# Patient Record
Sex: FEMALE | Race: WHITE | Employment: OTHER | URBAN - METROPOLITAN AREA
[De-identification: names, ages, dates, MRNs, and addresses within clinical notes are randomized per-mention and may not be internally consistent; named-entity substitution may affect disease eponyms.]

---

## 2017-02-01 RX ORDER — MONTELUKAST SODIUM 10 MG/1
TABLET ORAL
Qty: 30 TABLET | Refills: 5 | Status: SHIPPED | OUTPATIENT
Start: 2017-02-01 | End: 2017-07-31 | Stop reason: SDUPTHER

## 2017-02-14 ENCOUNTER — OFFICE VISIT (OUTPATIENT)
Dept: INTERNAL MEDICINE CLINIC | Age: 72
End: 2017-02-14

## 2017-02-14 VITALS
SYSTOLIC BLOOD PRESSURE: 130 MMHG | HEIGHT: 63 IN | DIASTOLIC BLOOD PRESSURE: 84 MMHG | RESPIRATION RATE: 16 BRPM | WEIGHT: 172 LBS | BODY MASS INDEX: 30.48 KG/M2 | OXYGEN SATURATION: 98 % | HEART RATE: 77 BPM

## 2017-02-14 DIAGNOSIS — L23.9 ALLERGIC DERMATITIS: ICD-10-CM

## 2017-02-14 DIAGNOSIS — K21.9 GASTROESOPHAGEAL REFLUX DISEASE WITHOUT ESOPHAGITIS: ICD-10-CM

## 2017-02-14 DIAGNOSIS — J44.9 CHRONIC OBSTRUCTIVE PULMONARY DISEASE, UNSPECIFIED COPD TYPE (HCC): ICD-10-CM

## 2017-02-14 LAB — HBA1C MFR BLD: 7 %

## 2017-02-14 PROCEDURE — 99214 OFFICE O/P EST MOD 30 MIN: CPT | Performed by: INTERNAL MEDICINE

## 2017-02-14 PROCEDURE — 83036 HEMOGLOBIN GLYCOSYLATED A1C: CPT | Performed by: INTERNAL MEDICINE

## 2017-02-14 RX ORDER — MOMETASONE FUROATE 1 MG/G
OINTMENT TOPICAL
Qty: 30 G | Refills: 1 | Status: SHIPPED | OUTPATIENT
Start: 2017-02-14 | End: 2019-08-21 | Stop reason: ALTCHOICE

## 2017-02-14 ASSESSMENT — ENCOUNTER SYMPTOMS
SORE THROAT: 0
GASTROINTESTINAL NEGATIVE: 1
COUGH: 0
SHORTNESS OF BREATH: 1
CHEST TIGHTNESS: 0
TROUBLE SWALLOWING: 0
WHEEZING: 0

## 2017-04-10 RX ORDER — GABAPENTIN 300 MG/1
CAPSULE ORAL
Qty: 90 CAPSULE | Refills: 5 | Status: SHIPPED | OUTPATIENT
Start: 2017-04-10 | End: 2018-05-02 | Stop reason: SDUPTHER

## 2017-05-16 ENCOUNTER — OFFICE VISIT (OUTPATIENT)
Dept: INTERNAL MEDICINE CLINIC | Age: 72
End: 2017-05-16

## 2017-05-16 VITALS
HEIGHT: 63 IN | WEIGHT: 167.2 LBS | HEART RATE: 72 BPM | RESPIRATION RATE: 16 BRPM | BODY MASS INDEX: 29.62 KG/M2 | DIASTOLIC BLOOD PRESSURE: 74 MMHG | SYSTOLIC BLOOD PRESSURE: 122 MMHG

## 2017-05-16 DIAGNOSIS — L23.9 ALLERGIC DERMATITIS: ICD-10-CM

## 2017-05-16 DIAGNOSIS — E78.5 HYPERLIPIDEMIA, UNSPECIFIED HYPERLIPIDEMIA TYPE: ICD-10-CM

## 2017-05-16 DIAGNOSIS — I10 ESSENTIAL HYPERTENSION: ICD-10-CM

## 2017-05-16 LAB
ALBUMIN SERPL-MCNC: 4.1 G/DL (ref 3.4–5)
ALP BLD-CCNC: 35 U/L (ref 40–129)
ALT SERPL-CCNC: 17 U/L (ref 10–40)
ANION GAP SERPL CALCULATED.3IONS-SCNC: 14 MMOL/L (ref 3–16)
AST SERPL-CCNC: 18 U/L (ref 15–37)
BASOPHILS ABSOLUTE: 0 K/UL (ref 0–0.2)
BASOPHILS RELATIVE PERCENT: 0.2 %
BILIRUB SERPL-MCNC: 0.4 MG/DL (ref 0–1)
BILIRUBIN DIRECT: <0.2 MG/DL (ref 0–0.3)
BILIRUBIN, INDIRECT: ABNORMAL MG/DL (ref 0–1)
BUN BLDV-MCNC: 15 MG/DL (ref 7–20)
CALCIUM SERPL-MCNC: 9.5 MG/DL (ref 8.3–10.6)
CHLORIDE BLD-SCNC: 99 MMOL/L (ref 99–110)
CHOLESTEROL, TOTAL: 127 MG/DL (ref 0–199)
CO2: 24 MMOL/L (ref 21–32)
CREAT SERPL-MCNC: 0.9 MG/DL (ref 0.6–1.2)
EOSINOPHILS ABSOLUTE: 0.2 K/UL (ref 0–0.6)
EOSINOPHILS RELATIVE PERCENT: 2.5 %
GFR AFRICAN AMERICAN: >60
GFR NON-AFRICAN AMERICAN: >60
GLUCOSE BLD-MCNC: 130 MG/DL (ref 70–99)
HBA1C MFR BLD: 6.9 %
HCT VFR BLD CALC: 38.4 % (ref 36–48)
HDLC SERPL-MCNC: 35 MG/DL (ref 40–60)
HEMOGLOBIN: 12.4 G/DL (ref 12–16)
LDL CHOLESTEROL CALCULATED: 60 MG/DL
LYMPHOCYTES ABSOLUTE: 2 K/UL (ref 1–5.1)
LYMPHOCYTES RELATIVE PERCENT: 31.3 %
MCH RBC QN AUTO: 30.1 PG (ref 26–34)
MCHC RBC AUTO-ENTMCNC: 32.3 G/DL (ref 31–36)
MCV RBC AUTO: 93.1 FL (ref 80–100)
MONOCYTES ABSOLUTE: 0.5 K/UL (ref 0–1.3)
MONOCYTES RELATIVE PERCENT: 7.5 %
NEUTROPHILS ABSOLUTE: 3.7 K/UL (ref 1.7–7.7)
NEUTROPHILS RELATIVE PERCENT: 58.5 %
PDW BLD-RTO: 14.5 % (ref 12.4–15.4)
PLATELET # BLD: 225 K/UL (ref 135–450)
PMV BLD AUTO: 9.1 FL (ref 5–10.5)
POTASSIUM SERPL-SCNC: 4.2 MMOL/L (ref 3.5–5.1)
RBC # BLD: 4.12 M/UL (ref 4–5.2)
SODIUM BLD-SCNC: 137 MMOL/L (ref 136–145)
TOTAL PROTEIN: 6.3 G/DL (ref 6.4–8.2)
TRIGL SERPL-MCNC: 160 MG/DL (ref 0–150)
VLDLC SERPL CALC-MCNC: 32 MG/DL
WBC # BLD: 6.3 K/UL (ref 4–11)

## 2017-05-16 PROCEDURE — 99213 OFFICE O/P EST LOW 20 MIN: CPT | Performed by: INTERNAL MEDICINE

## 2017-05-16 PROCEDURE — 83036 HEMOGLOBIN GLYCOSYLATED A1C: CPT | Performed by: INTERNAL MEDICINE

## 2017-05-16 PROCEDURE — 3288F FALL RISK ASSESSMENT DOCD: CPT | Performed by: INTERNAL MEDICINE

## 2017-05-16 PROCEDURE — 36415 COLL VENOUS BLD VENIPUNCTURE: CPT | Performed by: INTERNAL MEDICINE

## 2017-05-16 PROCEDURE — G8510 SCR DEP NEG, NO PLAN REQD: HCPCS | Performed by: INTERNAL MEDICINE

## 2017-05-16 ASSESSMENT — ENCOUNTER SYMPTOMS
COUGH: 0
WHEEZING: 0
GASTROINTESTINAL NEGATIVE: 1
CHEST TIGHTNESS: 0
SHORTNESS OF BREATH: 0

## 2017-05-16 ASSESSMENT — PATIENT HEALTH QUESTIONNAIRE - PHQ9
SUM OF ALL RESPONSES TO PHQ QUESTIONS 1-9: 0
SUM OF ALL RESPONSES TO PHQ9 QUESTIONS 1 & 2: 0
2. FEELING DOWN, DEPRESSED OR HOPELESS: 0
1. LITTLE INTEREST OR PLEASURE IN DOING THINGS: 0

## 2017-05-25 RX ORDER — LOSARTAN POTASSIUM 100 MG/1
TABLET ORAL
Qty: 90 TABLET | Refills: 1 | Status: SHIPPED | OUTPATIENT
Start: 2017-05-25 | End: 2017-11-21 | Stop reason: SDUPTHER

## 2017-06-14 ENCOUNTER — TELEPHONE (OUTPATIENT)
Dept: INTERNAL MEDICINE CLINIC | Age: 72
End: 2017-06-14

## 2017-07-31 RX ORDER — MONTELUKAST SODIUM 10 MG/1
TABLET ORAL
Qty: 30 TABLET | Refills: 5 | Status: SHIPPED | OUTPATIENT
Start: 2017-07-31 | End: 2018-01-24 | Stop reason: SDUPTHER

## 2017-08-23 ENCOUNTER — OFFICE VISIT (OUTPATIENT)
Dept: INTERNAL MEDICINE CLINIC | Age: 72
End: 2017-08-23

## 2017-08-23 VITALS
BODY MASS INDEX: 29.62 KG/M2 | DIASTOLIC BLOOD PRESSURE: 80 MMHG | RESPIRATION RATE: 16 BRPM | HEART RATE: 75 BPM | SYSTOLIC BLOOD PRESSURE: 120 MMHG | HEIGHT: 63 IN | WEIGHT: 167.2 LBS | OXYGEN SATURATION: 96 %

## 2017-08-23 DIAGNOSIS — I10 ESSENTIAL HYPERTENSION: ICD-10-CM

## 2017-08-23 LAB — HBA1C MFR BLD: 7.8 %

## 2017-08-23 PROCEDURE — 99213 OFFICE O/P EST LOW 20 MIN: CPT | Performed by: INTERNAL MEDICINE

## 2017-08-23 PROCEDURE — 83036 HEMOGLOBIN GLYCOSYLATED A1C: CPT | Performed by: INTERNAL MEDICINE

## 2017-08-23 ASSESSMENT — ENCOUNTER SYMPTOMS
SORE THROAT: 0
WHEEZING: 0
TROUBLE SWALLOWING: 0
SHORTNESS OF BREATH: 0
CHEST TIGHTNESS: 0
COUGH: 0
GASTROINTESTINAL NEGATIVE: 1

## 2017-10-11 ENCOUNTER — HOSPITAL ENCOUNTER (OUTPATIENT)
Dept: WOMENS IMAGING | Age: 72
Discharge: HOME | End: 2017-10-11
Attending: INTERNAL MEDICINE | Admitting: INTERNAL MEDICINE

## 2017-10-11 DIAGNOSIS — Z12.31 VISIT FOR SCREENING MAMMOGRAM: ICD-10-CM

## 2017-11-03 ENCOUNTER — OFFICE VISIT (OUTPATIENT)
Dept: INTERNAL MEDICINE CLINIC | Age: 72
End: 2017-11-03

## 2017-11-03 VITALS
HEART RATE: 66 BPM | BODY MASS INDEX: 29.91 KG/M2 | HEIGHT: 63 IN | OXYGEN SATURATION: 92 % | RESPIRATION RATE: 16 BRPM | WEIGHT: 168.8 LBS | SYSTOLIC BLOOD PRESSURE: 134 MMHG | DIASTOLIC BLOOD PRESSURE: 74 MMHG

## 2017-11-03 DIAGNOSIS — J98.01 BRONCHOSPASM: Primary | ICD-10-CM

## 2017-11-03 DIAGNOSIS — J20.8 ACUTE BRONCHITIS DUE TO OTHER SPECIFIED ORGANISMS: ICD-10-CM

## 2017-11-03 PROCEDURE — 99213 OFFICE O/P EST LOW 20 MIN: CPT | Performed by: INTERNAL MEDICINE

## 2017-11-03 RX ORDER — ALBUTEROL SULFATE 90 UG/1
2 AEROSOL, METERED RESPIRATORY (INHALATION)
Qty: 1 INHALER | Refills: 1 | Status: SHIPPED | OUTPATIENT
Start: 2017-11-03 | End: 2018-10-15 | Stop reason: SDUPTHER

## 2017-11-03 RX ORDER — PREDNISONE 10 MG/1
TABLET ORAL
Qty: 35 TABLET | Refills: 0 | Status: SHIPPED | OUTPATIENT
Start: 2017-11-03 | End: 2018-04-04 | Stop reason: ALTCHOICE

## 2017-11-03 RX ORDER — AZITHROMYCIN 250 MG/1
TABLET, FILM COATED ORAL
Qty: 1 PACKET | Refills: 0 | Status: SHIPPED | OUTPATIENT
Start: 2017-11-03 | End: 2017-11-13

## 2017-11-03 ASSESSMENT — ENCOUNTER SYMPTOMS: TROUBLE SWALLOWING: 0

## 2017-11-03 NOTE — PROGRESS NOTES
Subjective:      Patient ID: Marizol Carl is a 67 y.o. female. HPIfor 3 days started having cough and wheezing and difficulty braething  Has no sputum and has white nasal congestion and drainage  Felt feverish but did not take temp  Right  side of chest hurts when she pushes since her lung biopsy  Review of Systems   Constitutional: Negative for activity change, appetite change and unexpected weight change. HENT: Negative for trouble swallowing. Cardiovascular: Negative for chest pain, palpitations and leg swelling. Neurological: Negative for dizziness, light-headedness and headaches. has urinary frequency of small amounts for 1 month and has no pain or burning and has it all night and day  No incontinence  Objective:   Physical Exam   Constitutional: She is oriented to person, place, and time. No distress. HENT:   Nose: Nose normal.   Mouth/Throat: Oropharynx is clear and moist. No oropharyngeal exudate. Eyes: Conjunctivae and EOM are normal. Pupils are equal, round, and reactive to light. No scleral icterus. Neck: No JVD present. No thyromegaly present. Cardiovascular: Normal rate, regular rhythm and normal heart sounds. Exam reveals no gallop. No murmur heard. Pulmonary/Chest: No respiratory distress. Wheezes: has expiratory wheezes both luungs more with forced expiration. She has rales (few at bases and decaresed bs both lungs at bases). Musculoskeletal: She exhibits no edema. Lymphadenopathy:     She has no cervical adenopathy. Neurological: She is alert and oriented to person, place, and time. Nursing note and vitals reviewed. Assessment:      Acute bronchitis with exacerbation of asthma with bronchospasm      Plan:    start on zithromax  Start on albuterol inhaler 2 puffs every 4-6 hours as needed for wheezing spells. Start on prednisone in tapering doses.     See in 10 days

## 2017-11-06 RX ORDER — FENOFIBRATE 160 MG/1
TABLET ORAL
Qty: 90 TABLET | Refills: 1 | Status: SHIPPED | OUTPATIENT
Start: 2017-11-06 | End: 2018-05-05 | Stop reason: SDUPTHER

## 2017-11-13 ENCOUNTER — OFFICE VISIT (OUTPATIENT)
Dept: INTERNAL MEDICINE CLINIC | Age: 72
End: 2017-11-13

## 2017-11-13 ENCOUNTER — HOSPITAL ENCOUNTER (OUTPATIENT)
Dept: OTHER | Age: 72
Discharge: HOME | End: 2017-11-13
Attending: INTERNAL MEDICINE | Admitting: INTERNAL MEDICINE

## 2017-11-13 VITALS
SYSTOLIC BLOOD PRESSURE: 130 MMHG | DIASTOLIC BLOOD PRESSURE: 74 MMHG | HEART RATE: 70 BPM | HEIGHT: 63 IN | BODY MASS INDEX: 29.23 KG/M2 | OXYGEN SATURATION: 95 % | RESPIRATION RATE: 16 BRPM | WEIGHT: 165 LBS

## 2017-11-13 DIAGNOSIS — J44.1 ACUTE EXACERBATION OF CHRONIC OBSTRUCTIVE PULMONARY DISEASE (COPD) (HCC): ICD-10-CM

## 2017-11-13 DIAGNOSIS — R07.81 RIB PAIN ON RIGHT SIDE: ICD-10-CM

## 2017-11-13 LAB — HBA1C MFR BLD: 6.9 %

## 2017-11-13 PROCEDURE — 93000 ELECTROCARDIOGRAM COMPLETE: CPT | Performed by: INTERNAL MEDICINE

## 2017-11-13 PROCEDURE — 83036 HEMOGLOBIN GLYCOSYLATED A1C: CPT | Performed by: INTERNAL MEDICINE

## 2017-11-13 PROCEDURE — 99214 OFFICE O/P EST MOD 30 MIN: CPT | Performed by: INTERNAL MEDICINE

## 2017-11-13 RX ORDER — LEVOFLOXACIN 500 MG/1
500 TABLET, FILM COATED ORAL DAILY
Qty: 5 TABLET | Refills: 0 | Status: SHIPPED | OUTPATIENT
Start: 2017-11-13 | End: 2017-11-18

## 2017-11-13 RX ORDER — BUDESONIDE AND FORMOTEROL FUMARATE DIHYDRATE 160; 4.5 UG/1; UG/1
2 AEROSOL RESPIRATORY (INHALATION) 2 TIMES DAILY
Qty: 1 INHALER | Refills: 3 | Status: SHIPPED | OUTPATIENT
Start: 2017-11-13 | End: 2018-04-04

## 2017-11-13 ASSESSMENT — ENCOUNTER SYMPTOMS
CHEST TIGHTNESS: 1
COUGH: 1
WHEEZING: 1
SHORTNESS OF BREATH: 1
GASTROINTESTINAL NEGATIVE: 1
SORE THROAT: 1
TROUBLE SWALLOWING: 0

## 2017-11-13 NOTE — PROGRESS NOTES
Subjective:      Patient ID: Danilo White is a 67 y.o. female. HPI  Still has problems breathing and day and night and has cough with white sputum and is less than last time she was here   Has no fever or chills and has no orthopnea or pnd and sleeps well  Has no chest pain or heaviness in chest and has slight burning under her chest and is there all the time and not worse with eating or exertion  Has no heart burns  Has no other gi or gu symptoms. Review of Systems   Constitutional: Negative for chills and fever. HENT: Positive for sore throat. Negative for trouble swallowing. Sneezing: left side and hurst slightly with swallowing. Respiratory: Positive for cough, chest tightness, shortness of breath and wheezing. Cardiovascular: Negative for chest pain, palpitations and leg swelling. Gastrointestinal: Negative. Genitourinary: Negative. Neurological: Negative for dizziness, light-headedness and headaches. Objective:   Physical Exam   Constitutional: She is oriented to person, place, and time. No distress. HENT:   Nose: Nose normal.   Mouth/Throat: No oropharyngeal exudate. Eyes: Conjunctivae and EOM are normal. Pupils are equal, round, and reactive to light. No scleral icterus. Neck: No JVD present. No thyromegaly present. Cardiovascular: Normal rate, regular rhythm, normal heart sounds and intact distal pulses. Exam reveals no gallop. No murmur heard. Pulmonary/Chest: No respiratory distress. She has wheezes (both lungs and more with forced expiration and few scattered rhinch also heard both lungs). She has no rales. She exhibits tenderness (has rigth chest wall tenderness in infra axillary and lower anterior ribs). Musculoskeletal: She exhibits no edema. Lymphadenopathy:     She has no cervical adenopathy. Neurological: She is alert and oriented to person, place, and time. Nursing note and vitals reviewed.     ekg is normal  Assessment:      Exacerbation of copd

## 2017-11-13 NOTE — PATIENT INSTRUCTIONS
Will  get chest xray and xray of right ribs    Start on steroid inhaler -symbocort  Continue albuterol inhaler as needed for symptoms control  See in  10 days or sooner if symptoms get any worse.

## 2017-11-21 RX ORDER — LOSARTAN POTASSIUM 100 MG/1
TABLET ORAL
Qty: 90 TABLET | Refills: 1 | Status: SHIPPED | OUTPATIENT
Start: 2017-11-21 | End: 2018-05-20 | Stop reason: SDUPTHER

## 2017-11-27 ENCOUNTER — TELEPHONE (OUTPATIENT)
Dept: INTERNAL MEDICINE CLINIC | Age: 72
End: 2017-11-27

## 2017-11-27 NOTE — TELEPHONE ENCOUNTER
Transitional Care Management Service-  Initial Post-discharge Communication     Date of discharge: 11-27-17  Facility: [place] [id number] services provided:  · Scheduled appointment with [facility] appt

## 2017-12-04 ENCOUNTER — OFFICE VISIT (OUTPATIENT)
Dept: INTERNAL MEDICINE CLINIC | Age: 72
End: 2017-12-04

## 2017-12-04 VITALS
SYSTOLIC BLOOD PRESSURE: 130 MMHG | WEIGHT: 164 LBS | HEART RATE: 63 BPM | OXYGEN SATURATION: 98 % | HEIGHT: 63 IN | DIASTOLIC BLOOD PRESSURE: 80 MMHG | RESPIRATION RATE: 16 BRPM | BODY MASS INDEX: 29.06 KG/M2

## 2017-12-04 DIAGNOSIS — I10 ESSENTIAL HYPERTENSION: Primary | ICD-10-CM

## 2017-12-04 DIAGNOSIS — J44.9 CHRONIC OBSTRUCTIVE PULMONARY DISEASE, UNSPECIFIED COPD TYPE (HCC): ICD-10-CM

## 2017-12-04 DIAGNOSIS — Z23 NEED FOR INFLUENZA VACCINATION: ICD-10-CM

## 2017-12-04 LAB
CREATININE URINE: 224.2 MG/DL (ref 28–259)
MICROALBUMIN UR-MCNC: <1.2 MG/DL
MICROALBUMIN/CREAT UR-RTO: NORMAL MG/G (ref 0–30)

## 2017-12-04 PROCEDURE — 99213 OFFICE O/P EST LOW 20 MIN: CPT | Performed by: INTERNAL MEDICINE

## 2017-12-04 PROCEDURE — 90662 IIV NO PRSV INCREASED AG IM: CPT | Performed by: INTERNAL MEDICINE

## 2017-12-04 PROCEDURE — G0008 ADMIN INFLUENZA VIRUS VAC: HCPCS | Performed by: INTERNAL MEDICINE

## 2017-12-04 ASSESSMENT — ENCOUNTER SYMPTOMS
CHEST TIGHTNESS: 0
GASTROINTESTINAL NEGATIVE: 1
WHEEZING: 0
SHORTNESS OF BREATH: 0
COUGH: 0

## 2017-12-04 NOTE — PROGRESS NOTES
Subjective:      Patient ID: Alex Silverman is a 67 y.o. female.htn diabetes    HPI  Feels fien and has no cough or rib pain and xcr showed infiltrate and rib xrays are negative for fx  She has no cough sputum or dyspnea or wheezing  Has no symptoms of low blood sugars and are around 122 and 110 at home  Review of Systems   Constitutional: Negative for activity change and appetite change. Respiratory: Negative for cough, chest tightness, shortness of breath and wheezing. Cardiovascular: Negative for chest pain, palpitations and leg swelling. Gastrointestinal: Negative. Genitourinary: Negative. Neurological: Negative for dizziness, light-headedness and headaches. Psychiatric/Behavioral: Negative for dysphoric mood. Objective:   Physical Exam   Constitutional: She is oriented to person, place, and time. No distress. Eyes: Conjunctivae and EOM are normal. Pupils are equal, round, and reactive to light. No scleral icterus. Neck: No JVD present. No thyromegaly present. Cardiovascular: Normal rate, regular rhythm, normal heart sounds and intact distal pulses. Exam reveals no gallop. No murmur heard. Pulmonary/Chest: Breath sounds normal. No respiratory distress. She has no wheezes. She has no rales. Musculoskeletal: She exhibits no edema. Lymphadenopathy:     She has no cervical adenopathy. Neurological: She is alert and oriented to person, place, and time. Nursing note and vitals reviewed.       Assessment:      htn controlled  Diabetes controlled  Copd stable      Plan:      Continue current medications  See in  3 months

## 2018-01-24 RX ORDER — MONTELUKAST SODIUM 10 MG/1
TABLET ORAL
Qty: 30 TABLET | Refills: 5 | Status: SHIPPED | OUTPATIENT
Start: 2018-01-24 | End: 2018-10-06 | Stop reason: SDUPTHER

## 2018-01-25 RX ORDER — SPIRONOLACTONE 25 MG/1
TABLET ORAL
Qty: 90 TABLET | Refills: 1 | Status: SHIPPED | OUTPATIENT
Start: 2018-01-25 | End: 2019-08-07 | Stop reason: SINTOL

## 2018-02-20 ENCOUNTER — OFFICE VISIT (OUTPATIENT)
Dept: INTERNAL MEDICINE CLINIC | Age: 73
End: 2018-02-20

## 2018-02-20 VITALS
RESPIRATION RATE: 16 BRPM | OXYGEN SATURATION: 97 % | SYSTOLIC BLOOD PRESSURE: 146 MMHG | DIASTOLIC BLOOD PRESSURE: 72 MMHG | HEIGHT: 63 IN | WEIGHT: 159 LBS | BODY MASS INDEX: 28.17 KG/M2 | HEART RATE: 64 BPM

## 2018-02-20 DIAGNOSIS — L23.9 ALLERGIC DERMATITIS: Primary | ICD-10-CM

## 2018-02-20 PROCEDURE — 99213 OFFICE O/P EST LOW 20 MIN: CPT | Performed by: INTERNAL MEDICINE

## 2018-02-20 RX ORDER — METHYLPREDNISOLONE 4 MG/1
TABLET ORAL
Qty: 1 KIT | Refills: 0 | Status: SHIPPED | OUTPATIENT
Start: 2018-02-20 | End: 2018-02-26

## 2018-02-20 RX ORDER — CETIRIZINE HYDROCHLORIDE 10 MG/1
10 TABLET ORAL DAILY
Qty: 15 TABLET | Refills: 0 | Status: ON HOLD
Start: 2018-02-20 | End: 2020-09-22 | Stop reason: HOSPADM

## 2018-02-20 RX ORDER — RANITIDINE 150 MG/1
150 TABLET ORAL 2 TIMES DAILY
Qty: 30 TABLET | Refills: 0 | Status: SHIPPED | OUTPATIENT
Start: 2018-02-20 | End: 2018-03-03 | Stop reason: SDUPTHER

## 2018-02-20 NOTE — PATIENT INSTRUCTIONS
Start taking zyrtec 1 tab daily and start on medrol dose pack and take as directed on packet and zantac   Follow up with dermatology as scheduled before.

## 2018-03-03 RX ORDER — RANITIDINE 150 MG/1
TABLET ORAL
Qty: 30 TABLET | Refills: 5 | Status: SHIPPED | OUTPATIENT
Start: 2018-03-03 | End: 2018-04-04 | Stop reason: ALTCHOICE

## 2018-04-04 ENCOUNTER — OFFICE VISIT (OUTPATIENT)
Dept: INTERNAL MEDICINE CLINIC | Age: 73
End: 2018-04-04

## 2018-04-04 VITALS
SYSTOLIC BLOOD PRESSURE: 130 MMHG | HEIGHT: 63 IN | RESPIRATION RATE: 16 BRPM | BODY MASS INDEX: 29.06 KG/M2 | WEIGHT: 164 LBS | DIASTOLIC BLOOD PRESSURE: 70 MMHG

## 2018-04-04 DIAGNOSIS — M54.16 LEFT LUMBAR RADICULOPATHY: ICD-10-CM

## 2018-04-04 DIAGNOSIS — M70.62 TROCHANTERIC BURSITIS OF LEFT HIP: ICD-10-CM

## 2018-04-04 DIAGNOSIS — I10 ESSENTIAL HYPERTENSION: ICD-10-CM

## 2018-04-04 LAB — HBA1C MFR BLD: 6.9 %

## 2018-04-04 PROCEDURE — 99214 OFFICE O/P EST MOD 30 MIN: CPT | Performed by: INTERNAL MEDICINE

## 2018-04-04 PROCEDURE — 83036 HEMOGLOBIN GLYCOSYLATED A1C: CPT | Performed by: INTERNAL MEDICINE

## 2018-04-04 ASSESSMENT — ENCOUNTER SYMPTOMS
GASTROINTESTINAL NEGATIVE: 1
CHEST TIGHTNESS: 0
CHOKING: 0
WHEEZING: 0
TROUBLE SWALLOWING: 0
SHORTNESS OF BREATH: 1

## 2018-04-05 ENCOUNTER — NURSE ONLY (OUTPATIENT)
Dept: INTERNAL MEDICINE CLINIC | Age: 73
End: 2018-04-05

## 2018-04-05 DIAGNOSIS — M70.62 TROCHANTERIC BURSITIS OF LEFT HIP: Primary | ICD-10-CM

## 2018-04-05 PROCEDURE — 99212 OFFICE O/P EST SF 10 MIN: CPT | Performed by: INTERNAL MEDICINE

## 2018-04-05 PROCEDURE — 20610 DRAIN/INJ JOINT/BURSA W/O US: CPT | Performed by: INTERNAL MEDICINE

## 2018-04-05 RX ORDER — METHYLPREDNISOLONE ACETATE 40 MG/ML
40 INJECTION, SUSPENSION INTRA-ARTICULAR; INTRALESIONAL; INTRAMUSCULAR; SOFT TISSUE ONCE
Status: CANCELLED | OUTPATIENT
Start: 2018-04-05 | End: 2018-04-05

## 2018-04-05 RX ORDER — METHYLPREDNISOLONE ACETATE 40 MG/ML
40 INJECTION, SUSPENSION INTRA-ARTICULAR; INTRALESIONAL; INTRAMUSCULAR; SOFT TISSUE ONCE
Status: COMPLETED | OUTPATIENT
Start: 2018-04-05 | End: 2018-04-05

## 2018-04-05 RX ADMIN — METHYLPREDNISOLONE ACETATE 40 MG: 40 INJECTION, SUSPENSION INTRA-ARTICULAR; INTRALESIONAL; INTRAMUSCULAR; SOFT TISSUE at 11:42

## 2018-04-06 ENCOUNTER — TELEPHONE (OUTPATIENT)
Dept: INTERNAL MEDICINE CLINIC | Age: 73
End: 2018-04-06

## 2018-04-06 DIAGNOSIS — M54.16 LEFT LUMBAR RADICULOPATHY: Primary | ICD-10-CM

## 2018-05-02 RX ORDER — GABAPENTIN 300 MG/1
CAPSULE ORAL
Qty: 90 CAPSULE | Refills: 5 | Status: ON HOLD | OUTPATIENT
Start: 2018-05-02 | End: 2020-09-22

## 2018-05-07 RX ORDER — FENOFIBRATE 160 MG/1
TABLET ORAL
Qty: 90 TABLET | Refills: 3 | Status: SHIPPED | OUTPATIENT
Start: 2018-05-07 | End: 2019-04-30 | Stop reason: SDUPTHER

## 2018-05-11 ENCOUNTER — HOSPITAL ENCOUNTER (OUTPATIENT)
Dept: ENDOSCOPY | Age: 73
Discharge: HOME | End: 2018-05-11
Attending: ANESTHESIOLOGY | Admitting: ANESTHESIOLOGY

## 2018-05-11 VITALS
RESPIRATION RATE: 18 BRPM | DIASTOLIC BLOOD PRESSURE: 84 MMHG | OXYGEN SATURATION: 98 % | HEART RATE: 60 BPM | SYSTOLIC BLOOD PRESSURE: 125 MMHG | TEMPERATURE: 96.7 F

## 2018-05-11 DIAGNOSIS — M54.17 RADICULOPATHY OF LUMBOSACRAL REGION: ICD-10-CM

## 2018-05-11 LAB
GLUCOSE BLD-MCNC: 186 MG/DL (ref 70–99)
PERFORMED ON: ABNORMAL

## 2018-05-11 RX ORDER — LIDOCAINE HYDROCHLORIDE 10 MG/ML
5 INJECTION, SOLUTION EPIDURAL; INFILTRATION; INTRACAUDAL; PERINEURAL ONCE
Status: COMPLETED | OUTPATIENT
Start: 2018-05-11 | End: 2018-05-11

## 2018-05-11 RX ORDER — METHYLPREDNISOLONE ACETATE 80 MG/ML
80 INJECTION, SUSPENSION INTRA-ARTICULAR; INTRALESIONAL; INTRAMUSCULAR; SOFT TISSUE ONCE
Status: COMPLETED | OUTPATIENT
Start: 2018-05-11 | End: 2018-05-11

## 2018-05-11 RX ADMIN — LIDOCAINE HYDROCHLORIDE 5 ML: 10 INJECTION, SOLUTION EPIDURAL; INFILTRATION; INTRACAUDAL; PERINEURAL at 10:26

## 2018-05-11 RX ADMIN — METHYLPREDNISOLONE ACETATE 80 MG: 80 INJECTION, SUSPENSION INTRA-ARTICULAR; INTRALESIONAL; INTRAMUSCULAR; SOFT TISSUE at 10:27

## 2018-05-11 ASSESSMENT — PAIN DESCRIPTION - LOCATION
LOCATION: LEG
LOCATION: LEG

## 2018-05-11 ASSESSMENT — PAIN DESCRIPTION - PAIN TYPE: TYPE: CHRONIC PAIN

## 2018-05-11 ASSESSMENT — PAIN DESCRIPTION - ORIENTATION
ORIENTATION: RIGHT;LEFT
ORIENTATION: RIGHT

## 2018-05-11 ASSESSMENT — PAIN SCALES - GENERAL: PAINLEVEL_OUTOF10: 5

## 2018-05-11 ASSESSMENT — PAIN DESCRIPTION - DESCRIPTORS
DESCRIPTORS: RADIATING
DESCRIPTORS: SHARP
DESCRIPTORS: RADIATING

## 2018-05-11 ASSESSMENT — PAIN - FUNCTIONAL ASSESSMENT: PAIN_FUNCTIONAL_ASSESSMENT: 0-10

## 2018-05-11 ASSESSMENT — PAIN DESCRIPTION - FREQUENCY: FREQUENCY: CONTINUOUS

## 2018-05-21 RX ORDER — LOSARTAN POTASSIUM 100 MG/1
TABLET ORAL
Qty: 90 TABLET | Refills: 1 | Status: SHIPPED | OUTPATIENT
Start: 2018-05-21 | End: 2018-11-17 | Stop reason: SDUPTHER

## 2018-05-29 ENCOUNTER — OFFICE VISIT (OUTPATIENT)
Dept: INTERNAL MEDICINE CLINIC | Age: 73
End: 2018-05-29

## 2018-05-29 ENCOUNTER — HOSPITAL ENCOUNTER (OUTPATIENT)
Dept: OTHER | Age: 73
Discharge: HOME | End: 2018-05-29
Attending: INTERNAL MEDICINE | Admitting: INTERNAL MEDICINE

## 2018-05-29 VITALS
HEIGHT: 63 IN | WEIGHT: 159 LBS | DIASTOLIC BLOOD PRESSURE: 62 MMHG | RESPIRATION RATE: 16 BRPM | OXYGEN SATURATION: 92 % | BODY MASS INDEX: 28.17 KG/M2 | HEART RATE: 72 BPM | TEMPERATURE: 99 F | SYSTOLIC BLOOD PRESSURE: 120 MMHG

## 2018-05-29 DIAGNOSIS — J44.1 ACUTE EXACERBATION OF CHRONIC OBSTRUCTIVE PULMONARY DISEASE (COPD) (HCC): Primary | ICD-10-CM

## 2018-05-29 DIAGNOSIS — J18.9 PNEUMONIA OF LEFT LOWER LOBE DUE TO INFECTIOUS ORGANISM: ICD-10-CM

## 2018-05-29 DIAGNOSIS — J44.1 ACUTE EXACERBATION OF CHRONIC OBSTRUCTIVE PULMONARY DISEASE (COPD) (HCC): ICD-10-CM

## 2018-05-29 PROCEDURE — 3288F FALL RISK ASSESSMENT DOCD: CPT | Performed by: INTERNAL MEDICINE

## 2018-05-29 PROCEDURE — 99213 OFFICE O/P EST LOW 20 MIN: CPT | Performed by: INTERNAL MEDICINE

## 2018-05-29 PROCEDURE — 36415 COLL VENOUS BLD VENIPUNCTURE: CPT | Performed by: INTERNAL MEDICINE

## 2018-05-29 PROCEDURE — G8510 SCR DEP NEG, NO PLAN REQD: HCPCS | Performed by: INTERNAL MEDICINE

## 2018-05-29 RX ORDER — LEVOFLOXACIN 750 MG/1
750 TABLET ORAL DAILY
Qty: 7 TABLET | Refills: 0 | Status: SHIPPED | OUTPATIENT
Start: 2018-05-29 | End: 2018-06-05

## 2018-05-29 ASSESSMENT — PATIENT HEALTH QUESTIONNAIRE - PHQ9
1. LITTLE INTEREST OR PLEASURE IN DOING THINGS: 0
SUM OF ALL RESPONSES TO PHQ9 QUESTIONS 1 & 2: 0
SUM OF ALL RESPONSES TO PHQ QUESTIONS 1-9: 0
2. FEELING DOWN, DEPRESSED OR HOPELESS: 0

## 2018-05-30 LAB
ANION GAP SERPL CALCULATED.3IONS-SCNC: 15 MMOL/L (ref 3–16)
BASOPHILS ABSOLUTE: 0 K/UL (ref 0–0.2)
BASOPHILS RELATIVE PERCENT: 0.3 %
BUN BLDV-MCNC: 19 MG/DL (ref 7–20)
CALCIUM SERPL-MCNC: 9.7 MG/DL (ref 8.3–10.6)
CHLORIDE BLD-SCNC: 100 MMOL/L (ref 99–110)
CO2: 23 MMOL/L (ref 21–32)
CREAT SERPL-MCNC: 0.8 MG/DL (ref 0.6–1.2)
EOSINOPHILS ABSOLUTE: 0.5 K/UL (ref 0–0.6)
EOSINOPHILS RELATIVE PERCENT: 5.7 %
GFR AFRICAN AMERICAN: >60
GFR NON-AFRICAN AMERICAN: >60
GLUCOSE BLD-MCNC: 142 MG/DL (ref 70–99)
HCT VFR BLD CALC: 40.9 % (ref 36–48)
HEMOGLOBIN: 14 G/DL (ref 12–16)
LYMPHOCYTES ABSOLUTE: 2.6 K/UL (ref 1–5.1)
LYMPHOCYTES RELATIVE PERCENT: 30.5 %
MCH RBC QN AUTO: 32.9 PG (ref 26–34)
MCHC RBC AUTO-ENTMCNC: 34.2 G/DL (ref 31–36)
MCV RBC AUTO: 96 FL (ref 80–100)
MONOCYTES ABSOLUTE: 0.6 K/UL (ref 0–1.3)
MONOCYTES RELATIVE PERCENT: 7.3 %
NEUTROPHILS ABSOLUTE: 4.8 K/UL (ref 1.7–7.7)
NEUTROPHILS RELATIVE PERCENT: 56.2 %
PDW BLD-RTO: 13.5 % (ref 12.4–15.4)
PLATELET # BLD: 178 K/UL (ref 135–450)
PMV BLD AUTO: 8.8 FL (ref 5–10.5)
POTASSIUM SERPL-SCNC: 4.5 MMOL/L (ref 3.5–5.1)
RBC # BLD: 4.26 M/UL (ref 4–5.2)
SODIUM BLD-SCNC: 138 MMOL/L (ref 136–145)
WBC # BLD: 8.5 K/UL (ref 4–11)

## 2018-06-01 RX ORDER — RANITIDINE 150 MG/1
TABLET ORAL
Qty: 60 TABLET | Refills: 3 | Status: SHIPPED | OUTPATIENT
Start: 2018-06-01 | End: 2018-06-04

## 2018-06-04 ENCOUNTER — OFFICE VISIT (OUTPATIENT)
Dept: INTERNAL MEDICINE CLINIC | Age: 73
End: 2018-06-04

## 2018-06-04 VITALS
RESPIRATION RATE: 16 BRPM | HEIGHT: 63 IN | SYSTOLIC BLOOD PRESSURE: 134 MMHG | DIASTOLIC BLOOD PRESSURE: 74 MMHG | HEART RATE: 88 BPM | BODY MASS INDEX: 28 KG/M2 | OXYGEN SATURATION: 93 % | WEIGHT: 158 LBS

## 2018-06-04 DIAGNOSIS — J44.1 ACUTE EXACERBATION OF CHRONIC OBSTRUCTIVE PULMONARY DISEASE (COPD) (HCC): Primary | ICD-10-CM

## 2018-06-04 PROCEDURE — 99213 OFFICE O/P EST LOW 20 MIN: CPT | Performed by: INTERNAL MEDICINE

## 2018-06-04 RX ORDER — PREDNISONE 10 MG/1
TABLET ORAL
Qty: 32 TABLET | Refills: 0 | Status: SHIPPED | OUTPATIENT
Start: 2018-06-04 | End: 2018-07-09

## 2018-06-11 ENCOUNTER — OFFICE VISIT (OUTPATIENT)
Dept: INTERNAL MEDICINE CLINIC | Age: 73
End: 2018-06-11

## 2018-06-11 VITALS
BODY MASS INDEX: 28.35 KG/M2 | HEIGHT: 63 IN | RESPIRATION RATE: 16 BRPM | OXYGEN SATURATION: 98 % | HEART RATE: 71 BPM | SYSTOLIC BLOOD PRESSURE: 130 MMHG | DIASTOLIC BLOOD PRESSURE: 72 MMHG | WEIGHT: 160 LBS

## 2018-06-11 DIAGNOSIS — J44.1 ACUTE EXACERBATION OF CHRONIC OBSTRUCTIVE PULMONARY DISEASE (COPD) (HCC): Primary | ICD-10-CM

## 2018-06-11 PROCEDURE — 99213 OFFICE O/P EST LOW 20 MIN: CPT | Performed by: INTERNAL MEDICINE

## 2018-07-09 ENCOUNTER — OFFICE VISIT (OUTPATIENT)
Dept: INTERNAL MEDICINE CLINIC | Age: 73
End: 2018-07-09

## 2018-07-09 VITALS
HEIGHT: 63 IN | RESPIRATION RATE: 16 BRPM | BODY MASS INDEX: 28.42 KG/M2 | WEIGHT: 160.4 LBS | HEART RATE: 70 BPM | SYSTOLIC BLOOD PRESSURE: 120 MMHG | DIASTOLIC BLOOD PRESSURE: 80 MMHG | OXYGEN SATURATION: 93 %

## 2018-07-09 DIAGNOSIS — G62.9 PERIPHERAL POLYNEUROPATHY: ICD-10-CM

## 2018-07-09 DIAGNOSIS — F32.9 REACTIVE DEPRESSION: ICD-10-CM

## 2018-07-09 DIAGNOSIS — I10 ESSENTIAL HYPERTENSION: ICD-10-CM

## 2018-07-09 DIAGNOSIS — J44.9 CHRONIC OBSTRUCTIVE PULMONARY DISEASE, UNSPECIFIED COPD TYPE (HCC): ICD-10-CM

## 2018-07-09 LAB — HBA1C MFR BLD: 7 %

## 2018-07-09 PROCEDURE — 99214 OFFICE O/P EST MOD 30 MIN: CPT | Performed by: INTERNAL MEDICINE

## 2018-07-09 PROCEDURE — 83036 HEMOGLOBIN GLYCOSYLATED A1C: CPT | Performed by: INTERNAL MEDICINE

## 2018-07-09 RX ORDER — ESCITALOPRAM OXALATE 10 MG/1
10 TABLET ORAL DAILY
Qty: 30 TABLET | Refills: 3 | Status: SHIPPED | OUTPATIENT
Start: 2018-07-09 | End: 2018-11-02 | Stop reason: SDUPTHER

## 2018-07-09 ASSESSMENT — ENCOUNTER SYMPTOMS
CHEST TIGHTNESS: 0
TROUBLE SWALLOWING: 0
COUGH: 0
WHEEZING: 0
GASTROINTESTINAL NEGATIVE: 1
SHORTNESS OF BREATH: 0

## 2018-07-09 NOTE — PROGRESS NOTES
Subjective:      Patient ID: Omkar Brandt is a 67 y.o. female. copd diabetes and htn    HPI  Feels ok and has no cp gi or gu symptoms  Some times she is getting forgetful like dates, but she takes her medications but need to be reminded . Feels depressed some times and sleeps ok most of the times and some times gets mood swings and not take much for her to get upset and she has to deal with illness in family- sister and 51 y/o daughter with stage -1V breast cancer  Has no cp gi or gu symptoms  No change in her back pain and feet  At times has lower right chest pain and last for few minutes to an hour ,never with activity and some times gets after eating and has no associated symptoms  Review of Systems   Constitutional: Negative for activity change, appetite change and unexpected weight change. HENT: Negative for trouble swallowing. Respiratory: Negative for cough, chest tightness, shortness of breath (when she runs sweeper ) and wheezing. Cardiovascular: Positive for chest pain. Negative for palpitations and leg swelling. Gastrointestinal: Negative. Genitourinary: Negative. Neurological: Negative for dizziness, light-headedness and headaches. Objective:   Physical Exam   Constitutional: She is oriented to person, place, and time. No distress. Eyes: Conjunctivae and EOM are normal. Pupils are equal, round, and reactive to light. No scleral icterus. Neck: No JVD present. No thyromegaly present. Cardiovascular: Normal rate, regular rhythm, normal heart sounds and intact distal pulses. Exam reveals no gallop. No murmur heard. Pulmonary/Chest: Breath sounds normal. No respiratory distress. Wheezes: has occasional whee heard and had few crackles heard at bases. She has no rales. Musculoskeletal: She exhibits no edema. Lymphadenopathy:     She has no cervical adenopathy. Neurological: She is alert and oriented to person, place, and time.    Nursing note and vitals

## 2018-10-08 RX ORDER — MONTELUKAST SODIUM 10 MG/1
TABLET ORAL
Qty: 30 TABLET | Refills: 5 | Status: SHIPPED | OUTPATIENT
Start: 2018-10-08 | End: 2018-10-15

## 2018-10-15 ENCOUNTER — OFFICE VISIT (OUTPATIENT)
Dept: INTERNAL MEDICINE CLINIC | Age: 73
End: 2018-10-15
Payer: MEDICARE

## 2018-10-15 VITALS
HEART RATE: 93 BPM | DIASTOLIC BLOOD PRESSURE: 90 MMHG | RESPIRATION RATE: 16 BRPM | HEIGHT: 63 IN | BODY MASS INDEX: 27.14 KG/M2 | OXYGEN SATURATION: 94 % | SYSTOLIC BLOOD PRESSURE: 130 MMHG | WEIGHT: 153.2 LBS

## 2018-10-15 DIAGNOSIS — K21.9 GASTROESOPHAGEAL REFLUX DISEASE WITHOUT ESOPHAGITIS: ICD-10-CM

## 2018-10-15 DIAGNOSIS — J84.10 POSTINFLAMMATORY PULMONARY FIBROSIS (HCC): ICD-10-CM

## 2018-10-15 DIAGNOSIS — J44.9 CHRONIC OBSTRUCTIVE PULMONARY DISEASE, UNSPECIFIED COPD TYPE (HCC): ICD-10-CM

## 2018-10-15 DIAGNOSIS — I10 ESSENTIAL HYPERTENSION: ICD-10-CM

## 2018-10-15 DIAGNOSIS — E11.649 UNCONTROLLED TYPE 2 DIABETES MELLITUS WITH HYPOGLYCEMIA, UNSPECIFIED HYPOGLYCEMIA COMA STATUS (HCC): ICD-10-CM

## 2018-10-15 DIAGNOSIS — M85.89 OSTEOPENIA OF MULTIPLE SITES: ICD-10-CM

## 2018-10-15 DIAGNOSIS — Z23 NEED FOR INFLUENZA VACCINATION: Primary | ICD-10-CM

## 2018-10-15 LAB — HBA1C MFR BLD: 7.5 %

## 2018-10-15 PROCEDURE — 90662 IIV NO PRSV INCREASED AG IM: CPT | Performed by: INTERNAL MEDICINE

## 2018-10-15 PROCEDURE — G0008 ADMIN INFLUENZA VIRUS VAC: HCPCS | Performed by: INTERNAL MEDICINE

## 2018-10-15 PROCEDURE — 83036 HEMOGLOBIN GLYCOSYLATED A1C: CPT | Performed by: INTERNAL MEDICINE

## 2018-10-15 PROCEDURE — 99214 OFFICE O/P EST MOD 30 MIN: CPT | Performed by: INTERNAL MEDICINE

## 2018-10-15 RX ORDER — GLIPIZIDE 2.5 MG/1
2.5 TABLET, EXTENDED RELEASE ORAL DAILY
Qty: 30 TABLET | Refills: 3 | Status: SHIPPED | OUTPATIENT
Start: 2018-10-15 | End: 2019-02-12 | Stop reason: SDUPTHER

## 2018-10-15 RX ORDER — ALBUTEROL SULFATE 90 UG/1
2 AEROSOL, METERED RESPIRATORY (INHALATION)
Qty: 1 INHALER | Refills: 1 | Status: ON HOLD | OUTPATIENT
Start: 2018-10-15 | End: 2020-09-22 | Stop reason: SDUPTHER

## 2018-10-15 ASSESSMENT — ENCOUNTER SYMPTOMS
COUGH: 1
CHEST TIGHTNESS: 0
TROUBLE SWALLOWING: 0
GASTROINTESTINAL NEGATIVE: 1
WHEEZING: 1
SHORTNESS OF BREATH: 0

## 2018-10-15 NOTE — PATIENT INSTRUCTIONS
Continue current medications  Will arrange for lung function tests ct scan of lungs and dexa scan and then decide on treatment sjustments    ADD glipizide garcia in am and she states she follows diet very well.   See in  3 months

## 2018-10-15 NOTE — PROGRESS NOTES
Vaccine Information Sheet, \"Influenza - Inactivated\"  given to Allyssa Dunn, or parent/legal guardian of  Allyssa Dunn and verbalized understanding. Patient responses:    Have you ever had a reaction to a flu vaccine? No  Are you able to eat eggs without adverse effects? Yes  Do you have any current illness? No  Have you ever had Guillian Clayton Syndrome? No    Flu vaccine given per order. Please see immunization tab.

## 2018-10-15 NOTE — PROGRESS NOTES
BP Readings from Last 2 Encounters:   07/09/18 120/80   06/11/18 130/72     Hemoglobin A1C (%)   Date Value   07/09/2018 7.0     Microalbumin, Random Urine (mg/dL)   Date Value   12/04/2017 <1.20     LDL Calculated (mg/dL)   Date Value   05/16/2017 60              Tobacco use:  Patient  reports that she has never smoked. She has never used smokeless tobacco.    If Smoker - Cessation materials given? NA    Is Daily aspirin on medication list?:  No    Diabetic retinal exam done? Yes   If yes, document in Health Maintenance. Monofilament placed on counter? Yes    Shoes and socks removed? No    BP taken with correct size cuff? Yes   Repeated if > 130/80 NA     Microalbumin performed if applicable?   Performed on 12/04/18

## 2018-10-29 ENCOUNTER — HOSPITAL ENCOUNTER (OUTPATIENT)
Dept: WOMENS IMAGING | Age: 73
Discharge: HOME | End: 2018-10-29
Payer: MEDICARE

## 2018-10-29 ENCOUNTER — HOSPITAL ENCOUNTER (OUTPATIENT)
Dept: CT IMAGING | Age: 73
Discharge: HOME | End: 2018-10-29
Payer: MEDICARE

## 2018-10-29 DIAGNOSIS — M85.89 OSTEOPENIA OF MULTIPLE SITES: ICD-10-CM

## 2018-10-29 DIAGNOSIS — J44.9 CHRONIC OBSTRUCTIVE PULMONARY DISEASE, UNSPECIFIED COPD TYPE (HCC): ICD-10-CM

## 2018-10-29 DIAGNOSIS — J84.10 POSTINFLAMMATORY PULMONARY FIBROSIS (HCC): ICD-10-CM

## 2018-10-29 PROCEDURE — 77080 DXA BONE DENSITY AXIAL: CPT

## 2018-10-29 PROCEDURE — 71250 CT THORAX DX C-: CPT

## 2018-10-29 RX ORDER — B-COMPLEX WITH VITAMIN C
1 TABLET ORAL 2 TIMES DAILY
Qty: 1 TABLET | Refills: 0 | COMMUNITY
Start: 2018-10-29 | End: 2019-11-08

## 2018-11-01 ENCOUNTER — TELEPHONE (OUTPATIENT)
Dept: INTERNAL MEDICINE CLINIC | Age: 73
End: 2018-11-01

## 2018-11-02 ENCOUNTER — NURSE ONLY (OUTPATIENT)
Dept: INTERNAL MEDICINE CLINIC | Age: 73
End: 2018-11-02
Payer: MEDICARE

## 2018-11-02 DIAGNOSIS — R35.0 FREQUENT URINATION: Primary | ICD-10-CM

## 2018-11-02 LAB
BACTERIA: ABNORMAL /HPF
BILIRUBIN, POC: NORMAL
BLOOD URINE, POC: NORMAL
CLARITY, POC: CLEAR
COLOR, POC: YELLOW
EPITHELIAL CELLS, UA: 4 /HPF (ref 0–5)
GLUCOSE URINE, POC: NORMAL
HYALINE CASTS: 6 /LPF (ref 0–8)
KETONES, POC: NORMAL
LEUKOCYTE EST, POC: NORMAL
NITRITE, POC: POSITIVE
PH, POC: 6
PROTEIN, POC: 30
RBC UA: 2 /HPF (ref 0–4)
SPECIFIC GRAVITY, POC: 1.02
UROBILINOGEN, POC: 0.2
WBC UA: 139 /HPF (ref 0–5)

## 2018-11-02 PROCEDURE — 81002 URINALYSIS NONAUTO W/O SCOPE: CPT | Performed by: INTERNAL MEDICINE

## 2018-11-02 RX ORDER — ESCITALOPRAM OXALATE 10 MG/1
TABLET ORAL
Qty: 30 TABLET | Refills: 5 | Status: SHIPPED | OUTPATIENT
Start: 2018-11-02 | End: 2019-04-17 | Stop reason: DRUGHIGH

## 2018-11-02 RX ORDER — CIPROFLOXACIN 250 MG/1
250 TABLET, FILM COATED ORAL EVERY 12 HOURS
Qty: 14 TABLET | Refills: 0 | Status: SHIPPED | OUTPATIENT
Start: 2018-11-02 | End: 2018-11-09

## 2018-11-04 LAB
ORGANISM: ABNORMAL
URINE CULTURE, ROUTINE: ABNORMAL
URINE CULTURE, ROUTINE: ABNORMAL

## 2018-11-09 ENCOUNTER — HOSPITAL ENCOUNTER (OUTPATIENT)
Dept: PULMONOLOGY | Age: 73
Discharge: HOME | End: 2018-11-09
Payer: MEDICARE

## 2018-11-09 PROCEDURE — 94640 AIRWAY INHALATION TREATMENT: CPT

## 2018-11-09 PROCEDURE — 6370000000 HC RX 637 (ALT 250 FOR IP): Performed by: INTERNAL MEDICINE

## 2018-11-09 PROCEDURE — 94729 DIFFUSING CAPACITY: CPT | Performed by: INTERNAL MEDICINE

## 2018-11-09 PROCEDURE — 94726 PLETHYSMOGRAPHY LUNG VOLUMES: CPT | Performed by: INTERNAL MEDICINE

## 2018-11-09 PROCEDURE — 94664 DEMO&/EVAL PT USE INHALER: CPT

## 2018-11-09 PROCEDURE — 94729 DIFFUSING CAPACITY: CPT

## 2018-11-09 PROCEDURE — 94060 EVALUATION OF WHEEZING: CPT | Performed by: INTERNAL MEDICINE

## 2018-11-09 PROCEDURE — 94060 EVALUATION OF WHEEZING: CPT

## 2018-11-09 PROCEDURE — 94726 PLETHYSMOGRAPHY LUNG VOLUMES: CPT

## 2018-11-09 RX ORDER — ALBUTEROL SULFATE 90 UG/1
4 AEROSOL, METERED RESPIRATORY (INHALATION) ONCE
Status: COMPLETED | OUTPATIENT
Start: 2018-11-09 | End: 2018-11-09

## 2018-11-09 RX ADMIN — Medication 4 PUFF: at 08:20

## 2018-11-19 RX ORDER — LOSARTAN POTASSIUM 100 MG/1
TABLET ORAL
Qty: 90 TABLET | Refills: 1 | Status: SHIPPED | OUTPATIENT
Start: 2018-11-19 | End: 2019-05-18 | Stop reason: SDUPTHER

## 2018-12-10 ENCOUNTER — OFFICE VISIT (OUTPATIENT)
Dept: PULMONOLOGY | Age: 73
End: 2018-12-10
Payer: MEDICARE

## 2018-12-10 VITALS
TEMPERATURE: 97.8 F | DIASTOLIC BLOOD PRESSURE: 82 MMHG | HEART RATE: 82 BPM | SYSTOLIC BLOOD PRESSURE: 138 MMHG | HEIGHT: 63 IN | BODY MASS INDEX: 28 KG/M2 | RESPIRATION RATE: 16 BRPM | OXYGEN SATURATION: 96 % | WEIGHT: 158 LBS

## 2018-12-10 DIAGNOSIS — J84.9 ILD (INTERSTITIAL LUNG DISEASE) (HCC): ICD-10-CM

## 2018-12-10 DIAGNOSIS — R06.02 SOB (SHORTNESS OF BREATH): ICD-10-CM

## 2018-12-10 DIAGNOSIS — J45.998 OTHER ASTHMA: ICD-10-CM

## 2018-12-10 DIAGNOSIS — R06.02 SOB (SHORTNESS OF BREATH): Primary | ICD-10-CM

## 2018-12-10 LAB
C-REACTIVE PROTEIN: 7.5 MG/L (ref 0–5.1)
RHEUMATOID FACTOR: <10 IU/ML
TOTAL CK: 145 U/L (ref 26–192)

## 2018-12-10 PROCEDURE — 99205 OFFICE O/P NEW HI 60 MIN: CPT | Performed by: INTERNAL MEDICINE

## 2018-12-10 RX ORDER — FLUTICASONE FUROATE AND VILANTEROL 200; 25 UG/1; UG/1
1 POWDER RESPIRATORY (INHALATION) DAILY
Qty: 1 EACH | Refills: 1 | COMMUNITY
Start: 2018-12-10 | End: 2019-03-04 | Stop reason: CLARIF

## 2018-12-11 LAB
ANA INTERPRETATION: NORMAL
ANTI-NUCLEAR ANTIBODY (ANA): NEGATIVE
SEDIMENTATION RATE, ERYTHROCYTE: 18 MM/HR (ref 0–30)

## 2018-12-12 LAB
ANCA IFA: NORMAL
ANTI JO-1 IGG: 0 AU/ML (ref 0–40)
ENA TO RNP ANTIBODY: NEGATIVE EU
ENA TO SMITH (SM) ANTIBODY: NEGATIVE EU
ENA TO SSB (LA) ANTIBODY: 0 AU/ML (ref 0–40)
SCLERODERMA (SCL-70) AB: 1 AU/ML (ref 0–40)
SSA 52 (RO) (ENA) AB, IGG: 13 AU/ML (ref 0–40)
SSA 60 (RO) (ENA) AB, IGG: 1 AU/ML (ref 0–40)

## 2018-12-13 ENCOUNTER — TELEPHONE (OUTPATIENT)
Dept: PULMONOLOGY | Age: 73
End: 2018-12-13

## 2018-12-13 LAB
ALLERGEN ASPERGILLUS ALTERNATA IGE: <0.1 KU/L
ALLERGEN ASPERGILLUS FUMIGATUS IGE: <0.1 KU/L
ALLERGEN BERMUDA GRASS IGE: <0.1 KU/L
ALLERGEN BIRCH IGE: <0.1 KU/L
ALLERGEN CAT DANDER IGE: <0.1 KU/L
ALLERGEN COMMON SHORT RAGWEED IGE: <0.1 KU/L
ALLERGEN COTTONWOOD: <0.1 KU/L
ALLERGEN COW MILK IGE: <0.1 KU/L
ALLERGEN DOG DANDER IGE: <0.1 KU/L
ALLERGEN ELM IGE: <0.1 KU/L
ALLERGEN FUNGI/MOLD M.RACEMOSUS IGE: <0.1 KU/L
ALLERGEN GERMAN COCKROACH IGE: <0.1 KU/L
ALLERGEN HORMODENDRUM HORDEI IGE: <0.1 KU/L
ALLERGEN MAPLE/BOX ELDER IGE: <0.1 KU/L
ALLERGEN MITE DUST FARINAE IGE: <0.1 KU/L
ALLERGEN MITE DUST PTERONYSSINUS IGE: <0.1 KU/L
ALLERGEN MOUNTAIN CEDAR: <0.1 KU/L
ALLERGEN MOUSE EPITHELIA IGE: <0.1 KU/L
ALLERGEN OAK TREE IGE: <0.1 KU/L
ALLERGEN PEANUT (F13) IGE: <0.1 KU/L
ALLERGEN PECAN TREE IGE: <0.1 KU/L
ALLERGEN PENICILLIUM NOTATUM: <0.1 KU/L
ALLERGEN ROUGH PIGWEED (W14) IGE: <0.1 KU/L
ALLERGEN RUSSIAN THISTLE IGE: <0.1 KU/L
ALLERGEN SHEEP SORREL (W18) IGE: <0.1 KU/L
ALLERGEN TIMOTHY GRASS: <0.1 KU/L
ALLERGEN TREE SYCAMORE: <0.1 KU/L
ALLERGEN WALNUT TREE IGE: <0.1 KU/L
ALLERGEN WHITE MULBERRY TREE, IGE: <0.1 KU/L
ALLERGEN, TREE, WHITE ASH IGE: <0.1 KU/L
IGE: 116 KU/L

## 2018-12-15 LAB
ALLERGEN BEEF: <0.1 KU/L
ALLERGEN PHOMA BETAE: <0.1 KU/L
ALLERGEN PORK: <0.1 KU/L
ALLERGEN SEE NOTE: NORMAL
ALLERGEN, FEATHER MIX: NEGATIVE KU/L
ASPERGILLUS FLAVUS ANTIBODIES: NORMAL
ASPERGILLUS FUMIGATUS #1: NORMAL
ASPERGILLUS FUMIGATUS #2: NORMAL
ASPERGILLUS FUMIGATUS #3: NORMAL
ASPERGILLUS FUMIGATUS #6: NORMAL
AUREOBASIDIUM PULLULANS: NORMAL
MICROPOLYSPORA FAENI: NORMAL
PIGEON SERUM ABS: NORMAL
SACCHAROMONOSPORA VIRIDIS: NORMAL
THERMOACTINOMYCES CANDIDUS AB: NORMAL
THERMOACTINOMYCES SACCHARI: NORMAL
THERMOACTINOMYCES VULGARIS #1: NORMAL

## 2018-12-21 ENCOUNTER — HOSPITAL ENCOUNTER (OUTPATIENT)
Age: 73
Setting detail: OUTPATIENT SURGERY
Discharge: HOME | End: 2018-12-21
Attending: INTERNAL MEDICINE | Admitting: INTERNAL MEDICINE
Payer: MEDICARE

## 2018-12-21 VITALS
SYSTOLIC BLOOD PRESSURE: 152 MMHG | WEIGHT: 155.87 LBS | DIASTOLIC BLOOD PRESSURE: 89 MMHG | BODY MASS INDEX: 27.62 KG/M2 | RESPIRATION RATE: 18 BRPM | HEART RATE: 69 BPM | HEIGHT: 63 IN | TEMPERATURE: 97.2 F | OXYGEN SATURATION: 96 %

## 2018-12-21 DIAGNOSIS — J84.9 INTERSTITIAL LUNG DISEASE (HCC): ICD-10-CM

## 2018-12-21 PROBLEM — R93.89 ABNORMAL CT OF THE CHEST: Status: ACTIVE | Noted: 2018-12-21

## 2018-12-21 LAB
APPEARANCE BAL (LAVAGE): ABNORMAL
CLOT EVALUATION BAL: ABNORMAL
COLOR LAVAGE: COLORLESS
LYMPHOCYTES, BAL: 40 % (ref 5–10)
MACROPHAGES, BAL: 10 % (ref 90–95)
MONOCYTES, BAL: 3 %
NUMBER OF CELLS COUNTED BAL (LAVAGE): 100
RBC, BAL: 43 /CUMM
SEGMENTED NEUTROPHILS, BAL: 47 % (ref 5–10)
VOLUME LAVAGE: 35 ML
WBC/EPI CELLS BAL: 44 /CUMM

## 2018-12-21 PROCEDURE — 87206 SMEAR FLUORESCENT/ACID STAI: CPT

## 2018-12-21 PROCEDURE — 3609010800 HC BRONCHOSCOPY ALVEOLAR LAVAGE: Performed by: INTERNAL MEDICINE

## 2018-12-21 PROCEDURE — 7100000011 HC PHASE II RECOVERY - ADDTL 15 MIN: Performed by: INTERNAL MEDICINE

## 2018-12-21 PROCEDURE — 2709999900 HC NON-CHARGEABLE SUPPLY: Performed by: INTERNAL MEDICINE

## 2018-12-21 PROCEDURE — 89051 BODY FLUID CELL COUNT: CPT

## 2018-12-21 PROCEDURE — 87116 MYCOBACTERIA CULTURE: CPT

## 2018-12-21 PROCEDURE — 2580000003 HC RX 258: Performed by: INTERNAL MEDICINE

## 2018-12-21 PROCEDURE — 87102 FUNGUS ISOLATION CULTURE: CPT

## 2018-12-21 PROCEDURE — 88112 CYTOPATH CELL ENHANCE TECH: CPT

## 2018-12-21 PROCEDURE — 7100000010 HC PHASE II RECOVERY - FIRST 15 MIN: Performed by: INTERNAL MEDICINE

## 2018-12-21 PROCEDURE — C1725 CATH, TRANSLUMIN NON-LASER: HCPCS | Performed by: INTERNAL MEDICINE

## 2018-12-21 PROCEDURE — 31624 DX BRONCHOSCOPE/LAVAGE: CPT | Performed by: INTERNAL MEDICINE

## 2018-12-21 PROCEDURE — 88184 FLOWCYTOMETRY/ TC 1 MARKER: CPT

## 2018-12-21 PROCEDURE — 2500000003 HC RX 250 WO HCPCS: Performed by: INTERNAL MEDICINE

## 2018-12-21 PROCEDURE — 87070 CULTURE OTHR SPECIMN AEROBIC: CPT

## 2018-12-21 PROCEDURE — 99152 MOD SED SAME PHYS/QHP 5/>YRS: CPT | Performed by: INTERNAL MEDICINE

## 2018-12-21 PROCEDURE — 87205 SMEAR GRAM STAIN: CPT

## 2018-12-21 PROCEDURE — 88305 TISSUE EXAM BY PATHOLOGIST: CPT

## 2018-12-21 PROCEDURE — 6360000002 HC RX W HCPCS: Performed by: INTERNAL MEDICINE

## 2018-12-21 PROCEDURE — 87015 SPECIMEN INFECT AGNT CONCNTJ: CPT

## 2018-12-21 PROCEDURE — 88185 FLOWCYTOMETRY/TC ADD-ON: CPT

## 2018-12-21 RX ORDER — LIDOCAINE HYDROCHLORIDE 40 MG/ML
INJECTION, SOLUTION RETROBULBAR; TOPICAL
Status: COMPLETED | OUTPATIENT
Start: 2018-12-21 | End: 2018-12-21

## 2018-12-21 RX ORDER — SODIUM CHLORIDE 9 MG/ML
INJECTION, SOLUTION INTRAVENOUS CONTINUOUS
Status: DISCONTINUED | OUTPATIENT
Start: 2018-12-21 | End: 2018-12-21 | Stop reason: HOSPADM

## 2018-12-21 RX ORDER — FENTANYL CITRATE 50 UG/ML
INJECTION, SOLUTION INTRAMUSCULAR; INTRAVENOUS
Status: COMPLETED | OUTPATIENT
Start: 2018-12-21 | End: 2018-12-21

## 2018-12-21 RX ORDER — MIDAZOLAM HYDROCHLORIDE 1 MG/ML
INJECTION INTRAMUSCULAR; INTRAVENOUS
Status: COMPLETED | OUTPATIENT
Start: 2018-12-21 | End: 2018-12-21

## 2018-12-21 RX ORDER — LIDOCAINE HYDROCHLORIDE 20 MG/ML
INJECTION, SOLUTION EPIDURAL; INFILTRATION; INTRACAUDAL; PERINEURAL
Status: COMPLETED | OUTPATIENT
Start: 2018-12-21 | End: 2018-12-21

## 2018-12-21 RX ADMIN — SODIUM CHLORIDE: 9 INJECTION, SOLUTION INTRAVENOUS at 07:54

## 2018-12-21 ASSESSMENT — PAIN SCALES - GENERAL
PAINLEVEL_OUTOF10: 0

## 2018-12-21 ASSESSMENT — PAIN DESCRIPTION - PAIN TYPE
TYPE: SURGICAL PAIN

## 2018-12-21 ASSESSMENT — PAIN - FUNCTIONAL ASSESSMENT: PAIN_FUNCTIONAL_ASSESSMENT: 0-10

## 2018-12-23 LAB
CULTURE, RESPIRATORY: NORMAL
GRAM STAIN RESULT: NORMAL

## 2018-12-28 DIAGNOSIS — J84.9 ILD (INTERSTITIAL LUNG DISEASE) (HCC): Primary | ICD-10-CM

## 2018-12-28 RX ORDER — PREDNISONE 10 MG/1
20 TABLET ORAL DAILY
Qty: 30 TABLET | Refills: 1 | Status: SHIPPED | OUTPATIENT
Start: 2018-12-28 | End: 2019-02-04 | Stop reason: ALTCHOICE

## 2019-01-16 ENCOUNTER — OFFICE VISIT (OUTPATIENT)
Dept: INTERNAL MEDICINE CLINIC | Age: 74
End: 2019-01-16
Payer: MEDICARE

## 2019-01-16 VITALS
WEIGHT: 157 LBS | SYSTOLIC BLOOD PRESSURE: 130 MMHG | HEART RATE: 78 BPM | OXYGEN SATURATION: 98 % | HEIGHT: 63 IN | DIASTOLIC BLOOD PRESSURE: 84 MMHG | BODY MASS INDEX: 27.82 KG/M2 | RESPIRATION RATE: 16 BRPM

## 2019-01-16 DIAGNOSIS — E11.649 UNCONTROLLED TYPE 2 DIABETES MELLITUS WITH HYPOGLYCEMIA, UNSPECIFIED HYPOGLYCEMIA COMA STATUS (HCC): Primary | ICD-10-CM

## 2019-01-16 DIAGNOSIS — J44.9 CHRONIC OBSTRUCTIVE PULMONARY DISEASE, UNSPECIFIED COPD TYPE (HCC): ICD-10-CM

## 2019-01-16 DIAGNOSIS — J84.9 ILD (INTERSTITIAL LUNG DISEASE) (HCC): ICD-10-CM

## 2019-01-16 DIAGNOSIS — F32.9 REACTIVE DEPRESSION: ICD-10-CM

## 2019-01-16 DIAGNOSIS — I10 ESSENTIAL HYPERTENSION: ICD-10-CM

## 2019-01-16 DIAGNOSIS — J84.10 POSTINFLAMMATORY PULMONARY FIBROSIS (HCC): ICD-10-CM

## 2019-01-16 PROBLEM — J44.1 ACUTE EXACERBATION OF CHRONIC OBSTRUCTIVE PULMONARY DISEASE (COPD) (HCC): Status: RESOLVED | Noted: 2017-11-13 | Resolved: 2019-01-16

## 2019-01-16 PROBLEM — J20.8 ACUTE BRONCHITIS DUE TO OTHER SPECIFIED ORGANISMS: Status: RESOLVED | Noted: 2017-11-03 | Resolved: 2019-01-16

## 2019-01-16 LAB — HBA1C MFR BLD: 6.3 %

## 2019-01-16 PROCEDURE — 99214 OFFICE O/P EST MOD 30 MIN: CPT | Performed by: INTERNAL MEDICINE

## 2019-01-16 PROCEDURE — 83036 HEMOGLOBIN GLYCOSYLATED A1C: CPT | Performed by: INTERNAL MEDICINE

## 2019-01-16 ASSESSMENT — ENCOUNTER SYMPTOMS
SHORTNESS OF BREATH: 1
COUGH: 1
WHEEZING: 0
GASTROINTESTINAL NEGATIVE: 1
TROUBLE SWALLOWING: 0
CHEST TIGHTNESS: 0

## 2019-01-21 LAB
FUNGUS (MYCOLOGY) CULTURE: NORMAL
FUNGUS STAIN: NORMAL

## 2019-02-04 ENCOUNTER — OFFICE VISIT (OUTPATIENT)
Dept: PULMONOLOGY | Age: 74
End: 2019-02-04
Payer: MEDICARE

## 2019-02-04 VITALS
HEART RATE: 89 BPM | DIASTOLIC BLOOD PRESSURE: 80 MMHG | RESPIRATION RATE: 16 BRPM | OXYGEN SATURATION: 98 % | BODY MASS INDEX: 28 KG/M2 | HEIGHT: 63 IN | WEIGHT: 158 LBS | SYSTOLIC BLOOD PRESSURE: 160 MMHG | TEMPERATURE: 98.5 F

## 2019-02-04 DIAGNOSIS — J84.9 ILD (INTERSTITIAL LUNG DISEASE) (HCC): Primary | ICD-10-CM

## 2019-02-04 PROCEDURE — 99214 OFFICE O/P EST MOD 30 MIN: CPT | Performed by: INTERNAL MEDICINE

## 2019-02-04 RX ORDER — PREDNISONE 10 MG/1
10 TABLET ORAL DAILY
Qty: 90 TABLET | Refills: 1 | Status: ON HOLD | OUTPATIENT
Start: 2019-02-04 | End: 2020-09-22 | Stop reason: SDUPTHER

## 2019-02-05 LAB
AFB CULTURE (MYCOBACTERIA): NORMAL
AFB SMEAR: NORMAL

## 2019-02-12 RX ORDER — GLIPIZIDE 2.5 MG/1
TABLET, EXTENDED RELEASE ORAL
Qty: 30 TABLET | Refills: 5 | Status: SHIPPED | OUTPATIENT
Start: 2019-02-12 | End: 2019-04-17 | Stop reason: DRUGHIGH

## 2019-03-04 ENCOUNTER — OFFICE VISIT (OUTPATIENT)
Dept: PULMONOLOGY | Age: 74
End: 2019-03-04
Payer: MEDICARE

## 2019-03-04 VITALS
OXYGEN SATURATION: 97 % | SYSTOLIC BLOOD PRESSURE: 120 MMHG | HEART RATE: 91 BPM | RESPIRATION RATE: 20 BRPM | TEMPERATURE: 98.4 F | BODY MASS INDEX: 27.82 KG/M2 | WEIGHT: 157 LBS | DIASTOLIC BLOOD PRESSURE: 72 MMHG | HEIGHT: 63 IN

## 2019-03-04 DIAGNOSIS — J84.9 ILD (INTERSTITIAL LUNG DISEASE) (HCC): Primary | ICD-10-CM

## 2019-03-04 PROCEDURE — 99214 OFFICE O/P EST MOD 30 MIN: CPT | Performed by: INTERNAL MEDICINE

## 2019-04-09 RX ORDER — MONTELUKAST SODIUM 10 MG/1
TABLET ORAL
Qty: 30 TABLET | Refills: 5 | Status: SHIPPED | OUTPATIENT
Start: 2019-04-09 | End: 2020-11-02 | Stop reason: SDUPTHER

## 2019-04-17 ENCOUNTER — OFFICE VISIT (OUTPATIENT)
Dept: INTERNAL MEDICINE CLINIC | Age: 74
End: 2019-04-17
Payer: MEDICARE

## 2019-04-17 VITALS
BODY MASS INDEX: 28.56 KG/M2 | DIASTOLIC BLOOD PRESSURE: 70 MMHG | HEART RATE: 79 BPM | HEIGHT: 63 IN | SYSTOLIC BLOOD PRESSURE: 130 MMHG | WEIGHT: 161.2 LBS | OXYGEN SATURATION: 97 %

## 2019-04-17 DIAGNOSIS — F32.9 REACTIVE DEPRESSION: ICD-10-CM

## 2019-04-17 DIAGNOSIS — E11.65 UNCONTROLLED TYPE 2 DIABETES MELLITUS WITH HYPERGLYCEMIA (HCC): Primary | ICD-10-CM

## 2019-04-17 DIAGNOSIS — I10 ESSENTIAL HYPERTENSION: ICD-10-CM

## 2019-04-17 DIAGNOSIS — J44.9 CHRONIC OBSTRUCTIVE PULMONARY DISEASE, UNSPECIFIED COPD TYPE (HCC): ICD-10-CM

## 2019-04-17 LAB — HBA1C MFR BLD: 8.5 %

## 2019-04-17 PROCEDURE — 83036 HEMOGLOBIN GLYCOSYLATED A1C: CPT | Performed by: INTERNAL MEDICINE

## 2019-04-17 PROCEDURE — 99214 OFFICE O/P EST MOD 30 MIN: CPT | Performed by: INTERNAL MEDICINE

## 2019-04-17 RX ORDER — GLIPIZIDE 5 MG/1
5 TABLET, FILM COATED, EXTENDED RELEASE ORAL DAILY
Qty: 30 TABLET | Refills: 3 | Status: SHIPPED | OUTPATIENT
Start: 2019-04-17 | End: 2019-11-08 | Stop reason: DRUGHIGH

## 2019-04-17 RX ORDER — ESCITALOPRAM OXALATE 20 MG/1
20 TABLET ORAL DAILY
Qty: 30 TABLET | Refills: 3 | Status: SHIPPED | OUTPATIENT
Start: 2019-04-17 | End: 2019-11-05 | Stop reason: SDUPTHER

## 2019-04-17 ASSESSMENT — ENCOUNTER SYMPTOMS
COUGH: 0
CHEST TIGHTNESS: 0
GASTROINTESTINAL NEGATIVE: 1
SHORTNESS OF BREATH: 0
WHEEZING: 0
TROUBLE SWALLOWING: 0

## 2019-04-17 NOTE — PATIENT INSTRUCTIONS
Increase lexapro to 20 mg daily  Continue current medications and see in  3 months  Increase  glipizide xl to 5 mg daily

## 2019-04-24 ENCOUNTER — OFFICE VISIT (OUTPATIENT)
Dept: PULMONOLOGY | Age: 74
End: 2019-04-24
Payer: MEDICARE

## 2019-04-24 VITALS
DIASTOLIC BLOOD PRESSURE: 94 MMHG | OXYGEN SATURATION: 97 % | RESPIRATION RATE: 16 BRPM | BODY MASS INDEX: 28.53 KG/M2 | TEMPERATURE: 97.5 F | SYSTOLIC BLOOD PRESSURE: 179 MMHG | HEART RATE: 71 BPM | WEIGHT: 161 LBS | HEIGHT: 63 IN

## 2019-04-24 DIAGNOSIS — J84.9 ILD (INTERSTITIAL LUNG DISEASE) (HCC): Primary | ICD-10-CM

## 2019-04-24 PROCEDURE — 99214 OFFICE O/P EST MOD 30 MIN: CPT | Performed by: INTERNAL MEDICINE

## 2019-04-24 NOTE — PROGRESS NOTES
education level: Not on file   Occupational History    Occupation: retired   Social Needs    Financial resource strain: Not on file    Food insecurity:     Worry: Not on file     Inability: Not on file   [employer] needs:     Medical: Not on file     Non-medical: Not on file   Tobacco Use    Smoking status: Never Smoker    Smokeless tobacco: Never Used   Substance and Sexual Activity    Alcohol use:  Yes    Drug use: No    Sexual activity: Not on file   Lifestyle    Physical activity:     Days per week: Not on file     Minutes per session: Not on file    Stress: Not on file   Relationships    Social connections:     Talks on phone: Not on file     Gets together: Not on file     Attends Gnosticism service: Not on file     Active member of club or organization: Not on file     Attends meetings of clubs or organizations: Not on file     Relationship status: Not on file    Intimate partner violence:     Fear of current or ex partner: Not on file     Emotionally abused: Not on file     Physically abused: Not on file     Forced sexual activity: Not on file   Other Topics Concern    Not on file   Social History Narrative    Not on file       Family History   Problem Relation Age of Onset    Heart Disease Mother     Stroke Mother     Osteoporosis Mother     Cirrhosis Father         Liver    Osteoporosis Sister     Osteoporosis Maternal Grandmother          Current Outpatient Medications:     escitalopram (LEXAPRO) 20 MG tablet, Take 1 tablet by mouth daily, Disp: 30 tablet, Rfl: 3    glipiZIDE (GLIPIZIDE XL) 5 MG extended release tablet, Take 1 tablet by mouth daily, Disp: 30 tablet, Rfl: 3    montelukast (SINGULAIR) 10 MG tablet, TAKE ONE TABLET BY MOUTH DAILY, Disp: 30 tablet, Rfl: 5    predniSONE (DELTASONE) 10 MG tablet, Take 1 tablet by mouth daily, Disp: 90 tablet, Rfl: 1    losartan (COZAAR) 100 MG tablet, TAKE 1 TABLET DAILY, Disp: 90 tablet, Rfl: 1    Calcium Carbonate-Vitamin D °F (36.4 °C)   SpO2: 97%       GENERAL:  Well nourished, alert, appears stated age, no distress  HEENT:  No scleral icterus, no conjunctival irritation  NECK:  No thyromegaly, no bruits  LYMPH:  No cervical or supraclavicular adenopathy  HEART:  Regular rate and rhythm, no murmurs  LUNGS:  Essentially clear but diminished today   ABDOMEN:  No distention, no organomegaly  EXTREMITIES:  No edema, no digital clubbing  NEURO:  No localizing deficits, CN II-XII intact    Pulmonary Function Testing 11/2018  MY IMPRESSION  Overall Mild restriction, no bronchodilator response and moderate reduction in diffusing capacity     Chest imaging from 10/2018 is reviewed. 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 Comment(s)       The combination of chronic interstitial pneumonia, bronchiolitis, and  organizing pneumonia raises hypersensitivity pneumonia (extrinsic allergic  alveolitis) as the chief etiologic consideration.  In the absence of an  identifiable antigenic exposure, similar changes can be seen in patients with  various forms of drug-induced lung disease.  In the absence of any history to  incriminate either an antigenic or drug exposure, these findings are most  consistent with nonspecific interstitial pneumonia (NSIP).   I reviewed above labs and studies pertinent to this visit and date    Bronchoscopy  12/2018  -  No malignant cells identified    -Increased CD4/CD8 ratio and decreased /CD4 ratio.  These  results may be present in pulmonary sarcoidosis, viral infection,  alveolitis, and interstitial syndromes    Differential:  Neutrophilic, lymphocytic    Cultures all negative    12/2018  VINCENT  negative  ANCA (myeloperioxidase/serine protease 3)  negative  RF (RA):   negative  Anti-Scl 70 (Scleroderma):  negative  Anti-DS DNA (SLE):  negative  Anti- SSA (Ro) (Sjogrens):  negative  Anti-SSB (La) (Sjogrens):  negative  Anti-Bhumika (polymyositis/dermatomyositis):  negative  Anti-Smith (SLE):  negative  Anti-RNP (MCTD):  negative  CK: (inflammatory myopathy) 145  Sed rate  18    Total IgE Level:  116  Significant allergies:  none    HP panel:  Negative    I reviewed the above labs and images     Assessment/Plan:  1. ILD (interstitial lung disease) ([facility] [address]  Symptoms seem to be worse with less steroids. WIll go back up to 20 mg for prednisone for the next 2 weeks and get HRCT at that time. That will be ~ 6 weeks from the last CT and will allow me to see if she has improved. - CT CHEST HIGH RESOLUTION; Future        She is tolerating prednisone well.   If she needs to stay on prednisone 20 mg long term, she will need PJP prophylaxis    I will call her once I get the repeat CT results

## 2019-04-24 NOTE — PATIENT INSTRUCTIONS
CT Chest in 2 weeks     Increase prednisone to 20 mg a day for the next 2 weeks      I will call with results regarding what to do with the prednisone

## 2019-04-30 RX ORDER — FENOFIBRATE 160 MG/1
TABLET ORAL
Qty: 90 TABLET | Refills: 3 | Status: SHIPPED | OUTPATIENT
Start: 2019-04-30 | End: 2020-04-24

## 2019-05-01 ENCOUNTER — HOSPITAL ENCOUNTER (OUTPATIENT)
Dept: CT IMAGING | Age: 74
Discharge: HOME | End: 2019-05-01
Payer: MEDICARE

## 2019-05-01 DIAGNOSIS — J84.9 ILD (INTERSTITIAL LUNG DISEASE) (HCC): ICD-10-CM

## 2019-05-01 PROCEDURE — 71250 CT THORAX DX C-: CPT

## 2019-05-08 RX ORDER — ESCITALOPRAM OXALATE 10 MG/1
TABLET ORAL
Qty: 30 TABLET | Refills: 5 | Status: ON HOLD
Start: 2019-05-08 | End: 2020-09-22 | Stop reason: HOSPADM

## 2019-05-20 RX ORDER — LOSARTAN POTASSIUM 100 MG/1
TABLET ORAL
Qty: 90 TABLET | Refills: 1 | Status: SHIPPED | OUTPATIENT
Start: 2019-05-20 | End: 2019-11-16 | Stop reason: SDUPTHER

## 2019-05-23 ENCOUNTER — OFFICE VISIT (OUTPATIENT)
Dept: PULMONOLOGY | Age: 74
End: 2019-05-23
Payer: MEDICARE

## 2019-05-23 VITALS
RESPIRATION RATE: 20 BRPM | TEMPERATURE: 97.8 F | HEIGHT: 63 IN | BODY MASS INDEX: 28.35 KG/M2 | OXYGEN SATURATION: 97 % | DIASTOLIC BLOOD PRESSURE: 93 MMHG | SYSTOLIC BLOOD PRESSURE: 159 MMHG | HEART RATE: 75 BPM | WEIGHT: 160 LBS

## 2019-05-23 DIAGNOSIS — J84.9 ILD (INTERSTITIAL LUNG DISEASE) (HCC): Primary | ICD-10-CM

## 2019-05-23 PROCEDURE — 99214 OFFICE O/P EST MOD 30 MIN: CPT | Performed by: INTERNAL MEDICINE

## 2019-05-23 RX ORDER — DAPSONE 100 MG/1
100 TABLET ORAL DAILY
Qty: 30 TABLET | Refills: 3 | Status: SHIPPED | OUTPATIENT
Start: 2019-05-23 | End: 2019-09-16 | Stop reason: SDUPTHER

## 2019-05-23 NOTE — Clinical Note
Dr. Gemma Otto was in and doing better with prednisone at 20 mg.  I will likely keep her on 20 mg longer than I did before due to improved symptoms while on 20 mg. Since she is going to remain on 20 mg I added dapsone for PJP prophylaxis.    [facility]

## 2019-05-23 NOTE — PROGRESS NOTES
file   Occupational History    Occupation: retired   Social Needs    Financial resource strain: Not on file    Food insecurity:     Worry: Not on file     Inability: Not on file   [employer] needs:     Medical: Not on file     Non-medical: Not on file   Tobacco Use    Smoking status: Never Smoker    Smokeless tobacco: Never Used   Substance and Sexual Activity    Alcohol use:  Yes    Drug use: No    Sexual activity: Not on file   Lifestyle    Physical activity:     Days per week: Not on file     Minutes per session: Not on file    Stress: Not on file   Relationships    Social connections:     Talks on phone: Not on file     Gets together: Not on file     Attends Shinto service: Not on file     Active member of club or organization: Not on file     Attends meetings of clubs or organizations: Not on file     Relationship status: Not on file    Intimate partner violence:     Fear of current or ex partner: Not on file     Emotionally abused: Not on file     Physically abused: Not on file     Forced sexual activity: Not on file   Other Topics Concern    Not on file   Social History Narrative    Not on file       Family History   Problem Relation Age of Onset    Heart Disease Mother     Stroke Mother     Osteoporosis Mother     Cirrhosis Father         Liver    Osteoporosis Sister     Osteoporosis Maternal Grandmother          Current Outpatient Medications:     losartan (COZAAR) 100 MG tablet, TAKE 1 TABLET DAILY, Disp: 90 tablet, Rfl: 1    escitalopram (LEXAPRO) 10 MG tablet, TAKE ONE TABLET BY MOUTH DAILY, Disp: 30 tablet, Rfl: 5    fenofibrate 160 MG tablet, TAKE 1 TABLET DAILY, Disp: 90 tablet, Rfl: 3    escitalopram (LEXAPRO) 20 MG tablet, Take 1 tablet by mouth daily, Disp: 30 tablet, Rfl: 3    glipiZIDE (GLIPIZIDE XL) 5 MG extended release tablet, Take 1 tablet by mouth daily, Disp: 30 tablet, Rfl: 3    montelukast (SINGULAIR) 10 MG tablet, TAKE ONE TABLET BY MOUTH DAILY, Disp: 30 tablet, Rfl: 5    predniSONE (DELTASONE) 10 MG tablet, Take 1 tablet by mouth daily, Disp: 90 tablet, Rfl: 1    Calcium Carbonate-Vitamin D (OYSTER SHELL CALCIUM/D) 500-200 MG-UNIT TABS, Take 1 tablet by mouth 2 times daily, Disp: 1 tablet, Rfl: 0    albuterol sulfate  (90 Base) MCG/ACT inhaler, Inhale 2 puffs into the lungs every 4 hours (while awake) And as needed every 4 hours at nights, Disp: 1 Inhaler, Rfl: 1    gabapentin (NEURONTIN) 300 MG capsule, TAKE ONE CAPSULE BY MOUTH THREE TIMES A DAY, Disp: 90 capsule, Rfl: 5    cetirizine (ZYRTEC ALLERGY) 10 MG tablet, Take 1 tablet by mouth daily, Disp: 15 tablet, Rfl: 0    spironolactone (ALDACTONE) 25 MG tablet, TAKE 1 TABLET DAILY, Disp: 90 tablet, Rfl: 1    metFORMIN (GLUCOPHAGE) 1000 MG tablet, TAKE ONE TABLET BY MOUTH TWICE A DAY WITH MEALS, Disp: 180 tablet, Rfl: 1    betamethasone valerate (VALISONE) 0.1 % ointment, Apply topically 2 times daily. , Disp: 30 g, Rfl: 1    mometasone (ELOCON) 0.1 % ointment, Apply topically to rash 2 times daily. , Disp: 30 g, Rfl: 1    dicyclomine (BENTYL) 10 MG capsule, TAKE 1 CAPSULE DAILY, Disp: 90 capsule, Rfl: 3    acebutolol (SECTRAL) 400 MG capsule, TAKE 1 CAPSULE TWICE A DAY, Disp: 180 capsule, Rfl: 1    esomeprazole (NEXIUM) 40 MG capsule, Take 1 capsule by mouth every morning (before breakfast). , Disp: 30 capsule, Rfl: 11      ROS:  GENERAL:  No fevers, chills, or night sweats  HEENT:  No double vision, blurry vision, or difficulty swallowing  HEART:  No chest pain, no palpitations  LUNGS:  Improved breathing with 20 mg of prednisone, less SOb, less coughing   ABDOMEN:  No abdominal pains, no changes in stools  GENITOURINARY:  No increased frequency, no blood in urine  EXTREMITIES:  No swelling, no lesions  NEURO:  No numbness or tingling, no seizures  SKIN:  No new rashes, no changes in hair or nails  LYMPH:  No masses, no swelling neck, armpits, or groin    PHYSICAL EXAM:  Vitals:    05/23/19 1241 malignant cells identified    -Increased CD4/CD8 ratio and decreased /CD4 ratio.  These  results may be present in pulmonary sarcoidosis, viral infection,  alveolitis, and interstitial syndromes    Differential:  Neutrophilic, lymphocytic    Cultures all negative    12/2018  VINCENT  negative  ANCA (myeloperioxidase/serine protease 3)  negative  RF (RA):   negative  Anti-Scl 70 (Scleroderma):  negative  Anti-DS DNA (SLE):  negative  Anti- SSA (Ro) (Sjogrens):  negative  Anti-SSB (La) (Sjogrens):  negative  Anti-Bhumika (polymyositis/dermatomyositis):  negative  Anti-Smith (SLE):  negative  Anti-RNP (MCTD):  negative  CK: (inflammatory myopathy) 145  Sed rate  18    Total IgE Level:  116  Significant allergies:  none    HP panel:  Negative    I reviewed the above labs and images     Assessment/Plan:  1. ILD (interstitial lung disease) ([facility] [address]  Either NSIP or HP. Good response to prednisone. Now that she has been on prednisone 20 mg for greater than 2 weeks she needs PJP prophylaxis. Due to sulfa allergy will use daily dapsone. Will likely need slow wean of her prednisone in the future. - dapsone 100 MG tablet; Take 1 tablet by mouth daily  Dispense: 30 tablet; Refill: 3    Follow up in 6 weeks  Prednisone of 20 mg controls her symptoms.

## 2019-07-10 ENCOUNTER — OFFICE VISIT (OUTPATIENT)
Dept: PULMONOLOGY | Age: 74
End: 2019-07-10
Payer: MEDICARE

## 2019-07-10 VITALS
BODY MASS INDEX: 25.88 KG/M2 | WEIGHT: 161 LBS | DIASTOLIC BLOOD PRESSURE: 84 MMHG | SYSTOLIC BLOOD PRESSURE: 132 MMHG | HEIGHT: 66 IN | TEMPERATURE: 98 F | RESPIRATION RATE: 16 BRPM | HEART RATE: 93 BPM | OXYGEN SATURATION: 94 %

## 2019-07-10 DIAGNOSIS — R06.02 SOB (SHORTNESS OF BREATH): ICD-10-CM

## 2019-07-10 DIAGNOSIS — J84.9 ILD (INTERSTITIAL LUNG DISEASE) (HCC): Primary | ICD-10-CM

## 2019-07-10 PROCEDURE — 99214 OFFICE O/P EST MOD 30 MIN: CPT | Performed by: INTERNAL MEDICINE

## 2019-07-10 RX ORDER — BUDESONIDE AND FORMOTEROL FUMARATE DIHYDRATE 160; 4.5 UG/1; UG/1
2 AEROSOL RESPIRATORY (INHALATION) 2 TIMES DAILY
Qty: 2 INHALER | Refills: 0 | COMMUNITY
Start: 2019-07-10 | End: 2019-11-05 | Stop reason: SDUPTHER

## 2019-07-10 NOTE — PROGRESS NOTES
gabapentin (NEURONTIN) 300 MG capsule, TAKE ONE CAPSULE BY MOUTH THREE TIMES A DAY, Disp: 90 capsule, Rfl: 5    cetirizine (ZYRTEC ALLERGY) 10 MG tablet, Take 1 tablet by mouth daily, Disp: 15 tablet, Rfl: 0    spironolactone (ALDACTONE) 25 MG tablet, TAKE 1 TABLET DAILY, Disp: 90 tablet, Rfl: 1    metFORMIN (GLUCOPHAGE) 1000 MG tablet, TAKE ONE TABLET BY MOUTH TWICE A DAY WITH MEALS, Disp: 180 tablet, Rfl: 1    dicyclomine (BENTYL) 10 MG capsule, TAKE 1 CAPSULE DAILY, Disp: 90 capsule, Rfl: 3    acebutolol (SECTRAL) 400 MG capsule, TAKE 1 CAPSULE TWICE A DAY, Disp: 180 capsule, Rfl: 1    esomeprazole (NEXIUM) 40 MG capsule, Take 1 capsule by mouth every morning (before breakfast). , Disp: 30 capsule, Rfl: 11    escitalopram (LEXAPRO) 20 MG tablet, Take 1 tablet by mouth daily, Disp: 30 tablet, Rfl: 3    albuterol sulfate  (90 Base) MCG/ACT inhaler, Inhale 2 puffs into the lungs every 4 hours (while awake) And as needed every 4 hours at nights, Disp: 1 Inhaler, Rfl: 1    betamethasone valerate (VALISONE) 0.1 % ointment, Apply topically 2 times daily. , Disp: 30 g, Rfl: 1    mometasone (ELOCON) 0.1 % ointment, Apply topically to rash 2 times daily. , Disp: 30 g, Rfl: 1      ROS:  GENERAL:  No fevers, chills, or night sweats, mild weight gain with prednisone  HEENT:  No double vision, blurry vision, or difficulty swallowing  HEART:  No chest pain, no palpitations  LUNGS:  More SOb, more coughing, more wheezing   ABDOMEN:  No abdominal pains, no changes in stools  GENITOURINARY:  No increased frequency, no blood in urine  EXTREMITIES:  No swelling, no lesions  NEURO:  No numbness or tingling, no seizures  SKIN:  No new rashes, no changes in hair or nails  LYMPH:  No masses, no swelling neck, armpits, or groin    PHYSICAL EXAM:  Vitals:    07/10/19 1307   BP: 132/84   Pulse: 93   Resp: 16   Temp: 98 °F (36.7 °C)   SpO2: 94%       GENERAL:  Well nourished, alert, appears stated age, no distress  HEENT:  No

## 2019-08-07 ENCOUNTER — OFFICE VISIT (OUTPATIENT)
Dept: INTERNAL MEDICINE CLINIC | Age: 74
End: 2019-08-07
Payer: MEDICARE

## 2019-08-07 VITALS
OXYGEN SATURATION: 89 % | HEART RATE: 90 BPM | HEIGHT: 63 IN | DIASTOLIC BLOOD PRESSURE: 60 MMHG | SYSTOLIC BLOOD PRESSURE: 110 MMHG | WEIGHT: 157 LBS | BODY MASS INDEX: 27.82 KG/M2

## 2019-08-07 DIAGNOSIS — J44.9 CHRONIC OBSTRUCTIVE PULMONARY DISEASE, UNSPECIFIED COPD TYPE (HCC): ICD-10-CM

## 2019-08-07 DIAGNOSIS — I10 ESSENTIAL HYPERTENSION: Primary | ICD-10-CM

## 2019-08-07 DIAGNOSIS — E11.649 UNCONTROLLED TYPE 2 DIABETES MELLITUS WITH HYPOGLYCEMIA, UNSPECIFIED HYPOGLYCEMIA COMA STATUS (HCC): ICD-10-CM

## 2019-08-07 DIAGNOSIS — F32.9 REACTIVE DEPRESSION: ICD-10-CM

## 2019-08-07 LAB
A/G RATIO: 1.9 (ref 1.1–2.2)
ALBUMIN SERPL-MCNC: 4 G/DL (ref 3.4–5)
ALP BLD-CCNC: 33 U/L (ref 40–129)
ALT SERPL-CCNC: 23 U/L (ref 10–40)
ANION GAP SERPL CALCULATED.3IONS-SCNC: 12 MMOL/L (ref 3–16)
AST SERPL-CCNC: 21 U/L (ref 15–37)
BASOPHILS ABSOLUTE: 0 K/UL (ref 0–0.2)
BASOPHILS RELATIVE PERCENT: 0.3 %
BILIRUB SERPL-MCNC: 0.7 MG/DL (ref 0–1)
BUN BLDV-MCNC: 15 MG/DL (ref 7–20)
CALCIUM SERPL-MCNC: 11.1 MG/DL (ref 8.3–10.6)
CHLORIDE BLD-SCNC: 99 MMOL/L (ref 99–110)
CO2: 29 MMOL/L (ref 21–32)
CREAT SERPL-MCNC: 1.1 MG/DL (ref 0.6–1.2)
EOSINOPHILS ABSOLUTE: 0 K/UL (ref 0–0.6)
EOSINOPHILS RELATIVE PERCENT: 0 %
GFR AFRICAN AMERICAN: 59
GFR NON-AFRICAN AMERICAN: 48
GLOBULIN: 2.1 G/DL
GLUCOSE BLD-MCNC: 128 MG/DL (ref 70–99)
HBA1C MFR BLD: 5 %
HCT VFR BLD CALC: 35.8 % (ref 36–48)
HEMOGLOBIN: 11.9 G/DL (ref 12–16)
LYMPHOCYTES ABSOLUTE: 1.9 K/UL (ref 1–5.1)
LYMPHOCYTES RELATIVE PERCENT: 34.5 %
MAGNESIUM: 1.6 MG/DL (ref 1.8–2.4)
MCH RBC QN AUTO: 35.9 PG (ref 26–34)
MCHC RBC AUTO-ENTMCNC: 33.2 G/DL (ref 31–36)
MCV RBC AUTO: 108.1 FL (ref 80–100)
MONOCYTES ABSOLUTE: 0.5 K/UL (ref 0–1.3)
MONOCYTES RELATIVE PERCENT: 8.3 %
NEUTROPHILS ABSOLUTE: 3.2 K/UL (ref 1.7–7.7)
NEUTROPHILS RELATIVE PERCENT: 56.9 %
PDW BLD-RTO: 13.6 % (ref 12.4–15.4)
PLATELET # BLD: 191 K/UL (ref 135–450)
PMV BLD AUTO: 8.5 FL (ref 5–10.5)
POTASSIUM SERPL-SCNC: 4.3 MMOL/L (ref 3.5–5.1)
RBC # BLD: 3.31 M/UL (ref 4–5.2)
SODIUM BLD-SCNC: 140 MMOL/L (ref 136–145)
TOTAL PROTEIN: 6.1 G/DL (ref 6.4–8.2)
TSH REFLEX: 1.45 UIU/ML (ref 0.27–4.2)
WBC # BLD: 5.6 K/UL (ref 4–11)

## 2019-08-07 PROCEDURE — 99214 OFFICE O/P EST MOD 30 MIN: CPT | Performed by: INTERNAL MEDICINE

## 2019-08-07 PROCEDURE — 36415 COLL VENOUS BLD VENIPUNCTURE: CPT | Performed by: INTERNAL MEDICINE

## 2019-08-07 PROCEDURE — 3288F FALL RISK ASSESSMENT DOCD: CPT | Performed by: INTERNAL MEDICINE

## 2019-08-07 PROCEDURE — 83036 HEMOGLOBIN GLYCOSYLATED A1C: CPT | Performed by: INTERNAL MEDICINE

## 2019-08-07 ASSESSMENT — ENCOUNTER SYMPTOMS
SHORTNESS OF BREATH: 1
WHEEZING: 0
CHEST TIGHTNESS: 0
COUGH: 0
GASTROINTESTINAL NEGATIVE: 1

## 2019-08-07 NOTE — PROGRESS NOTES
Diabetes  well controlled and a1c is 5% today  Copd stable    No tremor seen on exam and cause is not clear and ha s no signs of parkinson's on exam and has n h/o essential tremor and she did not see her tremor to be after HHN or using inhaler  No change in her depression even with increased dose of lexapro  Memory issues -prednisone related? Plan: Will monitor her tremors for now  Stop spironolactone for now  Decrease lexapro 10 mg daily- and she states she is taking 10 mg only and not increased to 20 mg  Continue current medications  See in  3 months.         Toyin Simpson MD

## 2019-08-07 NOTE — PATIENT INSTRUCTIONS
Will monitor her tremors for now  Stop spironolactone for now  Decrease lexapro 10 mg daily- and she states she is taking 10 mg only and not increased to 20 mg  Continue current medications  See in  3 months

## 2019-08-08 DIAGNOSIS — E87.5 HYPERKALEMIA: Primary | ICD-10-CM

## 2019-08-08 DIAGNOSIS — D53.9 MACROCYTIC ANEMIA: ICD-10-CM

## 2019-08-08 DIAGNOSIS — E87.5 HYPERKALEMIA: ICD-10-CM

## 2019-08-08 LAB
PARATHYROID HORMONE INTACT: 14.7 PG/ML (ref 14–72)
VITAMIN B-12: 490 PG/ML (ref 211–911)

## 2019-08-08 RX ORDER — MAGNESIUM OXIDE 400 MG/1
400 TABLET ORAL 2 TIMES DAILY
Qty: 60 TABLET | Refills: 3 | Status: SHIPPED | OUTPATIENT
Start: 2019-08-08 | End: 2020-01-23

## 2019-08-12 LAB
ALBUMIN SERPL-MCNC: 3.5 G/DL (ref 3.1–4.9)
ALPHA-1-GLOBULIN: 0.2 G/DL (ref 0.2–0.4)
ALPHA-2-GLOBULIN: 0.7 G/DL (ref 0.4–1.1)
BETA GLOBULIN: 1.1 G/DL (ref 0.9–1.6)
GAMMA GLOBULIN: 0.6 G/DL (ref 0.6–1.8)
SPE/IFE INTERPRETATION: NORMAL
TOTAL PROTEIN: 6.1 G/DL (ref 6.4–8.2)

## 2019-08-21 ENCOUNTER — OFFICE VISIT (OUTPATIENT)
Dept: PULMONOLOGY | Age: 74
End: 2019-08-21
Payer: MEDICARE

## 2019-08-21 VITALS
BODY MASS INDEX: 27.82 KG/M2 | OXYGEN SATURATION: 94 % | HEIGHT: 63 IN | WEIGHT: 157 LBS | SYSTOLIC BLOOD PRESSURE: 117 MMHG | DIASTOLIC BLOOD PRESSURE: 72 MMHG | TEMPERATURE: 98 F | HEART RATE: 92 BPM | RESPIRATION RATE: 20 BRPM

## 2019-08-21 DIAGNOSIS — J84.9 ILD (INTERSTITIAL LUNG DISEASE) (HCC): Primary | ICD-10-CM

## 2019-08-21 DIAGNOSIS — R06.02 SOB (SHORTNESS OF BREATH): ICD-10-CM

## 2019-08-21 PROCEDURE — 99214 OFFICE O/P EST MOD 30 MIN: CPT | Performed by: INTERNAL MEDICINE

## 2019-08-21 RX ORDER — BUDESONIDE AND FORMOTEROL FUMARATE DIHYDRATE 160; 4.5 UG/1; UG/1
2 AEROSOL RESPIRATORY (INHALATION) 2 TIMES DAILY
Qty: 1 INHALER | Refills: 0 | Status: ON HOLD | OUTPATIENT
Start: 2019-08-21 | End: 2020-09-22 | Stop reason: SDUPTHER

## 2019-08-21 RX ORDER — BUDESONIDE AND FORMOTEROL FUMARATE DIHYDRATE 160; 4.5 UG/1; UG/1
2 AEROSOL RESPIRATORY (INHALATION) 2 TIMES DAILY
Qty: 1 INHALER | Refills: 1 | COMMUNITY
Start: 2019-08-21 | End: 2019-11-05 | Stop reason: SDUPTHER

## 2019-08-21 NOTE — PATIENT INSTRUCTIONS
Continue with prednisone 20 mg     Continue with dapsone    Continue with symbicort. One puff twice a day.   Samples of symbicort x 2    Follow up in 2 months

## 2019-08-21 NOTE — PROGRESS NOTES
Chief complaint  This is a 75y.o. year old female  who comes to see me with a chief complaint of   Chief Complaint   Patient presents with    Follow-up     ILD       HPI  Here with a cc of ILD    She has been on 20 mg of prednisone and dapsone for PJP prophylaxis. Last visit she was still SOB. I decided to start symbicort mainly for the ICS properties and knowing I always have samples. She is doing much better. Less SOB with the stairs and overall improvement. Only issus is shakiness from the inhaler. Tolerating everything very well. No other changes     Past Medical History:   Diagnosis Date    Asthma     Depression 6/28/2013    Diverticulosis of colon (without mention of hemorrhage)     Enthesopathy of hip region     left - mild    Esophageal reflux     Irritable bowel syndrome     Nocturia     Osteoporosis, unspecified     Other and unspecified hyperlipidemia     Postinflammatory pulmonary fibrosis (HCC)     Sialoadenitis     left    Synovial cyst of popliteal space     left knee    Thoracic or lumbosacral neuritis or radiculitis, unspecified     left - L5 - S1    Type II or unspecified type diabetes mellitus without mention of complication, not stated as uncontrolled     Unspecified essential hypertension        Past Surgical History:   Procedure Laterality Date    BRONCHOSCOPY N/A 12/21/2018    BRONCHOSCOPY WITH BAL performed by Kemi Banks DO at [address] [place] [address]  2009    DR. Jean-no polyps    COLONOSCOPY  02/2016    normal-DR. Ramsay    HYSTERECTOMY      partial    LIPOMA RESECTION      abdominal wall    OVARY REMOVAL      left    POLYPECTOMY         Social History     Socioeconomic History    Marital status:      Spouse name: Not on file    Number of children: Not on file    Years of education: Not on file    Highest education level: Not on file   Occupational History    Occupation: retired   Social Needs    Financial resource strain: Not on file    Food insecurity:     Worry: Not on file     Inability: Not on file    Transportation needs:     Medical: Not on file     Non-medical: Not on file   Tobacco Use    Smoking status: Never Smoker    Smokeless tobacco: Never Used   Substance and Sexual Activity    Alcohol use:  Yes    Drug use: No    Sexual activity: Not on file   Lifestyle    Physical activity:     Days per week: Not on file     Minutes per session: Not on file    Stress: Not on file   Relationships    Social connections:     Talks on phone: Not on file     Gets together: Not on file     Attends [employer] witness service: Not on file     Active member of club or organization: Not on file     Attends meetings of clubs or organizations: Not on file     Relationship status: Not on file    Intimate partner violence:     Fear of current or ex partner: Not on file     Emotionally abused: Not on file     Physically abused: Not on file     Forced sexual activity: Not on file   Other Topics Concern    Not on file   Social History Narrative    Not on file       Family History   Problem Relation Age of Onset    Heart Disease Mother     Stroke Mother     Osteoporosis Mother     Cirrhosis Father         Liver    Osteoporosis Sister     Osteoporosis Maternal Grandmother          Current Outpatient Medications:     magnesium oxide (MAG-OX) 400 MG tablet, Take 1 tablet by mouth 2 times daily, Disp: 60 tablet, Rfl: 3    budesonide-formoterol (SYMBICORT) 160-4.5 MCG/ACT AERO, Inhale 2 puffs into the lungs 2 times daily, Disp: 2 Inhaler, Rfl: 0    losartan (COZAAR) 100 MG tablet, TAKE 1 TABLET DAILY, Disp: 90 tablet, Rfl: 1    escitalopram (LEXAPRO) 10 MG tablet, TAKE ONE TABLET BY MOUTH DAILY, Disp: 30 tablet, Rfl: 5    fenofibrate 160 MG tablet, TAKE 1 TABLET DAILY, Disp: 90 tablet, Rfl: 3    escitalopram (LEXAPRO) 20 MG tablet, Take 1 tablet by mouth daily, Disp: 30 tablet, Rfl: 3    glipiZIDE (GLIPIZIDE XL) 5 adenopathy  HEART:  Regular rate and rhythm, no murmurs  LUNGS:  Clear with slight crackles   ABDOMEN:  No distention, no organomegaly  EXTREMITIES:  No edema, no digital clubbing  NEURO:  No localizing deficits, CN II-XII intact    Pulmonary Function Testing 11/2018  MY IMPRESSION  Overall Mild restriction, no bronchodilator response and moderate reduction in diffusing capacity     Chest imaging from 5/2019 is reviewed. My interpretation chronic interstitial changes with bronchiectasis. 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 Comment(s)       The combination of chronic interstitial pneumonia, bronchiolitis, and  organizing pneumonia raises hypersensitivity pneumonia (extrinsic allergic  alveolitis) as the chief etiologic consideration.  In the absence of an  identifiable antigenic exposure, similar changes can be seen in patients with  various forms of drug-induced lung disease.  In the absence of any history to  incriminate either an antigenic or drug exposure, these findings are most  consistent with nonspecific interstitial pneumonia (NSIP).   I reviewed above labs and studies pertinent to this visit and date    Bronchoscopy  12/2018  -  No malignant cells identified    -Increased CD4/CD8 ratio and decreased /CD4 ratio.  These  results may be present in pulmonary sarcoidosis, viral infection,  alveolitis, and interstitial syndromes    Differential:  Neutrophilic, lymphocytic    Cultures all

## 2019-09-16 DIAGNOSIS — J84.9 ILD (INTERSTITIAL LUNG DISEASE) (HCC): ICD-10-CM

## 2019-09-16 RX ORDER — DAPSONE 100 MG/1
TABLET ORAL
Qty: 30 TABLET | Refills: 2 | Status: ON HOLD
Start: 2019-09-16 | End: 2020-09-22 | Stop reason: HOSPADM

## 2019-11-05 ENCOUNTER — OFFICE VISIT (OUTPATIENT)
Dept: PULMONOLOGY | Age: 74
End: 2019-11-05
Payer: MEDICARE

## 2019-11-05 VITALS
HEIGHT: 63 IN | RESPIRATION RATE: 16 BRPM | BODY MASS INDEX: 27.64 KG/M2 | SYSTOLIC BLOOD PRESSURE: 138 MMHG | TEMPERATURE: 98.2 F | OXYGEN SATURATION: 96 % | HEART RATE: 86 BPM | DIASTOLIC BLOOD PRESSURE: 82 MMHG | WEIGHT: 156 LBS

## 2019-11-05 DIAGNOSIS — Z79.52 CURRENT CHRONIC USE OF SYSTEMIC STEROIDS: ICD-10-CM

## 2019-11-05 DIAGNOSIS — J84.9 ILD (INTERSTITIAL LUNG DISEASE) (HCC): Primary | ICD-10-CM

## 2019-11-05 PROCEDURE — 99214 OFFICE O/P EST MOD 30 MIN: CPT | Performed by: INTERNAL MEDICINE

## 2019-11-05 RX ORDER — BUDESONIDE AND FORMOTEROL FUMARATE DIHYDRATE 160; 4.5 UG/1; UG/1
2 AEROSOL RESPIRATORY (INHALATION) 2 TIMES DAILY
Qty: 1 INHALER | Refills: 1 | COMMUNITY
Start: 2019-11-05 | End: 2019-11-08

## 2019-11-08 ENCOUNTER — OFFICE VISIT (OUTPATIENT)
Dept: INTERNAL MEDICINE CLINIC | Age: 74
End: 2019-11-08
Payer: MEDICARE

## 2019-11-08 VITALS
OXYGEN SATURATION: 96 % | HEART RATE: 80 BPM | SYSTOLIC BLOOD PRESSURE: 130 MMHG | BODY MASS INDEX: 27.64 KG/M2 | DIASTOLIC BLOOD PRESSURE: 70 MMHG | WEIGHT: 156 LBS | HEIGHT: 63 IN

## 2019-11-08 DIAGNOSIS — J44.9 CHRONIC OBSTRUCTIVE PULMONARY DISEASE, UNSPECIFIED COPD TYPE (HCC): ICD-10-CM

## 2019-11-08 DIAGNOSIS — L73.9 FOLLICULITIS: ICD-10-CM

## 2019-11-08 DIAGNOSIS — E11.649 UNCONTROLLED TYPE 2 DIABETES MELLITUS WITH HYPOGLYCEMIA, UNSPECIFIED HYPOGLYCEMIA COMA STATUS (HCC): ICD-10-CM

## 2019-11-08 DIAGNOSIS — I10 ESSENTIAL HYPERTENSION: Primary | ICD-10-CM

## 2019-11-08 LAB
ANION GAP SERPL CALCULATED.3IONS-SCNC: 14 MMOL/L (ref 3–16)
BUN BLDV-MCNC: 15 MG/DL (ref 7–20)
CALCIUM SERPL-MCNC: 9.7 MG/DL (ref 8.3–10.6)
CHLORIDE BLD-SCNC: 98 MMOL/L (ref 99–110)
CO2: 25 MMOL/L (ref 21–32)
CREAT SERPL-MCNC: 0.8 MG/DL (ref 0.6–1.2)
GFR AFRICAN AMERICAN: >60
GFR NON-AFRICAN AMERICAN: >60
GLUCOSE BLD-MCNC: 153 MG/DL (ref 70–99)
MAGNESIUM: 1.7 MG/DL (ref 1.8–2.4)
POTASSIUM SERPL-SCNC: 4.1 MMOL/L (ref 3.5–5.1)
SODIUM BLD-SCNC: 137 MMOL/L (ref 136–145)

## 2019-11-08 PROCEDURE — 90653 IIV ADJUVANT VACCINE IM: CPT | Performed by: INTERNAL MEDICINE

## 2019-11-08 PROCEDURE — G0008 ADMIN INFLUENZA VIRUS VAC: HCPCS | Performed by: INTERNAL MEDICINE

## 2019-11-08 PROCEDURE — 36415 COLL VENOUS BLD VENIPUNCTURE: CPT | Performed by: INTERNAL MEDICINE

## 2019-11-08 PROCEDURE — G0009 ADMIN PNEUMOCOCCAL VACCINE: HCPCS | Performed by: INTERNAL MEDICINE

## 2019-11-08 PROCEDURE — 99214 OFFICE O/P EST MOD 30 MIN: CPT | Performed by: INTERNAL MEDICINE

## 2019-11-08 PROCEDURE — 90732 PPSV23 VACC 2 YRS+ SUBQ/IM: CPT | Performed by: INTERNAL MEDICINE

## 2019-11-08 RX ORDER — CEPHALEXIN 500 MG/1
500 CAPSULE ORAL 2 TIMES DAILY
Qty: 28 CAPSULE | Refills: 0 | Status: SHIPPED | OUTPATIENT
Start: 2019-11-08 | End: 2020-01-23

## 2019-11-08 ASSESSMENT — ENCOUNTER SYMPTOMS
TROUBLE SWALLOWING: 0
WHEEZING: 0
GASTROINTESTINAL NEGATIVE: 1
SHORTNESS OF BREATH: 1
SORE THROAT: 0
COUGH: 0
CHEST TIGHTNESS: 0

## 2019-11-14 ENCOUNTER — HOSPITAL ENCOUNTER (OUTPATIENT)
Dept: CT IMAGING | Age: 74
Discharge: HOME | End: 2019-11-14
Payer: MEDICARE

## 2019-11-14 DIAGNOSIS — J84.9 ILD (INTERSTITIAL LUNG DISEASE) (HCC): ICD-10-CM

## 2019-11-14 PROCEDURE — 71250 CT THORAX DX C-: CPT

## 2019-11-18 RX ORDER — LOSARTAN POTASSIUM 100 MG/1
TABLET ORAL
Qty: 90 TABLET | Refills: 1 | Status: SHIPPED | OUTPATIENT
Start: 2019-11-18 | End: 2020-05-14

## 2020-01-23 ENCOUNTER — APPOINTMENT (OUTPATIENT)
Dept: CT IMAGING | Age: 75
End: 2020-01-23
Payer: MEDICARE

## 2020-01-23 ENCOUNTER — HOSPITAL ENCOUNTER (EMERGENCY)
Age: 75
Discharge: HOME | End: 2020-01-23
Attending: EMERGENCY MEDICINE
Payer: MEDICARE

## 2020-01-23 ENCOUNTER — OFFICE VISIT (OUTPATIENT)
Dept: INTERNAL MEDICINE CLINIC | Age: 75
End: 2020-01-23
Payer: MEDICARE

## 2020-01-23 VITALS
WEIGHT: 148.15 LBS | TEMPERATURE: 97.8 F | SYSTOLIC BLOOD PRESSURE: 164 MMHG | OXYGEN SATURATION: 94 % | HEIGHT: 63 IN | DIASTOLIC BLOOD PRESSURE: 82 MMHG | BODY MASS INDEX: 26.25 KG/M2 | HEART RATE: 81 BPM | RESPIRATION RATE: 18 BRPM

## 2020-01-23 VITALS
BODY MASS INDEX: 25.16 KG/M2 | HEIGHT: 63 IN | OXYGEN SATURATION: 96 % | HEART RATE: 84 BPM | DIASTOLIC BLOOD PRESSURE: 96 MMHG | RESPIRATION RATE: 16 BRPM | WEIGHT: 142 LBS | SYSTOLIC BLOOD PRESSURE: 179 MMHG

## 2020-01-23 LAB
ANION GAP SERPL CALCULATED.3IONS-SCNC: 12 MMOL/L (ref 3–16)
BASOPHILS ABSOLUTE: 0 K/UL (ref 0–0.2)
BASOPHILS RELATIVE PERCENT: 0.3 %
BUN BLDV-MCNC: 13 MG/DL (ref 7–20)
CALCIUM SERPL-MCNC: 9.4 MG/DL (ref 8.3–10.6)
CHLORIDE BLD-SCNC: 102 MMOL/L (ref 99–110)
CO2: 25 MMOL/L (ref 21–32)
CREAT SERPL-MCNC: 0.6 MG/DL (ref 0.6–1.2)
EOSINOPHILS ABSOLUTE: 0 K/UL (ref 0–0.6)
EOSINOPHILS RELATIVE PERCENT: 0.2 %
GFR AFRICAN AMERICAN: >60
GFR NON-AFRICAN AMERICAN: >60
GLUCOSE BLD-MCNC: 140 MG/DL (ref 70–99)
HCT VFR BLD CALC: 38.9 % (ref 36–48)
HEMOGLOBIN: 13 G/DL (ref 12–16)
LYMPHOCYTES ABSOLUTE: 1.7 K/UL (ref 1–5.1)
LYMPHOCYTES RELATIVE PERCENT: 36.4 %
MAGNESIUM: 1.6 MG/DL (ref 1.8–2.4)
MCH RBC QN AUTO: 31.4 PG (ref 26–34)
MCHC RBC AUTO-ENTMCNC: 33.6 G/DL (ref 31–36)
MCV RBC AUTO: 93.5 FL (ref 80–100)
MONOCYTES ABSOLUTE: 0.4 K/UL (ref 0–1.3)
MONOCYTES RELATIVE PERCENT: 8.1 %
NEUTROPHILS ABSOLUTE: 2.6 K/UL (ref 1.7–7.7)
NEUTROPHILS RELATIVE PERCENT: 55 %
PDW BLD-RTO: 14.2 % (ref 12.4–15.4)
PLATELET # BLD: 179 K/UL (ref 135–450)
PMV BLD AUTO: 8.3 FL (ref 5–10.5)
POTASSIUM REFLEX MAGNESIUM: 3.5 MMOL/L (ref 3.5–5.1)
RBC # BLD: 4.15 M/UL (ref 4–5.2)
SODIUM BLD-SCNC: 139 MMOL/L (ref 136–145)
WBC # BLD: 4.8 K/UL (ref 4–11)

## 2020-01-23 PROCEDURE — 99214 OFFICE O/P EST MOD 30 MIN: CPT | Performed by: NURSE PRACTITIONER

## 2020-01-23 PROCEDURE — 85025 COMPLETE CBC W/AUTO DIFF WBC: CPT

## 2020-01-23 PROCEDURE — 80048 BASIC METABOLIC PNL TOTAL CA: CPT

## 2020-01-23 PROCEDURE — 72128 CT CHEST SPINE W/O DYE: CPT

## 2020-01-23 PROCEDURE — 72131 CT LUMBAR SPINE W/O DYE: CPT

## 2020-01-23 PROCEDURE — 83735 ASSAY OF MAGNESIUM: CPT

## 2020-01-23 PROCEDURE — 99285 EMERGENCY DEPT VISIT HI MDM: CPT

## 2020-01-23 RX ORDER — HYDROCODONE BITARTRATE AND ACETAMINOPHEN 5; 325 MG/1; MG/1
1 TABLET ORAL EVERY 4 HOURS PRN
Qty: 18 TABLET | Refills: 0 | Status: SHIPPED | OUTPATIENT
Start: 2020-01-23 | End: 2020-01-26

## 2020-01-23 SDOH — ECONOMIC STABILITY: FOOD INSECURITY: WITHIN THE PAST 12 MONTHS, THE FOOD YOU BOUGHT JUST DIDN'T LAST AND YOU DIDN'T HAVE MONEY TO GET MORE.: NEVER TRUE

## 2020-01-23 SDOH — ECONOMIC STABILITY: TRANSPORTATION INSECURITY
IN THE PAST 12 MONTHS, HAS LACK OF TRANSPORTATION KEPT YOU FROM MEETINGS, WORK, OR FROM GETTING THINGS NEEDED FOR DAILY LIVING?: NO

## 2020-01-23 SDOH — ECONOMIC STABILITY: INCOME INSECURITY: HOW HARD IS IT FOR YOU TO PAY FOR THE VERY BASICS LIKE FOOD, HOUSING, MEDICAL CARE, AND HEATING?: NOT HARD AT ALL

## 2020-01-23 SDOH — ECONOMIC STABILITY: FOOD INSECURITY: WITHIN THE PAST 12 MONTHS, YOU WORRIED THAT YOUR FOOD WOULD RUN OUT BEFORE YOU GOT MONEY TO BUY MORE.: NEVER TRUE

## 2020-01-23 SDOH — ECONOMIC STABILITY: TRANSPORTATION INSECURITY
IN THE PAST 12 MONTHS, HAS THE LACK OF TRANSPORTATION KEPT YOU FROM MEDICAL APPOINTMENTS OR FROM GETTING MEDICATIONS?: NO

## 2020-01-23 ASSESSMENT — ENCOUNTER SYMPTOMS
ABDOMINAL DISTENTION: 1
BACK PAIN: 1
CHOKING: 0
BLOOD IN STOOL: 0
VOMITING: 0
CHEST TIGHTNESS: 0
CONSTIPATION: 0
STRIDOR: 0
EYES NEGATIVE: 1
WHEEZING: 0
ANAL BLEEDING: 0
ABDOMINAL PAIN: 1
CHOKING: 0
ABDOMINAL PAIN: 1
NAUSEA: 0
BACK PAIN: 1
DIARRHEA: 0
ALLERGIC/IMMUNOLOGIC NEGATIVE: 1
RECTAL PAIN: 0
WHEEZING: 0
COUGH: 0
ABDOMINAL DISTENTION: 1
APNEA: 0
SHORTNESS OF BREATH: 1
SHORTNESS OF BREATH: 1
CHEST TIGHTNESS: 0
COUGH: 0

## 2020-01-23 ASSESSMENT — PAIN DESCRIPTION - PROGRESSION: CLINICAL_PROGRESSION: NOT CHANGED

## 2020-01-23 ASSESSMENT — PAIN DESCRIPTION - ORIENTATION: ORIENTATION: MID

## 2020-01-23 ASSESSMENT — PAIN DESCRIPTION - PAIN TYPE: TYPE: ACUTE PAIN

## 2020-01-23 ASSESSMENT — PAIN DESCRIPTION - LOCATION: LOCATION: BACK

## 2020-01-23 ASSESSMENT — PAIN DESCRIPTION - FREQUENCY: FREQUENCY: CONTINUOUS

## 2020-01-23 ASSESSMENT — PAIN - FUNCTIONAL ASSESSMENT
PAIN_FUNCTIONAL_ASSESSMENT: ACTIVITIES ARE NOT PREVENTED
PAIN_FUNCTIONAL_ASSESSMENT: 0-10

## 2020-01-23 ASSESSMENT — PAIN DESCRIPTION - ONSET: ONSET: ON-GOING

## 2020-01-23 ASSESSMENT — PAIN SCALES - GENERAL
PAINLEVEL_OUTOF10: 9
PAINLEVEL_OUTOF10: 8

## 2020-01-23 ASSESSMENT — PAIN DESCRIPTION - DESCRIPTORS: DESCRIPTORS: PRESSURE

## 2020-01-23 NOTE — ED TRIAGE NOTES
Pt arrived to ED via private vehilce with complaints of back pain s/p fall 1.5 weeks ago. On initial assessment, pt states they tripped in the bathroom and fell, denies LOC. Pt states they hurt their back and it has gotten worse. VS noted and stable. Patient A&Ox4. Respirations easy and unlabored. Skin warm and dry and appropriate for ethnicity. No acute distress noted at this time.

## 2020-01-23 NOTE — PROGRESS NOTES
Pt is here for: back pain, abdominal pain, Passing out at home, dizziness/SOB    Chief Complaint   Patient presents with    Fall    Back Pain     mid back     Abdominal Pain     bloated , swelling and pain        HPI  This is a 75 yr old CF, with a known past medical hx that includes HTN, DM type II, anxiety/depression, and neuropathy, that presents today after falling in her bathroom ~5 days ago. Patient denies tripping, denies wet surface, states \"I just fell backwards and passed out\". Since, patient states that she has had 9/10 mid scapular back pain, abdominal pain/bloating, dizziness and SOB. Upon assessment, patient is guarded to abdomen. Given LOC/abodminal pain, and 9/10 back pain, patient instructed to go to ER For further evaluation and treatment of pain. BP (!) 179/96 (Site: Left Upper Arm, Position: Sitting, Cuff Size: Medium Adult)   Pulse 84   Resp 16   Ht 5' 3\" (1.6 m)   Wt 142 lb (64.4 kg)   SpO2 96%   Breastfeeding No   BMI 25.15 kg/m²     Review of Systems   Constitutional: Positive for fatigue. Negative for activity change, appetite change, fever and unexpected weight change. Respiratory: Positive for shortness of breath. Negative for cough, choking, chest tightness, wheezing and stridor. Cardiovascular: Positive for palpitations. Negative for chest pain. Gastrointestinal: Positive for abdominal distention and abdominal pain. Musculoskeletal: Positive for back pain. Neurological: Positive for dizziness, syncope, weakness and light-headedness. Negative for tremors and speech difficulty. Psychiatric/Behavioral: The patient is nervous/anxious. Physical Exam  Constitutional:       General: She is in acute distress (2/2 to pain ). Appearance: Normal appearance. Cardiovascular:      Rate and Rhythm: Normal rate. Pulses: Normal pulses. Pulmonary:      Effort: No respiratory distress. Breath sounds: Normal breath sounds.    Abdominal:      General: There is distension. Palpations: There is no mass. Tenderness: There is tenderness. There is no right CVA tenderness, left CVA tenderness, guarding or rebound. Hernia: No hernia is present. Musculoskeletal:         General: Tenderness (to midscapular region ) present. Neurological:      Mental Status: She is alert. Psychiatric:         Mood and Affect: Mood is anxious. Assessment/Plan   Melissa Gaona was seen today for fall, back pain and abdominal pain. Diagnoses and all orders for this visit:    Impression   Acute midline thoracic back pain  Abdominal pain, unspecified abdominal location  Syncope, unspecified syncope type  SOB (shortness of breath)    Plan  Given presenting with acute exacerbating back/abdominal pain, with known syncopal event, patient instructed to go to ER ASAP For further evaluation and pain control. Patient  states \"I wanted her to go anyways, she's been hurting so badly\". Patient and  in room agree with plan of care.

## 2020-02-14 ENCOUNTER — OFFICE VISIT (OUTPATIENT)
Dept: INTERNAL MEDICINE CLINIC | Age: 75
End: 2020-02-14
Payer: MEDICARE

## 2020-02-14 VITALS
HEART RATE: 80 BPM | BODY MASS INDEX: 25.52 KG/M2 | OXYGEN SATURATION: 94 % | SYSTOLIC BLOOD PRESSURE: 130 MMHG | HEIGHT: 63 IN | DIASTOLIC BLOOD PRESSURE: 80 MMHG | WEIGHT: 144 LBS

## 2020-02-14 PROBLEM — R63.4 WEIGHT LOSS: Status: ACTIVE | Noted: 2020-02-14

## 2020-02-14 PROBLEM — L73.9 FOLLICULITIS: Status: RESOLVED | Noted: 2019-11-08 | Resolved: 2020-02-14

## 2020-02-14 LAB
A/G RATIO: 1.9 (ref 1.1–2.2)
ALBUMIN SERPL-MCNC: 4.2 G/DL (ref 3.4–5)
ALP BLD-CCNC: 77 U/L (ref 40–129)
ALT SERPL-CCNC: 22 U/L (ref 10–40)
ANION GAP SERPL CALCULATED.3IONS-SCNC: 14 MMOL/L (ref 3–16)
AST SERPL-CCNC: 19 U/L (ref 15–37)
BILIRUB SERPL-MCNC: 1.1 MG/DL (ref 0–1)
BUN BLDV-MCNC: 14 MG/DL (ref 7–20)
C-REACTIVE PROTEIN: 2.5 MG/L (ref 0–5.1)
CALCIUM SERPL-MCNC: 9.7 MG/DL (ref 8.3–10.6)
CHLORIDE BLD-SCNC: 100 MMOL/L (ref 99–110)
CO2: 27 MMOL/L (ref 21–32)
CREAT SERPL-MCNC: 0.7 MG/DL (ref 0.6–1.2)
GFR AFRICAN AMERICAN: >60
GFR NON-AFRICAN AMERICAN: >60
GLOBULIN: 2.2 G/DL
GLUCOSE BLD-MCNC: 131 MG/DL (ref 70–99)
HBA1C MFR BLD: 6.1 %
LIPASE: 65 U/L (ref 13–60)
POTASSIUM SERPL-SCNC: 4.2 MMOL/L (ref 3.5–5.1)
SODIUM BLD-SCNC: 141 MMOL/L (ref 136–145)
TOTAL PROTEIN: 6.4 G/DL (ref 6.4–8.2)

## 2020-02-14 PROCEDURE — 99213 OFFICE O/P EST LOW 20 MIN: CPT | Performed by: INTERNAL MEDICINE

## 2020-02-14 PROCEDURE — 83036 HEMOGLOBIN GLYCOSYLATED A1C: CPT | Performed by: INTERNAL MEDICINE

## 2020-02-14 PROCEDURE — 36415 COLL VENOUS BLD VENIPUNCTURE: CPT | Performed by: INTERNAL MEDICINE

## 2020-02-14 ASSESSMENT — ENCOUNTER SYMPTOMS
TROUBLE SWALLOWING: 0
SHORTNESS OF BREATH: 0
WHEEZING: 0
COUGH: 0
CHEST TIGHTNESS: 0
GASTROINTESTINAL NEGATIVE: 1

## 2020-02-14 ASSESSMENT — PATIENT HEALTH QUESTIONNAIRE - PHQ9
1. LITTLE INTEREST OR PLEASURE IN DOING THINGS: 0
SUM OF ALL RESPONSES TO PHQ9 QUESTIONS 1 & 2: 0
SUM OF ALL RESPONSES TO PHQ QUESTIONS 1-9: 0
SUM OF ALL RESPONSES TO PHQ QUESTIONS 1-9: 0
2. FEELING DOWN, DEPRESSED OR HOPELESS: 0

## 2020-02-14 NOTE — PATIENT INSTRUCTIONS
Continue current medications  See in  3 months  Will check labs today and then may need a ct of abdomen and pelvis.

## 2020-02-14 NOTE — PROGRESS NOTES
Subjective:      Patient ID: Silver Gaitan is a 75 y.o. female. HPI  Minus Cameron in bath room 1 month back and has back pain and was found to have comp fx T-11 -pain is better . Lost weight and not have much appetite  Eats cereals for bf and some times soup for lunch and small supper. Lost 8 pounds since 11/19  Has no gi or gu symptoms and has no gi symptoms  Not feel depressed and sleeps ok . Review of Systems   Constitutional: Positive for appetite change and unexpected weight change. HENT: Negative for trouble swallowing. Respiratory: Negative for cough, chest tightness, shortness of breath and wheezing. Cardiovascular: Negative for chest pain, palpitations and leg swelling. Gastrointestinal: Negative. Genitourinary: Negative. Neurological: Positive for light-headedness (some times and walks with cane). Negative for dizziness and headaches. Objective:   Physical Exam  Vitals signs and nursing note reviewed. Constitutional:       General: She is not in acute distress. HENT:      Nose: Nose normal.      Mouth/Throat:      Mouth: Mucous membranes are moist.      Pharynx: Oropharynx is clear. Eyes:      General: No scleral icterus. Extraocular Movements: Extraocular movements intact. Conjunctiva/sclera: Conjunctivae normal.      Pupils: Pupils are equal, round, and reactive to light. Neck:      Vascular: No carotid bruit. Cardiovascular:      Rate and Rhythm: Normal rate and regular rhythm. Pulses: Normal pulses. Heart sounds: Normal heart sounds. No murmur. No gallop. Pulmonary:      Effort: No respiratory distress. Breath sounds: Normal breath sounds. No wheezing, rhonchi or rales. Abdominal:      General: Bowel sounds are normal. There is no distension. Palpations: Abdomen is soft. There is no mass. Tenderness: There is no abdominal tenderness. Musculoskeletal:      Right lower leg: No edema. Left lower leg: No edema.    Lymphadenopathy: Cervical: No cervical adenopathy. Neurological:      Mental Status: She is alert and oriented to person, place, and time. no mass felt left Le and calf    Assessment:      htn controlled  Diabetes controlled-a1c is 6.1%    Not sure what is causing her poor appetite and weight loss. cbc and bmp normal in 1/20  Plan:      Continue current medications  See in  3 months  Will check labs today and then may need a ct of abdomen and pelvis.         Miracle Izaguirre MD

## 2020-02-17 ENCOUNTER — TELEPHONE (OUTPATIENT)
Dept: INTERNAL MEDICINE CLINIC | Age: 75
End: 2020-02-17

## 2020-02-17 NOTE — TELEPHONE ENCOUNTER
DR MELÉNDEZ -  THE SED RATE WAS NOT DRAWN ON 2.14.20     TRIED ADDING IT ON AT THE LAB BUT MARCELLA CALLED AND STATED IT COULD NOT BE ADDED ON    WOULD YOU LIKE PATIENT TO RETURN FOR A REDRAW OF THE SED RATE ?

## 2020-02-21 ENCOUNTER — HOSPITAL ENCOUNTER (OUTPATIENT)
Dept: CT IMAGING | Age: 75
Discharge: HOME | End: 2020-02-21
Payer: MEDICARE

## 2020-02-21 LAB
GFR AFRICAN AMERICAN: >60
GFR NON-AFRICAN AMERICAN: >60
PERFORMED ON: NORMAL
POC CREATININE: 0.9 MG/DL (ref 0.6–1.2)
POC SAMPLE TYPE: NORMAL

## 2020-02-21 PROCEDURE — 74177 CT ABD & PELVIS W/CONTRAST: CPT

## 2020-02-21 PROCEDURE — 82565 ASSAY OF CREATININE: CPT

## 2020-02-21 PROCEDURE — 6360000004 HC RX CONTRAST MEDICATION: Performed by: INTERNAL MEDICINE

## 2020-02-21 RX ADMIN — IOPAMIDOL 75 ML: 755 INJECTION, SOLUTION INTRAVENOUS at 16:19

## 2020-02-21 RX ADMIN — IOHEXOL 50 ML: 240 INJECTION, SOLUTION INTRATHECAL; INTRAVASCULAR; INTRAVENOUS; ORAL at 16:19

## 2020-02-24 ENCOUNTER — TELEPHONE (OUTPATIENT)
Dept: INTERNAL MEDICINE CLINIC | Age: 75
End: 2020-02-24

## 2020-02-26 ENCOUNTER — TELEPHONE (OUTPATIENT)
Dept: INTERNAL MEDICINE CLINIC | Age: 75
End: 2020-02-26

## 2020-02-26 NOTE — TELEPHONE ENCOUNTER
Spoke to [address] [place] [address], the  and she did not have any openings today. She is wide open tomorrow. Dr Liam Gonzalez please call to schedule a time for a physician to call you personally (a medical assistant cannot) and perform a Peer - to Peer to get this approved.     [phone] option #4    Since this is a Medicare case the last day to do this is this Friday 2.28.2020

## 2020-04-24 RX ORDER — FENOFIBRATE 160 MG/1
TABLET ORAL
Qty: 90 TABLET | Refills: 0 | Status: SHIPPED | OUTPATIENT
Start: 2020-04-24 | End: 2020-07-23

## 2020-04-24 NOTE — TELEPHONE ENCOUNTER
Last ov 2/14/2020  Future Appointments   Date Time Provider Lila Arias   5/15/2020  1:15 PM Daniel Cooper MD [facility] [place]

## 2020-05-14 RX ORDER — LOSARTAN POTASSIUM 100 MG/1
TABLET ORAL
Qty: 90 TABLET | Refills: 1 | Status: SHIPPED | OUTPATIENT
Start: 2020-05-14 | End: 2020-10-05 | Stop reason: DRUGHIGH

## 2020-05-15 ENCOUNTER — VIRTUAL VISIT (OUTPATIENT)
Dept: INTERNAL MEDICINE CLINIC | Age: 75
End: 2020-05-15
Payer: MEDICARE

## 2020-05-15 PROBLEM — Z92.241 PERSONAL HISTORY OF STEROID THERAPY: Status: ACTIVE | Noted: 2020-05-15

## 2020-05-15 PROBLEM — S32.010A CLOSED COMPRESSION FRACTURE OF THORACOLUMBAR VERTEBRA (HCC): Status: ACTIVE | Noted: 2020-05-15

## 2020-05-15 PROBLEM — S22.080A CLOSED COMPRESSION FRACTURE OF THORACOLUMBAR VERTEBRA (HCC): Status: ACTIVE | Noted: 2020-05-15

## 2020-05-15 PROBLEM — Z78.0 POST-MENOPAUSAL: Status: ACTIVE | Noted: 2020-05-15

## 2020-05-15 PROBLEM — E11.9 TYPE 2 DIABETES MELLITUS WITHOUT COMPLICATION, WITHOUT LONG-TERM CURRENT USE OF INSULIN (HCC): Status: ACTIVE | Noted: 2020-05-15

## 2020-05-15 PROCEDURE — 99442 PR PHYS/QHP TELEPHONE EVALUATION 11-20 MIN: CPT | Performed by: INTERNAL MEDICINE

## 2020-05-15 ASSESSMENT — ENCOUNTER SYMPTOMS
COUGH: 0
TROUBLE SWALLOWING: 0
SHORTNESS OF BREATH: 1
CHEST TIGHTNESS: 0
BACK PAIN: 0
GASTROINTESTINAL NEGATIVE: 1
WHEEZING: 0

## 2020-06-24 ENCOUNTER — HOSPITAL ENCOUNTER (OUTPATIENT)
Dept: WOMENS IMAGING | Age: 75
Discharge: HOME | End: 2020-06-24
Payer: MEDICARE

## 2020-06-24 PROCEDURE — 77080 DXA BONE DENSITY AXIAL: CPT

## 2020-06-24 RX ORDER — ALENDRONATE SODIUM 70 MG/1
70 TABLET ORAL
Qty: 4 TABLET | Refills: 5 | Status: ON HOLD | OUTPATIENT
Start: 2020-06-24 | End: 2020-09-22 | Stop reason: SDUPTHER

## 2020-07-07 ENCOUNTER — TELEPHONE (OUTPATIENT)
Dept: INTERNAL MEDICINE CLINIC | Age: 75
End: 2020-07-07

## 2020-07-23 RX ORDER — FENOFIBRATE 160 MG/1
TABLET ORAL
Qty: 90 TABLET | Refills: 1 | Status: SHIPPED | OUTPATIENT
Start: 2020-07-23 | End: 2020-10-05 | Stop reason: DRUGHIGH

## 2020-07-23 NOTE — TELEPHONE ENCOUNTER
Last office visit: 05/15/2020    Future Appointments   Date Time Provider Lila Arias   8/19/2020  1:15 PM Rome Mena MD [facility]

## 2020-08-19 ENCOUNTER — OFFICE VISIT (OUTPATIENT)
Dept: INTERNAL MEDICINE CLINIC | Age: 75
End: 2020-08-19
Payer: MEDICARE

## 2020-08-19 VITALS
SYSTOLIC BLOOD PRESSURE: 150 MMHG | DIASTOLIC BLOOD PRESSURE: 100 MMHG | HEIGHT: 63 IN | TEMPERATURE: 97.9 F | WEIGHT: 135 LBS | BODY MASS INDEX: 23.92 KG/M2 | OXYGEN SATURATION: 99 % | HEART RATE: 94 BPM | RESPIRATION RATE: 18 BRPM

## 2020-08-19 PROBLEM — R35.0 URINARY FREQUENCY: Status: ACTIVE | Noted: 2020-08-19

## 2020-08-19 LAB
A/G RATIO: 1.8 (ref 1.1–2.2)
ALBUMIN SERPL-MCNC: 4.3 G/DL (ref 3.4–5)
ALP BLD-CCNC: 54 U/L (ref 40–129)
ALT SERPL-CCNC: 17 U/L (ref 10–40)
ANION GAP SERPL CALCULATED.3IONS-SCNC: 12 MMOL/L (ref 3–16)
AST SERPL-CCNC: 17 U/L (ref 15–37)
BACTERIA: ABNORMAL /HPF
BASOPHILS ABSOLUTE: 0 K/UL (ref 0–0.2)
BASOPHILS RELATIVE PERCENT: 0.3 %
BILIRUB SERPL-MCNC: 0.7 MG/DL (ref 0–1)
BILIRUBIN URINE: ABNORMAL
BLOOD, URINE: NEGATIVE
BUN BLDV-MCNC: 12 MG/DL (ref 7–20)
C-REACTIVE PROTEIN: 0.9 MG/L (ref 0–5.1)
CALCIUM SERPL-MCNC: 9.5 MG/DL (ref 8.3–10.6)
CHLORIDE BLD-SCNC: 102 MMOL/L (ref 99–110)
CLARITY: ABNORMAL
CO2: 27 MMOL/L (ref 21–32)
COLOR: ABNORMAL
CREAT SERPL-MCNC: 0.7 MG/DL (ref 0.6–1.2)
CRYSTALS, UA: ABNORMAL /HPF
EOSINOPHILS ABSOLUTE: 0.1 K/UL (ref 0–0.6)
EOSINOPHILS RELATIVE PERCENT: 1.6 %
EPITHELIAL CELLS, UA: 4 /HPF (ref 0–5)
GFR AFRICAN AMERICAN: >60
GFR NON-AFRICAN AMERICAN: >60
GLOBULIN: 2.4 G/DL
GLUCOSE BLD-MCNC: 128 MG/DL (ref 70–99)
GLUCOSE URINE: NEGATIVE MG/DL
HBA1C MFR BLD: 5.7 %
HCT VFR BLD CALC: 47.3 % (ref 36–48)
HEMOGLOBIN: 15.8 G/DL (ref 12–16)
HYALINE CASTS: 5 /LPF (ref 0–8)
KETONES, URINE: ABNORMAL MG/DL
LEUKOCYTE ESTERASE, URINE: ABNORMAL
LYMPHOCYTES ABSOLUTE: 1.8 K/UL (ref 1–5.1)
LYMPHOCYTES RELATIVE PERCENT: 29.6 %
MCH RBC QN AUTO: 31.8 PG (ref 26–34)
MCHC RBC AUTO-ENTMCNC: 33.4 G/DL (ref 31–36)
MCV RBC AUTO: 95.3 FL (ref 80–100)
MICROSCOPIC EXAMINATION: YES
MONOCYTES ABSOLUTE: 0.5 K/UL (ref 0–1.3)
MONOCYTES RELATIVE PERCENT: 7.5 %
NEUTROPHILS ABSOLUTE: 3.8 K/UL (ref 1.7–7.7)
NEUTROPHILS RELATIVE PERCENT: 61 %
NITRITE, URINE: NEGATIVE
PDW BLD-RTO: 13.3 % (ref 12.4–15.4)
PH UA: 5.5 (ref 5–8)
PLATELET # BLD: 186 K/UL (ref 135–450)
PMV BLD AUTO: 9.5 FL (ref 5–10.5)
POTASSIUM SERPL-SCNC: 4 MMOL/L (ref 3.5–5.1)
PROTEIN UA: 30 MG/DL
RBC # BLD: 4.96 M/UL (ref 4–5.2)
RBC UA: 6 /HPF (ref 0–4)
SEDIMENTATION RATE, ERYTHROCYTE: 15 MM/HR (ref 0–30)
SODIUM BLD-SCNC: 141 MMOL/L (ref 136–145)
SPECIFIC GRAVITY UA: 1.03 (ref 1–1.03)
TOTAL PROTEIN: 6.7 G/DL (ref 6.4–8.2)
TSH SERPL DL<=0.05 MIU/L-ACNC: 1.27 UIU/ML (ref 0.27–4.2)
URINE TYPE: ABNORMAL
UROBILINOGEN, URINE: 0.2 E.U./DL
WBC # BLD: 6.2 K/UL (ref 4–11)
WBC UA: 316 /HPF (ref 0–5)

## 2020-08-19 PROCEDURE — 99214 OFFICE O/P EST MOD 30 MIN: CPT | Performed by: INTERNAL MEDICINE

## 2020-08-19 PROCEDURE — 36415 COLL VENOUS BLD VENIPUNCTURE: CPT | Performed by: INTERNAL MEDICINE

## 2020-08-19 PROCEDURE — 83036 HEMOGLOBIN GLYCOSYLATED A1C: CPT | Performed by: INTERNAL MEDICINE

## 2020-08-19 RX ORDER — AMLODIPINE BESYLATE 5 MG/1
5 TABLET ORAL DAILY
Qty: 30 TABLET | Refills: 5 | Status: SHIPPED
Start: 2020-08-19 | End: 2020-10-02 | Stop reason: DRUGHIGH

## 2020-08-19 ASSESSMENT — ENCOUNTER SYMPTOMS
DIARRHEA: 0
NAUSEA: 0
CONSTIPATION: 0
TROUBLE SWALLOWING: 0
ABDOMINAL PAIN: 0
ABDOMINAL DISTENTION: 1
VOMITING: 0

## 2020-08-19 NOTE — PATIENT INSTRUCTIONS
Advised her she need to follow up with DR. Mahsa Aguilar for her lung problems  Continue current medications  Will check labs again   May need GI consult. and Urology consult.   See in 4 weeks  Add amlodipine 5 mg daily

## 2020-08-19 NOTE — PROGRESS NOTES
Lymphadenopathy:      Cervical: No cervical adenopathy. Neurological:      Mental Status: She is alert and oriented to person, place, and time. Assessment:      htn not well controlled-may be due to steroids  Diabetes  well controlled  Not sure of her cause of weight loss -labs and ct were negative in 2.20 ,normal colonoscopy in 2016      Plan:      Advised her she need to follow up with DR. Martha Lundy for her lung problems  Continue current medications  Will check labs again   May need GI consult. and Urology consult.   See in 4 weeks  Add amlodipine 5 mg daily        Kranthi Carlos MD

## 2020-08-20 RX ORDER — CIPROFLOXACIN 250 MG/1
250 TABLET, FILM COATED ORAL 2 TIMES DAILY
Qty: 14 TABLET | Refills: 0 | Status: SHIPPED | OUTPATIENT
Start: 2020-08-20 | End: 2020-08-27

## 2020-08-26 ENCOUNTER — HOSPITAL ENCOUNTER (OUTPATIENT)
Dept: CT IMAGING | Age: 75
Discharge: HOME | End: 2020-08-26
Payer: MEDICARE

## 2020-08-26 PROCEDURE — 74176 CT ABD & PELVIS W/O CONTRAST: CPT

## 2020-08-26 PROCEDURE — 6360000004 HC RX CONTRAST MEDICATION: Performed by: INTERNAL MEDICINE

## 2020-08-26 RX ADMIN — IOHEXOL 50 ML: 240 INJECTION, SOLUTION INTRATHECAL; INTRAVASCULAR; INTRAVENOUS; ORAL at 14:00

## 2020-09-16 ENCOUNTER — HOSPITAL ENCOUNTER (INPATIENT)
Age: 75
LOS: 6 days | Discharge: HOME | DRG: 189 | End: 2020-09-22
Attending: STUDENT IN AN ORGANIZED HEALTH CARE EDUCATION/TRAINING PROGRAM | Admitting: INTERNAL MEDICINE
Payer: MEDICARE

## 2020-09-16 ENCOUNTER — OFFICE VISIT (OUTPATIENT)
Dept: INTERNAL MEDICINE CLINIC | Age: 75
End: 2020-09-16
Payer: MEDICARE

## 2020-09-16 ENCOUNTER — APPOINTMENT (OUTPATIENT)
Dept: CT IMAGING | Age: 75
DRG: 189 | End: 2020-09-16
Payer: MEDICARE

## 2020-09-16 ENCOUNTER — APPOINTMENT (OUTPATIENT)
Dept: GENERAL RADIOLOGY | Age: 75
DRG: 189 | End: 2020-09-16
Payer: MEDICARE

## 2020-09-16 VITALS
SYSTOLIC BLOOD PRESSURE: 120 MMHG | TEMPERATURE: 98 F | DIASTOLIC BLOOD PRESSURE: 78 MMHG | BODY MASS INDEX: 23.1 KG/M2 | RESPIRATION RATE: 22 BRPM | WEIGHT: 130.4 LBS

## 2020-09-16 DIAGNOSIS — J18.9 PNEUMONIA: ICD-10-CM

## 2020-09-16 DIAGNOSIS — J44.1 COPD WITH ACUTE EXACERBATION (HCC): ICD-10-CM

## 2020-09-16 DIAGNOSIS — J96.01 ACUTE HYPOXEMIC RESPIRATORY FAILURE (HCC): Primary | ICD-10-CM

## 2020-09-16 DIAGNOSIS — J84.9 ILD (INTERSTITIAL LUNG DISEASE) (HCC): ICD-10-CM

## 2020-09-16 DIAGNOSIS — R06.02 SOB (SHORTNESS OF BREATH): ICD-10-CM

## 2020-09-16 LAB
ALBUMIN SERPL-MCNC: 4.4 G/DL (ref 3.4–5)
ALP BLD-CCNC: 38 U/L (ref 40–129)
ALT SERPL-CCNC: 39 U/L (ref 10–40)
ANION GAP SERPL CALCULATED.3IONS-SCNC: 14 MMOL/L (ref 3–16)
AST SERPL-CCNC: 41 U/L (ref 15–37)
BASOPHILS ABSOLUTE: 0 K/UL (ref 0–0.2)
BASOPHILS RELATIVE PERCENT: 0.7 %
BILIRUB SERPL-MCNC: 3.1 MG/DL (ref 0–1)
BILIRUBIN DIRECT: 0.7 MG/DL (ref 0–0.3)
BILIRUBIN, INDIRECT: 2.4 MG/DL (ref 0–1)
BUN BLDV-MCNC: 18 MG/DL (ref 7–20)
CALCIUM SERPL-MCNC: 10.2 MG/DL (ref 8.3–10.6)
CHLORIDE BLD-SCNC: 101 MMOL/L (ref 99–110)
CO2: 21 MMOL/L (ref 21–32)
CREAT SERPL-MCNC: 0.9 MG/DL (ref 0.6–1.2)
EOSINOPHILS ABSOLUTE: 0 K/UL (ref 0–0.6)
EOSINOPHILS RELATIVE PERCENT: 0.1 %
GFR AFRICAN AMERICAN: >60
GFR NON-AFRICAN AMERICAN: >60
GLUCOSE BLD-MCNC: 109 MG/DL (ref 70–99)
GLUCOSE BLD-MCNC: 111 MG/DL (ref 70–99)
HCT VFR BLD CALC: 36.2 % (ref 36–48)
HEMOGLOBIN: 12.3 G/DL (ref 12–16)
LYMPHOCYTES ABSOLUTE: 2.1 K/UL (ref 1–5.1)
LYMPHOCYTES RELATIVE PERCENT: 30.6 %
MCH RBC QN AUTO: 34.4 PG (ref 26–34)
MCHC RBC AUTO-ENTMCNC: 33.9 G/DL (ref 31–36)
MCV RBC AUTO: 101.5 FL (ref 80–100)
MONOCYTES ABSOLUTE: 0.6 K/UL (ref 0–1.3)
MONOCYTES RELATIVE PERCENT: 8.2 %
NEUTROPHILS ABSOLUTE: 4.1 K/UL (ref 1.7–7.7)
NEUTROPHILS RELATIVE PERCENT: 60.4 %
PDW BLD-RTO: 18.3 % (ref 12.4–15.4)
PERFORMED ON: ABNORMAL
PLATELET # BLD: 225 K/UL (ref 135–450)
PMV BLD AUTO: 8.7 FL (ref 5–10.5)
POTASSIUM REFLEX MAGNESIUM: 4.4 MMOL/L (ref 3.5–5.1)
PRO-BNP: 41 PG/ML (ref 0–449)
RBC # BLD: 3.57 M/UL (ref 4–5.2)
SODIUM BLD-SCNC: 136 MMOL/L (ref 136–145)
TOTAL PROTEIN: 6.9 G/DL (ref 6.4–8.2)
TROPONIN: <0.01 NG/ML
WBC # BLD: 6.8 K/UL (ref 4–11)

## 2020-09-16 PROCEDURE — 2580000003 HC RX 258: Performed by: INTERNAL MEDICINE

## 2020-09-16 PROCEDURE — 1200000000 HC SEMI PRIVATE

## 2020-09-16 PROCEDURE — 74177 CT ABD & PELVIS W/CONTRAST: CPT

## 2020-09-16 PROCEDURE — 71046 X-RAY EXAM CHEST 2 VIEWS: CPT

## 2020-09-16 PROCEDURE — 99214 OFFICE O/P EST MOD 30 MIN: CPT | Performed by: INTERNAL MEDICINE

## 2020-09-16 PROCEDURE — 80048 BASIC METABOLIC PNL TOTAL CA: CPT

## 2020-09-16 PROCEDURE — 84484 ASSAY OF TROPONIN QUANT: CPT

## 2020-09-16 PROCEDURE — 71260 CT THORAX DX C+: CPT

## 2020-09-16 PROCEDURE — 6370000000 HC RX 637 (ALT 250 FOR IP): Performed by: INTERNAL MEDICINE

## 2020-09-16 PROCEDURE — 6360000004 HC RX CONTRAST MEDICATION: Performed by: STUDENT IN AN ORGANIZED HEALTH CARE EDUCATION/TRAINING PROGRAM

## 2020-09-16 PROCEDURE — 85025 COMPLETE CBC W/AUTO DIFF WBC: CPT

## 2020-09-16 PROCEDURE — 80076 HEPATIC FUNCTION PANEL: CPT

## 2020-09-16 PROCEDURE — 83880 ASSAY OF NATRIURETIC PEPTIDE: CPT

## 2020-09-16 PROCEDURE — 83036 HEMOGLOBIN GLYCOSYLATED A1C: CPT

## 2020-09-16 PROCEDURE — 99285 EMERGENCY DEPT VISIT HI MDM: CPT

## 2020-09-16 PROCEDURE — 36415 COLL VENOUS BLD VENIPUNCTURE: CPT

## 2020-09-16 PROCEDURE — 93005 ELECTROCARDIOGRAM TRACING: CPT | Performed by: STUDENT IN AN ORGANIZED HEALTH CARE EDUCATION/TRAINING PROGRAM

## 2020-09-16 RX ORDER — DAPSONE 100 MG/1
100 TABLET ORAL DAILY
Status: DISCONTINUED | OUTPATIENT
Start: 2020-09-17 | End: 2020-09-18

## 2020-09-16 RX ORDER — AMLODIPINE BESYLATE 5 MG/1
5 TABLET ORAL DAILY
Status: DISCONTINUED | OUTPATIENT
Start: 2020-09-17 | End: 2020-09-22 | Stop reason: HOSPADM

## 2020-09-16 RX ORDER — DEXTROSE MONOHYDRATE 25 G/50ML
12.5 INJECTION, SOLUTION INTRAVENOUS PRN
Status: DISCONTINUED | OUTPATIENT
Start: 2020-09-16 | End: 2020-09-22 | Stop reason: HOSPADM

## 2020-09-16 RX ORDER — ACETAMINOPHEN 325 MG/1
650 TABLET ORAL EVERY 6 HOURS PRN
Status: DISCONTINUED | OUTPATIENT
Start: 2020-09-16 | End: 2020-09-22 | Stop reason: HOSPADM

## 2020-09-16 RX ORDER — ACETAMINOPHEN 650 MG/1
650 SUPPOSITORY RECTAL EVERY 6 HOURS PRN
Status: DISCONTINUED | OUTPATIENT
Start: 2020-09-16 | End: 2020-09-22 | Stop reason: HOSPADM

## 2020-09-16 RX ORDER — ESCITALOPRAM OXALATE 10 MG/1
10 TABLET ORAL DAILY
Status: DISCONTINUED | OUTPATIENT
Start: 2020-09-17 | End: 2020-09-22 | Stop reason: HOSPADM

## 2020-09-16 RX ORDER — FENOFIBRATE 160 MG/1
160 TABLET ORAL DAILY
Status: DISCONTINUED | OUTPATIENT
Start: 2020-09-17 | End: 2020-09-22 | Stop reason: HOSPADM

## 2020-09-16 RX ORDER — NICOTINE POLACRILEX 4 MG
15 LOZENGE BUCCAL PRN
Status: DISCONTINUED | OUTPATIENT
Start: 2020-09-16 | End: 2020-09-22 | Stop reason: HOSPADM

## 2020-09-16 RX ORDER — SODIUM CHLORIDE 0.9 % (FLUSH) 0.9 %
10 SYRINGE (ML) INJECTION PRN
Status: DISCONTINUED | OUTPATIENT
Start: 2020-09-16 | End: 2020-09-22 | Stop reason: HOSPADM

## 2020-09-16 RX ORDER — IPRATROPIUM BROMIDE AND ALBUTEROL SULFATE 2.5; .5 MG/3ML; MG/3ML
1 SOLUTION RESPIRATORY (INHALATION) EVERY 4 HOURS PRN
Status: DISCONTINUED | OUTPATIENT
Start: 2020-09-16 | End: 2020-09-22 | Stop reason: HOSPADM

## 2020-09-16 RX ORDER — PROMETHAZINE HYDROCHLORIDE 25 MG/1
12.5 TABLET ORAL EVERY 6 HOURS PRN
Status: DISCONTINUED | OUTPATIENT
Start: 2020-09-16 | End: 2020-09-22 | Stop reason: HOSPADM

## 2020-09-16 RX ORDER — DICYCLOMINE HYDROCHLORIDE 10 MG/1
10 CAPSULE ORAL DAILY
Status: DISCONTINUED | OUTPATIENT
Start: 2020-09-17 | End: 2020-09-22 | Stop reason: HOSPADM

## 2020-09-16 RX ORDER — LANOLIN ALCOHOL/MO/W.PET/CERES
400 CREAM (GRAM) TOPICAL 2 TIMES DAILY
Status: DISCONTINUED | OUTPATIENT
Start: 2020-09-16 | End: 2020-09-22 | Stop reason: HOSPADM

## 2020-09-16 RX ORDER — ONDANSETRON 2 MG/ML
4 INJECTION INTRAMUSCULAR; INTRAVENOUS EVERY 6 HOURS PRN
Status: DISCONTINUED | OUTPATIENT
Start: 2020-09-16 | End: 2020-09-22 | Stop reason: HOSPADM

## 2020-09-16 RX ORDER — MONTELUKAST SODIUM 10 MG/1
10 TABLET ORAL NIGHTLY
Status: DISCONTINUED | OUTPATIENT
Start: 2020-09-16 | End: 2020-09-22 | Stop reason: HOSPADM

## 2020-09-16 RX ORDER — CETIRIZINE HYDROCHLORIDE 10 MG/1
10 TABLET ORAL DAILY
Status: DISCONTINUED | OUTPATIENT
Start: 2020-09-17 | End: 2020-09-22 | Stop reason: HOSPADM

## 2020-09-16 RX ORDER — ALENDRONATE SODIUM 35 MG/1
70 TABLET ORAL
Status: DISCONTINUED | OUTPATIENT
Start: 2020-09-18 | End: 2020-09-18

## 2020-09-16 RX ORDER — PREDNISONE 10 MG/1
10 TABLET ORAL DAILY
Status: DISCONTINUED | OUTPATIENT
Start: 2020-09-17 | End: 2020-09-17

## 2020-09-16 RX ORDER — BUDESONIDE AND FORMOTEROL FUMARATE DIHYDRATE 160; 4.5 UG/1; UG/1
2 AEROSOL RESPIRATORY (INHALATION) 2 TIMES DAILY
Status: DISCONTINUED | OUTPATIENT
Start: 2020-09-16 | End: 2020-09-22 | Stop reason: HOSPADM

## 2020-09-16 RX ORDER — LOSARTAN POTASSIUM 100 MG/1
100 TABLET ORAL DAILY
Status: DISCONTINUED | OUTPATIENT
Start: 2020-09-17 | End: 2020-09-22 | Stop reason: HOSPADM

## 2020-09-16 RX ORDER — POLYETHYLENE GLYCOL 3350 17 G/17G
17 POWDER, FOR SOLUTION ORAL DAILY PRN
Status: DISCONTINUED | OUTPATIENT
Start: 2020-09-16 | End: 2020-09-22 | Stop reason: HOSPADM

## 2020-09-16 RX ORDER — DEXTROSE MONOHYDRATE 50 MG/ML
100 INJECTION, SOLUTION INTRAVENOUS PRN
Status: DISCONTINUED | OUTPATIENT
Start: 2020-09-16 | End: 2020-09-22 | Stop reason: HOSPADM

## 2020-09-16 RX ORDER — GABAPENTIN 300 MG/1
300 CAPSULE ORAL DAILY
Status: DISCONTINUED | OUTPATIENT
Start: 2020-09-17 | End: 2020-09-22 | Stop reason: HOSPADM

## 2020-09-16 RX ORDER — SODIUM CHLORIDE 0.9 % (FLUSH) 0.9 %
10 SYRINGE (ML) INJECTION EVERY 12 HOURS SCHEDULED
Status: DISCONTINUED | OUTPATIENT
Start: 2020-09-16 | End: 2020-09-22 | Stop reason: HOSPADM

## 2020-09-16 RX ADMIN — METOPROLOL TARTRATE 25 MG: 25 TABLET, FILM COATED ORAL at 23:19

## 2020-09-16 RX ADMIN — OYSTER SHELL CALCIUM WITH VITAMIN D 1 TABLET: 500; 200 TABLET, FILM COATED ORAL at 23:19

## 2020-09-16 RX ADMIN — MONTELUKAST 10 MG: 10 TABLET, FILM COATED ORAL at 23:18

## 2020-09-16 RX ADMIN — Medication 400 MG: at 23:18

## 2020-09-16 RX ADMIN — IOPAMIDOL 75 ML: 755 INJECTION, SOLUTION INTRAVENOUS at 16:21

## 2020-09-16 RX ADMIN — SODIUM CHLORIDE, PRESERVATIVE FREE 10 ML: 5 INJECTION INTRAVENOUS at 23:18

## 2020-09-16 ASSESSMENT — ENCOUNTER SYMPTOMS
COUGH: 0
SHORTNESS OF BREATH: 1
CHEST TIGHTNESS: 0
WHEEZING: 0
NAUSEA: 0
BACK PAIN: 1
TROUBLE SWALLOWING: 0
ABDOMINAL PAIN: 1
DIARRHEA: 0
BLOOD IN STOOL: 1
CONSTIPATION: 0

## 2020-09-16 ASSESSMENT — PAIN DESCRIPTION - DESCRIPTORS
DESCRIPTORS: ACHING
DESCRIPTORS: ACHING

## 2020-09-16 ASSESSMENT — PAIN DESCRIPTION - ONSET: ONSET: GRADUAL

## 2020-09-16 ASSESSMENT — PAIN DESCRIPTION - PAIN TYPE: TYPE: ACUTE PAIN

## 2020-09-16 ASSESSMENT — PAIN DESCRIPTION - PROGRESSION: CLINICAL_PROGRESSION: GRADUALLY WORSENING

## 2020-09-16 ASSESSMENT — PAIN SCALES - GENERAL
PAINLEVEL_OUTOF10: 3
PAINLEVEL_OUTOF10: 4

## 2020-09-16 ASSESSMENT — PAIN DESCRIPTION - ORIENTATION
ORIENTATION: LOWER
ORIENTATION: LOWER

## 2020-09-16 NOTE — ED PROVIDER NOTES
[address] [facility]      Pt Name: Tierra Lane  MRN: [id number]  [place] 1945  Date of evaluation: 9/16/2020  Provider: Jailyn Hunt MD    CHIEF COMPLAINT       Chief Complaint   Patient presents with    Shortness of Breath     starting for one week.  Nausea    Abdominal Pain     lower abd pain, bright red blood one week ago, that has subsided          HISTORY OF PRESENT ILLNESS   (Location/Symptom, Timing/Onset,Context/Setting, Quality, Duration, Modifying Factors, Severity)  Note limiting factors. Tierra Lane is a 67 y.o. female who presents to the emergency department with shortness of breath x 1 week. Onset gradual, progressively worsening, associated with exertion, characterized as difficulty catching her breath, stating she becomes winded when talking. Associated with R sided anteroinferior chest pain worse with deep inspiration. Further associated with crampy RUQ/epigastric pain, nonradiating, associated with nausea. Had 1 episode hematochezia 1 week ago that has since resolved. Denies fevers, chills, diarrhea, focal weakness, syncope, though feels lightheaded when standing. Symptoms not otherwise alleviated or exacerbated by other factors. NursingNotes were reviewed. REVIEW OF SYSTEMS    (2-9 systems for level 4, 10 or more for level 5)       Constitutional: No fever or chills. Eye: No visual disturbances. No eye pain. Ear/Nose/Mouth/Throat: No nasal congestion. No sore throat. Respiratory: No cough, + shortness of breath, No sputum production. Cardiovascular: + chest pain. No palpitations. Gastrointestinal: + abdominal pain. + nausea, no vomiting, no melena or hematochezia. Genitourinary: No dysuria. No hematuria. Hematology/Lymphatics: No bleeding or bruising tendency. Immunologic: No malaise. No swollen glands. Musculoskeletal: No back pain. No joint pain. Integumentary: No rash.  No abrasions. Neurologic: No headache. No focal numbness or weakness. PAST MEDICAL HISTORY     Past Medical History:   Diagnosis Date    Asthma     Depression 6/28/2013    Diverticulosis of colon (without mention of hemorrhage)     Enthesopathy of hip region     left - mild    Esophageal reflux     Irritable bowel syndrome     Nocturia     Osteoporosis, unspecified     Other and unspecified hyperlipidemia     Postinflammatory pulmonary fibrosis (HCC)     Sialoadenitis     left    Synovial cyst of popliteal space     left knee    Thoracic or lumbosacral neuritis or radiculitis, unspecified     left - L5 - S1    Type II or unspecified type diabetes mellitus without mention of complication, not stated as uncontrolled     Unspecified essential hypertension          SURGICALHISTORY       Past Surgical History:   Procedure Laterality Date    BRONCHOSCOPY N/A 12/21/2018    BRONCHOSCOPY WITH BAL performed by Kimmy Barbour DO at [address] [place] [address]  2009    DR. Jean-no polyps    COLONOSCOPY  02/2016    normal-DR. Ramsay    HYSTERECTOMY      partial    LIPOMA RESECTION      abdominal wall    OVARY REMOVAL      left    POLYPECTOMY           CURRENT MEDICATIONS       Previous Medications    ACEBUTOLOL (SECTRAL) 400 MG CAPSULE    TAKE 1 CAPSULE TWICE A DAY    ALBUTEROL SULFATE  (90 BASE) MCG/ACT INHALER    Inhale 2 puffs into the lungs every 4 hours (while awake) And as needed every 4 hours at nights    ALENDRONATE (FOSAMAX) 70 MG TABLET    Take 1 tablet by mouth every 7 days Take on empty stomach in am with full glass of water and remain upright for 30 minutes    AMLODIPINE (NORVASC) 5 MG TABLET    Take 1 tablet by mouth daily    BUDESONIDE-FORMOTEROL (SYMBICORT) 160-4.5 MCG/ACT AERO    Inhale 2 puffs into the lungs 2 times daily    CALCIUM-VITAMIN D (OSCAL 500/200 D-3) 500-200 MG-UNIT PER TABLET    Take 1 tablet by mouth 2 times daily    CETIRIZINE (ZYRTEC ALLERGY) 10 MG TABLET    Take 1 tablet by mouth daily    DAPSONE 100 MG TABLET    TAKE ONE TABLET BY MOUTH DAILY    DICYCLOMINE (BENTYL) 10 MG CAPSULE    TAKE 1 CAPSULE DAILY    ESCITALOPRAM (LEXAPRO) 10 MG TABLET    TAKE ONE TABLET BY MOUTH DAILY    FENOFIBRATE (TRIGLIDE) 160 MG TABLET    TAKE 1 TABLET DAILY    GABAPENTIN (NEURONTIN) 300 MG CAPSULE    TAKE ONE CAPSULE BY MOUTH THREE TIMES A DAY    LOSARTAN (COZAAR) 100 MG TABLET    TAKE 1 TABLET DAILY    MAGNESIUM OXIDE (MAG-OX) 400 (241.3 MG) MG TABS TABLET    TAKE ONE TABLET BY MOUTH TWICE A DAY    METFORMIN (GLUCOPHAGE) 1000 MG TABLET    TAKE ONE TABLET BY MOUTH TWICE A DAY WITH MEALS    MONTELUKAST (SINGULAIR) 10 MG TABLET    TAKE ONE TABLET BY MOUTH DAILY    PREDNISONE (DELTASONE) 10 MG TABLET    Take 1 tablet by mouth daily       ALLERGIES     Latex; Neda-seltzer [aspirin effervescent]; and Sulfa antibiotics    FAMILY HISTORY       Family History   Problem Relation Age of Onset    Heart Disease Mother     Stroke Mother     Osteoporosis Mother     Cirrhosis Father         Liver    Osteoporosis Sister     Osteoporosis Maternal Grandmother           SOCIAL HISTORY       Social History     Socioeconomic History    Marital status:      Spouse name: Milagros Marie Number of children: 3    Years of education: 15    Highest education level: High school graduate   Occupational History    Occupation: retired   Social Needs    Financial resource strain: Not hard at all   [place] insecurity     Worry: Never true     Inability: Never true    Transportation needs     Medical: No     Non-medical: No   Tobacco Use    Smoking status: Never Smoker    Smokeless tobacco: Never Used   Substance and Sexual Activity    Alcohol use:  Yes    Drug use: No    Sexual activity: Not on file   Lifestyle    Physical activity     Days per week: Not on file     Minutes per session: Not on file    Stress: Not on file   Relationships    Social connections     Talks on phone: Not on file     Gets together: Not on file     Attends Anabaptist service: Not on file     Active member of club or organization: Not on file     Attends meetings of clubs or organizations: Not on file     Relationship status: Not on file    Intimate partner violence     Fear of current or ex partner: Not on file     Emotionally abused: Not on file     Physically abused: Not on file     Forced sexual activity: Not on file   Other Topics Concern    Not on file   Social History Narrative    Not on file       SCREENINGS             PHYSICAL EXAM    (up to 7 for level 4, 8 or more for level 5)     ED Triage Vitals [09/16/20 1354]   BP Temp Temp Source Pulse Resp SpO2 Height Weight   135/83 97.4 °F (36.3 °C) Oral 88 20 80% on RA during my evaluation -- 135 lb 11.2 oz (61.6 kg)       General: Alert and oriented appropriately for age, Tachypneic, speaking in short sentences. No acute distress. Eye: Normal conjunctiva. Pupils equal and reactive. HENT: Oral mucosa is moist.  Respiratory: Tachypneic, equal air entry bilaterally, needs to stop and catch her breath b/t phrases. No stridor. Placed on NC. Cardiovascular: Normal rate, Regular rhythm. Gastrointestinal: Soft, Non-tender, Non-distended. Musculoskeletal: No swelling. Integumentary: Warm, Dry. Neurologic: Alert and appropriate for age. No focal deficits. Psychiatric: Cooperative. DIAGNOSTIC RESULTS       RADIOLOGY:   Non-plain filmimages such as CT, Ultrasound and MRI are read by the radiologist. Plain radiographic images are visualized and preliminarily interpreted by the emergency physician with the below findings:      Interpretation per the Radiologist below, if available at the time ofthis note:    CT CHEST PULMONARY EMBOLISM W CONTRAST   Final Result   1. No pulmonary embolism. 2. Centrilobular emphysema and chronic fibrotic changes in the chest.   Scattered ground-glass opacities in the right upper lobe appear to be   chronic.   No focal consolidation to suggest pneumonia. 3. No acute finding in the abdomen or pelvis. CT ABDOMEN PELVIS W IV CONTRAST Additional Contrast? None   Final Result   1. No pulmonary embolism. 2. Centrilobular emphysema and chronic fibrotic changes in the chest.   Scattered ground-glass opacities in the right upper lobe appear to be   chronic. No focal consolidation to suggest pneumonia. 3. No acute finding in the abdomen or pelvis. XR CHEST (2 VW)   Final Result   No acute process. ED BEDSIDE ULTRASOUND:   Performed by ED Physician - none    LABS:  Labs Reviewed   CBC WITH AUTO DIFFERENTIAL - Abnormal; Notable for the following components:       Result Value    RBC 3.57 (*)     .5 (*)     MCH 34.4 (*)     RDW 18.3 (*)     All other components within normal limits    Narrative:     Performed at:  [address] [place] [address]   Phone [phone]   BASIC METABOLIC PANEL W/ REFLEX TO MG FOR LOW K - Abnormal; Notable for the following components:    Glucose 111 (*)     All other components within normal limits    Narrative:     Performed at:  [address] [place] [address]   Phone [phone]   BRAIN NATRIURETIC PEPTIDE    Narrative:     Performed at:  [address] [place] [address]   Phone [phone]   TROPONIN    Narrative:     Performed at:  [facility]  [address] [place] [address] [address]   Phone [phone]       All other labs were within normal range or not returned as of this dictation.     EMERGENCY DEPARTMENT COURSE and DIFFERENTIAL DIAGNOSIS/MDM:   Vitals:    Vitals:    09/16/20 2050 09/16/20 2237 09/16/20 2321 09/17/20 0038   BP: 134/78 (!) 154/89  125/71   Pulse: 88 97  72   Resp: 18 18  16   Temp:  97.8 °F (36.6 °C)  98.1 °F (36.7 °C)   TempSrc: Oral  Oral   SpO2: 92% 92%  93%   Weight:   128 lb 8.5 oz (58.3 kg)    Height:   5' 4\" (1.626 m)        Medical decision makin yo F pt p/w worsening SOB x 1 week, h/o ILD, exertional dyspnea, speaking in short sentences, will desat to 80% when talking. CT -ve for PE, demonstrates interstitial changes. On 3L NC to improvement. EKG not STEMI. Troponin negative. Given ground glass opacities c/w priors, likely worsening dyspnea from chronic ILD requiring further evaluation, likely inpt pulm consultation, pt improved with O2, still satting 92% on 3L, admitted to Dr. Zo Williamson for further [facility] and workup. FINAL IMPRESSION      1.  Acute hypoxemic respiratory failure ([address]          DISPOSITION/PLAN   DISPOSITION  Admitted      (Please note that portions of this note were completed with a voice recognition program.Efforts were made to edit the dictations but occasionally words are mis-transcribed.)    Melissa Ortiz MD (electronically signed)  Attending Emergency Physician          Melissa Ortiz MD  [phone]

## 2020-09-16 NOTE — PROGRESS NOTES
Medication Reconciliation     List of medications patient is currently taking is complete. Source of information:   1. Conversation with patient at bedside  2. EPIC records        Notes regarding home medications:  1. Patient did not receive any of her home medications today    2. Has not refilled her dapsone in \"months\"    3. States that she takes every medication \"once a day in the morning\".  There are multiple meds that are ordered multiple times daily      Venu Parkinson, Pharmacy Intern  9/16/2020 7:51 PM

## 2020-09-16 NOTE — PROGRESS NOTES
Subjective:      Patient ID: William Howard is a 67 y.o. female. HPI  For 2 weeks she is not breathing well and happens at rest also an increases with any exertion   Has no swelling of legs and not gained any weight but lost weight-4 more pounds in 1 month  She feels she eats well and has no fever or chills and hs no cough or sputum   Uses inhaler and seem to help her  Her lower chest feels sore when she starts breathing hard  She stated she has bright blood in stool and vagina or rectum and not bled any more after that  Once in a awhile has pain in epigastric area and recent cy did not show any abnormalities  Review of Systems   Constitutional: Positive for appetite change, chills, fatigue and unexpected weight change. Negative for fever. HENT: Negative for trouble swallowing. Respiratory: Positive for shortness of breath. Negative for cough, chest tightness and wheezing. Cardiovascular: Negative for chest pain, palpitations and leg swelling. Gastrointestinal: Positive for abdominal pain and blood in stool. Negative for constipation, diarrhea and nausea. Genitourinary: Negative. Musculoskeletal: Positive for back pain. Neurological: Negative for dizziness, light-headedness and headaches. Objective:   Physical Exam  Vitals signs and nursing note reviewed. Constitutional:       General: She is not in acute distress. Eyes:      General: No scleral icterus. Extraocular Movements: Extraocular movements intact. Conjunctiva/sclera: Conjunctivae normal.      Pupils: Pupils are equal, round, and reactive to light. Neck:      Thyroid: No thyromegaly. Vascular: No carotid bruit. Cardiovascular:      Rate and Rhythm: Normal rate and regular rhythm. Pulses: Normal pulses. Heart sounds: Normal heart sounds. No murmur. No gallop. Pulmonary:      Effort: No respiratory distress. Breath sounds: No wheezing, rhonchi or rales.    Abdominal:      General: Bowel sounds are normal. There is no distension (rlq). Palpations: Abdomen is soft. There is no hepatomegaly, splenomegaly or mass. Tenderness: There is abdominal tenderness (moderate). There is no guarding or rebound. Hernia: No hernia is present. Musculoskeletal:      Right lower leg: No edema. Left lower leg: No edema. Lymphadenopathy:      Cervical: No cervical adenopathy. Neurological:      General: No focal deficit present. Mental Status: She is alert and oriented to person, place, and time.          Assessment:      Dyspnea of undetermined etiology    Weight loss   htn ;abdominal pain a nd rectal bleeding  Copd   Acute respiratory failure with hypoxia  ILD  Diabetes mellitis-2  Plan:      Advised to take her to ER for evaluation         Caitlyn Yoon MD

## 2020-09-17 PROBLEM — D64.9 ANEMIA, UNSPECIFIED: Status: ACTIVE | Noted: 2020-09-17

## 2020-09-17 PROBLEM — K62.5 RECTAL BLEEDING: Status: ACTIVE | Noted: 2020-09-17

## 2020-09-17 LAB
ALBUMIN SERPL-MCNC: 3.8 G/DL (ref 3.4–5)
ALP BLD-CCNC: 33 U/L (ref 40–129)
ALT SERPL-CCNC: 35 U/L (ref 10–40)
ANION GAP SERPL CALCULATED.3IONS-SCNC: 10 MMOL/L (ref 3–16)
AST SERPL-CCNC: 32 U/L (ref 15–37)
BASOPHILS ABSOLUTE: 0 K/UL (ref 0–0.2)
BASOPHILS RELATIVE PERCENT: 0.5 %
BILIRUB SERPL-MCNC: 2.7 MG/DL (ref 0–1)
BILIRUBIN DIRECT: 0.7 MG/DL (ref 0–0.3)
BILIRUBIN, INDIRECT: 2 MG/DL (ref 0–1)
BUN BLDV-MCNC: 19 MG/DL (ref 7–20)
CALCIUM SERPL-MCNC: 9.4 MG/DL (ref 8.3–10.6)
CHLORIDE BLD-SCNC: 99 MMOL/L (ref 99–110)
CO2: 24 MMOL/L (ref 21–32)
CREAT SERPL-MCNC: 0.8 MG/DL (ref 0.6–1.2)
DAT POLYSPECIFIC: NORMAL
EKG ATRIAL RATE: 83 BPM
EKG DIAGNOSIS: NORMAL
EKG P AXIS: 78 DEGREES
EKG P-R INTERVAL: 134 MS
EKG Q-T INTERVAL: 358 MS
EKG QRS DURATION: 84 MS
EKG QTC CALCULATION (BAZETT): 420 MS
EKG R AXIS: -29 DEGREES
EKG T AXIS: 16 DEGREES
EKG VENTRICULAR RATE: 83 BPM
EOSINOPHILS ABSOLUTE: 0 K/UL (ref 0–0.6)
EOSINOPHILS RELATIVE PERCENT: 0.2 %
ESTIMATED AVERAGE GLUCOSE: 56.6 MG/DL
FOLATE: 9.72 NG/ML (ref 4.78–24.2)
GFR AFRICAN AMERICAN: >60
GFR NON-AFRICAN AMERICAN: >60
GLUCOSE BLD-MCNC: 100 MG/DL (ref 70–99)
GLUCOSE BLD-MCNC: 157 MG/DL (ref 70–99)
GLUCOSE BLD-MCNC: 179 MG/DL (ref 70–99)
GLUCOSE BLD-MCNC: 90 MG/DL (ref 70–99)
GLUCOSE BLD-MCNC: 94 MG/DL (ref 70–99)
HAPTOGLOBIN: <10 MG/DL (ref 30–200)
HBA1C MFR BLD: 3.6 %
HCT VFR BLD CALC: 33.2 % (ref 36–48)
HEMOGLOBIN: 11.1 G/DL (ref 12–16)
IMMATURE RETIC FRACT: 0.6 (ref 0.21–0.37)
LACTATE DEHYDROGENASE: 242 U/L (ref 100–190)
LV EF: 53 %
LVEF MODALITY: NORMAL
LYMPHOCYTES ABSOLUTE: 1.7 K/UL (ref 1–5.1)
LYMPHOCYTES RELATIVE PERCENT: 31.6 %
MAGNESIUM: 2 MG/DL (ref 1.8–2.4)
MCH RBC QN AUTO: 34.5 PG (ref 26–34)
MCHC RBC AUTO-ENTMCNC: 33.4 G/DL (ref 31–36)
MCV RBC AUTO: 103.3 FL (ref 80–100)
MONOCYTES ABSOLUTE: 0.4 K/UL (ref 0–1.3)
MONOCYTES RELATIVE PERCENT: 7.5 %
NEUTROPHILS ABSOLUTE: 3.2 K/UL (ref 1.7–7.7)
NEUTROPHILS RELATIVE PERCENT: 60.2 %
PDW BLD-RTO: 19.3 % (ref 12.4–15.4)
PERFORMED ON: ABNORMAL
PERFORMED ON: NORMAL
PLATELET # BLD: 208 K/UL (ref 135–450)
PMV BLD AUTO: 8.7 FL (ref 5–10.5)
POTASSIUM REFLEX MAGNESIUM: 4.1 MMOL/L (ref 3.5–5.1)
PROCALCITONIN: 0.08 NG/ML (ref 0–0.15)
RBC # BLD: 3.21 M/UL (ref 4–5.2)
RETICULOCYTE ABSOLUTE COUNT: 0.17 M/UL (ref 0.02–0.1)
RETICULOCYTE COUNT PCT: 5.35 % (ref 0.5–2.18)
SODIUM BLD-SCNC: 133 MMOL/L (ref 136–145)
TOTAL PROTEIN: 6.2 G/DL (ref 6.4–8.2)
VITAMIN B-12: 327 PG/ML (ref 211–911)
WBC # BLD: 5.3 K/UL (ref 4–11)

## 2020-09-17 PROCEDURE — 2580000003 HC RX 258: Performed by: INTERNAL MEDICINE

## 2020-09-17 PROCEDURE — 83010 ASSAY OF HAPTOGLOBIN QUANT: CPT

## 2020-09-17 PROCEDURE — 86880 COOMBS TEST DIRECT: CPT

## 2020-09-17 PROCEDURE — 94761 N-INVAS EAR/PLS OXIMETRY MLT: CPT

## 2020-09-17 PROCEDURE — 6370000000 HC RX 637 (ALT 250 FOR IP): Performed by: INTERNAL MEDICINE

## 2020-09-17 PROCEDURE — 2700000000 HC OXYGEN THERAPY PER DAY

## 2020-09-17 PROCEDURE — 83615 LACTATE (LD) (LDH) ENZYME: CPT

## 2020-09-17 PROCEDURE — 1200000000 HC SEMI PRIVATE

## 2020-09-17 PROCEDURE — 93010 ELECTROCARDIOGRAM REPORT: CPT | Performed by: INTERNAL MEDICINE

## 2020-09-17 PROCEDURE — 80076 HEPATIC FUNCTION PANEL: CPT

## 2020-09-17 PROCEDURE — 87449 NOS EACH ORGANISM AG IA: CPT

## 2020-09-17 PROCEDURE — 83735 ASSAY OF MAGNESIUM: CPT

## 2020-09-17 PROCEDURE — 82607 VITAMIN B-12: CPT

## 2020-09-17 PROCEDURE — 99223 1ST HOSP IP/OBS HIGH 75: CPT | Performed by: INTERNAL MEDICINE

## 2020-09-17 PROCEDURE — 84145 PROCALCITONIN (PCT): CPT

## 2020-09-17 PROCEDURE — 6360000002 HC RX W HCPCS: Performed by: INTERNAL MEDICINE

## 2020-09-17 PROCEDURE — 80048 BASIC METABOLIC PNL TOTAL CA: CPT

## 2020-09-17 PROCEDURE — 87305 ASPERGILLUS AG IA: CPT

## 2020-09-17 PROCEDURE — 83051 HEMOGLOBIN PLASMA: CPT

## 2020-09-17 PROCEDURE — 83070 ASSAY OF HEMOSIDERIN QUAL: CPT

## 2020-09-17 PROCEDURE — 94640 AIRWAY INHALATION TREATMENT: CPT

## 2020-09-17 PROCEDURE — 85045 AUTOMATED RETICULOCYTE COUNT: CPT

## 2020-09-17 PROCEDURE — 93306 TTE W/DOPPLER COMPLETE: CPT

## 2020-09-17 PROCEDURE — 82746 ASSAY OF FOLIC ACID SERUM: CPT

## 2020-09-17 PROCEDURE — 85025 COMPLETE CBC W/AUTO DIFF WBC: CPT

## 2020-09-17 RX ORDER — LUBIPROSTONE 8 UG/1
8 CAPSULE, GELATIN COATED ORAL 2 TIMES DAILY WITH MEALS
Status: DISCONTINUED | OUTPATIENT
Start: 2020-09-17 | End: 2020-09-17 | Stop reason: RX

## 2020-09-17 RX ORDER — PREDNISONE 20 MG/1
20 TABLET ORAL DAILY
Status: DISCONTINUED | OUTPATIENT
Start: 2020-09-17 | End: 2020-09-17 | Stop reason: SDUPTHER

## 2020-09-17 RX ORDER — PREDNISONE 20 MG/1
20 TABLET ORAL DAILY
Status: DISCONTINUED | OUTPATIENT
Start: 2020-09-18 | End: 2020-09-18

## 2020-09-17 RX ORDER — PREDNISONE 10 MG/1
10 TABLET ORAL ONCE
Status: COMPLETED | OUTPATIENT
Start: 2020-09-17 | End: 2020-09-17

## 2020-09-17 RX ORDER — HALOPERIDOL 5 MG/ML
2 INJECTION INTRAMUSCULAR EVERY 6 HOURS PRN
Status: DISCONTINUED | OUTPATIENT
Start: 2020-09-17 | End: 2020-09-22 | Stop reason: HOSPADM

## 2020-09-17 RX ADMIN — HALOPERIDOL LACTATE 2 MG: 5 INJECTION, SOLUTION INTRAMUSCULAR at 21:45

## 2020-09-17 RX ADMIN — METFORMIN HYDROCHLORIDE 1000 MG: 500 TABLET ORAL at 08:28

## 2020-09-17 RX ADMIN — Medication 400 MG: at 09:32

## 2020-09-17 RX ADMIN — SODIUM CHLORIDE, PRESERVATIVE FREE 10 ML: 5 INJECTION INTRAVENOUS at 09:42

## 2020-09-17 RX ADMIN — LOSARTAN POTASSIUM 100 MG: 100 TABLET, FILM COATED ORAL at 09:26

## 2020-09-17 RX ADMIN — SODIUM CHLORIDE, PRESERVATIVE FREE 10 ML: 5 INJECTION INTRAVENOUS at 23:04

## 2020-09-17 RX ADMIN — DAPSONE 100 MG: 100 TABLET ORAL at 09:30

## 2020-09-17 RX ADMIN — FENOFIBRATE 160 MG: 160 TABLET ORAL at 09:38

## 2020-09-17 RX ADMIN — ENOXAPARIN SODIUM 30 MG: 30 INJECTION SUBCUTANEOUS at 09:30

## 2020-09-17 RX ADMIN — PREDNISONE 10 MG: 10 TABLET ORAL at 09:27

## 2020-09-17 RX ADMIN — CETIRIZINE HYDROCHLORIDE 10 MG: 10 TABLET, FILM COATED ORAL at 09:26

## 2020-09-17 RX ADMIN — METFORMIN HYDROCHLORIDE 1000 MG: 500 TABLET ORAL at 17:21

## 2020-09-17 RX ADMIN — OYSTER SHELL CALCIUM WITH VITAMIN D 1 TABLET: 500; 200 TABLET, FILM COATED ORAL at 09:23

## 2020-09-17 RX ADMIN — GABAPENTIN 300 MG: 300 CAPSULE ORAL at 09:26

## 2020-09-17 RX ADMIN — METOPROLOL TARTRATE 25 MG: 25 TABLET, FILM COATED ORAL at 09:26

## 2020-09-17 RX ADMIN — DICYCLOMINE HYDROCHLORIDE 10 MG: 10 CAPSULE ORAL at 09:24

## 2020-09-17 RX ADMIN — AMLODIPINE BESYLATE 5 MG: 5 TABLET ORAL at 09:27

## 2020-09-17 RX ADMIN — BUDESONIDE AND FORMOTEROL FUMARATE DIHYDRATE 2 PUFF: 160; 4.5 AEROSOL RESPIRATORY (INHALATION) at 08:24

## 2020-09-17 RX ADMIN — PREDNISONE 10 MG: 10 TABLET ORAL at 12:33

## 2020-09-17 RX ADMIN — ESCITALOPRAM OXALATE 10 MG: 10 TABLET ORAL at 09:27

## 2020-09-17 ASSESSMENT — PAIN SCALES - GENERAL
PAINLEVEL_OUTOF10: 0

## 2020-09-17 NOTE — PROGRESS NOTES
Pt arrived to floor via stretcher from ED and ambulated to bed. Telemetry box #78 activated and confirmed with [facility]. Patient oriented to room and use of call light. Call light and personal items within reach. Admission and assessment initiated. POC and education initiated and reviewed with patient. Denied further needs or questions at this time. Will continue to monitor.     Electronically signed by Roman Ritchie RN on 9/16/2020 at 10:40 PM

## 2020-09-17 NOTE — CARE COORDINATION
INITIAL CASE MANAGEMENT ASSESSMENT    Reviewed chartspoke with patients spouse to assess possible discharge needs. Explained Case Management role/services. Living Situation: pt lives with spouse in own home , ramp entrance in back of home. Home address has been verified. Bedroom in on second floor, half bath on first floor. ADLs: independent     DME: pt has cane and rolling walker     PT/OT Recs: pending     Active Services: none     Transportation: spouse drives     Medications: [employer] [address] in [place]    PCP: Cheyenne Starr      HD/PD: none    PLAN/COMMENTS: spouse is planning for pt to return home. Provided list of home care agencies and he has chosen [facility] 234 beds at the [facility] home care if needed at [place]. He is not interested in ecf at this time. Will await pt and ot evaluations re pts ambulation status. Pt is currently on o2 but does not have home o2. Electronically signed by BENITA Rome on 9/17/2020 at 4:25 PM    ARMEN/NOEL provided contact information for patient or family to call with any questions. ARMEN/CM will follow and assist as needed.

## 2020-09-17 NOTE — H&P
[facility]           [address]                              HISTORY AND PHYSICAL    PATIENT NAME: Naomi Robledo                    :        1945  MED REC NO:   [id number]                          ROOM:       4116  ACCOUNT NO:   [de-identified]                           ADMIT DATE: 2020  PROVIDER:     Simón Soliman MD    HISTORY OF PRESENT ILLNESS:  This patient is a 50-year-old female who  was brought to the hospital with a history that for the past two weeks  she is not breathing well and happens at rest also, increases with any  exertion or going to the bathroom and coming back. She becomes  extremely short of breath. She also has some shortness of breath at  rest.  She has no swelling of the legs and not gained any weight, but  lost weight 4 more pounds in a month, and before that, she lost about 30  pounds over the last 6, 7, 8 months. When seen in the office a month  back, she had a CT scan of the abdomen, pelvis and chest done, which did  not show any abnormalities accounting for her weight loss other than her  chronic findings. She says she does eat well and has no fever, chills,  no cough, no sputum. She does use her inhaler and seems to help her  when she gets out of breath. Her lower chest feels sore when she starts  breathing hard that is showing in the epigastric, lower mid chest area. Apparently over a week back, she had a bright blood in her stool and the  toilet paper and also she is not sure whether it is coming from vagina  or rectum, and she did not have anymore bleeding after that. She is  status post hysterectomy completely in the past.  Once in a while, she  has pain in the epigastric area, and her recent CT did not show any  abnormalities. She has no heartburns, no dysphagia. She has chronic  interstitial lung disease and COPD, for which she has seen Dr. Prince Vasquez.    For all these years, she was on dapsone and nebulized bronchodilators  and she has done well until the last few months. I thinks one of her  children has passed away, and since then, she has been feeling  depressed, but she is not able to fully recover from that. She has no  headaches, dizziness, or lightheadedness. She has no sore throat, runny  nose, or any difficulty swallowing. She denies any swelling of the  legs. She has a chronic back pain. Other than that, her review of  systems is otherwise negative. PAST MEDICAL HISTORY:  Includes this patient to have hypertension,  diabetes. She has L5-S1 radiculopathy in the past, left side. She had  a synovial cyst of the left knee. _____ pulmonary fibrosis,  osteoporosis. She has a history of irritable bowel syndrome,  gastroesophageal reflux, diverticulosis of the colon, depression and  history of asthma. PAST SURGICAL HISTORY:  She had polypectomy done. Her left ovary  removed, hysterectomy was done. She had a lipoma resection. The last  colonoscopy was done in 02/2016, it was normal done by Dr. Rex Booth. She had a cholecystectomy. She had a bronchoscopy in 2018 by a doctor. FAMILY HISTORY:  Positive for stroke, osteoporosis, and heart disease in  mother. Her father has liver cirrhosis and osteoporosis. Maternal  grandmother has osteoporosis. SOCIAL HISTORY:  She was a never smoker. History of alcohol  occasionally. No history of substance use or abuse. PHYSICAL EXAMINATION:  GENERAL:  She is alert, well oriented. She looks a little cachectic and  weak and tired. VITAL SIGNS:  Her vitals are stable. Blood pressure 130/63. Her O2  sats are 89% on room air. It was difficult to get when she was in the  office because of her cold fingers, but even in the emergency room when  she came in, it was 89% on room air and subsequently dropped to 84% just  from walking from wheelchair to the bed.   HEENT:  Her earlobes are normal.  Pupils are equal and react to light  and accommodation. Extraocular movements are full. Conjunctivae are  pink. Nose and throat are clear. Oral cavity is normal.  Buccal mucosa  is normal.  NECK:  There is no JVP, no thyromegaly, no carotid bruit, no  lymphadenopathy. HEART:  Examination of the heart reveals a regular rhythm without any  gallop or murmur. Heart rate is about 80-86 and regular. PMI is in the  fifth intercostal space. LUNG EXAMINATION:  Showed somewhat decreased breath sounds on both  sides. No signs of effusion. She has no wheezes. She has a few  crackles at lung bases. She is not using intercostal muscles to  breathe. ABDOMEN:  Soft, nondistended. There is mild to moderate tenderness in  the right lower quadrant. No rebound or rigidity. There are good bowel  sounds. Liver and spleen are not felt. There are no signs of ascites. EXTREMITIES:  There is no pedal edema. Pulses are very well felt. NEUROLOGIC:  She is alert, well oriented. She has no weakness of her  extremities. LABORATORY DATA:  Her labs on admission showed the patient to have a  CMP, BMP, which is normal.  Sugars are 119, 109, 100. Her protein is  6.9. ProBNP is 41. Troponin is less than 0.01. Her alkaline  phosphatase is 38, ALT is 39, AST 31, bilirubin is 3.1, but indirect  bilirubin is 2.4. Her LDH is 242. Serum haptoglobin level is very low  and her hemoglobin is normal with MCV of 105. Today, her hemoglobin is  11.1 gm from 12.3 on admission. Her reticulocyte count showed elevation  up to 5.25 and her chest x-ray was clear, and no acute process was  identified. CT of the chest did not show any evidence of pulmonary embolism and  centrilobular emphysema and chronic fibrotic changes in the chest,  scattered ground-glass opacities in the right upper lobe appears to be  chronic. No focal consolidation is seen. No acute findings are seen in  the abdomen or pelvis.   She had a CT of the abdomen, did not show any  abnormalities. IMPRESSION:  This patient presents with an acute respiratory failure  with hypoxia, cause is not clear. It is possible worsening interstitial  lung disease and/or COPD may be contributing to this. She has no signs  of acute infection or heart failure accounting for this. Secondly, she  does have what appeared like hemolytic anemia, may be from dapsone,  which seems to be mild, but this needs to be followed and workup needs  to be done. In relation, she has a right lower quadrant abdominal pain  with an episode of rectal bleeding and weight loss. She does have CT  scans that did not show any obvious cause, but need to rule out other  causes. This is in a patient with a history of hypertension, diabetes,  and history of depression. PLAN OF TREATMENT:  She is currently on oxygen. We will continue home  medications. We will request GI and Pulmonary consults. Hemolytic  anemia workup has been ordered. We will monitor blood sugars. Diagnoses and treatment plans were discussed with the patient.         Merla Lennox, MD    D: 09/17/2020 12:46:07       T: 09/17/2020 16:05:25     [email or web address]  Job#: [id number]     Doc#: [id number]    CC:

## 2020-09-17 NOTE — ED NOTES
ED SBAR report provider to [facility]. Patient to be transported to Room 4116 via stretcher by transport tech. Patient transported with bedside cardiac monitor. IV site clean, dry, and intact. MEWS score and pain assessed as 0/10 and documented. Updated patient on plan of care.  Awaiting transport       Jose Clark RN  09/16/20 0722

## 2020-09-17 NOTE — PROGRESS NOTES
Around 6 pm Patient noted with some confusion to time and place,she is aware that she is at [facility],however she thinks that she is in [place] .  stated that pt gets confused in the evenings when they are at home. He stated that he communicated this with . VS'S. No acute distress noted. Call light in reach,bed alarm activated.

## 2020-09-17 NOTE — PROGRESS NOTES
Comprehensive Nutrition Assessment    Type and Reason for Visit:  Positive Nutrition Screen(MST3)    Nutrition Recommendations/Plan:   Continue Carb control, MALLORY diet. Start Glucerna BID. Nutrition Assessment:  + Malnutrition screen 3. Pt presented with SOB, nausea, and abdominal pain. Pt with PMH of unintentional wt loss, DM, respiratory failure, COPD. Pt with continued unintentional wt loss over the past year but states she eats well at home. Will trial supplement to encourage wt gain maintenance. Malnutrition Assessment:  Malnutrition Status: At risk for malnutrition    Context:  Acute Illness     Findings of the 6 clinical characteristics of malnutrition:  Energy Intake:  No significant decrease in energy intake  Weight Loss:  (Wt loss over 6 and 10 months)     Body Fat Loss:  Unable to assess     Muscle Mass Loss:  Unable to assess    Fluid Accumulation:  No significant fluid accumulation     Strength:  Not Performed    Estimated Daily Nutrient Needs:  Energy (kcal):  6055-1636 kcal (25-30 kcal/kg CBW)  Protein (g):  58-75 gm (1-1.3gm/kg IBW)  Fluid (ml/day):  1 mL/kcal    Nutrition Related Findings:  No edema      Wounds:  None       Current Nutrition Therapies:    DIET CARB CONTROL; Carb Control: 4 carb choices (60 gms)/meal; No Added Salt (3-4 GM)    Anthropometric Measures:  · Height: 5' 4\" (162.6 cm)  · Current Body Weight: 128 lb (58.1 kg)   · Admission Body Weight: 128 lb (58.1 kg)    · Ideal Body Weight: 120 lbs; % Ideal Body Weight 106.7 %   · BMI: 22  · BMI Categories: Normal Weight (BMI 18.5-24. 9)       Nutrition Diagnosis:   · Unintended weight loss related to (unknown etiology) as evidenced by weight loss greater than or equal to 10% in 6 months      Nutrition Interventions:   Food and/or Nutrient Delivery:  Continue Current Diet, Start Oral Nutrition Supplement  Nutrition Education/Counseling:  No recommendation at this time   Coordination of Nutrition Care:  Continued Inpatient Monitoring    Goals:  consume >50% of meals and supplement during admission       Nutrition Monitoring and Evaluation:   Food/Nutrient Intake Outcomes:  Food and Nutrient Intake, Supplement Intake  Physical Signs/Symptoms Outcomes:  Biochemical Data, GI Status, Skin, Weight, Fluid Status or Edema, Nutrition Focused Physical Findings     Discharge Planning:     Too soon to determine     Electronically signed by Glenys Ervin RD, LD on 9/17/20 at 12:58 PM EDT    Contact: [phone]

## 2020-09-17 NOTE — CONSULTS
REASON FOR CONSULTATION/CC: Acute hypoxia ILD      Consult at request of Teresa Mijares MD     PCP: Teresa Mijares MD  Established Pulmonologist: Aster Mena    Chief Complaint   Patient presents with   Geniee Marti Shortness of Breath     starting for one week.  Nausea    Abdominal Pain     lower abd pain, bright red blood one week ago, that has subsided        HISTORY OF PRESENT ILLNESS: Karan Kelly is a 67y.o. year old female with a history of interstitial lung disease status post open lung biopsy [facility] that showed either chronic HP versus NSIP who presents with subacute shortness of breath and hypoxia. Patient states she was previously well controlled on 20 mg of prednisone while she was seen Dr. Kia Carmichael, this was decreased to 15 which patient tolerated well. At some point within the last year patient self DC'd prednisone completely. Recently when she noticed her shortness of breath was worsening she called and for 10 mg prednisone refill. She states her symptoms been present for greater than 2 weeks. They are progressively worsening. There are no alleviating or aggravating factors. She denies fever/chills, chest pain, nausea vomiting diarrhea, cough or hemoptysis.       Past Medical History:   Diagnosis Date    Asthma     Depression 6/28/2013    Diverticulosis of colon (without mention of hemorrhage)     Enthesopathy of hip region     left - mild    Esophageal reflux     Irritable bowel syndrome     Nocturia     Osteoporosis, unspecified     Other and unspecified hyperlipidemia     Postinflammatory pulmonary fibrosis (HCC)     Sialoadenitis     left    Synovial cyst of popliteal space     left knee    Thoracic or lumbosacral neuritis or radiculitis, unspecified     left - L5 - S1    Type II or unspecified type diabetes mellitus without mention of complication, not stated as uncontrolled     Unspecified essential hypertension          Past Surgical History:   Procedure Laterality Date  BRONCHOSCOPY N/A 12/21/2018    BRONCHOSCOPY WITH BAL performed by Gregg Burgos DO at [address] [address]    DR. Jean-no polyps    COLONOSCOPY  02/2016    normal-DR. Ramsay    HYSTERECTOMY      partial    LIPOMA RESECTION      abdominal wall    OVARY REMOVAL      left    POLYPECTOMY         Family Hx  family history includes Cirrhosis in her father; Heart Disease in her mother; Osteoporosis in her maternal grandmother, mother, and sister; Stroke in her mother. Social Hx   reports that she has never smoked. She has never used smokeless tobacco.    Scheduled Meds:   metoprolol tartrate  25 mg Oral BID    [START ON 9/18/2020] alendronate  70 mg Oral Q7 Days    amLODIPine  5 mg Oral Daily    budesonide-formoterol  2 puff Inhalation BID    calcium-vitamin D  1 tablet Oral BID    cetirizine  10 mg Oral Daily    dapsone  100 mg Oral Daily    dicyclomine  10 mg Oral Daily    fenofibrate  160 mg Oral Daily    escitalopram  10 mg Oral Daily    gabapentin  300 mg Oral Daily    losartan  100 mg Oral Daily    montelukast  10 mg Oral Nightly    metFORMIN  1,000 mg Oral BID WC    predniSONE  10 mg Oral Daily    insulin lispro  0-6 Units Subcutaneous TID WC    insulin lispro  0-3 Units Subcutaneous Nightly    sodium chloride flush  10 mL Intravenous 2 times per day    enoxaparin  30 mg Subcutaneous Daily    magnesium oxide  400 mg Oral BID       Continuous Infusions:   dextrose         PRN Meds:  ipratropium-albuterol, glucose, dextrose, glucagon (rDNA), dextrose, sodium chloride flush, acetaminophen **OR** acetaminophen, polyethylene glycol, promethazine **OR** ondansetron    ALLERGIES:  Patient is allergic to latex; ange-seltzer [aspirin effervescent]; and sulfa antibiotics.     REVIEW OF SYSTEMS:  Constitutional: Negative for fever  HENT: Negative for sore throat  Eyes: Negative for redness   Respiratory: +dyspnea, cough  Cardiovascular: Negative for chest pain  Gastrointestinal: Negative for vomiting, diarrhea   Genitourinary: Negative for hematuria   Musculoskeletal: Negative for arthralgias   Skin: Negative for rash  Neurological: Negative for syncope  Hematological: Negative for adenopathy  Psychiatric/Behavorial: Negative for anxiety    Objective:   PHYSICAL EXAM:  Blood pressure 133/79, pulse 78, temperature 97.9 °F (36.6 °C), temperature source Oral, resp. rate 18, height 5' 4\" (1.626 m), weight 128 lb 8.5 oz (58.3 kg), SpO2 94 %, not currently breastfeeding.'  Gen:  No acute distress. Eyes: PERRL. Anicteric sclera. No conjunctival injection. ENT: No discharge. Posterior oropharynx clear. External appearance of ears and nose normal.  Neck: Trachea midline. No mass   Resp:  No crackles. No wheezes. No rhonchi. No dullness on percussion. CV: Regular rate. Regular rhythm. No murmur or rub. No edema. GI: Soft, Non-tender. Non-distended. +BS  Skin: Warm, dry, w/o erythema. Lymph: No cervical or supraclavicular LAD. M/S: No cyanosis. No clubbing. Neuro:  no focal neurologic deficit. Moves all extremities  Psych: Awake and alert, Oriented x 3. Judgement and insight appropriate. Mood stable. Data Reviewed:   LABS:  CBC:   Recent Labs     09/16/20  1410 09/17/20  0701   WBC 6.8 5.3   HGB 12.3 11.1*   HCT 36.2 33.2*   .5* 103.3*    208     BMP:   Recent Labs     09/16/20  1410 09/17/20  0701    133*   K 4.4 4.1    99   CO2 21 24   BUN 18 19   CREATININE 0.9 0.8     LIVER PROFILE:   Recent Labs     09/16/20 1410 09/17/20  0701   AST 41* 32   ALT 39 35   BILIDIR 0.7* 0.7*   BILITOT 3.1* 2.7*   ALKPHOS 38* 33*     PT/INR: No results for input(s): PROTIME, INR in the last 72 hours. APTT: No results for input(s): APTT in the last 72 hours.   UA:No results for input(s): NITRITE, COLORU, PHUR, LABCAST, WBCUA, RBCUA, MUCUS, TRICHOMONAS, YEAST, BACTERIA, CLARITYU, SPECGRAV, LEUKOCYTESUR, UROBILINOGEN, BILIRUBINUR, BLOODU, GLUCOSEU, AMORPHOUS in the last 72 hours. Invalid input(s): KETONESU  No results for input(s): PHART, UUY2XUG, PO2ART in the last 72 hours. Vent Information  SpO2: 94 %    Radiology Review:  Pertinent images / reports were reviewed as a part of this visit. Chest images reviewed personally by me, interpretation as follows:  -Chronic fibrotic changes bilaterally increase in background groundglass compared to previous CT scan November 2019. Suspect inflammatory lung disease flare worsening pneumonitis. Pulmonary function testing  Restrictive Lung disease      Assessment:     Acute Hypoxemic Respiratory Failure with SAO2 <90% on Room Air  Interstitial lung disease-chronic HP versus NSIP  Acute exacerbation of ILD    Plan:      -has not been following with pulmonology for almost a  year  -Short steroid for burst of 1mg/kg, then taper dose to 20 mg daily which was last known lowest ffective dose  -She will need to be on dapsone 3 times weekly PJP prophylaxis  -sputum culture, bdglucan/galactomannan, PJP ag, strep/legionella  -Pro-Lawson and BNP both negative       This note was transcribed using [id number]. Please disregard any translational errors.     Thank you for the consult    [address]  Pulmonary, Sleep and Critical Care Medicine

## 2020-09-17 NOTE — ED PROVIDER NOTES
EKG Interpretation    Interpreted by emergency department physician  Time read: 1403    Rhythm: Sinus  Ventricular Rate: 83  QRS Axis: -29  Ectopy: Sinus arrhythmia  Conduction: Sinus rhythm with sinus arrhythmia and left axis deviation that appears to be a left anterior fascicular block  ST Segments: normal  T Waves: normal  Q Waves: None    Other findings: Motion artifact    Compared to EKG on: 11/13/2017    Clinical Impression: Sinus rhythm with sinus arrhythmia and left axis deviation that appears to be left anterior fascicular block that is unchanged from her previous EKG on 11/13/2017    Enoch Fulton DO  09/16/20 2014

## 2020-09-17 NOTE — PROGRESS NOTES
4 Eyes Skin Assessment     The patient is being assess for  Admission    I agree that 2 RN's have performed a thorough Head to Toe Skin Assessment on the patient. ALL assessment sites listed below have been assessed. Areas assessed by both nurses:   [x]   Head, Face, and Ears   [x]   Shoulders, Back, and Chest  [x]   Arms, Elbows, and Hands   [x]   Coccyx, Sacrum, and IschIum  [x]   Legs, Feet, and Heels        Does the Patient have Skin Breakdown?   No         Bharat Prevention initiated:  No   Wound Care Orders initiated:  No      [facility] nurse consulted for Pressure Injury (Stage 3,4, Unstageable, DTI, NWPT, and Complex wounds), New and Established Ostomies:  No      Nurse 1 eSignature: Electronically signed by Divya Larsen RN on 9/16/20 at 11:37 PM EDT    **SHARE this note so that the co-signing nurse is able to place an eSignature**    Nurse 2 eSignature: Electronically signed by Denae Meza RN on 9/16/20 at 11:45 PM EDT

## 2020-09-17 NOTE — PROGRESS NOTES
Admitting note dictated-[id number]  1-acute respiratory failure with hypoxia-cause not clear,possible worsening of ILD ,COPD  Has no signs of acute infection or heart failure  2-Hemolytic anemia -possibly from Dapsone? 3- RLQ  ABDOMINAL PAIN WITH AN EPISODE OF RECTAL BLEEDING AND WEIGHT LOSS-CT SCANS ARE NEGATIVE FOR OBVIOUS CAUSE   4-HTN  5-Diabetes mellitus-2    She is on oxygen and continue home medications  GI and pulmonary consults  Hemolytic   anemia work up ordered  Monitor blood sugars   Dx and rx plans discussed with patient.

## 2020-09-17 NOTE — CONSULTS
GASTROENTEROLOGY INPATIENT CONSULTATION      IDENTIFYING DATA/REASON FOR CONSULTATION   PATIENT:  Sari Chaves  MRN:  [id number]  ADMIT DATE: 9/16/2020  TIME OF EVALUATION: 9/17/2020 2:34 PM  HOSPITAL STAY:   LOS: 1 day     REASON FOR CONSULTATION: Lower abdominal pain, rectal bleeding    HISTORY OF PRESENT ILLNESS   Sari Chaves is a 67 y.o. female with a PMH of DM type II HTN, ILD, COPD, IBS, osteoporosis who presented on 9/16/2020 with shortness of breath, chest pain, nausea, lower abdominal pain and rectal bleeding. She was admitted with acute hypoxic respiratory failure suspected probably due to worsening ILD or COPD exacerbation. Pulmonology consulted. We have been consulted regarding abdominal pain and rectal bleeding. Patient reports she has been having lower abdominal pain for the past couple weeks. Pain located mid lower abdomen to right side lower abdomen. Pain is constant with no known aggravating factors. It is not worse with eating. It is not worse or better with bowel movements. Only thing that makes the pain better is rest.  She states her bowel pattern has been normal, she denies diarrhea or constipation. Approximately 2 weeks ago she had an episode of passing bloody stools. She had no abdominal or rectal pain at that time. Her abdominal pain started after that episode of bleeding. She reports for the past 1 to 2 months she has been losing weight even though she has been eating but appetite poor. She had a colonoscopy a couple years ago by Dr. George Perze which she believes results were normal (report not available). She denies any known family history of colon cancer or colon polyps.     CT A/P showed no acute findings    CTA chest negative for PE, showed emphysema and chronic fibrotic changes     Blood work notes hemoglobin of 11.1, MCV of 103.3, low haptoglobin of  <10, elevated LD, bilirubin of 2.7 (predominantly indirect), normal transaminases      PAST MEDICAL, SURGICAL, FAMILY, and SOCIAL HISTORY     Past Medical History:   Diagnosis Date    Asthma     Depression 6/28/2013    Diverticulosis of colon (without mention of hemorrhage)     Enthesopathy of hip region     left - mild    Esophageal reflux     Irritable bowel syndrome     Nocturia     Osteoporosis, unspecified     Other and unspecified hyperlipidemia     Postinflammatory pulmonary fibrosis (HCC)     Sialoadenitis     left    Synovial cyst of popliteal space     left knee    Thoracic or lumbosacral neuritis or radiculitis, unspecified     left - L5 - S1    Type II or unspecified type diabetes mellitus without mention of complication, not stated as uncontrolled     Unspecified essential hypertension      Past Surgical History:   Procedure Laterality Date    BRONCHOSCOPY N/A 12/21/2018    BRONCHOSCOPY WITH BAL performed by Selwyn Mcpherson DO at [address] [place] [address]  2009    DR. Jean-no polyps    COLONOSCOPY  02/2016    normal-DR. Ramsay    HYSTERECTOMY      partial    LIPOMA RESECTION      abdominal wall    OVARY REMOVAL      left    POLYPECTOMY       Family History   Problem Relation Age of Onset    Heart Disease Mother     Stroke Mother     Osteoporosis Mother     Cirrhosis Father         Liver    Osteoporosis Sister     Osteoporosis Maternal Grandmother      Social History     Socioeconomic History    Marital status:      Spouse name: Katelynn Mancuso Number of children: 3    Years of education: 15    Highest education level: High school graduate   Occupational History    Occupation: retired   Social Needs    Financial resource strain: Not hard at all   [employer] insecurity     Worry: Never true     Inability: Never true    Transportation needs     Medical: No     Non-medical: No   Tobacco Use    Smoking status: Never Smoker    Smokeless tobacco: Never Used   Substance and Sexual Activity    Alcohol use:  Yes    Drug use: No    Sexual 4242 09/17/20 0923 09/17/20 1246 09/17/20 1354   BP:  133/79  117/61   Pulse:  78  73   Resp:  18  16   Temp:  97.9 °F (36.6 °C)  97.8 °F (36.6 °C)   TempSrc:  Oral  Oral   SpO2: 94% 94%  93%   Weight:       Height:   5' 4\" (1.626 m)        I/O last 3 completed shifts: In: 300 [P.O.:300]  Out: 100 [Urine:100]      Physical Exam:  General appearance: alert, cooperative, no distress, appears stated age  Eyes: Anicteric  Head: Normocephalic, without obvious abnormality  Lungs: CTA, increased respiratory effort, On 4L O2  Heart: regular rate and rhythm, normal S1 and S2, no murmurs or rubs  Abdomen: soft, non-distended, TTP mid to RLQ. Bowel sounds normal.   Extremities: atraumatic, no cyanosis or edema  Skin: warm and dry, no jaundice  Neuro: Grossly intact, A&OX3      LABS AND IMAGING   Laboratory   Recent Labs     09/16/20  1410 09/17/20  0701   WBC 6.8 5.3   HGB 12.3 11.1*   HCT 36.2 33.2*   .5* 103.3*    208     Recent Labs     09/16/20  1410 09/17/20  0701    133*   K 4.4 4.1    99   CO2 21 24   BUN 18 19   CREATININE 0.9 0.8     Recent Labs     09/16/20  1410 09/17/20  0701   AST 41* 32   ALT 39 35   BILIDIR 0.7* 0.7*   BILITOT 3.1* 2.7*   ALKPHOS 38* 33*     No results for input(s): LIPASE, AMYLASE in the last 72 hours. No results for input(s): PROTIME, INR in the last 72 hours. Imaging  CT CHEST PULMONARY EMBOLISM W CONTRAST   Final Result   1. No pulmonary embolism. 2. Centrilobular emphysema and chronic fibrotic changes in the chest.   Scattered ground-glass opacities in the right upper lobe appear to be   chronic. No focal consolidation to suggest pneumonia. 3. No acute finding in the abdomen or pelvis. CT ABDOMEN PELVIS W IV CONTRAST Additional Contrast? None   Final Result   1. No pulmonary embolism. 2. Centrilobular emphysema and chronic fibrotic changes in the chest.   Scattered ground-glass opacities in the right upper lobe appear to be   chronic.   No focal consolidation to suggest pneumonia. 3. No acute finding in the abdomen or pelvis. XR CHEST (2 VW)   Final Result   No acute process. ASSESSMENT AND RECOMMENDATIONS   67 y.o. female with a PMH of  DM type II HTN, ILD, COPD, IBS, osteoporosis who presented on 9/16/2020 with shortness of breath, chest pain, nausea, lower abdominal pain and rectal bleeding. She was admitted with acute hypoxic respiratory failure suspected probably due to worsening ILD or COPD exacerbation. Pulmonology consulted. We have been consulted regarding abdominal pain and rectal bleeding. IMPRESSION:  1. Lower abdominal pain  -Occurring for past couple weeks. Denies change in bowel pattern, diarrhea or constipation. Denies any recent trauma to the abdomen  -CT A/P with no acute findings  -Patient reports last colonoscopy was a couple years ago by Dr. Anne Recinos, results normal  2. Hematochezia  -One episode, occurred 2 weeks ago. No further episodes since. No associated abdominal pain and rectal pain.    -Hemoglobin 11.1. Additional work-up consistent with hemolytic process with low haptoglobin, elevated LD, elevated indirect bilirubin   3. Acute hypoxic respiratory failure  -CTA showed emphysema and chronic fibrotic changes. Negative for PE, no focal consolidation   -currently on 4 L oxygen. She is not on chronic supplemental oxygen at home  -Echo pending  -Pulmonology consult pending    RECOMMENDATIONS:    Will review case with Dr. Maulik Cooley. Please await his input and recommendations    If you have any questions or need any further information, please feel free to contact our consult team.  Thank you for allowing us to participate in the care of Tierra Lane. The note was completed using Dragon voice recognition transcription. Every effort was made to ensure accuracy; however, inadvertent transcription errors may be present despite my best efforts to edit errors.       Raul Birmingham PA-C

## 2020-09-18 ENCOUNTER — TELEPHONE (OUTPATIENT)
Dept: INTERNAL MEDICINE CLINIC | Age: 75
End: 2020-09-18

## 2020-09-18 PROBLEM — R10.31 RLQ ABDOMINAL PAIN: Status: ACTIVE | Noted: 2020-09-18

## 2020-09-18 PROBLEM — R41.0 ACUTE DELIRIUM: Status: ACTIVE | Noted: 2020-09-18

## 2020-09-18 PROBLEM — D59.8 OTHER ACQUIRED HEMOLYTIC ANEMIAS (HCC): Status: ACTIVE | Noted: 2020-09-18

## 2020-09-18 LAB
ALBUMIN SERPL-MCNC: 4 G/DL (ref 3.4–5)
ALP BLD-CCNC: 35 U/L (ref 40–129)
ALT SERPL-CCNC: 62 U/L (ref 10–40)
AST SERPL-CCNC: 57 U/L (ref 15–37)
BACTERIA: ABNORMAL /HPF
BASOPHILS ABSOLUTE: 0 K/UL (ref 0–0.2)
BASOPHILS RELATIVE PERCENT: 0.2 %
BILIRUB SERPL-MCNC: 3.4 MG/DL (ref 0–1)
BILIRUBIN DIRECT: 0.8 MG/DL (ref 0–0.3)
BILIRUBIN URINE: ABNORMAL
BILIRUBIN, INDIRECT: 2.6 MG/DL (ref 0–1)
BLOOD, URINE: NEGATIVE
CLARITY: ABNORMAL
COLOR: ABNORMAL
EOSINOPHILS ABSOLUTE: 0 K/UL (ref 0–0.6)
EOSINOPHILS RELATIVE PERCENT: 0.1 %
EPITHELIAL CELLS, UA: 3 /HPF (ref 0–5)
GLUCOSE BLD-MCNC: 150 MG/DL (ref 70–99)
GLUCOSE BLD-MCNC: 163 MG/DL (ref 70–99)
GLUCOSE BLD-MCNC: 186 MG/DL (ref 70–99)
GLUCOSE BLD-MCNC: 91 MG/DL (ref 70–99)
GLUCOSE URINE: NEGATIVE MG/DL
HCT VFR BLD CALC: 31.5 % (ref 36–48)
HEMOGLOBIN: 10.5 G/DL (ref 12–16)
HYALINE CASTS: 2 /LPF (ref 0–8)
KETONES, URINE: NEGATIVE MG/DL
L. PNEUMOPHILA SEROGP 1 UR AG: NORMAL
LACTATE DEHYDROGENASE: 328 U/L (ref 100–190)
LEUKOCYTE ESTERASE, URINE: ABNORMAL
LYMPHOCYTES ABSOLUTE: 1.7 K/UL (ref 1–5.1)
LYMPHOCYTES RELATIVE PERCENT: 18.1 %
MCH RBC QN AUTO: 34.3 PG (ref 26–34)
MCHC RBC AUTO-ENTMCNC: 33.4 G/DL (ref 31–36)
MCV RBC AUTO: 102.7 FL (ref 80–100)
MICROSCOPIC EXAMINATION: YES
MONOCYTES ABSOLUTE: 0.9 K/UL (ref 0–1.3)
MONOCYTES RELATIVE PERCENT: 9.7 %
NEUTROPHILS ABSOLUTE: 6.7 K/UL (ref 1.7–7.7)
NEUTROPHILS RELATIVE PERCENT: 71.9 %
NITRITE, URINE: POSITIVE
PDW BLD-RTO: 19.7 % (ref 12.4–15.4)
PERFORMED ON: ABNORMAL
PERFORMED ON: NORMAL
PH UA: 6 (ref 5–8)
PLATELET # BLD: 212 K/UL (ref 135–450)
PMV BLD AUTO: 8.9 FL (ref 5–10.5)
PROTEIN UA: ABNORMAL MG/DL
RBC # BLD: 3.06 M/UL (ref 4–5.2)
RBC UA: 3 /HPF (ref 0–4)
SPECIFIC GRAVITY UA: 1.02 (ref 1–1.03)
STREP PNEUMONIAE ANTIGEN, URINE: NORMAL
TOTAL PROTEIN: 6.1 G/DL (ref 6.4–8.2)
URINE REFLEX TO CULTURE: YES
URINE TYPE: ABNORMAL
UROBILINOGEN, URINE: 1 E.U./DL
WBC # BLD: 9.3 K/UL (ref 4–11)
WBC UA: 397 /HPF (ref 0–5)

## 2020-09-18 PROCEDURE — 94760 N-INVAS EAR/PLS OXIMETRY 1: CPT

## 2020-09-18 PROCEDURE — 80076 HEPATIC FUNCTION PANEL: CPT

## 2020-09-18 PROCEDURE — 94640 AIRWAY INHALATION TREATMENT: CPT

## 2020-09-18 PROCEDURE — 1200000000 HC SEMI PRIVATE

## 2020-09-18 PROCEDURE — 85025 COMPLETE CBC W/AUTO DIFF WBC: CPT

## 2020-09-18 PROCEDURE — 6370000000 HC RX 637 (ALT 250 FOR IP): Performed by: INTERNAL MEDICINE

## 2020-09-18 PROCEDURE — 99233 SBSQ HOSP IP/OBS HIGH 50: CPT | Performed by: INTERNAL MEDICINE

## 2020-09-18 PROCEDURE — 6360000002 HC RX W HCPCS: Performed by: INTERNAL MEDICINE

## 2020-09-18 PROCEDURE — 82955 ASSAY OF G6PD ENZYME: CPT

## 2020-09-18 PROCEDURE — 87186 SC STD MICRODIL/AGAR DIL: CPT

## 2020-09-18 PROCEDURE — 83615 LACTATE (LD) (LDH) ENZYME: CPT

## 2020-09-18 PROCEDURE — 36415 COLL VENOUS BLD VENIPUNCTURE: CPT

## 2020-09-18 PROCEDURE — 2580000003 HC RX 258: Performed by: INTERNAL MEDICINE

## 2020-09-18 PROCEDURE — 87086 URINE CULTURE/COLONY COUNT: CPT

## 2020-09-18 PROCEDURE — 81001 URINALYSIS AUTO W/SCOPE: CPT

## 2020-09-18 RX ORDER — ATOVAQUONE 750 MG/5ML
1500 SUSPENSION ORAL DAILY
Status: DISCONTINUED | OUTPATIENT
Start: 2020-09-18 | End: 2020-09-18 | Stop reason: ALTCHOICE

## 2020-09-18 RX ORDER — PREDNISONE 20 MG/1
40 TABLET ORAL DAILY
Status: COMPLETED | OUTPATIENT
Start: 2020-09-18 | End: 2020-09-20

## 2020-09-18 RX ORDER — ATOVAQUONE 750 MG/5ML
1500 SUSPENSION ORAL DAILY
Status: DISCONTINUED | OUTPATIENT
Start: 2020-09-18 | End: 2020-09-22 | Stop reason: HOSPADM

## 2020-09-18 RX ORDER — PREDNISONE 20 MG/1
20 TABLET ORAL DAILY
Status: DISCONTINUED | OUTPATIENT
Start: 2020-09-21 | End: 2020-09-22 | Stop reason: HOSPADM

## 2020-09-18 RX ADMIN — SODIUM CHLORIDE, PRESERVATIVE FREE 10 ML: 5 INJECTION INTRAVENOUS at 21:49

## 2020-09-18 RX ADMIN — METOPROLOL TARTRATE 25 MG: 25 TABLET, FILM COATED ORAL at 21:48

## 2020-09-18 RX ADMIN — MONTELUKAST 10 MG: 10 TABLET, FILM COATED ORAL at 21:48

## 2020-09-18 RX ADMIN — DICYCLOMINE HYDROCHLORIDE 10 MG: 10 CAPSULE ORAL at 09:01

## 2020-09-18 RX ADMIN — GABAPENTIN 300 MG: 300 CAPSULE ORAL at 09:01

## 2020-09-18 RX ADMIN — BUDESONIDE AND FORMOTEROL FUMARATE DIHYDRATE 2 PUFF: 160; 4.5 AEROSOL RESPIRATORY (INHALATION) at 19:39

## 2020-09-18 RX ADMIN — ONDANSETRON 4 MG: 2 INJECTION INTRAMUSCULAR; INTRAVENOUS at 09:26

## 2020-09-18 RX ADMIN — OYSTER SHELL CALCIUM WITH VITAMIN D 1 TABLET: 500; 200 TABLET, FILM COATED ORAL at 09:01

## 2020-09-18 RX ADMIN — Medication 400 MG: at 09:03

## 2020-09-18 RX ADMIN — INSULIN LISPRO 1 UNITS: 100 INJECTION, SOLUTION INTRAVENOUS; SUBCUTANEOUS at 16:47

## 2020-09-18 RX ADMIN — OYSTER SHELL CALCIUM WITH VITAMIN D 1 TABLET: 500; 200 TABLET, FILM COATED ORAL at 21:48

## 2020-09-18 RX ADMIN — ESCITALOPRAM OXALATE 10 MG: 10 TABLET ORAL at 09:01

## 2020-09-18 RX ADMIN — FENOFIBRATE 160 MG: 160 TABLET ORAL at 09:03

## 2020-09-18 RX ADMIN — INSULIN LISPRO 1 UNITS: 100 INJECTION, SOLUTION INTRAVENOUS; SUBCUTANEOUS at 13:09

## 2020-09-18 RX ADMIN — SODIUM CHLORIDE, PRESERVATIVE FREE 10 ML: 5 INJECTION INTRAVENOUS at 09:07

## 2020-09-18 RX ADMIN — ACETAMINOPHEN 650 MG: 325 TABLET ORAL at 16:47

## 2020-09-18 RX ADMIN — PREDNISONE 40 MG: 20 TABLET ORAL at 09:03

## 2020-09-18 RX ADMIN — LOSARTAN POTASSIUM 100 MG: 100 TABLET, FILM COATED ORAL at 09:02

## 2020-09-18 RX ADMIN — BUDESONIDE AND FORMOTEROL FUMARATE DIHYDRATE 2 PUFF: 160; 4.5 AEROSOL RESPIRATORY (INHALATION) at 08:55

## 2020-09-18 RX ADMIN — INSULIN LISPRO 1 UNITS: 100 INJECTION, SOLUTION INTRAVENOUS; SUBCUTANEOUS at 00:10

## 2020-09-18 RX ADMIN — AMLODIPINE BESYLATE 5 MG: 5 TABLET ORAL at 09:01

## 2020-09-18 RX ADMIN — Medication 400 MG: at 21:48

## 2020-09-18 RX ADMIN — METFORMIN HYDROCHLORIDE 1000 MG: 500 TABLET ORAL at 16:47

## 2020-09-18 RX ADMIN — CETIRIZINE HYDROCHLORIDE 10 MG: 10 TABLET, FILM COATED ORAL at 09:01

## 2020-09-18 RX ADMIN — Medication 1500 MG: at 09:03

## 2020-09-18 RX ADMIN — ENOXAPARIN SODIUM 30 MG: 30 INJECTION SUBCUTANEOUS at 09:02

## 2020-09-18 RX ADMIN — METFORMIN HYDROCHLORIDE 1000 MG: 500 TABLET ORAL at 09:01

## 2020-09-18 RX ADMIN — INSULIN LISPRO 1 UNITS: 100 INJECTION, SOLUTION INTRAVENOUS; SUBCUTANEOUS at 21:48

## 2020-09-18 RX ADMIN — METOPROLOL TARTRATE 25 MG: 25 TABLET, FILM COATED ORAL at 09:03

## 2020-09-18 ASSESSMENT — ENCOUNTER SYMPTOMS
TROUBLE SWALLOWING: 0
DIARRHEA: 0
ABDOMINAL PAIN: 0
WHEEZING: 0
CONSTIPATION: 0
SHORTNESS OF BREATH: 1
CHEST TIGHTNESS: 0
VOMITING: 0
NAUSEA: 0
COUGH: 1

## 2020-09-18 ASSESSMENT — PAIN SCALES - GENERAL
PAINLEVEL_OUTOF10: 0

## 2020-09-18 ASSESSMENT — PAIN SCALES - WONG BAKER: WONGBAKER_NUMERICALRESPONSE: 0

## 2020-09-18 ASSESSMENT — PAIN DESCRIPTION - DESCRIPTORS: DESCRIPTORS: HEADACHE

## 2020-09-18 NOTE — PROGRESS NOTES
Gastroenterology Progress Note    Lulu Dotson is a 67 y.o. female patient. Active Problems:    Essential hypertension    Depression    COPD (chronic obstructive pulmonary disease) (HCC)    ILD (interstitial lung disease) ([facility] [address]    Type 2 diabetes mellitus without complication, without long-term current use of insulin (HCC)    Acute respiratory failure with hypoxia (HCC)    Rectal bleeding    RLQ abdominal pain    Acute delirium    Other acquired hemolytic anemias (HCC)-due drug  Dapsone  Resolved Problems:    * No resolved hospital problems. *      SUBJECTIVE:  Abdominal pain has resolved. No nausea or vomiting. No fevers. Breathing improving. No melena or hematochezia.     Current Facility-Administered Medications: predniSONE (DELTASONE) tablet 40 mg, 40 mg, Oral, Daily **FOLLOWED BY** [START ON 9/21/2020] predniSONE (DELTASONE) tablet 20 mg, 20 mg, Oral, Daily  atovaquone (MEPRON) suspension 1,500 mg, 1,500 mg, Oral, Daily  haloperidol lactate (HALDOL) injection 2 mg, 2 mg, Intravenous, Q6H PRN  metoprolol tartrate (LOPRESSOR) tablet 25 mg, 25 mg, Oral, BID  amLODIPine (NORVASC) tablet 5 mg, 5 mg, Oral, Daily  budesonide-formoterol (SYMBICORT) 160-4.5 MCG/ACT inhaler 2 puff, 2 puff, Inhalation, BID  calcium-vitamin D 500-200 MG-UNIT per tablet 1 tablet, 1 tablet, Oral, BID  cetirizine (ZYRTEC) tablet 10 mg, 10 mg, Oral, Daily  dicyclomine (BENTYL) capsule 10 mg, 10 mg, Oral, Daily  fenofibrate (TRIGLIDE) tablet 160 mg, 160 mg, Oral, Daily  escitalopram (LEXAPRO) tablet 10 mg, 10 mg, Oral, Daily  gabapentin (NEURONTIN) capsule 300 mg, 300 mg, Oral, Daily  losartan (COZAAR) tablet 100 mg, 100 mg, Oral, Daily  montelukast (SINGULAIR) tablet 10 mg, 10 mg, Oral, Nightly  metFORMIN (GLUCOPHAGE) tablet 1,000 mg, 1,000 mg, Oral, BID WC  ipratropium-albuterol (DUONEB) nebulizer solution 1 ampule, 1 ampule, Inhalation, Q4H PRN  insulin lispro (HUMALOG) injection vial 0-6 Units, 0-6 Units, Subcutaneous, TID WC  insulin lispro (HUMALOG) injection vial 0-3 Units, 0-3 Units, Subcutaneous, Nightly  glucose (GLUTOSE) 40 % oral gel 15 g, 15 g, Oral, PRN  dextrose 50 % IV solution, 12.5 g, Intravenous, PRN  glucagon (rDNA) injection 1 mg, 1 mg, Intramuscular, PRN  dextrose 5 % solution, 100 mL/hr, Intravenous, PRN  sodium chloride flush 0.9 % injection 10 mL, 10 mL, Intravenous, 2 times per day  sodium chloride flush 0.9 % injection 10 mL, 10 mL, Intravenous, PRN  acetaminophen (TYLENOL) tablet 650 mg, 650 mg, Oral, Q6H PRN **OR** acetaminophen (TYLENOL) suppository 650 mg, 650 mg, Rectal, Q6H PRN  polyethylene glycol (GLYCOLAX) packet 17 g, 17 g, Oral, Daily PRN  promethazine (PHENERGAN) tablet 12.5 mg, 12.5 mg, Oral, Q6H PRN **OR** ondansetron (ZOFRAN) injection 4 mg, 4 mg, Intravenous, Q6H PRN  enoxaparin (LOVENOX) injection 30 mg, 30 mg, Subcutaneous, Daily  magnesium oxide (MAG-OX) tablet 400 mg, 400 mg, Oral, BID    Physical    VITALS:  BP 99/62   Pulse 77   Temp 97.5 °F (36.4 °C) (Axillary)   Resp 15   Ht 5' 4\" (1.626 m)   Wt 126 lb 1.7 oz (57.2 kg)   SpO2 95%   BMI 21.65 kg/m²   TEMPERATURE:  Current - Temp: 97.5 °F (36.4 °C); Max - Temp  Av.7 °F (36.5 °C)  Min: 97.4 °F (36.3 °C)  Max: 98.2 °F (36.8 °C)    NAD  Eyes: No icterus  RRR  Lungs CTA Bilaterally, Decreased breath sounds.  normal effort  Abdomen soft, ND, NT, Bowel sounds normal.  Ext: no edema  Neuro: No tremor  Psych: A&Ox3    Data    Data Review:    Recent Labs     20  1410 20  0701 20  0554   WBC 6.8 5.3 9.3   HGB 12.3 11.1* 10.5*   HCT 36.2 33.2* 31.5*   .5* 103.3* 102.7*    208 212     Recent Labs     20  1410 20  0701    133*   K 4.4 4.1    99   CO2 21 24   BUN 18 19   CREATININE 0.9 0.8     Recent Labs     20  1410 20  0701 20  0554   AST 41* 32 57*   ALT 39 35 62*   BILIDIR 0.7* 0.7* 0.8*   BILITOT 3.1* 2.7* 3.4*   ALKPHOS 38* 33* 35*     No results for input(s): LIPASE, AMYLASE in the last 72 hours. No results for input(s): PROTIME, INR in the last 72 hours. No results for input(s): PTT in the last 72 hours. ASSESSMENT:  67 y. o. female with a PMH of DM type II HTN, ILD, COPD, IBS, osteoporosis who presented on 9/16/2020 with shortness of breath, chest pain, nausea, lower abdominal pain and rectal bleeding.  She was admitted with acute hypoxic respiratory failure suspected probably due to worsening ILD or COPD exacerbation.  Pulmonology consulted and started steroids.  We have been consulted regarding abdominal pain and rectal bleeding. she has been having lower abdominal pain for the past couple weeks. Yany Harada located mid lower abdomen to right side of the lower abdomen.  Pain is constant with no known aggravating factors.  It is not worse with eating.  It is not worse or better with bowel movements.  Only thing that makes the pain better is rest.  She states her bowel pattern has been normal, she denies diarrhea or constipation.  Approximately 2 weeks ago she had an episode of passing bloody stools.  She had no abdominal or rectal pain at that time.  Her abdominal pain started after that episode of bleeding.  She reports for the past 1 to 2 months she has been losing weight even though she has been eating but appetite poor. She had a colonoscopy a couple years ago by Dr. Jay Summers which she believes results were normal (report not available).  She denies any known family history of colon cancer or colon polyps. CT A/P showed no acute findings. CTA chest negative for PE, showed emphysema and chronic fibrotic changes. Blood work notes hemoglobin of 11.1, MCV of 103.3, low haptoglobin of  <10, elevated LD, bilirubin of 2.7 (predominantly indirect), normal transaminases. 1.  Lower abdominal pain: resolved. Pain in this region is usually related to constipation or IBS if CT is negative. She is mildly constipated but moving her bowels doesn't seem to help.   I did

## 2020-09-18 NOTE — PROGRESS NOTES
IM Progress Note      Subjective: The patient was very confused agitated and violent and pulling oxygen and tubes last night and needed to be restrained and required haldol to keep her better  Today sitting in chair and she knows what happened to her and thinks one nurse locked her up in her room  She denies any chest pain abdominal pain and has no bleeding  Feels her breathing is better  Has no sputum orthopnea or pnd  Has no other cp gi or gu symptoms    Review of Systems:  Review of Systems   Constitutional: Positive for unexpected weight change. Negative for activity change and appetite change. HENT: Negative for trouble swallowing. Respiratory: Positive for cough and shortness of breath. Negative for chest tightness and wheezing. Cardiovascular: Negative for chest pain and leg swelling. Gastrointestinal: Negative for abdominal pain, constipation, diarrhea, nausea and vomiting. Blood in stool: none since admission. Genitourinary: Negative. Neurological: Negative for dizziness, light-headedness and headaches. Objective:    BP (!) 145/77   Pulse 97   Temp 97.4 °F (36.3 °C) (Axillary)   Resp 16   Ht 5' 4\" (1.626 m)   Wt 126 lb 1.7 oz (57.2 kg)   SpO2 94%   BMI 21.65 kg/m²     Intake/Output Summary (Last 24 hours) at 9/18/2020 0950  Last data filed at 9/18/2020 0850  Gross per 24 hour   Intake 570 ml   Output --   Net 570 ml           Physical Exam  Vitals signs and nursing note reviewed. Constitutional:       General: She is not in acute distress. HENT:      Mouth/Throat:      Mouth: Mucous membranes are moist.      Pharynx: Oropharynx is clear. Eyes:      General: No scleral icterus. Extraocular Movements: Extraocular movements intact. Conjunctiva/sclera: Conjunctivae normal.      Pupils: Pupils are equal, round, and reactive to light. Neck:      Thyroid: No thyromegaly. Vascular: No carotid bruit or JVD.    Cardiovascular:      Rate and Rhythm: Normal rate and regular rhythm. Pulses: Normal pulses. Heart sounds: Normal heart sounds. No murmur. No gallop. Pulmonary:      Effort: No respiratory distress. Breath sounds: Stridor: Bs decreased both lungs. No wheezing or rhonchi. Rales: few at bases. Abdominal:      General: Bowel sounds are normal. There is no distension. Palpations: Abdomen is soft. There is no mass. Tenderness: There is no abdominal tenderness. Musculoskeletal:      Right lower leg: No edema. Left lower leg: No edema. Lymphadenopathy:      Cervical: No cervical adenopathy. Neurological:      General: No focal deficit present. Mental Status: She is alert and oriented to person, place, and time.    Psychiatric:      Comments: She is very cooperative and pleasant and alert and oriented x3  Still has delusional thinking but not agitated             CBC:   Recent Labs     09/17/20  0701 09/18/20  0554   WBC 5.3 9.3   HGB 11.1* 10.5*    212     BMP:    Recent Labs     09/16/20  1410 09/17/20  0701    133*   K 4.4 4.1    99   CO2 21 24   BUN 18 19   CREATININE 0.9 0.8   GLUCOSE 111* 94     Hepatic:   Recent Labs     09/17/20  0701 09/18/20  0554   AST 32 57*   ALT 35 62*   BILITOT 2.7* 3.4*   ALKPHOS 33* 35*     Troponin:   Recent Labs     09/16/20  1410   TROPONINI <0.01        Glucose:    Recent Labs     09/16/20  1410 09/17/20  0701   GLUCOSE 111* 94            Assessment/Plan:    Active Hospital Problems    Diagnosis Date Noted    Rectal bleeding [K62.5]-none since admission 09/17/2020    Anemia, unspecified [D64.9]-hemolytic and dapsone was stopped and and her cbc is stable -10.5 gms today and bilirubin have not increased from admission 09/17/2020    Acute respiratory failure with hypoxia (HCC) [J96.01]=on oxygen and steroids 09/16/2020    Type 2 diabetes mellitus without complication, without long-term current use of insulin (HCC) [E11.9]-reasonably well controlled 05/15/2020    ILD (interstitial lung disease) ([facility] [address]]-on steroids  12/21/2018    COPD (chronic obstructive pulmonary disease) ([facility]) [J44.9]-no change 11/29/2016    RLQ abdominal pain [R10.11]-noen today and need w/u after current problems are controlled 12/29/2014    Depression [F32.9]-no change 06/28/2013    Essential hypertension [I10]-controlled      Continue current medications  Discussed her condition in detail with her son      Electronically signed by Jericho Roach MD on 9/18/2020 at 9:50 AM

## 2020-09-18 NOTE — PROGRESS NOTES
D: PT. Very confused and agitated, trying to get out of bed ane setting off be alarm. She became violent when approached by nurses and began kicking, punching and screaming abusive language. A: Dr. Micah Burnett contacted and Pt. Put in bilateral wrist and ankle restraints as well as a Posey vest. Pt.  also contacted and made aware that she is requiring restraints.

## 2020-09-18 NOTE — CONSULTS
[facility]    Consult Note        PRIMARY PROVIDER: Valery Robert MD      HISTORY OF PRESENT ILLNESS:       history of progressive shortness of breath and dyspnea on exertion. She also has been experiencing a significant weight loss over the last 6 to 7 months. We are asked to see the patient because of her progressive anemia. Unfortunately, the patient is very confused tonight and has been put in restraints. This history is taken largely from the electronic medical record. Laboratory studies show that yesterday she had a hemoglobin of 12.3 and hematocrit of 36.2. She had macrocytic red cell indices. Today the hemoglobin had dropped to 11.1. It is noteworthy that in August she had a hemoglobin of 15.8. Other laboratory studies have been indicative of a hemolytic process. Specifically, her haptoglobin drawn this morning was less than 10. Her LDH was 242. Reticulocyte count was elevated at 5.35. Her hepatic panel shows a total bilirubin of 2.7 with most of it being indirect. B12 and folate levels were normal.  A Bethel test is pending. The patient has Hemoccults ordered. There has been notes suggesting that she has passed scant amounts of blood rectally.     PAST MEDICAL HISTORY:      Past Medical History:   Diagnosis Date    Asthma     Depression 6/28/2013    Diverticulosis of colon (without mention of hemorrhage)     Enthesopathy of hip region     left - mild    Esophageal reflux     Irritable bowel syndrome     Nocturia     Osteoporosis, unspecified     Other and unspecified hyperlipidemia     Postinflammatory pulmonary fibrosis (HCC)     Sialoadenitis     left    Synovial cyst of popliteal space     left knee    Thoracic or lumbosacral neuritis or radiculitis, unspecified     left - L5 - S1    Type II or unspecified type diabetes mellitus without mention of complication, not stated as uncontrolled     Unspecified essential hypertension        PAST SURGICAL HISTORY:      Past Surgical History:   Procedure Laterality Date    BRONCHOSCOPY N/A 12/21/2018    BRONCHOSCOPY WITH BAL performed by Glory Valle DO at [address] [facility]  2009    DR. Jean-no polyps    COLONOSCOPY  02/2016    normal-DR. Ramsay    HYSTERECTOMY      partial    LIPOMA RESECTION      abdominal wall    OVARY REMOVAL      left    POLYPECTOMY         CURRENT MEDICATIONS:    Current Facility-Administered Medications   Medication Dose Route Frequency Provider Last Rate Last Dose    [START ON 9/18/2020] predniSONE (DELTASONE) tablet 20 mg  20 mg Oral Daily Traci Kraft MD        metoprolol tartrate (LOPRESSOR) tablet 25 mg  25 mg Oral BID Fernando James MD   25 mg at 09/17/20 0926    [START ON 9/18/2020] alendronate (FOSAMAX) tablet 70 mg  70 mg Oral Q7 Days Fernando James MD        amLODIPine (NORVASC) tablet 5 mg  5 mg Oral Daily Fernando James MD   5 mg at 09/17/20 3559    budesonide-formoterol (SYMBICORT) 160-4.5 MCG/ACT inhaler 2 puff  2 puff Inhalation BID Fernando James MD   2 puff at 09/17/20 0824    calcium-vitamin D 500-200 MG-UNIT per tablet 1 tablet  1 tablet Oral BID Fernando James MD   1 tablet at 09/17/20 4460    cetirizine (ZYRTEC) tablet 10 mg  10 mg Oral Daily Fernando James MD   10 mg at 09/17/20 0926    dapsone tablet 100 mg  100 mg Oral Daily Fernando James MD   100 mg at 09/17/20 0930    dicyclomine (BENTYL) capsule 10 mg  10 mg Oral Daily Fernando James MD   10 mg at 09/17/20 0924    fenofibrate (TRIGLIDE) tablet 160 mg  160 mg Oral Daily Fernando James MD   160 mg at 09/17/20 0938    escitalopram (LEXAPRO) tablet 10 mg  10 mg Oral Daily Fernando James MD   10 mg at 09/17/20 2370    gabapentin (NEURONTIN) capsule 300 mg  300 mg Oral Daily Fernando James MD   300 mg at 09/17/20 0926    losartan (COZAAR) tablet 100 mg  100 mg Oral Daily Fernando James MD   100 mg at 09/17/20 8490    montelukast (SINGULAIR) tablet 10 mg  10 mg Oral Nightly Tio Barbour MD   10 mg at 09/16/20 2318    metFORMIN (GLUCOPHAGE) tablet 1,000 mg  1,000 mg Oral BID  Tio Barbour MD   1,000 mg at 09/17/20 1721    ipratropium-albuterol (DUONEB) nebulizer solution 1 ampule  1 ampule Inhalation Q4H PRN Toi Barbour MD        insulin lispro (HUMALOG) injection vial 0-6 Units  0-6 Units Subcutaneous TID DANAE Barbour MD        insulin lispro (HUMALOG) injection vial 0-3 Units  0-3 Units Subcutaneous Nightly Tio Barbour MD        glucose (GLUTOSE) 40 % oral gel 15 g  15 g Oral PRN Tio Barbour MD        dextrose 50 % IV solution  12.5 g Intravenous PRN Tio Barbour MD        glucagon (rDNA) injection 1 mg  1 mg Intramuscular PRN Tio Barbour MD        dextrose 5 % solution  100 mL/hr Intravenous PRN Tio Barbour MD        sodium chloride flush 0.9 % injection 10 mL  10 mL Intravenous 2 times per day Tio Barbour MD   10 mL at 09/17/20 0942    sodium chloride flush 0.9 % injection 10 mL  10 mL Intravenous PRN Tio Barbour MD        acetaminophen (TYLENOL) tablet 650 mg  650 mg Oral Q6H PRN Tio Barbour MD        Or    acetaminophen (TYLENOL) suppository 650 mg  650 mg Rectal Q6H PRN Tio Barbour MD        polyethylene glycol [facility]) packet 17 g  17 g Oral Daily PRN Tio Barbour MD        promethazine (PHENERGAN) tablet 12.5 mg  12.5 mg Oral Q6H PRN Tio Barbour MD        Or    ondansetron [facility]) injection 4 mg  4 mg Intravenous Q6H PRN Tio Barbour MD        enoxaparin (LOVENOX) injection 30 mg  30 mg Subcutaneous Daily Tio Barbour MD   30 mg at 09/17/20 0930    magnesium oxide (MAG-OX) tablet 400 mg  400 mg Oral BID Tio Barbour MD   400 mg at 09/17/20 0932       ALLERGIES:    Allergies   Allergen Reactions    Latex     [place] [Aspirin Effervescent] Hives     Tongue swelling,but advil does not bother her.  Sulfa Antibiotics        SOCIAL HISTORY:    Social History     Socioeconomic History    Marital status:      Spouse name: Adis Viera Number of children: 3    Years of education: 15    Highest education level: High school graduate   Occupational History    Occupation: retired   Social Needs    Financial resource strain: Not hard at all   Varun[place]Faye insecurity     Worry: Never true     Inability: Never true   [employer] needs     Medical: No     Non-medical: No   Tobacco Use    Smoking status: Never Smoker    Smokeless tobacco: Never Used   Substance and Sexual Activity    Alcohol use: Yes    Drug use: No    Sexual activity: None   Lifestyle    Physical activity     Days per week: None     Minutes per session: None    Stress: None   Relationships    Social connections     Talks on phone: None     Gets together: None     Attends Sikh service: None     Active member of club or organization: None     Attends meetings of clubs or organizations: None     Relationship status: None    Intimate partner violence     Fear of current or ex partner: None     Emotionally abused: None     Physically abused: None     Forced sexual activity: None   Other Topics Concern    None   Social History Narrative    None   :      FAMILY HISTORY:    Family History   Problem Relation Age of Onset    Heart Disease Mother     Stroke Mother     Osteoporosis Mother     Cirrhosis Father         Liver    Osteoporosis Sister     Osteoporosis Maternal Grandmother        REVIEW OF SYSTEMS:      Unable to obtain    PHYSICAL EXAM:      Vitals:  /64   Pulse 78   Temp 97.9 °F (36.6 °C)   Resp 18   Ht 5' 4\" (1.626 m)   Wt 128 lb 8.5 oz (58.3 kg)   SpO2 94%   BMI 22.06 kg/m²     CONSTITUTIONAL: Patient currently is in two-point restraints. She is combative and agitated. She is confused. She is pale.  She is very thin  EYES: Sclera clear, conjunctiva normal  ENT: Oral pharynx unremarkable with moist mucus membranes  LUNGS: Diminished bilaterally to auscultation. No increased labor with breathing. CARDIOVASCULAR: Regular rate and rhythm, normal S1 and S2, no S3 or S4, and no murmur noted. ABDOMEN: Soft, non-distended, non-tender  MUSCULOSKELETAL: There is no redness, warmth, or swelling of the joints. NEUROLOGIC: She is moving all extremities and her exam seems non-focal  SKIN: No bruising or bleeding.   PSYCH: Oriented only to person    LAB DATA:    Labs:  General Labs:  CBC with Differential:    Lab Results   Component Value Date    WBC 5.3 09/17/2020    RBC 3.21 09/17/2020    HGB 11.1 09/17/2020    HCT 33.2 09/17/2020     09/17/2020    .3 09/17/2020    MCH 34.5 09/17/2020    MCHC 33.4 09/17/2020    RDW 19.3 09/17/2020    SEGSPCT 54.8 08/03/2011    LYMPHOPCT 31.6 09/17/2020    MONOPCT 7.5 09/17/2020    EOSPCT 2.4 08/03/2011    BASOPCT 0.5 09/17/2020    MONOSABS 0.4 09/17/2020    LYMPHSABS 1.7 09/17/2020    EOSABS 0.0 09/17/2020    BASOSABS 0.0 09/17/2020     CMP:    Lab Results   Component Value Date     09/17/2020    K 4.1 09/17/2020    CL 99 09/17/2020    CO2 24 09/17/2020    BUN 19 09/17/2020    CREATININE 0.8 09/17/2020    GFRAA >60 09/17/2020    GFRAA >60 05/31/2013    AGRATIO 1.8 08/19/2020    LABGLOM >60 09/17/2020    LABGLOM 49.7 08/03/2011    GLUCOSE 94 09/17/2020    GLUCOSE 191 08/03/2011    PROT 6.2 09/17/2020    PROT 6.7 08/03/2011    CALCIUM 9.4 09/17/2020    BILITOT 2.7 09/17/2020    ALKPHOS 33 09/17/2020    AST 32 09/17/2020    ALT 35 09/17/2020     Magnesium:    Lab Results   Component Value Date    MG 2.00 09/17/2020     PT/INR:  No results found for: PTINR  INR: No results found for: INR  PTT:  No results found for: APTT[APTT  U/A:    Lab Results   Component Value Date    NITRITE positive 11/02/2018    COLORU DK YELLOW 08/19/2020    PHUR 5.5 08/19/2020    WBCUA 316 08/19/2020    RBCUA 6 08/19/2020    BACTERIA 4+ 08/19/2020    CLARITYU CLOUDY 08/19/2020    SPECGRAV 1.027 08/19/2020    LEUKOCYTESUR MODERATE 08/19/2020    UROBILINOGEN 0.2 08/19/2020    BILIRUBINUR SMALL 08/19/2020    BILIRUBINUR neg 11/02/2018    BLOODU Negative 08/19/2020    GLUCOSEU Negative 08/19/2020       IMPRESSION\PLAN:    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 This is supported by her slight increase in LDH, increase in bilirubin, haptoglobin less than 10. She is becoming increasingly anemic but not to the point of necessarily needing immediate intervention. She is on 20 mg a day of corticosteroids. She has also been taking dapsone for pneumocystis prophylaxis. Dapsone has been associated with hemolytic anemia and I am fearful that this is probably the root cause. More often than not this hemolysis is mild and does not require intervention. However, her hemoglobin is dropping and I think that we should consider stopping the dapsone in favor of an alternative medication. Hemolysis can often be worse in those patients who harbor a G6PD deficiency so we will do that testing as well. I do not think we need to raise her steroid doses yet. I have talked to nursing and asked them to hold the dapsone for now she did get a dose this morning. The patient is very confused right now. According to the nursing her  has been reporting that she has been getting confusion regularly at night. She is to the point where they have had to restrain her. This is limited my ability to get a more cogent history. The patient was admitted with shortness of breath and exacerbation of her COPD. This is being managed by pulmonary. The patient has multiple other comorbidities. Included in the comorbidities has been significant weight loss. On 8/26/2020 she had a CT scan of the chest and abdomen which showed no acute process.   She had evidence of an interstitial lung disease and some wall thickening involving the right hemicolon which they thought was secondary to a lack of distention. At some point further GI evaluation might be considered. The CT of the abdomen pelvis was repeated yesterday and there was no foot findings. Likewise a CT scan of the chest was unremarkable. It is unlikely that this is a malignant process. Thank you very much for allowing me to participate in the care of this patient. I will plan to keep you fully appraised of your patient's progress. Haris Salas. Faby Figueroa M.D., MPH  Chair, Department of  Clinical [address]  Office [phone]  Cell [phone]  [email or web address]       \"Participating in a clinical trial is the first step to fighting cancer; not the last.\"    9/17/2020 8:27 PM

## 2020-09-18 NOTE — PROGRESS NOTES
Pulmonary Progress Note    Date of Admission: 9/16/2020   LOS: 2 days     Chief Complaint   Patient presents with    Shortness of Breath     starting for one week.  Nausea    Abdominal Pain     lower abd pain, bright red blood one week ago, that has subsided        Assessment:     Acute Hypoxemic Respiratory Failure with SAO2 <90% on Room Air  Acute Exacerbation of ILD - NSIP or HP  Hemolytic Anemia  Encephalopathy    Plan:      -ILD exacerbation most likely cuase, infectious w/u negative thus far.  -40mg pred burst followed by 20mg daily  -pt reportedly had stopped steroids on her own several motnhs ago due to feeling well. -ZACHARY to sulfa, dapsone possible culprit of hemolysis, will switch to atovaquone for PJP ppx. Pt not on steroids at home so unclear if she was taking dapsone. Had been advised to stop some time back, unclear if dapsone is to blame for hemolysis. -procal and BNP negative, strep/legionella neg  -symbicort  -nl Echo    I spent 37 minutes with the patient, >50% was face-to-face in discussion of the diagnosis and the coordination of care in regards to the treatment plan. All questions answered. 24 Hour Events/Subjective  Normal echo  Remains on 3L O2  Improved confusion    ROS:   No nausea  No Vomiting  No chest pain      Intake/Output Summary (Last 24 hours) at 9/18/2020 0730  Last data filed at 9/17/2020 1339  Gross per 24 hour   Intake 450 ml   Output --   Net 450 ml         PHYSICAL EXAM:   Blood pressure 127/75, pulse 103, temperature 97.7 °F (36.5 °C), temperature source Oral, resp. rate 18, height 5' 4\" (1.626 m), weight 126 lb 1.7 oz (57.2 kg), SpO2 90 %, not currently breastfeeding.'  Gen:  No acute distress. Eyes: PERRL. Anicteric sclera. No conjunctival injection. ENT: No discharge. Posterior oropharynx clear. External appearance of ears and nose normal.  Neck: Trachea midline. No mass   Resp:  No crackles. No wheezes. No rhonchi. No dullness on percussion.   CV: Regular rate. Regular rhythm. No murmur or rub. No edema. GI: Soft, Non-tender. Non-distended. +BS  Skin: Warm, dry, w/o erythema. Lymph: No cervical or supraclavicular LAD. M/S: No cyanosis. No clubbing. Neuro:   no focal neurologic deficit. Moves all extremities  Psych: Awake and alert, Oriented x 3. Judgement and insight appropriate. Mood stable.       Medications:    Scheduled Meds:   predniSONE  40 mg Oral Daily    Followed by   Kamron Phillips ON 9/21/2020] predniSONE  20 mg Oral Daily    metoprolol tartrate  25 mg Oral BID    alendronate  70 mg Oral Q7 Days    amLODIPine  5 mg Oral Daily    budesonide-formoterol  2 puff Inhalation BID    calcium-vitamin D  1 tablet Oral BID    cetirizine  10 mg Oral Daily    dapsone  100 mg Oral Daily    dicyclomine  10 mg Oral Daily    fenofibrate  160 mg Oral Daily    escitalopram  10 mg Oral Daily    gabapentin  300 mg Oral Daily    losartan  100 mg Oral Daily    montelukast  10 mg Oral Nightly    metFORMIN  1,000 mg Oral BID WC    insulin lispro  0-6 Units Subcutaneous TID WC    insulin lispro  0-3 Units Subcutaneous Nightly    sodium chloride flush  10 mL Intravenous 2 times per day    enoxaparin  30 mg Subcutaneous Daily    magnesium oxide  400 mg Oral BID       Continuous Infusions:   dextrose         PRN Meds:  haloperidol lactate, ipratropium-albuterol, glucose, dextrose, glucagon (rDNA), dextrose, sodium chloride flush, acetaminophen **OR** acetaminophen, polyethylene glycol, promethazine **OR** ondansetron    Labs reviewed:  CBC:   Recent Labs     09/16/20 1410 09/17/20  0701   WBC 6.8 5.3   HGB 12.3 11.1*   HCT 36.2 33.2*   .5* 103.3*    208     BMP:   Recent Labs     09/16/20 1410 09/17/20  0701    133*   K 4.4 4.1    99   CO2 21 24   BUN 18 19   CREATININE 0.9 0.8     LIVER PROFILE:   Recent Labs     09/16/20 1410 09/17/20  0701 09/18/20  0554   AST 41* 32 57*   ALT 39 35 62*   BILIDIR 0.7* 0.7* 0.8*   BILITOT 3.1* 2. 7* 3.4*   ALKPHOS 38* 33* 35*     PT/INR: No results for input(s): PROTIME, INR in the last 72 hours. APTT: No results for input(s): APTT in the last 72 hours. UA:No results for input(s): NITRITE, COLORU, PHUR, LABCAST, WBCUA, RBCUA, MUCUS, TRICHOMONAS, YEAST, BACTERIA, CLARITYU, SPECGRAV, LEUKOCYTESUR, UROBILINOGEN, BILIRUBINUR, BLOODU, GLUCOSEU, AMORPHOUS in the last 72 hours. Invalid input(s): Sharon Guillen  No results for input(s): PH, PCO2, PO2 in the last 72 hours. Films:  Radiology Review:  Pertinent images / reports were reviewed as a part of this visit. ECHO Complete 2D W Doppler W Color  Transthoracic Echocardiography Report (TTE)     Demographics      Patient Name       Sabrina Marin      Date of Study      09/17/2020         Gender              Female      Patient Number     [id number]         Date of Birth       1945      Visit Number       [id number]          Age                 67 year(s)      Accession Number   [id number]         Room Number         6959      Corporate ID       [id number]            Valerio Davidson,                                                             [facility]      Ordering Physician Ivette Saunders, [address]                      MD                 Physician           Eduardo Andrade MD     Procedure    Type of Study      TTE procedure:ECHOCARDIOGRAM COMPLETE 2D W DOPPLER W COLOR. Procedure Date  Date: 09/17/2020 Start: 03:12 PM    Study Location: [facility] Echo Lab  Technical Quality: Limited visualization due to lung interface. Indications:Dyspnea/SOB. Additional Indications:HX:HTN/COPD/DM/ATRIAL FIB. Patient Status: Routine    Height: 63 inches Weight: 135 pounds BSA: 1.64 m2 BMI: 23.91 kg/m2    BP: 117/61 mmHg     Conclusions      Summary   Left ventricular cavity size is normal.   Normal left ventricular wall thickness. Ejection fraction is visually estimated to be 50-55%. Mitral valve leaflets appear mildly thickened. Trivial mitral regurgitation. Trivial tricuspid regurgitation. Trivial pulmonic regurgitation present. Signature      ------------------------------------------------------------------   Electronically signed by Adrian Whittaker MD (Interpreting   physician) on 09/17/2020 at 05:10 PM   ------------------------------------------------------------------      Findings      Left Ventricle   Left ventricular cavity size is normal.   Normal left ventricular wall thickness. Ejection fraction is visually estimated to be 50-55%. Patient stated she was having right sided pain and difficulty breathing and   refused further testing as I begin apical views. Mitral Valve   Mitral valve leaflets appear mildly thickened. Trivial mitral regurgitation. Left Atrium   Left atrium is of normal size. Aortic Valve   Aortic valve appears sclerotic but opens adequately. No significant aortic valve regurgitation. Aorta   The aortic root is normal in size. Right Ventricle   The right ventricle is not well visualized. Tricuspid Valve   Tricuspid valve is structurally normal.   Trivial tricuspid regurgitation. Right Atrium   Right atrium is not well visualized. Pulmonic Valve   The pulmonic valve is not well visualized. Trivial pulmonic regurgitation present. No evidence of pulmonic valve stenosis. Pericardial Effusion   No evidence of pericardial or pleural effusion. Miscellaneous   IVC not visualized.      M-Mode/2D Measurements (cm)      LV Diastolic Dimension: 0.35 cm LV Systolic Dimension: 3.4 cm   LV Septum Diastolic: 6.30 cm   LV PW Diastolic: 7.57 cm        AO Root Dimension: 3 cm                                      LA Area: 14.6 cm2                                   LA volume/Index: 31 ml /19 ml/m2     Doppler Measurements      AV Peak Velocity: 122 cm/s   AV Peak Gradient: 5.95 mmHg   AV Mean Gradient: 3 mmHg      Aortic Valve      Peak Velocity: 122 cm/s  Mean Velocity: 85.3 cm/s   Peak Gradient: 5.95 mmHg Mean Gradient: 3 mmHg   AV VTI: 24.5 cm     Aorta      Aortic Root: 3 cm               This note was transcribed using [id number] [employer]. Please disregard any translational errors.     Thank you for this consult,    Tani Henry [address] [facility]

## 2020-09-18 NOTE — PLAN OF CARE
Problem: Falls - Risk of:  Goal: Will remain free from falls  Description: Will remain free from falls  9/18/2020 0923 by Janet Dee  Outcome: Ongoing  9/18/2020 0115 by Noble Solis RN  Outcome: Ongoing  Note: D: PT. Remains confused and in bilateral wrist/ankle restraints as well as a posey vest, bed alarm on   A: attempted to reorient as needed  R: without falls this shift  Goal: Absence of physical injury  Description: Absence of physical injury  Outcome: Ongoing     Problem: Pain:  Goal: Pain level will decrease  Description: Pain level will decrease  Outcome: Ongoing  Goal: Control of acute pain  Description: Control of acute pain  Outcome: Ongoing  Goal: Control of chronic pain  Description: Control of chronic pain  Outcome: Ongoing     Problem:  Activity:  Goal: Fatigue will decrease  Description: Fatigue will decrease  Outcome: Ongoing     Problem: Cardiac:  Goal: Hemodynamic stability will improve  Description: Hemodynamic stability will improve  Outcome: Ongoing     Problem: Coping:  Goal: Level of anxiety will decrease  Description: Level of anxiety will decrease  Outcome: Ongoing  Goal: Ability to cope will improve  Description: Ability to cope will improve  Outcome: Ongoing  Goal: Ability to establish a method of communication will improve  Description: Ability to establish a method of communication will improve  Outcome: Ongoing     Problem: Nutritional:  Goal: Consumption of the prescribed amount of daily calories will improve  Description: Consumption of the prescribed amount of daily calories will improve  Outcome: Ongoing     Problem: Respiratory:  Goal: Ability to maintain adequate ventilation will improve  Description: Ability to maintain adequate ventilation will improve  Outcome: Ongoing  Goal: Complications related to the disease process, condition or treatment will be avoided or minimized  Description: Complications related to the disease process, condition or treatment will be avoided or minimized  Outcome: Ongoing     Problem: Skin Integrity:  Goal: Risk for impaired skin integrity will decrease  Description: Risk for impaired skin integrity will decrease  Outcome: Ongoing  Goal: Will show no infection signs and symptoms  Description: Will show no infection signs and symptoms  Outcome: Ongoing  Goal: Absence of new skin breakdown  Description: Absence of new skin breakdown  Outcome: Ongoing     Problem: Nutrition  Goal: Optimal nutrition therapy  Outcome: Ongoing     Problem: Restraint Use - Nonviolent/Non-Self-Destructive Behavior:  Goal: Absence of restraint indications  Description: Absence of restraint indications  Outcome: Ongoing  Goal: Absence of restraint-related injury  Description: Absence of restraint-related injury  Outcome: Ongoing

## 2020-09-18 NOTE — PLAN OF CARE
Problem: Falls - Risk of:  Goal: Will remain free from falls  Description: Will remain free from falls  9/18/2020 0115 by Candance Dials, RN  Outcome: Ongoing  Note: D: PT. Remains confused and in bilateral wrist/ankle restraints as well as a posey vest, bed alarm on   A: attempted to reorient as needed  R: without falls this shift

## 2020-09-18 NOTE — PROGRESS NOTES
I was made aware that this patient was put in 4 point soft restraints due to being combative. I will make Latosha Man aware via e-mail.

## 2020-09-18 NOTE — PROGRESS NOTES
[facility]  [facility]  Progress Note      SUBJECTIVE:   PT is calm -she is in restraints and thought she was at home but hse is not agitated  She has a slight drop in hgb but not severe  ldh was mildly up   b12 folate were ok   OBJECTIVE      Medications    Current Facility-Administered Medications: predniSONE (DELTASONE) tablet 40 mg, 40 mg, Oral, Daily **FOLLOWED BY** [START ON 9/21/2020] predniSONE (DELTASONE) tablet 20 mg, 20 mg, Oral, Daily  atovaquone (MEPRON) suspension 1,500 mg, 1,500 mg, Oral, Daily  haloperidol lactate (HALDOL) injection 2 mg, 2 mg, Intravenous, Q6H PRN  metoprolol tartrate (LOPRESSOR) tablet 25 mg, 25 mg, Oral, BID  alendronate (FOSAMAX) tablet 70 mg, 70 mg, Oral, Q7 Days  amLODIPine (NORVASC) tablet 5 mg, 5 mg, Oral, Daily  budesonide-formoterol (SYMBICORT) 160-4.5 MCG/ACT inhaler 2 puff, 2 puff, Inhalation, BID  calcium-vitamin D 500-200 MG-UNIT per tablet 1 tablet, 1 tablet, Oral, BID  cetirizine (ZYRTEC) tablet 10 mg, 10 mg, Oral, Daily  dicyclomine (BENTYL) capsule 10 mg, 10 mg, Oral, Daily  fenofibrate (TRIGLIDE) tablet 160 mg, 160 mg, Oral, Daily  escitalopram (LEXAPRO) tablet 10 mg, 10 mg, Oral, Daily  gabapentin (NEURONTIN) capsule 300 mg, 300 mg, Oral, Daily  losartan (COZAAR) tablet 100 mg, 100 mg, Oral, Daily  montelukast (SINGULAIR) tablet 10 mg, 10 mg, Oral, Nightly  metFORMIN (GLUCOPHAGE) tablet 1,000 mg, 1,000 mg, Oral, BID WC  ipratropium-albuterol (DUONEB) nebulizer solution 1 ampule, 1 ampule, Inhalation, Q4H PRN  insulin lispro (HUMALOG) injection vial 0-6 Units, 0-6 Units, Subcutaneous, TID WC  insulin lispro (HUMALOG) injection vial 0-3 Units, 0-3 Units, Subcutaneous, Nightly  glucose (GLUTOSE) 40 % oral gel 15 g, 15 g, Oral, PRN  dextrose 50 % IV solution, 12.5 g, Intravenous, PRN  glucagon (rDNA) injection 1 mg, 1 mg, Intramuscular, PRN  dextrose 5 % solution, 100 mL/hr, Intravenous, PRN  sodium chloride flush 0.9 % injection 10 mL, 10 mL, Intravenous, 2 times per day  sodium chloride flush 0.9 % injection 10 mL, 10 mL, Intravenous, PRN  acetaminophen (TYLENOL) tablet 650 mg, 650 mg, Oral, Q6H PRN **OR** acetaminophen (TYLENOL) suppository 650 mg, 650 mg, Rectal, Q6H PRN  polyethylene glycol (GLYCOLAX) packet 17 g, 17 g, Oral, Daily PRN  promethazine (PHENERGAN) tablet 12.5 mg, 12.5 mg, Oral, Q6H PRN **OR** ondansetron (ZOFRAN) injection 4 mg, 4 mg, Intravenous, Q6H PRN  enoxaparin (LOVENOX) injection 30 mg, 30 mg, Subcutaneous, Daily  magnesium oxide (MAG-OX) tablet 400 mg, 400 mg, Oral, BID  Physical    VITALS:  /75   Pulse 103   Temp 97.7 °F (36.5 °C) (Oral)   Resp 18   Ht 5' 4\" (1.626 m)   Wt 126 lb 1.7 oz (57.2 kg)   SpO2 90%   BMI 21.65 kg/m²   TEMPERATURE:  Current - Temp: 97.7 °F (36.5 °C); Max - Temp  Av.8 °F (36.6 °C)  Min: 97.5 °F (36.4 °C)  Max: 98 °F (36.7 °C)  PULSE OXIMETRY RANGE: SpO2  Av.9 %  Min: 90 %  Max: 94 %  24HR INTAKE/OUTPUT:      Intake/Output Summary (Last 24 hours) at 2020 0751  Last data filed at 2020 1339  Gross per 24 hour   Intake 450 ml   Output --   Net 450 ml       CONSTITUTIONAL:  awake, alert, cooperative, no apparent distress, HEENT oral pharynx , no scleral icterus  HEMATOLOGIC/LYMPHATICS:  no cervical lymphadenopathy, no supraclavicular lymphadenopathy,   LUNGS:  No increased work of breathing, good air exchange, clear to auscultation bilaterally, no crackles or wheezing  CARDIOVASCULAR:  , regular rate and rhythm, normal S1 and S2, no S3 or S4, and no murmur noted  ABDOMEN:  No scars, normal bowel sounds, soft, non-distended, non-tender, no masses palpated, no hepatosplenomegally  MUSCULOSKELETAL:  There is no redness, warmth, or swelling of the joints.   EXTREMETIES: No clubbing cynosis or edema  NEUROLOGIC: deferred motor but cn intact     Data      Recent Labs     20  1410 20  0701 20  0554   WBC 6.8 5.3 9.3   HGB 12.3 11.1* 10.5*   HCT 36.2 33.2* 31.5*    208 212 .5* 103.3* 102.7*        Recent Labs     09/16/20  1410 09/17/20  0701    133*   K 4.4 4.1    99   CO2 21 24   BUN 18 19   CREATININE 0.9 0.8     Recent Labs     09/16/20  1410 09/17/20  0701 09/18/20  0554   AST 41* 32 57*   ALT 39 35 62*   BILIDIR 0.7* 0.7* 0.8*   BILITOT 3.1* 2.7* 3.4*   ALKPHOS 38* 33* 35*       Magnesium:    Lab Results   Component Value Date    MG 2.00 09/17/2020    MG 1.60 01/23/2020    MG 1.70 11/08/2019       Imaging Xr Chest (2 Vw)    Result Date: 9/16/2020  EXAMINATION: TWO XRAY VIEWS OF THE CHEST 9/16/2020 2:57 pm COMPARISON: 05/29/2018 08/26/2020 HISTORY: ORDERING SYSTEM PROVIDED HISTORY: Shortness of breath TECHNOLOGIST PROVIDED HISTORY: Reason for exam:->Shortness of breath Reason for Exam: sob, nausea abdominal pain FINDINGS: The lungs are without acute focal process. There is no effusion or pneumothorax. The cardiomediastinal silhouette is stable. The osseous structures are stable. Unchanged anterior wedging T11. No acute process. Ct Abdomen Pelvis W Iv Contrast Additional Contrast? None    Result Date: 9/16/2020  EXAMINATION: CTA OF THE CHEST; CT OF THE ABDOMEN AND PELVIS WITH CONTRAST 9/16/2020 3:56 pm TECHNIQUE: CTA of the chest was performed after the administration of intravenous contrast.  Multiplanar reformatted images are provided for review. MIP images are provided for review. Dose modulation, iterative reconstruction, and/or weight based adjustment of the mA/kV was utilized to reduce the radiation dose to as low as reasonably achievable.; CT of the abdomen and pelvis was performed with the administration of intravenous contrast. Multiplanar reformatted images are provided for review. Dose modulation, iterative reconstruction, and/or weight based adjustment of the mA/kV was utilized to reduce the radiation dose to as low as reasonably achievable.  COMPARISON: 08/26/2020 CT HISTORY: ORDERING SYSTEM PROVIDED HISTORY: hypoxic to the 80s, normally. No lymph node enlargement. Bones/Soft Tissues: No significant skeletal abnormalities. 1. No pulmonary embolism. 2. Centrilobular emphysema and chronic fibrotic changes in the chest. Scattered ground-glass opacities in the right upper lobe appear to be chronic. No focal consolidation to suggest pneumonia. 3. No acute finding in the abdomen or pelvis. Ct Chest Pulmonary Embolism W Contrast    Result Date: 9/16/2020  EXAMINATION: CTA OF THE CHEST; CT OF THE ABDOMEN AND PELVIS WITH CONTRAST 9/16/2020 3:56 pm TECHNIQUE: CTA of the chest was performed after the administration of intravenous contrast.  Multiplanar reformatted images are provided for review. MIP images are provided for review. Dose modulation, iterative reconstruction, and/or weight based adjustment of the mA/kV was utilized to reduce the radiation dose to as low as reasonably achievable.; CT of the abdomen and pelvis was performed with the administration of intravenous contrast. Multiplanar reformatted images are provided for review. Dose modulation, iterative reconstruction, and/or weight based adjustment of the mA/kV was utilized to reduce the radiation dose to as low as reasonably achievable.  COMPARISON: 08/26/2020 CT HISTORY: ORDERING SYSTEM PROVIDED HISTORY: hypoxic to the 80s, worsening shortness of breath, tachypneic, h/o ILD, lungs clear, assessing for perfusion etiology, r/o embolus TECHNOLOGIST PROVIDED HISTORY: Reason for exam:->hypoxic to the 80s, worsening shortness of breath, tachypneic, h/o ILD, lungs clear, assessing for perfusion etiology, r/o embolus Reason for Exam: hypoxic to the 80s, worsening shortness of breath, tachypneic, h/o ILD, lungs clear, assessing for perfusion etiology, r/o embolus Acuity: Acute Type of Exam: Initial; ORDERING SYSTEM PROVIDED HISTORY: periumbilical tenderness, abdominal pain TECHNOLOGIST PROVIDED HISTORY: Reason for exam:->periumbilical tenderness, abdominal pain Additional Contrast?->None Reason for Exam: periumbilical tenderness, abdominal pain nausea, sob, chol, Acuity: Acute Type of Exam: Initial FINDINGS: Chest: Pulmonary arteries: Study is of good technical quality for evaluation of pulmonary embolism. There are no filling defects to suggest pulmonary embolism. Main pulmonary artery is normal in caliber. No evidence of right ventricular strain. Mediastinum: The heart size is normal. Aorta is normal in caliber. Superior mediastinum is normal. No mediastinal or hilar lymph node enlargement. Lungs/pleura: The airways are patent. No pleural fluid. Moderate changes of centrilobular emphysema. Scattered ground-glass opacities are present in the right upper lobe. There is also a band of fibrotic change at the right base. Soft Tissues/Bones: T11 compression fracture stable and remote. No acute skeletal findings. Abdomen/Pelvis: Organs: The gallbladder is absent. The liver, biliary ducts, pancreas and spleen are normal.  Kidneys and adrenal glands are normal. GI/Bowel: The stomach, duodenum and small bowel are normal. A normal appendix is visualized. Diverticular changes in the sigmoid colon with no acute inflammation. Pelvis: The bladder is unremarkable. Postsurgical change of hysterectomy. Peritoneum/Retroperitoneum: The aorta tapers normally. No lymph node enlargement. Bones/Soft Tissues: No significant skeletal abnormalities. 1. No pulmonary embolism. 2. Centrilobular emphysema and chronic fibrotic changes in the chest. Scattered ground-glass opacities in the right upper lobe appear to be chronic. No focal consolidation to suggest pneumonia. 3. No acute finding in the abdomen or pelvis.      Ct Chest Abdomen Pelvis Wo Contrast    Result Date: 8/29/2020  EXAMINATION: CT OF THE CHEST, ABDOMEN, AND PELVIS WITHOUT CONTRAST 8/26/2020 1:07 pm TECHNIQUE: CT of the chest, abdomen and pelvis was performed without the administration of intravenous contrast. Multiplanar reformatted images are provided for review. Dose modulation, iterative reconstruction, and/or weight based adjustment of the mA/kV was utilized to reduce the radiation dose to as low as reasonably achievable. COMPARISON: 02/21/2020, 11/14/2019, 10/29/2018, 08/15/2007 HISTORY: ORDERING SYSTEM PROVIDED HISTORY: Weight loss, abnormal TECHNOLOGIST PROVIDED HISTORY: Additional Contrast?->Radiologist Recommendation Reason for Exam: abnormal weight loss Acuity: Acute Type of Exam: Subsequent/Follow-up Relevant Medical/Surgical History: hx domenic, hyst FINDINGS: Chest: Mediastinum: The thyroid is unremarkable. There is no pathologic lymphadenopathy within the chest or mediastinum. There is atherosclerotic disease of the thoracic aorta, without evidence of aneurysm. No pericardial abnormality is identified. Lungs/pleura: The central airways are patent. No localized focus of acute airspace consolidation is identified. There is a postbiopsy scar within the right middle lobe. There is an additional postbiopsy scar within the right lower lobe as well. There are multiple persistent reticular and ground-glass nodular opacities throughout both lungs which are similar in comparison with the study of 11/14/2019, and most likely represent an element of chronic interstitial lung disease, to include either chronic hypersensitivity pneumonitis, cryptogenic organizing pneumonia, or respiratory bronchiolitis. There is no evidence of a pneumothorax or pleural effusion. No new suspicious pulmonary nodule or mass is evident. Soft Tissues/Bones: There is degenerative change throughout the thoracic spine. There is a chronic wedge compression fracture of T11. No osteolytic or osteoblastic lesion is seen. Abdomen/Pelvis: Organs: Evaluation of the solid organs is limited by lack of IV contrast. The patient is status post cholecystectomy. The liver and spleen are unremarkable in noncontrast appearance.   The pancreas is normal.  The adrenal glands are unremarkable bilaterally. Noncontrast images of the kidneys and ureters demonstrate no evidence of a renal or ureteral calculus or hydronephrosis. There is chronic bilateral perinephric stranding, likely secondary to chronic medical renal disease. The bilateral kidneys are otherwise grossly unremarkable in noncontrast appearance. GI/Bowel: Evaluation of the hollow GI tract demonstrates a small sliding-type hiatal hernia. There is mild nonspecific wall thickening involving the cecum, ascending colon, and proximal transverse colon, which while could represent lack of distention, a focal infectious or inflammatory colitis is also on the differential in the right clinical setting. The remainder of the colon is unremarkable. The small bowel is unremarkable, without dilatation or obstruction. Pelvis: The urinary bladder is normal.  The patient is status post hysterectomy. There is no free pelvic fluid or pathologic pelvic lymphadenopathy. Peritoneum/Retroperitoneum: No intraperitoneal free air or free fluid is identified. No pathologic lymphadenopathy is seen. The abdominal aorta is unremarkable in noncontrast appearance. No significant abdominal wall hernia is identified. There is a small 1.6 x 1.4 cm fat-containing umbilical hernia without incarceration or bowel involvement. Bones/Soft Tissues: There is degenerative change throughout the lumbar spine. No osteolytic or osteoblastic lesion is seen. 1. No definite acute process within the chest, abdomen, or pelvis. 2. Multifocal reticular and ground-glass nodular opacities throughout both lungs are similar in comparison with the prior study of 11/14/2019, and likely reflect a chronic interstitial lung process, to include chronic hypersensitivity pneumonitis, cryptogenic organizing pneumonia, or respiratory bronchiolitis.   However, consider short-term chest CT follow-up in 3-6 months to ensure stability of the multifocal ground-glass nodules, as a slow-growing malignancy is not entirely excluded. 3. Wall thickening involving the right hemicolon may be secondary to lack of distention. However, a focal infectious or inflammatory colitis is also a diagnostic consideration. Clinical correlation is advised. Problem List  Patient Active Problem List   Diagnosis    Diverticulosis of large intestine    Postinflammatory pulmonary fibrosis ([facility] [address]    Essential hypertension    Irritable bowel syndrome    Osteoporosis    Nocturia    Thoracic or lumbosacral neuritis or radiculitis, unspecified    Hyperlipidemia    Synovial cyst of popliteal space    Enthesopathy of hip region    Peripheral neuropathy,both lower extremities    Left lumbar radiculopathy-L4    Bursitis of left hip,trochanteric.  Low back pain    Lumbar disc herniation with radiculopathy    Degenerative disc disease, lumbar    Tendonitis-3rd flexor tendon left hand.     Depression    RUQ abdominal pain    Allergic dermatitis    Trigger finger, left ring finger    COPD (chronic obstructive pulmonary disease) ([facility])    Rib pain on right side    Trochanteric bursitis of left hip    Osteopenia of multiple sites    Abnormal CT of the chest    ILD (interstitial lung disease) ([facility])    Weight loss    Type 2 diabetes mellitus without complication, without long-term current use of insulin ([facility])    compression fracture -T11    Personal history of steroid therapy    Post-menopausal    Urinary frequency    Acute respiratory failure with hypoxia (HCC)    Rectal bleeding    Anemia, unspecified       ASSESSMENT AND PLAN    Anemia  Mild hemolysis=  ldh not too high-would moniter daily   Dapsone stopped  Direct tg negative  For now would moniter as the counts are not severely dropping  This should be self limited if dapsone did this   g6pd pendin -can be hard to interpret in acute setting      Zahira Corley MD

## 2020-09-18 NOTE — TELEPHONE ENCOUNTER
bp is 93/60 and she is feeling light headed and dizzy   Wanting to know if any recommendations.       Wanting to know if okay to add UA and urine culture to sample given yesterday    Please advise

## 2020-09-19 PROBLEM — N30.00 ACUTE CYSTITIS WITHOUT HEMATURIA: Status: ACTIVE | Noted: 2020-09-19

## 2020-09-19 LAB
ALBUMIN SERPL-MCNC: 3.8 G/DL (ref 3.4–5)
ALP BLD-CCNC: 34 U/L (ref 40–129)
ALT SERPL-CCNC: 55 U/L (ref 10–40)
AST SERPL-CCNC: 43 U/L (ref 15–37)
BASOPHILS ABSOLUTE: 0 K/UL (ref 0–0.2)
BASOPHILS RELATIVE PERCENT: 0.1 %
BILIRUB SERPL-MCNC: 2.5 MG/DL (ref 0–1)
BILIRUBIN DIRECT: 0.7 MG/DL (ref 0–0.3)
BILIRUBIN, INDIRECT: 1.8 MG/DL (ref 0–1)
EOSINOPHILS ABSOLUTE: 0 K/UL (ref 0–0.6)
EOSINOPHILS RELATIVE PERCENT: 0.1 %
GLUCOSE BLD-MCNC: 123 MG/DL (ref 70–99)
GLUCOSE BLD-MCNC: 136 MG/DL (ref 70–99)
GLUCOSE BLD-MCNC: 141 MG/DL (ref 70–99)
GLUCOSE BLD-MCNC: 99 MG/DL (ref 70–99)
HCT VFR BLD CALC: 30.1 % (ref 36–48)
HEMOGLOBIN PLASMA: 3.1 MG/DL (ref 0–9.7)
HEMOGLOBIN: 10.2 G/DL (ref 12–16)
HEMOSIDERIN QUALITATIVE URINE: NEGATIVE
LACTATE DEHYDROGENASE: 262 U/L (ref 100–190)
LYMPHOCYTES ABSOLUTE: 2.5 K/UL (ref 1–5.1)
LYMPHOCYTES RELATIVE PERCENT: 26.1 %
MCH RBC QN AUTO: 35.3 PG (ref 26–34)
MCHC RBC AUTO-ENTMCNC: 33.8 G/DL (ref 31–36)
MCV RBC AUTO: 104.4 FL (ref 80–100)
MONOCYTES ABSOLUTE: 0.8 K/UL (ref 0–1.3)
MONOCYTES RELATIVE PERCENT: 7.8 %
NEUTROPHILS ABSOLUTE: 6.3 K/UL (ref 1.7–7.7)
NEUTROPHILS RELATIVE PERCENT: 65.9 %
OCCULT BLOOD DIAGNOSTIC: ABNORMAL
PDW BLD-RTO: 20 % (ref 12.4–15.4)
PERFORMED ON: ABNORMAL
PERFORMED ON: NORMAL
PLATELET # BLD: 205 K/UL (ref 135–450)
PMV BLD AUTO: 8.7 FL (ref 5–10.5)
RBC # BLD: 2.88 M/UL (ref 4–5.2)
REASON FOR REJECTION: NORMAL
REJECTED TEST: NORMAL
TOTAL PROTEIN: 6.1 G/DL (ref 6.4–8.2)
WBC # BLD: 9.6 K/UL (ref 4–11)

## 2020-09-19 PROCEDURE — 2580000003 HC RX 258: Performed by: INTERNAL MEDICINE

## 2020-09-19 PROCEDURE — 6370000000 HC RX 637 (ALT 250 FOR IP): Performed by: INTERNAL MEDICINE

## 2020-09-19 PROCEDURE — 1200000000 HC SEMI PRIVATE

## 2020-09-19 PROCEDURE — 80076 HEPATIC FUNCTION PANEL: CPT

## 2020-09-19 PROCEDURE — 99233 SBSQ HOSP IP/OBS HIGH 50: CPT | Performed by: INTERNAL MEDICINE

## 2020-09-19 PROCEDURE — 99232 SBSQ HOSP IP/OBS MODERATE 35: CPT | Performed by: INTERNAL MEDICINE

## 2020-09-19 PROCEDURE — G0328 FECAL BLOOD SCRN IMMUNOASSAY: HCPCS

## 2020-09-19 PROCEDURE — 85025 COMPLETE CBC W/AUTO DIFF WBC: CPT

## 2020-09-19 PROCEDURE — 36415 COLL VENOUS BLD VENIPUNCTURE: CPT

## 2020-09-19 PROCEDURE — 94640 AIRWAY INHALATION TREATMENT: CPT

## 2020-09-19 PROCEDURE — 83615 LACTATE (LD) (LDH) ENZYME: CPT

## 2020-09-19 PROCEDURE — 6360000002 HC RX W HCPCS: Performed by: INTERNAL MEDICINE

## 2020-09-19 RX ORDER — CIPROFLOXACIN 250 MG/1
250 TABLET, FILM COATED ORAL EVERY 12 HOURS SCHEDULED
Status: DISCONTINUED | OUTPATIENT
Start: 2020-09-19 | End: 2020-09-22 | Stop reason: HOSPADM

## 2020-09-19 RX ADMIN — MONTELUKAST 10 MG: 10 TABLET, FILM COATED ORAL at 20:53

## 2020-09-19 RX ADMIN — METFORMIN HYDROCHLORIDE 1000 MG: 500 TABLET ORAL at 09:24

## 2020-09-19 RX ADMIN — CIPROFLOXACIN HYDROCHLORIDE 250 MG: 250 TABLET, FILM COATED ORAL at 20:52

## 2020-09-19 RX ADMIN — INSULIN LISPRO 2 UNITS: 100 INJECTION, SOLUTION INTRAVENOUS; SUBCUTANEOUS at 17:47

## 2020-09-19 RX ADMIN — CETIRIZINE HYDROCHLORIDE 10 MG: 10 TABLET, FILM COATED ORAL at 09:24

## 2020-09-19 RX ADMIN — FENOFIBRATE 160 MG: 160 TABLET ORAL at 09:22

## 2020-09-19 RX ADMIN — BUDESONIDE AND FORMOTEROL FUMARATE DIHYDRATE 2 PUFF: 160; 4.5 AEROSOL RESPIRATORY (INHALATION) at 20:40

## 2020-09-19 RX ADMIN — CIPROFLOXACIN HYDROCHLORIDE 250 MG: 250 TABLET, FILM COATED ORAL at 09:24

## 2020-09-19 RX ADMIN — Medication 400 MG: at 09:24

## 2020-09-19 RX ADMIN — OYSTER SHELL CALCIUM WITH VITAMIN D 1 TABLET: 500; 200 TABLET, FILM COATED ORAL at 09:24

## 2020-09-19 RX ADMIN — Medication 1500 MG: at 09:27

## 2020-09-19 RX ADMIN — GABAPENTIN 300 MG: 300 CAPSULE ORAL at 09:24

## 2020-09-19 RX ADMIN — ENOXAPARIN SODIUM 30 MG: 30 INJECTION SUBCUTANEOUS at 09:22

## 2020-09-19 RX ADMIN — ESCITALOPRAM OXALATE 10 MG: 10 TABLET ORAL at 09:24

## 2020-09-19 RX ADMIN — SODIUM CHLORIDE, PRESERVATIVE FREE 10 ML: 5 INJECTION INTRAVENOUS at 09:24

## 2020-09-19 RX ADMIN — METOPROLOL TARTRATE 25 MG: 25 TABLET, FILM COATED ORAL at 20:53

## 2020-09-19 RX ADMIN — PREDNISONE 40 MG: 20 TABLET ORAL at 09:22

## 2020-09-19 RX ADMIN — IPRATROPIUM BROMIDE AND ALBUTEROL SULFATE 1 AMPULE: .5; 3 SOLUTION RESPIRATORY (INHALATION) at 09:20

## 2020-09-19 RX ADMIN — Medication 400 MG: at 20:53

## 2020-09-19 RX ADMIN — METFORMIN HYDROCHLORIDE 1000 MG: 500 TABLET ORAL at 17:47

## 2020-09-19 RX ADMIN — AMLODIPINE BESYLATE 5 MG: 5 TABLET ORAL at 09:25

## 2020-09-19 RX ADMIN — LOSARTAN POTASSIUM 100 MG: 100 TABLET, FILM COATED ORAL at 09:24

## 2020-09-19 RX ADMIN — DICYCLOMINE HYDROCHLORIDE 10 MG: 10 CAPSULE ORAL at 09:24

## 2020-09-19 RX ADMIN — OYSTER SHELL CALCIUM WITH VITAMIN D 1 TABLET: 500; 200 TABLET, FILM COATED ORAL at 20:52

## 2020-09-19 RX ADMIN — METOPROLOL TARTRATE 25 MG: 25 TABLET, FILM COATED ORAL at 09:23

## 2020-09-19 RX ADMIN — BUDESONIDE AND FORMOTEROL FUMARATE DIHYDRATE 2 PUFF: 160; 4.5 AEROSOL RESPIRATORY (INHALATION) at 09:16

## 2020-09-19 RX ADMIN — SODIUM CHLORIDE, PRESERVATIVE FREE 10 ML: 5 INJECTION INTRAVENOUS at 20:56

## 2020-09-19 ASSESSMENT — ENCOUNTER SYMPTOMS
RESPIRATORY NEGATIVE: 1
ALLERGIC/IMMUNOLOGIC NEGATIVE: 1
GASTROINTESTINAL NEGATIVE: 1
EYES NEGATIVE: 1

## 2020-09-19 ASSESSMENT — PAIN SCALES - GENERAL
PAINLEVEL_OUTOF10: 0

## 2020-09-19 ASSESSMENT — PAIN SCALES - WONG BAKER: WONGBAKER_NUMERICALRESPONSE: 0

## 2020-09-19 NOTE — PLAN OF CARE
Problem: Falls - Risk of:  Goal: Will remain free from falls  Description: Will remain free from falls  Outcome: Ongoing  Patient free from falls this shift. Fall precautions in place at all times. Bed in lowest position with two side rails up and wheels locked. Call light within reach. Telecam in place and operating. RN rounding every even hour/pca rounding every odd hour. Goal: Absence of physical injury  Description: Absence of physical injury  Outcome: Ongoing     Problem: Pain:  Goal: Pain level will decrease  Description: Pain level will decrease  Outcome: Ongoing  Goal: Control of acute pain  Description: Control of acute pain  Outcome: Ongoing  Goal: Control of chronic pain  Description: Control of chronic pain  Outcome: Ongoing     Problem:  Activity:  Goal: Fatigue will decrease  Description: Fatigue will decrease  Outcome: Ongoing     Problem: Cardiac:  Goal: Hemodynamic stability will improve  Description: Hemodynamic stability will improve  Outcome: Ongoing     Problem: Coping:  Goal: Level of anxiety will decrease  Description: Level of anxiety will decrease  Outcome: Ongoing  Goal: Ability to cope will improve  Description: Ability to cope will improve  Outcome: Ongoing  Goal: Ability to establish a method of communication will improve  Description: Ability to establish a method of communication will improve  Outcome: Ongoing     Problem: Nutritional:  Goal: Consumption of the prescribed amount of daily calories will improve  Description: Consumption of the prescribed amount of daily calories will improve  Outcome: Ongoing     Problem: Respiratory:  Goal: Ability to maintain adequate ventilation will improve  Description: Ability to maintain adequate ventilation will improve  Outcome: Ongoing  Goal: Complications related to the disease process, condition or treatment will be avoided or minimized  Description: Complications related to the disease process, condition or treatment will be avoided or

## 2020-09-19 NOTE — PROGRESS NOTES
Pt alert and oriented to person, place, and sitaution. Pt disoriented to the time. Pt denies any pain, nausea, and vomiting. Pt still little SOB with exertion, pt currently on 3L NC-maintains SpO2 of 94%. Pt lungs sounds are diminished, respirations are unlabored at rest. Scheduled a.m meds given to the pt as ordered. Pt denies other needs at present time. Bed alarm on. Call light and item need in reach. Electronically signed by Jordi Garner RN on 9/19/2020 at 4:03 PM

## 2020-09-19 NOTE — PROGRESS NOTES
Pt ringings SpO2 of 67 to the phone, went to check on the pt-pt sitting in bed. Pt alert and oriented x 3. Pt denies any trouble breathing while resting in bed. Pt respirations are unlabored. Pt states she walked to BR few mins ago. Increase pt O2 to 10L NC, pt SpO2 came up to 96 % within 5 mins. O2 turned back down to 3 L NC. Pt able to maintain SpO2 of 94%. RT notified at bedside.  Electronically signed by Dick Eckert RN on 9/19/2020 at 4:07 PM

## 2020-09-19 NOTE — PROGRESS NOTES
Pulmonary Progress Note    CC:  Follow up ILD, hypoxia    Subjective: On 3 liters of oxygen  Slight improvement in breathing   is present and does most of the talking         Intake/Output Summary (Last 24 hours) at 9/19/2020 1145  Last data filed at 9/19/2020 0932  Gross per 24 hour   Intake 830 ml   Output 700 ml   Net 130 ml         PHYSICAL EXAM:  Blood pressure 121/69, pulse 69, temperature 98.2 °F (36.8 °C), temperature source Oral, resp. rate 18, height 5' 4\" (1.626 m), weight 127 lb 3.3 oz (57.7 kg), SpO2 96 %, not currently breastfeeding.'  Gen: No distress. On toilet  Eyes: PERRL. No sclera icterus. No conjunctival injection. ENT: No discharge. Pharynx clear. External appearance of ears and nose normal.  Neck: Trachea midline. No obvious mass. Resp: No dyspnea   CV: Regular rate. Regular rhythm. No murmur or rub. GI: Non-tender. Non-distended. No hernia. Skin: Warm, dry, normal texture and turgor. No nodule on exposed extremities. Lymph: No cervical LAD. No supraclavicular LAD. M/S: No cyanosis. No clubbing. No joint deformity. Neuro: Moves all four extremities. CN 2-12 tested, no defect noted.   Ext:   no edema    Medications:    Scheduled Meds:   ciprofloxacin  250 mg Oral 2 times per day    predniSONE  40 mg Oral Daily    Followed by   Jose Armando Villalba ON 9/21/2020] predniSONE  20 mg Oral Daily    atovaquone  1,500 mg Oral Daily    metoprolol tartrate  25 mg Oral BID    amLODIPine  5 mg Oral Daily    budesonide-formoterol  2 puff Inhalation BID    calcium-vitamin D  1 tablet Oral BID    cetirizine  10 mg Oral Daily    dicyclomine  10 mg Oral Daily    fenofibrate  160 mg Oral Daily    escitalopram  10 mg Oral Daily    gabapentin  300 mg Oral Daily    losartan  100 mg Oral Daily    montelukast  10 mg Oral Nightly    metFORMIN  1,000 mg Oral BID WC    insulin lispro  0-6 Units Subcutaneous TID WC    insulin lispro  0-3 Units Subcutaneous Nightly    sodium chloride flush 10 mL Intravenous 2 times per day    enoxaparin  30 mg Subcutaneous Daily    magnesium oxide  400 mg Oral BID       Continuous Infusions:   dextrose         PRN Meds:  haloperidol lactate, ipratropium-albuterol, glucose, dextrose, glucagon (rDNA), dextrose, sodium chloride flush, acetaminophen **OR** acetaminophen, polyethylene glycol, promethazine **OR** ondansetron    Labs:  CBC:   Recent Labs     09/17/20  0701 09/18/20  0554 09/19/20  0904   WBC 5.3 9.3 9.6   HGB 11.1* 10.5* 10.2*   HCT 33.2* 31.5* 30.1*   .3* 102.7* 104.4*    212 205     BMP:   Recent Labs     09/16/20  1410 09/17/20  0701    133*   K 4.4 4.1    99   CO2 21 24   BUN 18 19   CREATININE 0.9 0.8     LIVER PROFILE:   Recent Labs     09/17/20  0701 09/18/20  0554 09/19/20  0738   AST 32 57* 43*   ALT 35 62* 55*   BILIDIR 0.7* 0.8* 0.7*   BILITOT 2.7* 3.4* 2.5*   ALKPHOS 33* 35* 34*     PT/INR: No results for input(s): PROTIME, INR in the last 72 hours. APTT: No results for input(s): APTT in the last 72 hours. UA:  Recent Labs     09/18/20  1645   COLORU DK YELLOW   PHUR 6.0   WBCUA 397*   RBCUA 3   BACTERIA 4+*   CLARITYU CLOUDY*   SPECGRAV 1.020   LEUKOCYTESUR LARGE*   UROBILINOGEN 1.0   BILIRUBINUR SMALL*   BLOODU Negative   GLUCOSEU Negative     No results for input(s): PH, PCO2, PO2 in the last 72 hours. Films:  Chest imaging reports were reviewed and imaging was reviewed by me and showed no new imagew      Cultures:  Negative    I reviewed the labs and images listed above    Assessment:   · Acute Hypoxic Respiratory Failure with saturations less than 90% on room air  · ILD exacerbation  · Anemia      Plan:  Titrate oxygen for saturations greater than or equal to 90%  · Likely needs home oxygen evaluation at DC  · Prednisone plan in place. Likely to need 20 mg at DC  · Atovaquone for PJP prohylaxis while on prednisone 20 mg or greater.   Likely to need life long prednisone  · DVT prophylaxis with lovenox    She follows with me as outpatient. She abruptly stopped her prednisone. ? Dementia setting in            Irais Rodriguez, DO [facility]

## 2020-09-19 NOTE — PROGRESS NOTES
trachea midline and thyroid not enlarged, symmetric, no tenderness/mass/nodules  Lungs: diminished breath sounds bibasilar  Heart: regular rate and rhythm, S1, S2 normal, no murmur, click, rub or gallop  Abdomen: soft, non-tender; bowel sounds normal; no masses,  no organomegaly  Extremities: extremities normal, atraumatic, no cyanosis or edema  Neurologic: Mental status: not agitated --no focal deficits--needs help getting up in bed     Admission on 09/16/2020   Component Date Value Ref Range Status    Ventricular Rate 09/16/2020 83  BPM Final    Atrial Rate 09/16/2020 83  BPM Final    P-R Interval 09/16/2020 134  ms Final    QRS Duration 09/16/2020 84  ms Final    Q-T Interval 09/16/2020 358  ms Final    QTc Calculation (Bazett) 09/16/2020 420  ms Final    P Axis 09/16/2020 78  degrees Final    R Axis 09/16/2020 -29  degrees Final    T Axis 09/16/2020 16  degrees Final    Diagnosis 09/16/2020 Sinus rhythm with marked sinus arrhythmiaBaseline artifactOtherwise normal ECGWhen compared with ECG of 12-JUN-2007 17:16,T wave amplitude has increased in Anterior leadsConfirmed by MADY Sena MD ([id number]) on 9/17/2020 8:55:58 AM   Final    WBC 09/16/2020 6.8  4.0 - 11.0 K/uL Final    RBC 09/16/2020 3.57* 4.00 - 5.20 M/uL Final    Hemoglobin 09/16/2020 12.3  12.0 - 16.0 g/dL Final    Hematocrit 09/16/2020 36.2  36.0 - 48.0 % Final    MCV 09/16/2020 101.5* 80.0 - 100.0 fL Final    MCH 09/16/2020 34.4* 26.0 - 34.0 pg Final    MCHC 09/16/2020 33.9  31.0 - 36.0 g/dL Final    RDW 09/16/2020 18.3* 12.4 - 15.4 % Final    Platelets 88/44/4091 225  135 - 450 K/uL Final    MPV 09/16/2020 8.7  5.0 - 10.5 fL Final    Neutrophils % 09/16/2020 60.4  % Final    Lymphocytes % 09/16/2020 30.6  % Final    Monocytes % 09/16/2020 8.2  % Final    Eosinophils % 09/16/2020 0.1  % Final    Basophils % 09/16/2020 0.7  % Final    Neutrophils Absolute 09/16/2020 4.1  1.7 - 7.7 K/uL Final    Lymphocytes Absolute 09/16/2020 2.1  1.0 - 5.1 K/uL Final    Monocytes Absolute 09/16/2020 0.6  0.0 - 1.3 K/uL Final    Eosinophils Absolute 09/16/2020 0.0  0.0 - 0.6 K/uL Final    Basophils Absolute 09/16/2020 0.0  0.0 - 0.2 K/uL Final    Sodium 09/16/2020 136  136 - 145 mmol/L Final    Potassium reflex Magnesium 09/16/2020 4.4  3.5 - 5.1 mmol/L Final    Chloride 09/16/2020 101  99 - 110 mmol/L Final    CO2 09/16/2020 21  21 - 32 mmol/L Final    Anion Gap 09/16/2020 14  3 - 16 Final    Glucose 09/16/2020 111* 70 - 99 mg/dL Final    BUN 09/16/2020 18  7 - 20 mg/dL Final    CREATININE 09/16/2020 0.9  0.6 - 1.2 mg/dL Final    GFR Non- 09/16/2020 >60  >60 Final    Comment: >60 mL/min/1.73m2 EGFR, calc. for ages 25 and older using the  MDRD formula (not corrected for weight), is valid for stable  renal function.  GFR  09/16/2020 >60  >60 Final    Comment: Chronic Kidney Disease: less than 60 ml/min/1.73 sq.m. Kidney Failure: less than 15 ml/min/1.73 sq.m. Results valid for patients 18 years and older.  Calcium 09/16/2020 10.2  8.3 - 10.6 mg/dL Final    Pro-BNP 09/16/2020 41  0 - 449 pg/mL Final    Comment: Methodology by NT-proBNP    An age-independent cutoff point of 300 pg/ml has a 98%  negative predictive value excluding acute heart failure. Values exceeding the age-related cutoff values (450 pg/mL if  age<50, 900 if 50-75 and 1800 if >75) has 90% sensitivity and  84% specificity for diagnosing acute HF. In patients with  renal compromise (eGFR<60) values greater than 1200pg/ml have  a diagnostic sensitivity and specificity of 89% and 72% for  acute HF.       Troponin 09/16/2020 <0.01  <0.01 ng/mL Final    Methodology by Troponin T    Total Protein 09/16/2020 6.9  6.4 - 8.2 g/dL Final    Alb 09/16/2020 4.4  3.4 - 5.0 g/dL Final    Alkaline Phosphatase 09/16/2020 38* 40 - 129 U/L Final    ALT 09/16/2020 39  10 - 40 U/L Final    AST 09/16/2020 41* 15 - 37 U/L Final    Total Bilirubin 09/16/2020 3.1* 0.0 - 1.0 mg/dL Final    Bilirubin, Direct 09/16/2020 0.7* 0.0 - 0.3 mg/dL Final    Bilirubin, Indirect 09/16/2020 2.4* 0.0 - 1.0 mg/dL Final    Hemoglobin A1C 09/16/2020 3.6  See comment % Final    Comment: Comment:  Diagnosis of Diabetes: > or = 6.5%  Increased risk of diabetes (Prediabetes): 5.7-6.4%  Glycemic Control: Nonpregnant Adults: <7.0%                    Pregnant: <6.0%        eAG 09/16/2020 56.6  mg/dL Final    WBC 09/17/2020 5.3  4.0 - 11.0 K/uL Final    RBC 09/17/2020 3.21* 4.00 - 5.20 M/uL Final    Hemoglobin 09/17/2020 11.1* 12.0 - 16.0 g/dL Final    Hematocrit 09/17/2020 33.2* 36.0 - 48.0 % Final    MCV 09/17/2020 103.3* 80.0 - 100.0 fL Final    Result consistent with patient history    [facility] 09/17/2020 34.5* 26.0 - 34.0 pg Final    MCHC 09/17/2020 33.4  31.0 - 36.0 g/dL Final    RDW 09/17/2020 19.3* 12.4 - 15.4 % Final    Platelets 57/83/8581 208  135 - 450 K/uL Final    MPV 09/17/2020 8.7  5.0 - 10.5 fL Final    Neutrophils % 09/17/2020 60.2  % Final    Lymphocytes % 09/17/2020 31.6  % Final    Monocytes % 09/17/2020 7.5  % Final    Eosinophils % 09/17/2020 0.2  % Final    Basophils % 09/17/2020 0.5  % Final    Neutrophils Absolute 09/17/2020 3.2  1.7 - 7.7 K/uL Final    Lymphocytes Absolute 09/17/2020 1.7  1.0 - 5.1 K/uL Final    Monocytes Absolute 09/17/2020 0.4  0.0 - 1.3 K/uL Final    Eosinophils Absolute 09/17/2020 0.0  0.0 - 0.6 K/uL Final    Basophils Absolute 09/17/2020 0.0  0.0 - 0.2 K/uL Final    Total Protein 09/17/2020 6.2* 6.4 - 8.2 g/dL Final    Alb 09/17/2020 3.8  3.4 - 5.0 g/dL Final    Alkaline Phosphatase 09/17/2020 33* 40 - 129 U/L Final    ALT 09/17/2020 35  10 - 40 U/L Final    AST 09/17/2020 32  15 - 37 U/L Final    Total Bilirubin 09/17/2020 2.7* 0.0 - 1.0 mg/dL Final    Bilirubin, Direct 09/17/2020 0.7* 0.0 - 0.3 mg/dL Final    Bilirubin, Indirect 09/17/2020 2.0* 0.0 - 1.0 mg/dL Final    Sodium 09/17/2020 133* 136 - 145 mmol/L Final    Potassium reflex Magnesium 09/17/2020 4.1  3.5 - 5.1 mmol/L Final    Chloride 09/17/2020 99  99 - 110 mmol/L Final    CO2 09/17/2020 24  21 - 32 mmol/L Final    Anion Gap 09/17/2020 10  3 - 16 Final    Glucose 09/17/2020 94  70 - 99 mg/dL Final    BUN 09/17/2020 19  7 - 20 mg/dL Final    CREATININE 09/17/2020 0.8  0.6 - 1.2 mg/dL Final    GFR Non- 09/17/2020 >60  >60 Final    Comment: >60 mL/min/1.73m2 EGFR, calc. for ages 25 and older using the  MDRD formula (not corrected for weight), is valid for stable  renal function.  GFR  09/17/2020 >60  >60 Final    Comment: Chronic Kidney Disease: less than 60 ml/min/1.73 sq.m. Kidney Failure: less than 15 ml/min/1.73 sq.m. Results valid for patients 18 years and older.  Calcium 09/17/2020 9.4  8.3 - 10.6 mg/dL Final    Magnesium 09/17/2020 2.00  1.80 - 2.40 mg/dL Final    Retic Ct Pct 09/17/2020 5.35* 0.50 - 2.18 % Final    Retic Ct Abs 09/17/2020 0.172* 0.024 - 0.100 M/uL Final    Immature Retic Fract 09/17/2020 0.60* 0.21 - 0.37 Final    LD 09/17/2020 242* 100 - 190 U/L Final    Haptoglobin 09/17/2020 <10.0* 30.0 - 200.0 mg/dL Final    Hemosiderin Qual, Ur 09/17/2020 Negative  Negative Final    Comment: Performed By: [address]  [place], [address] [facility]  : Patty Cevallos. Charli Vazquez MD      POC Glucose 09/16/2020 109* 70 - 99 mg/dl Final    Performed on 09/16/2020 ACCU-CHEK   Final    POC Glucose 09/17/2020 90  70 - 99 mg/dl Final    Performed on 09/17/2020 ACCU-CHEK   Final    STREP PNEUMONIAE ANTIGEN, URINE 09/17/2020    Final                    Value:Presumptive Negative  Presumptive negative suggests no current or recent  pneumococcal infection.  Infection due to Strep pneumoniae  cannot be ruled out since the antigen present in the sample  may be below the detection limit of the test.  Normal Range:Presumptive Negative      Procalcitonin 09/17/2020 0.08  0.00 - 0.15 ng/mL Final    Comment: Suspected Sepsis:  Low likelihood of sepsis  <.50 ng/mL    Increased likelihood of sepsis 0.50-2.00 ng/mL  Antibiotics encouraged    High risk of sepsis/shock   >2.00 ng/mL  Antibiotics strongly encouraged    Suspected Lower Respiratory Tract Infections:  Low likelihood of bacterial infection  <0.24 ng/mL    Increased likelihood of bacterial infection >0.24 ng/mL  Antibiotics encouraged    With successful antibiotic therapy, PCT levels should decrease  rapidly. (Half-life of 24 to 36 hours.)    Procalcitonin values from samples collected within the first  6 hours of systemic infection may still be low. Retesting may be indicated. Values from day 1 and day 4 can be entered into the Change in  Procalcitonin Calculator to determine the patient's  Mortality Risk Prognosis  (www.[facility]-pct-calculator. [employer])    In healthy neonates, plasma Procalcitonin (PCT) concentrations  increase gradually after birth, reaching peak values at about  24 hours of age then decrease to normal values below 0.5                            ng/mL  by 48-72 hours of age.  L. pneumophila Serogp 1 Ur Ag 09/17/2020    Final                    Value:Presumptive Negative  No Legionella pneumophila serogroup 1 antigens detected. A negative result does not exclude infection with  Legionella pneumophila serogroup 1 nor does it rule out  other microbial-caused respiratory infections or  disease caused by other serogroups of  Legionella pneumophila. Normal Range: Presumptive Negative      POC Glucose 09/17/2020 100* 70 - 99 mg/dl Final    Performed on 09/17/2020 ACCU-CHEK   Final    Vitamin B-12 09/17/2020 327  211 - 911 pg/mL Final    Folate 09/17/2020 9.72  4.78 - 24.20 ng/mL Final    Comment: Effective 11-15-16 10:00am EST  Please note reference ranges have  changed for Folate.       POC Glucose 09/17/2020 157* 70 - 99 mg/dl Final    Performed on 09/17/2020 ACCU-CHEK   Final    WBC 09/18/2020 9.3  4.0 - 11.0 K/uL Final    RBC 09/18/2020 3.06* 4.00 - 5.20 M/uL Final    Hemoglobin 09/18/2020 10.5* 12.0 - 16.0 g/dL Final    Hematocrit 09/18/2020 31.5* 36.0 - 48.0 % Final    MCV 09/18/2020 102.7* 80.0 - 100.0 fL Final    MCH 09/18/2020 34.3* 26.0 - 34.0 pg Final    MCHC 09/18/2020 33.4  31.0 - 36.0 g/dL Final    RDW 09/18/2020 19.7* 12.4 - 15.4 % Final    Platelets 27/66/8376 212  135 - 450 K/uL Final    MPV 09/18/2020 8.9  5.0 - 10.5 fL Final    Neutrophils % 09/18/2020 71.9  % Final    Lymphocytes % 09/18/2020 18.1  % Final    Monocytes % 09/18/2020 9.7  % Final    Eosinophils % 09/18/2020 0.1  % Final    Basophils % 09/18/2020 0.2  % Final    Neutrophils Absolute 09/18/2020 6.7  1.7 - 7.7 K/uL Final    Lymphocytes Absolute 09/18/2020 1.7  1.0 - 5.1 K/uL Final    Monocytes Absolute 09/18/2020 0.9  0.0 - 1.3 K/uL Final    Eosinophils Absolute 09/18/2020 0.0  0.0 - 0.6 K/uL Final    Basophils Absolute 09/18/2020 0.0  0.0 - 0.2 K/uL Final    Total Protein 09/18/2020 6.1* 6.4 - 8.2 g/dL Final    Alb 09/18/2020 4.0  3.4 - 5.0 g/dL Final    Alkaline Phosphatase 09/18/2020 35* 40 - 129 U/L Final    ALT 09/18/2020 62* 10 - 40 U/L Final    AST 09/18/2020 57* 15 - 37 U/L Final    Total Bilirubin 09/18/2020 3.4* 0.0 - 1.0 mg/dL Final    Bilirubin, Direct 09/18/2020 0.8* 0.0 - 0.3 mg/dL Final    Bilirubin, Indirect 09/18/2020 2.6* 0.0 - 1.0 mg/dL Final    Polyspecific [place] 09/17/2020 NEG   Final    Color, UA 09/18/2020 DK YELLOW  Straw/Yellow Final    Clarity, UA 09/18/2020 CLOUDY* Clear Final    Glucose, Ur 09/18/2020 Negative  Negative mg/dL Final    Bilirubin Urine 09/18/2020 SMALL* Negative Final    Ketones, Urine 09/18/2020 Negative  Negative mg/dL Final    Specific [place], UA 09/18/2020 1.020  1.005 - 1.030 Final    Blood, Urine 09/18/2020 Negative  Negative Final    pH, UA 09/18/2020 6.0  5.0 - 8.0 Final    Protein, UA 09/18/2020 TRACE* Negative mg/dL Final    Urobilinogen, Urine 09/18/2020 1.0  <2.0 E.U./dL Final    Nitrite, Urine 09/18/2020 POSITIVE* Negative Final    Leukocyte Esterase, Urine 09/18/2020 LARGE* Negative Final    Microscopic Examination 09/18/2020 YES   Final    Urine Type 09/18/2020 NotGiven   Final    Urine Reflex to Culture 09/18/2020 Yes   Final    POC Glucose 09/17/2020 179* 70 - 99 mg/dl Final    Performed on 09/17/2020 ACCU-CHEK   Final    POC Glucose 09/18/2020 91  70 - 99 mg/dl Final    Performed on 09/18/2020 ACCU-CHEK   Final    LD 09/18/2020 328* 100 - 190 U/L Final    POC Glucose 09/18/2020 163* 70 - 99 mg/dl Final    Performed on 09/18/2020 ACCU-CHEK   Final    POC Glucose 09/18/2020 150* 70 - 99 mg/dl Final    Performed on 09/18/2020 ACCU-CHEK   Final    Bacteria, UA 09/18/2020 4+* None Seen /HPF Final    Hyaline Casts, UA 09/18/2020 2  0 - 8 /LPF Final    WBC, UA 09/18/2020 397* 0 - 5 /HPF Final    RBC, UA 09/18/2020 3  0 - 4 /HPF Final    Epithelial Cells, UA 09/18/2020 3  0 - 5 /HPF Final    Comment: Urinalysis microscopic performed using the  automated methodology (AUWI analyzer).  POC Glucose 09/18/2020 186* 70 - 99 mg/dl Final    Performed on 09/18/2020 ACCU-CHEK   Final    POC Glucose 09/19/2020 99  70 - 99 mg/dl Final    Performed on 09/19/2020 ACCU-CHEK   Final         Assessment & Plan:    Active Problems:    Essential hypertension--Continue current therapy      Depression    COPD (chronic obstructive pulmonary disease) ([facility])--stable--Continue current therapy      ILD (interstitial lung disease) ([facility])    Type 2 diabetes mellitus without complication, without long-term current use of insulin ([facility])--Continue current therapy      Acute respiratory failure with hypoxia ([facility])--improving     Rectal bleeding--no recc--needs o/p colo as r GI     RLQ abdominal pain    Acute delirium    Other acquired hemolytic anemias ([facility])-due drug  Dapsone    Acute cystitis without hematuria--await C&S--now on po cipro Resolved Problems:    * No resolved hospital problems.  *  Cont cipro---SK ot TO SEE--ADL'S ===resp status stable--pred taper   Please note that over 35 minutes was spent in evaluating the patient, review of records and results, discussion with staff/family, etc.    Nesha Mathis MD

## 2020-09-19 NOTE — PLAN OF CARE
Problem: Falls - Risk of:  Goal: Will remain free from falls  Description: Will remain free from falls  Outcome: Ongoing  Note: Pt free from falls this shift. Non skid socks on, bed and chair alarm used, hourly rounded on pt for needs, Call light always within reach. Pt able and agreeable to contact for safety appropriately. Problem: Pain:  Goal: Pain level will decrease  Description: Pain level will decrease  Outcome: Ongoing  Note: Pt denies any pain this shift. Pt able to express presence and absence of pain using numerical pain scale. Pt pain is managed by PRN analgesics as ordered by MD. Pain reassess after each interventions. Will continue to monitor as needed. Problem: Skin Integrity:  Goal: Risk for impaired skin integrity will decrease  Description: Risk for impaired skin integrity will decrease  Outcome: Ongoing  Note: Skin assessment performed each shift per protocol. Pt encouraged to reposition often. Will continue to monitor and assess for skin breakdown.         Problem: Respiratory:  Goal: Ability to maintain adequate ventilation will improve  Description: Ability to maintain adequate ventilation will improve  Outcome: Ongoing

## 2020-09-19 NOTE — PROGRESS NOTES
[facility]  Progress Note    Subjective:     G6PD pend  AM labs pend   on Sept 18    Sitting up & eating breakfast. MS clear. Review of Systems:     Review of Systems   Constitutional: Negative. HENT: Negative. Eyes: Negative. Respiratory: Negative. Cardiovascular: Negative. Gastrointestinal: Negative. Endocrine: Negative. Genitourinary: Negative. Musculoskeletal: Negative. Skin: Negative. Allergic/Immunologic: Negative. Neurological: Negative. Hematological: Negative.         Objective:     Medications    Current Facility-Administered Medications: ciprofloxacin (CIPRO) tablet 250 mg, 250 mg, Oral, 2 times per day  predniSONE (DELTASONE) tablet 40 mg, 40 mg, Oral, Daily **FOLLOWED BY** [START ON 9/21/2020] predniSONE (DELTASONE) tablet 20 mg, 20 mg, Oral, Daily  atovaquone (MEPRON) suspension 1,500 mg, 1,500 mg, Oral, Daily  haloperidol lactate (HALDOL) injection 2 mg, 2 mg, Intravenous, Q6H PRN  metoprolol tartrate (LOPRESSOR) tablet 25 mg, 25 mg, Oral, BID  amLODIPine (NORVASC) tablet 5 mg, 5 mg, Oral, Daily  budesonide-formoterol (SYMBICORT) 160-4.5 MCG/ACT inhaler 2 puff, 2 puff, Inhalation, BID  calcium-vitamin D 500-200 MG-UNIT per tablet 1 tablet, 1 tablet, Oral, BID  cetirizine (ZYRTEC) tablet 10 mg, 10 mg, Oral, Daily  dicyclomine (BENTYL) capsule 10 mg, 10 mg, Oral, Daily  fenofibrate (TRIGLIDE) tablet 160 mg, 160 mg, Oral, Daily  escitalopram (LEXAPRO) tablet 10 mg, 10 mg, Oral, Daily  gabapentin (NEURONTIN) capsule 300 mg, 300 mg, Oral, Daily  losartan (COZAAR) tablet 100 mg, 100 mg, Oral, Daily  montelukast (SINGULAIR) tablet 10 mg, 10 mg, Oral, Nightly  metFORMIN (GLUCOPHAGE) tablet 1,000 mg, 1,000 mg, Oral, BID WC  ipratropium-albuterol (DUONEB) nebulizer solution 1 ampule, 1 ampule, Inhalation, Q4H PRN  insulin lispro (HUMALOG) injection vial 0-6 Units, 0-6 Units, Subcutaneous, TID WC  insulin lispro (HUMALOG) injection vial 0-3 Units, 0-3 Units, Subcutaneous, Nightly  glucose (GLUTOSE) 40 % oral gel 15 g, 15 g, Oral, PRN  dextrose 50 % IV solution, 12.5 g, Intravenous, PRN  glucagon (rDNA) injection 1 mg, 1 mg, Intramuscular, PRN  dextrose 5 % solution, 100 mL/hr, Intravenous, PRN  sodium chloride flush 0.9 % injection 10 mL, 10 mL, Intravenous, 2 times per day  sodium chloride flush 0.9 % injection 10 mL, 10 mL, Intravenous, PRN  acetaminophen (TYLENOL) tablet 650 mg, 650 mg, Oral, Q6H PRN **OR** acetaminophen (TYLENOL) suppository 650 mg, 650 mg, Rectal, Q6H PRN  polyethylene glycol (GLYCOLAX) packet 17 g, 17 g, Oral, Daily PRN  promethazine (PHENERGAN) tablet 12.5 mg, 12.5 mg, Oral, Q6H PRN **OR** ondansetron (ZOFRAN) injection 4 mg, 4 mg, Intravenous, Q6H PRN  enoxaparin (LOVENOX) injection 30 mg, 30 mg, Subcutaneous, Daily  magnesium oxide (MAG-OX) tablet 400 mg, 400 mg, Oral, BID    Allergies  Allergies   Allergen Reactions    Latex     Neda-[place] [Aspirin Effervescent] Hives     Tongue swelling,but advil does not bother her.  Sulfa Antibiotics        Physical Exam  VITALS:  /63   Pulse 76   Temp 97.7 °F (36.5 °C) (Oral)   Resp 18   Ht 5' 4\" (1.626 m)   Wt 127 lb 3.3 oz (57.7 kg)   SpO2 90%   BMI 21.83 kg/m²   TEMPERATURE:  Current - Temp: 97.7 °F (36.5 °C); Max - Temp  Av.8 °F (36.6 °C)  Min: 97.5 °F (36.4 °C)  Max: 98.2 °F (36.8 °C)  PULSE OXIMETRY RANGE: SpO2  Av.4 %  Min: 90 %  Max: 95 %  24HR INTAKE/OUTPUT:      Intake/Output Summary (Last 24 hours) at 2020 6338  Last data filed at 2020 0351  Gross per 24 hour   Intake 700 ml   Output 600 ml   Net 100 ml       Physical Exam  Eyes:      General: Scleral icterus present. Cardiovascular:      Rate and Rhythm: Normal rate and regular rhythm. Pulmonary:      Effort: Pulmonary effort is normal.      Breath sounds: Normal breath sounds. Comments: Moderate kyphosis  Abdominal:      Palpations: Abdomen is soft. Tenderness:  There is no abdominal tenderness. Musculoskeletal:         General: No swelling. Lymphadenopathy:      Cervical: No cervical adenopathy. Skin:     General: Skin is warm and dry. Findings: No rash. Neurological:      General: No focal deficit present. Mental Status: She is alert and oriented to person, place, and time. Labs  Recent Labs     09/16/20  1410 09/17/20  0701 09/18/20  0554   WBC 6.8 5.3 9.3   HGB 12.3 11.1* 10.5*   HCT 36.2 33.2* 31.5*    208 212   .5* 103.3* 102.7*       Recent Labs     09/16/20  1410 09/17/20  0701    133*   K 4.4 4.1    99   CO2 21 24   BUN 18 19   CREATININE 0.9 0.8       Recent Labs     09/16/20  1410 09/17/20  0701 09/18/20  0554   AST 41* 32 57*   ALT 39 35 62*   BILIDIR 0.7* 0.7* 0.8*   BILITOT 3.1* 2.7* 3.4*   ALKPHOS 38* 33* 35*       Recent Labs     09/17/20  0701   MG 2.00       Radiology  Xr Chest (2 Vw)    Result Date: 9/16/2020  EXAMINATION: TWO XRAY VIEWS OF THE CHEST 9/16/2020 2:57 pm COMPARISON: 05/29/2018 08/26/2020 HISTORY: ORDERING SYSTEM PROVIDED HISTORY: Shortness of breath TECHNOLOGIST PROVIDED HISTORY: Reason for exam:->Shortness of breath Reason for Exam: sob, nausea abdominal pain FINDINGS: The lungs are without acute focal process. There is no effusion or pneumothorax. The cardiomediastinal silhouette is stable. The osseous structures are stable. Unchanged anterior wedging T11. No acute process. Ct Abdomen Pelvis W Iv Contrast Additional Contrast? None    Result Date: 9/16/2020  EXAMINATION: CTA OF THE CHEST; CT OF THE ABDOMEN AND PELVIS WITH CONTRAST 9/16/2020 3:56 pm TECHNIQUE: CTA of the chest was performed after the administration of intravenous contrast.  Multiplanar reformatted images are provided for review. MIP images are provided for review.  Dose modulation, iterative reconstruction, and/or weight based adjustment of the mA/kV was utilized to reduce the radiation dose to as low as reasonably achievable.; CT of the abdomen and pelvis was performed with the administration of intravenous contrast. Multiplanar reformatted images are provided for review. Dose modulation, iterative reconstruction, and/or weight based adjustment of the mA/kV was utilized to reduce the radiation dose to as low as reasonably achievable. COMPARISON: 08/26/2020 CT HISTORY: ORDERING SYSTEM PROVIDED HISTORY: hypoxic to the 80s, worsening shortness of breath, tachypneic, h/o ILD, lungs clear, assessing for perfusion etiology, r/o embolus TECHNOLOGIST PROVIDED HISTORY: Reason for exam:->hypoxic to the 80s, worsening shortness of breath, tachypneic, h/o ILD, lungs clear, assessing for perfusion etiology, r/o embolus Reason for Exam: hypoxic to the 80s, worsening shortness of breath, tachypneic, h/o ILD, lungs clear, assessing for perfusion etiology, r/o embolus Acuity: Acute Type of Exam: Initial; ORDERING SYSTEM PROVIDED HISTORY: periumbilical tenderness, abdominal pain TECHNOLOGIST PROVIDED HISTORY: Reason for exam:->periumbilical tenderness, abdominal pain Additional Contrast?->None Reason for Exam: periumbilical tenderness, abdominal pain nausea, sob, chol, Acuity: Acute Type of Exam: Initial FINDINGS: Chest: Pulmonary arteries: Study is of good technical quality for evaluation of pulmonary embolism. There are no filling defects to suggest pulmonary embolism. Main pulmonary artery is normal in caliber. No evidence of right ventricular strain. Mediastinum: The heart size is normal. Aorta is normal in caliber. Superior mediastinum is normal. No mediastinal or hilar lymph node enlargement. Lungs/pleura: The airways are patent. No pleural fluid. Moderate changes of centrilobular emphysema. Scattered ground-glass opacities are present in the right upper lobe. There is also a band of fibrotic change at the right base. Soft Tissues/Bones: T11 compression fracture stable and remote. No acute skeletal findings.  Abdomen/Pelvis: Organs: The gallbladder is absent. The liver, biliary ducts, pancreas and spleen are normal.  Kidneys and adrenal glands are normal. GI/Bowel: The stomach, duodenum and small bowel are normal. A normal appendix is visualized. Diverticular changes in the sigmoid colon with no acute inflammation. Pelvis: The bladder is unremarkable. Postsurgical change of hysterectomy. Peritoneum/Retroperitoneum: The aorta tapers normally. No lymph node enlargement. Bones/Soft Tissues: No significant skeletal abnormalities. 1. No pulmonary embolism. 2. Centrilobular emphysema and chronic fibrotic changes in the chest. Scattered ground-glass opacities in the right upper lobe appear to be chronic. No focal consolidation to suggest pneumonia. 3. No acute finding in the abdomen or pelvis. Ct Chest Pulmonary Embolism W Contrast    Result Date: 9/16/2020  EXAMINATION: CTA OF THE CHEST; CT OF THE ABDOMEN AND PELVIS WITH CONTRAST 9/16/2020 3:56 pm TECHNIQUE: CTA of the chest was performed after the administration of intravenous contrast.  Multiplanar reformatted images are provided for review. MIP images are provided for review. Dose modulation, iterative reconstruction, and/or weight based adjustment of the mA/kV was utilized to reduce the radiation dose to as low as reasonably achievable.; CT of the abdomen and pelvis was performed with the administration of intravenous contrast. Multiplanar reformatted images are provided for review. Dose modulation, iterative reconstruction, and/or weight based adjustment of the mA/kV was utilized to reduce the radiation dose to as low as reasonably achievable.  COMPARISON: 08/26/2020 CT HISTORY: ORDERING SYSTEM PROVIDED HISTORY: hypoxic to the 80s, worsening shortness of breath, tachypneic, h/o ILD, lungs clear, assessing for perfusion etiology, r/o embolus TECHNOLOGIST PROVIDED HISTORY: Reason for exam:->hypoxic to the 80s, worsening shortness of breath, tachypneic, h/o ILD, lungs clear, assessing to be chronic. No focal consolidation to suggest pneumonia. 3. No acute finding in the abdomen or pelvis. Ct Chest Abdomen Pelvis Wo Contrast    Result Date: 8/29/2020  EXAMINATION: CT OF THE CHEST, ABDOMEN, AND PELVIS WITHOUT CONTRAST 8/26/2020 1:07 pm TECHNIQUE: CT of the chest, abdomen and pelvis was performed without the administration of intravenous contrast. Multiplanar reformatted images are provided for review. Dose modulation, iterative reconstruction, and/or weight based adjustment of the mA/kV was utilized to reduce the radiation dose to as low as reasonably achievable. COMPARISON: 02/21/2020, 11/14/2019, 10/29/2018, 08/15/2007 HISTORY: ORDERING SYSTEM PROVIDED HISTORY: Weight loss, abnormal TECHNOLOGIST PROVIDED HISTORY: Additional Contrast?->Radiologist Recommendation Reason for Exam: abnormal weight loss Acuity: Acute Type of Exam: Subsequent/Follow-up Relevant Medical/Surgical History: hx domenic, hyst FINDINGS: Chest: Mediastinum: The thyroid is unremarkable. There is no pathologic lymphadenopathy within the chest or mediastinum. There is atherosclerotic disease of the thoracic aorta, without evidence of aneurysm. No pericardial abnormality is identified. Lungs/pleura: The central airways are patent. No localized focus of acute airspace consolidation is identified. There is a postbiopsy scar within the right middle lobe. There is an additional postbiopsy scar within the right lower lobe as well. There are multiple persistent reticular and ground-glass nodular opacities throughout both lungs which are similar in comparison with the study of 11/14/2019, and most likely represent an element of chronic interstitial lung disease, to include either chronic hypersensitivity pneumonitis, cryptogenic organizing pneumonia, or respiratory bronchiolitis. There is no evidence of a pneumothorax or pleural effusion. No new suspicious pulmonary nodule or mass is evident. Soft Tissues/Bones:  There is opacities throughout both lungs are similar in comparison with the prior study of 11/14/2019, and likely reflect a chronic interstitial lung process, to include chronic hypersensitivity pneumonitis, cryptogenic organizing pneumonia, or respiratory bronchiolitis. However, consider short-term chest CT follow-up in 3-6 months to ensure stability of the multifocal ground-glass nodules, as a slow-growing malignancy is not entirely excluded. 3. Wall thickening involving the right hemicolon may be secondary to lack of distention. However, a focal infectious or inflammatory colitis is also a diagnostic consideration. Clinical correlation is advised. Pathology  N/A    Problem List  Patient Active Problem List   Diagnosis    Diverticulosis of large intestine    Postinflammatory pulmonary fibrosis ([facility] [address]    Essential hypertension    Irritable bowel syndrome    Osteoporosis    Nocturia    Thoracic or lumbosacral neuritis or radiculitis, unspecified    Hyperlipidemia    Synovial cyst of popliteal space    Enthesopathy of hip region    Peripheral neuropathy,both lower extremities    Left lumbar radiculopathy-L4    Bursitis of left hip,trochanteric.  Low back pain    Lumbar disc herniation with radiculopathy    Degenerative disc disease, lumbar    Tendonitis-3rd flexor tendon left hand.     Depression    Allergic dermatitis    Trigger finger, left ring finger    COPD (chronic obstructive pulmonary disease) (HCC)    Rib pain on right side    Trochanteric bursitis of left hip    Osteopenia of multiple sites    Abnormal CT of the chest    ILD (interstitial lung disease) (HCC)    Weight loss    Type 2 diabetes mellitus without complication, without long-term current use of insulin (HCC)    compression fracture -T11    Personal history of steroid therapy    Post-menopausal    Urinary frequency    Acute respiratory failure with hypoxia (HCC)    Rectal bleeding    RLQ abdominal pain    Acute delirium    Other acquired hemolytic anemias (HCC)-due drug  Dapsone       Assessment and Plan:     Clinically improved. Anemia, macrocytic - 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 This is supported by her slight increase in LDH, increase in bilirubin, haptoglobin less than 10. She is receiving prednisone 40 mg daily.   Dapsone stopped & switched to atovaquone for PCP prophylaxis  Direct tg negative  For now would moniter as the counts are not precipitously declining  This should be self limited if Dapsone did this   G6PD result is pending - can be hard to interpret in acute setting    Ketty Garcia MD  9/19/2020

## 2020-09-20 LAB
(1,3)-BETA-D-GLUCAN (FUNGITELL) INTERPRETATION: NORMAL
(1,3)-BETA-D-GLUCAN (FUNGITELL): NORMAL PG/ML
A/G RATIO: 1.8 (ref 1.1–2.2)
ALBUMIN SERPL-MCNC: 3.7 G/DL (ref 3.4–5)
ALP BLD-CCNC: 33 U/L (ref 40–129)
ALT SERPL-CCNC: 55 U/L (ref 10–40)
ANION GAP SERPL CALCULATED.3IONS-SCNC: 10 MMOL/L (ref 3–16)
ASPERGILLUS GALACTO AG: NEGATIVE
ASPERGILLUS GALACTO INDEX: 0.07
AST SERPL-CCNC: 35 U/L (ref 15–37)
BASOPHILS ABSOLUTE: 0 K/UL (ref 0–0.2)
BASOPHILS RELATIVE PERCENT: 0.2 %
BILIRUB SERPL-MCNC: 2.5 MG/DL (ref 0–1)
BILIRUBIN DIRECT: 0.7 MG/DL (ref 0–0.3)
BILIRUBIN, INDIRECT: 1.8 MG/DL (ref 0–1)
BUN BLDV-MCNC: 29 MG/DL (ref 7–20)
CALCIUM SERPL-MCNC: 10 MG/DL (ref 8.3–10.6)
CHLORIDE BLD-SCNC: 95 MMOL/L (ref 99–110)
CO2: 27 MMOL/L (ref 21–32)
CREAT SERPL-MCNC: 0.9 MG/DL (ref 0.6–1.2)
EOSINOPHILS ABSOLUTE: 0 K/UL (ref 0–0.6)
EOSINOPHILS RELATIVE PERCENT: 0 %
G-6-PD, QUANT: 15.1 U/G HB (ref 9.9–16.6)
GFR AFRICAN AMERICAN: >60
GFR NON-AFRICAN AMERICAN: >60
GLOBULIN: 2.1 G/DL
GLUCOSE BLD-MCNC: 114 MG/DL (ref 70–99)
GLUCOSE BLD-MCNC: 148 MG/DL (ref 70–99)
GLUCOSE BLD-MCNC: 148 MG/DL (ref 70–99)
GLUCOSE BLD-MCNC: 78 MG/DL (ref 70–99)
GLUCOSE BLD-MCNC: 84 MG/DL (ref 70–99)
HCT VFR BLD CALC: 30.1 % (ref 36–48)
HEMOGLOBIN: 10 G/DL (ref 12–16)
LACTATE DEHYDROGENASE: 250 U/L (ref 100–190)
LYMPHOCYTES ABSOLUTE: 2.2 K/UL (ref 1–5.1)
LYMPHOCYTES RELATIVE PERCENT: 18.8 %
MCH RBC QN AUTO: 34.7 PG (ref 26–34)
MCHC RBC AUTO-ENTMCNC: 33.4 G/DL (ref 31–36)
MCV RBC AUTO: 104 FL (ref 80–100)
MONOCYTES ABSOLUTE: 1 K/UL (ref 0–1.3)
MONOCYTES RELATIVE PERCENT: 9.1 %
NEUTROPHILS ABSOLUTE: 8.3 K/UL (ref 1.7–7.7)
NEUTROPHILS RELATIVE PERCENT: 71.9 %
ORGANISM: ABNORMAL
PDW BLD-RTO: 19.8 % (ref 12.4–15.4)
PERFORMED ON: ABNORMAL
PERFORMED ON: NORMAL
PLATELET # BLD: 207 K/UL (ref 135–450)
PMV BLD AUTO: 8.4 FL (ref 5–10.5)
POTASSIUM SERPL-SCNC: 4.9 MMOL/L (ref 3.5–5.1)
RBC # BLD: 2.89 M/UL (ref 4–5.2)
SODIUM BLD-SCNC: 132 MMOL/L (ref 136–145)
TOTAL PROTEIN: 5.8 G/DL (ref 6.4–8.2)
URINE CULTURE, ROUTINE: ABNORMAL
WBC # BLD: 11.6 K/UL (ref 4–11)

## 2020-09-20 PROCEDURE — 85025 COMPLETE CBC W/AUTO DIFF WBC: CPT

## 2020-09-20 PROCEDURE — 36415 COLL VENOUS BLD VENIPUNCTURE: CPT

## 2020-09-20 PROCEDURE — 6360000002 HC RX W HCPCS: Performed by: INTERNAL MEDICINE

## 2020-09-20 PROCEDURE — 99233 SBSQ HOSP IP/OBS HIGH 50: CPT | Performed by: INTERNAL MEDICINE

## 2020-09-20 PROCEDURE — 94761 N-INVAS EAR/PLS OXIMETRY MLT: CPT

## 2020-09-20 PROCEDURE — 82248 BILIRUBIN DIRECT: CPT

## 2020-09-20 PROCEDURE — 83615 LACTATE (LD) (LDH) ENZYME: CPT

## 2020-09-20 PROCEDURE — 80053 COMPREHEN METABOLIC PANEL: CPT

## 2020-09-20 PROCEDURE — 1200000000 HC SEMI PRIVATE

## 2020-09-20 PROCEDURE — 2580000003 HC RX 258: Performed by: INTERNAL MEDICINE

## 2020-09-20 PROCEDURE — 99232 SBSQ HOSP IP/OBS MODERATE 35: CPT | Performed by: INTERNAL MEDICINE

## 2020-09-20 PROCEDURE — 6370000000 HC RX 637 (ALT 250 FOR IP): Performed by: INTERNAL MEDICINE

## 2020-09-20 PROCEDURE — 94640 AIRWAY INHALATION TREATMENT: CPT

## 2020-09-20 PROCEDURE — 2700000000 HC OXYGEN THERAPY PER DAY

## 2020-09-20 RX ADMIN — Medication 400 MG: at 08:30

## 2020-09-20 RX ADMIN — CIPROFLOXACIN HYDROCHLORIDE 250 MG: 250 TABLET, FILM COATED ORAL at 21:13

## 2020-09-20 RX ADMIN — Medication 1500 MG: at 08:30

## 2020-09-20 RX ADMIN — LOSARTAN POTASSIUM 100 MG: 100 TABLET, FILM COATED ORAL at 08:31

## 2020-09-20 RX ADMIN — BUDESONIDE AND FORMOTEROL FUMARATE DIHYDRATE 2 PUFF: 160; 4.5 AEROSOL RESPIRATORY (INHALATION) at 19:32

## 2020-09-20 RX ADMIN — CIPROFLOXACIN HYDROCHLORIDE 250 MG: 250 TABLET, FILM COATED ORAL at 08:31

## 2020-09-20 RX ADMIN — OYSTER SHELL CALCIUM WITH VITAMIN D 1 TABLET: 500; 200 TABLET, FILM COATED ORAL at 21:13

## 2020-09-20 RX ADMIN — BUDESONIDE AND FORMOTEROL FUMARATE DIHYDRATE 2 PUFF: 160; 4.5 AEROSOL RESPIRATORY (INHALATION) at 08:21

## 2020-09-20 RX ADMIN — METOPROLOL TARTRATE 25 MG: 25 TABLET, FILM COATED ORAL at 21:13

## 2020-09-20 RX ADMIN — PREDNISONE 40 MG: 20 TABLET ORAL at 08:31

## 2020-09-20 RX ADMIN — DICYCLOMINE HYDROCHLORIDE 10 MG: 10 CAPSULE ORAL at 08:31

## 2020-09-20 RX ADMIN — METOPROLOL TARTRATE 25 MG: 25 TABLET, FILM COATED ORAL at 08:31

## 2020-09-20 RX ADMIN — CETIRIZINE HYDROCHLORIDE 10 MG: 10 TABLET, FILM COATED ORAL at 08:31

## 2020-09-20 RX ADMIN — FENOFIBRATE 160 MG: 160 TABLET ORAL at 08:30

## 2020-09-20 RX ADMIN — GABAPENTIN 300 MG: 300 CAPSULE ORAL at 08:30

## 2020-09-20 RX ADMIN — AMLODIPINE BESYLATE 5 MG: 5 TABLET ORAL at 08:31

## 2020-09-20 RX ADMIN — SODIUM CHLORIDE, PRESERVATIVE FREE 10 ML: 5 INJECTION INTRAVENOUS at 21:13

## 2020-09-20 RX ADMIN — MONTELUKAST 10 MG: 10 TABLET, FILM COATED ORAL at 21:13

## 2020-09-20 RX ADMIN — ENOXAPARIN SODIUM 30 MG: 30 INJECTION SUBCUTANEOUS at 08:30

## 2020-09-20 RX ADMIN — INSULIN LISPRO 1 UNITS: 100 INJECTION, SOLUTION INTRAVENOUS; SUBCUTANEOUS at 21:30

## 2020-09-20 RX ADMIN — OYSTER SHELL CALCIUM WITH VITAMIN D 1 TABLET: 500; 200 TABLET, FILM COATED ORAL at 08:31

## 2020-09-20 RX ADMIN — METFORMIN HYDROCHLORIDE 1000 MG: 500 TABLET ORAL at 16:56

## 2020-09-20 RX ADMIN — Medication 400 MG: at 21:13

## 2020-09-20 RX ADMIN — INSULIN LISPRO 1 UNITS: 100 INJECTION, SOLUTION INTRAVENOUS; SUBCUTANEOUS at 16:56

## 2020-09-20 RX ADMIN — SODIUM CHLORIDE, PRESERVATIVE FREE 10 ML: 5 INJECTION INTRAVENOUS at 08:31

## 2020-09-20 RX ADMIN — ESCITALOPRAM OXALATE 10 MG: 10 TABLET ORAL at 08:31

## 2020-09-20 RX ADMIN — METFORMIN HYDROCHLORIDE 1000 MG: 500 TABLET ORAL at 08:31

## 2020-09-20 ASSESSMENT — PAIN SCALES - GENERAL
PAINLEVEL_OUTOF10: 0

## 2020-09-20 NOTE — PROGRESS NOTES
Pulmonary Progress Note    CC:  Follow up ILD, hypoxia    Subjective: On 3 liters of oxygen  Doing ok  Less SOB      Intake/Output Summary (Last 24 hours) at 9/20/2020 1121  Last data filed at 9/20/2020 0839  Gross per 24 hour   Intake 970 ml   Output --   Net 970 ml         PHYSICAL EXAM:  Blood pressure (!) 142/75, pulse 83, temperature 98.2 °F (36.8 °C), temperature source Oral, resp. rate 18, height 5' 4\" (1.626 m), weight 133 lb 6.1 oz (60.5 kg), SpO2 95 %, not currently breastfeeding.'  Gen: No distress. On toilet  Eyes: PERRL. No sclera icterus. No conjunctival injection. ENT: No discharge. Pharynx clear. External appearance of ears and nose normal.  Neck: Trachea midline. No obvious mass. Resp: Few wheezes   CV: Regular rate. Regular rhythm. No murmur or rub. GI: Non-tender. Non-distended. No hernia. Skin: Warm, dry, normal texture and turgor. No nodule on exposed extremities. Lymph: No cervical LAD. No supraclavicular LAD. M/S: No cyanosis. No clubbing. No joint deformity. Neuro: Moves all four extremities. CN 2-12 tested, no defect noted.   Ext:   no edema    Medications:    Scheduled Meds:   ciprofloxacin  250 mg Oral 2 times per day    [START ON 9/21/2020] predniSONE  20 mg Oral Daily    atovaquone  1,500 mg Oral Daily    metoprolol tartrate  25 mg Oral BID    amLODIPine  5 mg Oral Daily    budesonide-formoterol  2 puff Inhalation BID    calcium-vitamin D  1 tablet Oral BID    cetirizine  10 mg Oral Daily    dicyclomine  10 mg Oral Daily    fenofibrate  160 mg Oral Daily    escitalopram  10 mg Oral Daily    gabapentin  300 mg Oral Daily    losartan  100 mg Oral Daily    montelukast  10 mg Oral Nightly    metFORMIN  1,000 mg Oral BID WC    insulin lispro  0-6 Units Subcutaneous TID WC    insulin lispro  0-3 Units Subcutaneous Nightly    sodium chloride flush  10 mL Intravenous 2 times per day    enoxaparin  30 mg Subcutaneous Daily    magnesium oxide  400 mg Oral BID Continuous Infusions:   dextrose         PRN Meds:  haloperidol lactate, ipratropium-albuterol, glucose, dextrose, glucagon (rDNA), dextrose, sodium chloride flush, acetaminophen **OR** acetaminophen, polyethylene glycol, promethazine **OR** ondansetron    Labs:  CBC:   Recent Labs     09/18/20  0554 09/19/20  0904 09/20/20  0618   WBC 9.3 9.6 11.6*   HGB 10.5* 10.2* 10.0*   HCT 31.5* 30.1* 30.1*   .7* 104.4* 104.0*    205 207     BMP:   Recent Labs     09/20/20  0618   *   K 4.9   CL 95*   CO2 27   BUN 29*   CREATININE 0.9     LIVER PROFILE:   Recent Labs     09/18/20  0554 09/19/20  0738 09/20/20  0618   AST 57* 43* 35   ALT 62* 55* 55*   BILIDIR 0.8* 0.7* 0.7*   BILITOT 3.4* 2.5* 2.5*   ALKPHOS 35* 34* 33*     PT/INR: No results for input(s): PROTIME, INR in the last 72 hours. APTT: No results for input(s): APTT in the last 72 hours. UA:  Recent Labs     09/18/20  1645   COLORU DK YELLOW   PHUR 6.0   WBCUA 397*   RBCUA 3   BACTERIA 4+*   CLARITYU CLOUDY*   SPECGRAV 1.020   LEUKOCYTESUR LARGE*   UROBILINOGEN 1.0   BILIRUBINUR SMALL*   BLOODU Negative   GLUCOSEU Negative     No results for input(s): PH, PCO2, PO2 in the last 72 hours. Films:  Chest imaging reports were reviewed and imaging was reviewed by me and showed no new imagew      Cultures:  Negative    I reviewed the labs and images listed above    Assessment:   · Acute Hypoxic Respiratory Failure with saturations less than 90% on room air  · ILD exacerbation  · Anemia      Plan:  Titrate oxygen for saturations greater than or equal to 90%  · Likely needs home oxygen evaluation at DC  · Prednisone plan in place. Likely to need 20 mg at DC  · Atovaquone for PJP prohylaxis while on prednisone 20 mg or greater.   Likely to need life long prednisone  · DVT prophylaxis with lovenox              Beryle Fluke, DO  [facility]

## 2020-09-20 NOTE — PROGRESS NOTES
[facility] Progress Note  9/20/2020 9:20 AM  Subjective:   Admit Date: 9/16/2020      Chief Complaint: Agitated and paranoid overnite--was able to be reassured     Interval History: Calmer this AM  Stool was heme pos-GI feels sec to hemorrhoids   Urine cx >10K E Coli--sens pending--on po cipro     Diet: DIET CARB CONTROL; Carb Control: 4 carb choices (60 gms)/meal; No Added Salt (3-4 GM)  Dietary Nutrition Supplements: Diabetic Oral Supplement  Medications:   Scheduled Meds:   ciprofloxacin  250 mg Oral 2 times per day    [START ON 9/21/2020] predniSONE  20 mg Oral Daily    atovaquone  1,500 mg Oral Daily    metoprolol tartrate  25 mg Oral BID    amLODIPine  5 mg Oral Daily    budesonide-formoterol  2 puff Inhalation BID    calcium-vitamin D  1 tablet Oral BID    cetirizine  10 mg Oral Daily    dicyclomine  10 mg Oral Daily    fenofibrate  160 mg Oral Daily    escitalopram  10 mg Oral Daily    gabapentin  300 mg Oral Daily    losartan  100 mg Oral Daily    montelukast  10 mg Oral Nightly    metFORMIN  1,000 mg Oral BID WC    insulin lispro  0-6 Units Subcutaneous TID WC    insulin lispro  0-3 Units Subcutaneous Nightly    sodium chloride flush  10 mL Intravenous 2 times per day    enoxaparin  30 mg Subcutaneous Daily    magnesium oxide  400 mg Oral BID     Continuous Infusions:   dextrose         Review of Systems -   General ROS: afebrile  Respiratory ROS: no cough, shortness of breath or wheezing  Cardiovascular ROS: no chest pain  Musculoskeletal ROS:positive for - :joint pain  Neurological ROS: positive for - confusion    Objective:   Vitals: BP (!) 142/75   Pulse 83   Temp 98.2 °F (36.8 °C) (Oral)   Resp 18   Ht 5' 4\" (1.626 m)   Wt 133 lb 6.1 oz (60.5 kg)   SpO2 95%   BMI 22.89 kg/m²   General appearance: alert and cooperative with exam  HEENT: Head: Normocephalic, no lesions, without obvious abnormality.   Neck: no adenopathy, no carotid bruit, no JVD, supple, symmetrical, trachea midline and thyroid not enlarged, symmetric, no tenderness/mass/nodules  Lungs: diminished breath sounds bibasilar  Heart: regular rate and rhythm, S1, S2 normal, no murmur, click, rub or gallop  Abdomen: soft, non-tender; bowel sounds normal; no masses,  no organomegaly  Extremities: extremities normal, atraumatic, no cyanosis or edema  Neurologic: Mental status: no focal deficits --     No results displayed because visit has over 200 results. Assessment & Plan: Active Problems:    Essential hypertension--Continue current therapy      Depression    COPD (chronic obstructive pulmonary disease) ([facility])--cont rx     ILD (interstitial lung disease) ([facility])--still requiring O2     Type 2 diabetes mellitus without complication, without long-term current use of insulin ([facility])    Acute hypoxemic respiratory failure ([facility])    Rectal bleeding--most likely hemorrhoids     RLQ abdominal pain    Acute delirium--fluctuating--cont to rx UTI     Other acquired hemolytic anemias ([facility])-due drug  Dapsone    Acute cystitis without hematuria--oral cipro     Anemia  Resolved Problems:    * No resolved hospital problems.  *  Await sensitivity for urine cx --incr activity   Please note that over 35 minutes was spent in evaluating the patient, review of records and results, discussion with staff/family, etc.    Faith Trent MD

## 2020-09-20 NOTE — PROGRESS NOTES
Pt alert to person only this morning. Pt thinks that she is at her house and looking for her . Pt reoriented to her situation. Pt reports ongoing SOB with exertion. Pt lungs sound are diminished, maintains SPO2 95% on 3 l nc. Pt scheduled a.m meds are given. Pt resting in bed quietly. Denies other needs at this time. Bed alarm on. Call light and item need in reach.  Electronically signed by Susana Grullon RN on 9/20/2020 at 10:00 AM

## 2020-09-20 NOTE — PROGRESS NOTES
Patient was alert and oriented to herself, place and situation at the beginning of shift. As the evening progressed she became more agitated and confused. She refused her midnight tele vitals. She was adamant that we leave her alone and not come in her room. While doing rounds she stated she is upset and doesn't understand why people are in her home She is very worried about her . She asked if we had him drugged and tied up out in the jha to keep him away from her. Continued to calmly reassure her and reorient her. At this time she calmed down enough to rest in her bed quietly.

## 2020-09-20 NOTE — PLAN OF CARE
Problem: Falls - Risk of:  Goal: Will remain free from falls  Description: Will remain free from falls  9/20/2020 0001 by Barbara Crump RN  Outcome: Ongoing  9/19/2020 1658 by Jim Albert RN  Outcome: Ongoing  Note: Pt free from falls this shift. Non skid socks on, bed and chair alarm used, hourly rounded on pt for needs, Call light always within reach. Pt able and agreeable to contact for safety appropriately. Goal: Absence of physical injury  Description: Absence of physical injury  9/20/2020 0001 by Barbara Crump RN  Outcome: Ongoing  9/19/2020 1658 by Jim Albert RN  Outcome: Ongoing     Problem: Pain:  Goal: Pain level will decrease  Description: Pain level will decrease  9/20/2020 0001 by Barbara Crump RN  Outcome: Ongoing  9/19/2020 1658 by Jim Albert RN  Outcome: Ongoing  Note: Pt denies any pain this shift. Pt able to express presence and absence of pain using numerical pain scale. Pt pain is managed by PRN analgesics as ordered by MD. Pain reassess after each interventions. Will continue to monitor as needed. Goal: Control of acute pain  Description: Control of acute pain  Outcome: Ongoing  Goal: Control of chronic pain  Description: Control of chronic pain  Outcome: Ongoing     Problem:  Activity:  Goal: Fatigue will decrease  Description: Fatigue will decrease  9/20/2020 0001 by Barbara Crump RN  Outcome: Ongoing  9/19/2020 1658 by Jim Albert RN  Outcome: Ongoing     Problem: Cardiac:  Goal: Hemodynamic stability will improve  Description: Hemodynamic stability will improve  Outcome: Ongoing     Problem: Coping:  Goal: Level of anxiety will decrease  Description: Level of anxiety will decrease  Outcome: Ongoing  Goal: Ability to cope will improve  Description: Ability to cope will improve  Outcome: Ongoing  Goal: Ability to establish a method of communication will improve  Description: Ability to establish a method of communication will improve  Outcome: Ongoing Problem: Nutritional:  Goal: Consumption of the prescribed amount of daily calories will improve  Description: Consumption of the prescribed amount of daily calories will improve  Outcome: Ongoing     Problem: Respiratory:  Goal: Ability to maintain adequate ventilation will improve  Description: Ability to maintain adequate ventilation will improve  9/20/2020 0001 by Morro Teixeira RN  Outcome: Ongoing  9/19/2020 1658 by Adriano Bahena RN  Outcome: Ongoing  Goal: Complications related to the disease process, condition or treatment will be avoided or minimized  Description: Complications related to the disease process, condition or treatment will be avoided or minimized  Outcome: Ongoing     Problem: Skin Integrity:  Goal: Risk for impaired skin integrity will decrease  Description: Risk for impaired skin integrity will decrease  9/20/2020 0001 by Morro Teixeira RN  Outcome: Ongoing  9/19/2020 1658 by Adriano Bahena RN  Outcome: Ongoing  Note: Skin assessment performed each shift per protocol. Pt encouraged to reposition often. Will continue to monitor and assess for skin breakdown.      Goal: Will show no infection signs and symptoms  Description: Will show no infection signs and symptoms  Outcome: Ongoing  Goal: Absence of new skin breakdown  Description: Absence of new skin breakdown  Outcome: Ongoing     Problem: Nutrition  Goal: Optimal nutrition therapy  Outcome: Ongoing     Problem: Restraint Use - Nonviolent/Non-Self-Destructive Behavior:  Goal: Absence of restraint indications  Description: Absence of restraint indications  Outcome: Ongoing  Goal: Absence of restraint-related injury  Description: Absence of restraint-related injury  Outcome: Ongoing

## 2020-09-20 NOTE — PLAN OF CARE
Problem: Falls - Risk of:  Goal: Will remain free from falls  Description: Will remain free from falls  9/20/2020 1001 by Dick Eckert RN  Outcome: Ongoing  Note: Pt free from falls this shift. Non skid socks provided,  bed and chair alarm used, frequently checked on pt for needs, Call light always within reach. Pt able and agreeable to contact for safety appropriately. Problem: Pain:  Goal: Pain level will decrease  Description: Pain level will decrease  9/20/2020 1001 by Dick Eckert RN  Outcome: Ongoing  Note: Pt able to express presence and absence of pain using numerical pain scale. Pt pain is managed by PRN analgesics as ordered by MD. Pain reassess after each interventions. Will continue to monitor as needed. Problem: Coping:  Goal: Level of anxiety will decrease  Description: Level of anxiety will decrease  9/20/2020 1001 by Dick Eckert RN  Outcome: Ongoing     Problem: Respiratory:  Goal: Ability to maintain adequate ventilation will improve  Description: Ability to maintain adequate ventilation will improve  9/20/2020 1001 by Dick Eckert RN  Outcome: Ongoing     Problem: Skin Integrity:  Goal: Risk for impaired skin integrity will decrease  Description: Risk for impaired skin integrity will decrease  9/20/2020 1001 by Dick Eckert RN  Outcome: Ongoing  Note: Skin assessment performed each shift per protocol. Pt encouraged to reposition often. Will continue to monitor and assess for skin breakdown.

## 2020-09-21 ENCOUNTER — APPOINTMENT (OUTPATIENT)
Dept: CT IMAGING | Age: 75
DRG: 189 | End: 2020-09-21
Payer: MEDICARE

## 2020-09-21 ENCOUNTER — TELEPHONE (OUTPATIENT)
Dept: INTERNAL MEDICINE CLINIC | Age: 75
End: 2020-09-21

## 2020-09-21 LAB
APTT: 31.1 SEC (ref 24.2–36.2)
GLUCOSE BLD-MCNC: 108 MG/DL (ref 70–99)
GLUCOSE BLD-MCNC: 150 MG/DL (ref 70–99)
GLUCOSE BLD-MCNC: 71 MG/DL (ref 70–99)
GLUCOSE BLD-MCNC: 75 MG/DL (ref 70–99)
HAV IGM SER IA-ACNC: NORMAL
HCT VFR BLD CALC: 32.2 % (ref 36–48)
HEPATITIS B CORE IGM ANTIBODY: NORMAL
HEPATITIS B SURFACE ANTIGEN INTERPRETATION: NORMAL
HEPATITIS C ANTIBODY INTERPRETATION: NORMAL
IMMATURE RETIC FRACT: 0.54 (ref 0.21–0.37)
INR BLD: 1.01 (ref 0.86–1.14)
LACTATE DEHYDROGENASE: 266 U/L (ref 100–190)
PERFORMED ON: ABNORMAL
PERFORMED ON: ABNORMAL
PERFORMED ON: NORMAL
PERFORMED ON: NORMAL
PROTHROMBIN TIME: 11.7 SEC (ref 10–13.2)
RETICULOCYTE ABSOLUTE COUNT: 0.18 M/UL (ref 0.02–0.1)
RETICULOCYTE COUNT PCT: 6.09 % (ref 0.5–2.18)

## 2020-09-21 PROCEDURE — 36415 COLL VENOUS BLD VENIPUNCTURE: CPT

## 2020-09-21 PROCEDURE — 6370000000 HC RX 637 (ALT 250 FOR IP): Performed by: INTERNAL MEDICINE

## 2020-09-21 PROCEDURE — 88313 SPECIAL STAINS GROUP 2: CPT

## 2020-09-21 PROCEDURE — 2709999900 CT BIOPSY BONE MARROW

## 2020-09-21 PROCEDURE — 99233 SBSQ HOSP IP/OBS HIGH 50: CPT | Performed by: INTERNAL MEDICINE

## 2020-09-21 PROCEDURE — 88185 FLOWCYTOMETRY/TC ADD-ON: CPT

## 2020-09-21 PROCEDURE — 2580000003 HC RX 258: Performed by: INTERNAL MEDICINE

## 2020-09-21 PROCEDURE — 82595 ASSAY OF CRYOGLOBULIN: CPT

## 2020-09-21 PROCEDURE — 80074 ACUTE HEPATITIS PANEL: CPT

## 2020-09-21 PROCEDURE — 99232 SBSQ HOSP IP/OBS MODERATE 35: CPT | Performed by: INTERNAL MEDICINE

## 2020-09-21 PROCEDURE — 84155 ASSAY OF PROTEIN SERUM: CPT

## 2020-09-21 PROCEDURE — 86157 COLD AGGLUTININ TITER: CPT

## 2020-09-21 PROCEDURE — 6360000002 HC RX W HCPCS: Performed by: RADIOLOGY

## 2020-09-21 PROCEDURE — 1200000000 HC SEMI PRIVATE

## 2020-09-21 PROCEDURE — 85045 AUTOMATED RETICULOCYTE COUNT: CPT

## 2020-09-21 PROCEDURE — 85730 THROMBOPLASTIN TIME PARTIAL: CPT

## 2020-09-21 PROCEDURE — 84165 PROTEIN E-PHORESIS SERUM: CPT

## 2020-09-21 PROCEDURE — 2700000000 HC OXYGEN THERAPY PER DAY

## 2020-09-21 PROCEDURE — 07DR3ZX EXTRACTION OF ILIAC BONE MARROW, PERCUTANEOUS APPROACH, DIAGNOSTIC: ICD-10-PCS | Performed by: INTERNAL MEDICINE

## 2020-09-21 PROCEDURE — 83615 LACTATE (LD) (LDH) ENZYME: CPT

## 2020-09-21 PROCEDURE — 94761 N-INVAS EAR/PLS OXIMETRY MLT: CPT

## 2020-09-21 PROCEDURE — 85610 PROTHROMBIN TIME: CPT

## 2020-09-21 PROCEDURE — 88184 FLOWCYTOMETRY/ TC 1 MARKER: CPT

## 2020-09-21 PROCEDURE — 77012 CT SCAN FOR NEEDLE BIOPSY: CPT

## 2020-09-21 PROCEDURE — 88311 DECALCIFY TISSUE: CPT

## 2020-09-21 PROCEDURE — 94640 AIRWAY INHALATION TREATMENT: CPT

## 2020-09-21 PROCEDURE — 88305 TISSUE EXAM BY PATHOLOGIST: CPT

## 2020-09-21 RX ORDER — FENTANYL CITRATE 50 UG/ML
INJECTION, SOLUTION INTRAMUSCULAR; INTRAVENOUS DAILY PRN
Status: COMPLETED | OUTPATIENT
Start: 2020-09-21 | End: 2020-09-21

## 2020-09-21 RX ORDER — MIDAZOLAM HYDROCHLORIDE 1 MG/ML
INJECTION INTRAMUSCULAR; INTRAVENOUS DAILY PRN
Status: COMPLETED | OUTPATIENT
Start: 2020-09-21 | End: 2020-09-21

## 2020-09-21 RX ORDER — IPRATROPIUM BROMIDE AND ALBUTEROL SULFATE 2.5; .5 MG/3ML; MG/3ML
1 SOLUTION RESPIRATORY (INHALATION)
Status: DISCONTINUED | OUTPATIENT
Start: 2020-09-21 | End: 2020-09-22 | Stop reason: HOSPADM

## 2020-09-21 RX ADMIN — Medication 400 MG: at 22:00

## 2020-09-21 RX ADMIN — CIPROFLOXACIN HYDROCHLORIDE 250 MG: 250 TABLET, FILM COATED ORAL at 22:00

## 2020-09-21 RX ADMIN — OYSTER SHELL CALCIUM WITH VITAMIN D 1 TABLET: 500; 200 TABLET, FILM COATED ORAL at 10:21

## 2020-09-21 RX ADMIN — SODIUM CHLORIDE, PRESERVATIVE FREE 10 ML: 5 INJECTION INTRAVENOUS at 22:00

## 2020-09-21 RX ADMIN — IPRATROPIUM BROMIDE AND ALBUTEROL SULFATE 1 AMPULE: .5; 3 SOLUTION RESPIRATORY (INHALATION) at 16:55

## 2020-09-21 RX ADMIN — AMLODIPINE BESYLATE 5 MG: 5 TABLET ORAL at 10:22

## 2020-09-21 RX ADMIN — FENTANYL CITRATE 25 MCG: 50 INJECTION INTRAMUSCULAR; INTRAVENOUS at 13:23

## 2020-09-21 RX ADMIN — IPRATROPIUM BROMIDE AND ALBUTEROL SULFATE 1 AMPULE: .5; 3 SOLUTION RESPIRATORY (INHALATION) at 21:20

## 2020-09-21 RX ADMIN — BUDESONIDE AND FORMOTEROL FUMARATE DIHYDRATE 2 PUFF: 160; 4.5 AEROSOL RESPIRATORY (INHALATION) at 21:20

## 2020-09-21 RX ADMIN — BUDESONIDE AND FORMOTEROL FUMARATE DIHYDRATE 2 PUFF: 160; 4.5 AEROSOL RESPIRATORY (INHALATION) at 08:53

## 2020-09-21 RX ADMIN — DICYCLOMINE HYDROCHLORIDE 10 MG: 10 CAPSULE ORAL at 10:22

## 2020-09-21 RX ADMIN — Medication 400 MG: at 10:20

## 2020-09-21 RX ADMIN — GABAPENTIN 300 MG: 300 CAPSULE ORAL at 10:22

## 2020-09-21 RX ADMIN — PREDNISONE 20 MG: 20 TABLET ORAL at 10:21

## 2020-09-21 RX ADMIN — CETIRIZINE HYDROCHLORIDE 10 MG: 10 TABLET, FILM COATED ORAL at 10:22

## 2020-09-21 RX ADMIN — METOPROLOL TARTRATE 25 MG: 25 TABLET, FILM COATED ORAL at 10:20

## 2020-09-21 RX ADMIN — FENOFIBRATE 160 MG: 160 TABLET ORAL at 10:24

## 2020-09-21 RX ADMIN — SODIUM CHLORIDE, PRESERVATIVE FREE 10 ML: 5 INJECTION INTRAVENOUS at 10:24

## 2020-09-21 RX ADMIN — Medication 1500 MG: at 10:22

## 2020-09-21 RX ADMIN — MIDAZOLAM 0.5 MG: 1 INJECTION INTRAMUSCULAR; INTRAVENOUS at 13:23

## 2020-09-21 RX ADMIN — OYSTER SHELL CALCIUM WITH VITAMIN D 1 TABLET: 500; 200 TABLET, FILM COATED ORAL at 22:00

## 2020-09-21 RX ADMIN — ESCITALOPRAM OXALATE 10 MG: 10 TABLET ORAL at 10:22

## 2020-09-21 RX ADMIN — CIPROFLOXACIN HYDROCHLORIDE 250 MG: 250 TABLET, FILM COATED ORAL at 10:20

## 2020-09-21 RX ADMIN — MONTELUKAST 10 MG: 10 TABLET, FILM COATED ORAL at 22:00

## 2020-09-21 RX ADMIN — LOSARTAN POTASSIUM 100 MG: 100 TABLET, FILM COATED ORAL at 10:22

## 2020-09-21 RX ADMIN — INSULIN LISPRO 1 UNITS: 100 INJECTION, SOLUTION INTRAVENOUS; SUBCUTANEOUS at 22:00

## 2020-09-21 RX ADMIN — IPRATROPIUM BROMIDE AND ALBUTEROL SULFATE 1 AMPULE: .5; 3 SOLUTION RESPIRATORY (INHALATION) at 12:05

## 2020-09-21 ASSESSMENT — ENCOUNTER SYMPTOMS
GASTROINTESTINAL NEGATIVE: 1
WHEEZING: 0
TROUBLE SWALLOWING: 0
COUGH: 0
CHEST TIGHTNESS: 0
SHORTNESS OF BREATH: 1

## 2020-09-21 ASSESSMENT — PAIN SCALES - GENERAL
PAINLEVEL_OUTOF10: 0
PAINLEVEL_OUTOF10: 0

## 2020-09-21 ASSESSMENT — PAIN SCALES - WONG BAKER
WONGBAKER_NUMERICALRESPONSE: 0
WONGBAKER_NUMERICALRESPONSE: 0

## 2020-09-21 NOTE — PROGRESS NOTES
IM Progress Note      Subjective: The patient is doing ok this am and no confused and slept well last night  She feels her breathing is ok and is on oxygen and sats some toes goes down to mid 80s even with oxygen and gets better in to 80s at rest  She ha sno abdominal pain and did not have nay more blood in stool but stool was guaiac positive  She denies any other cp gi pr gu symptoms  Review of Systems:  Review of Systems   Constitutional: Positive for fatigue. Negative for activity change, appetite change, fever and unexpected weight change. HENT: Negative for trouble swallowing. Respiratory: Positive for shortness of breath. Negative for cough, chest tightness and wheezing. Stridor: with exertion. Cardiovascular: Negative for chest pain, palpitations and leg swelling. Gastrointestinal: Negative. Genitourinary: Negative. Neurological: Negative for dizziness, light-headedness and headaches. Objective:    /62   Pulse 81   Temp 97.7 °F (36.5 °C) (Oral)   Resp 19   Ht 5' 4\" (1.626 m)   Wt 132 lb 4.4 oz (60 kg)   SpO2 96%   BMI 22.71 kg/m²     Intake/Output Summary (Last 24 hours) at 9/21/2020 0925  Last data filed at 9/21/2020 0600  Gross per 24 hour   Intake 1020 ml   Output --   Net 1020 ml         Physical Exam  Vitals signs and nursing note reviewed. Constitutional:       General: She is not in acute distress. Eyes:      General: No scleral icterus. Extraocular Movements: Extraocular movements intact. Conjunctiva/sclera: Conjunctivae normal.      Pupils: Pupils are equal, round, and reactive to light. Neck:      Thyroid: No thyromegaly. Vascular: No carotid bruit or JVD. Cardiovascular:      Rate and Rhythm: Normal rate and regular rhythm. Pulses: Normal pulses. Heart sounds: Normal heart sounds. No gallop. Pulmonary:      Effort: No respiratory distress. Breath sounds: Normal breath sounds. No wheezing or rhonchi.  Rales: few at bases.   Abdominal:      General: Bowel sounds are normal. There is no distension. Palpations: Abdomen is soft. There is no hepatomegaly, splenomegaly or mass. Tenderness: There is no abdominal tenderness. Musculoskeletal:      Right lower leg: No edema. Left lower leg: No edema. Lymphadenopathy:      Cervical: No cervical adenopathy. Neurological:      Mental Status: She is alert and oriented to person, place, and time. Psychiatric:         Mood and Affect: Mood normal.         Behavior: Behavior normal.      Comments: Not delusional any more             CBC:   Recent Labs     09/19/20  0904 09/20/20  0618   WBC 9.6 11.6*   HGB 10.2* 10.0*    207     BMP:    Recent Labs     09/20/20  0618   *   K 4.9   CL 95*   CO2 27   BUN 29*   CREATININE 0.9   GLUCOSE 84     Hepatic:   Recent Labs     09/19/20  0738 09/20/20  0618   AST 43* 35   ALT 55* 55*   BILITOT 2.5* 2.5*   ALKPHOS 34* 33*     INR:   Recent Labs     09/21/20  0754   INR 1.01        Glucose:    Recent Labs     09/20/20  0618   GLUCOSE 84              Assessment/Plan:    Active Hospital Problems    Diagnosis Date Noted    Acute cystitis without hematuria [N30.00]-on cipro 09/19/2020    Anemia [D64.9]-hemolytic and due to blood ;loass -cause to be determined     RLQ abdominal pain [R10.31]-no recurrence 09/18/2020    Acute delirium [R41.0]-improving 09/18/2020    Other acquired hemolytic anemias (HCC)-due drug  Dapsone [D59.8]-for bone marrow aspiration biopsy today 09/18/2020    Rectal bleeding [K62.5]-no recurrence and needs colonoscopy  09/17/2020    Acute hypoxemic respiratory failure (HCC) [J96.01]-on oxygen and needs home oxygen  09/16/2020    Type 2 diabetes mellitus without complication, without long-term current use of insulin (HCC) [E11.9]-controlled 05/15/2020    ILD (interstitial lung disease) ([facility] [address] [G28. 9]-on steroids  12/21/2018    COPD (chronic obstructive pulmonary disease) ([facility]) [J44.9]-stable 11/29/2016  06/28/2013    Essential hypertension [I10]-controlled          Discussed with her and her nurse -for home oxygen and colonoscopy arrangements  Possible discharge in am    Electronically signed by Daryle Po, MD on 9/21/2020 at 9:25 AM

## 2020-09-21 NOTE — PROGRESS NOTES
Pt off unit for bone marrow biopsy at 1305,  transported by stretcher, consent obtained and in chart. This RN will continue to monitor upon return.

## 2020-09-21 NOTE — PROGRESS NOTES
Patient anxious to leave pacing the jha with walker ordered for home and asks when will paperwork be ready. RN explains that paperwork includes home health information that needs completed by MD. Pt states \" I don't do home health care with them. .. my wife does it and takes care of me\".  RN provides notifies social work [place], and orders wheelchair

## 2020-09-21 NOTE — PRE SEDATION
Sedation Pre-Procedure Note    Patient Name: Ebony Gutierrez   YOB: 1945  Room/Bed: F3V-8995/6385-88  Medical Record Number: [id number]  Date: 9/21/2020   Time: 1:16 PM       Indication:  Bone marrow bx    Consent: I have discussed with the patient and/or the patient representative the indication, alternatives, and the possible risks and/or complications of the planned procedure and the anesthesia methods. The patient and/or patient representative appear to understand and agree to proceed. Vital Signs:   Vitals:    09/21/20 1313   BP: 127/69   Pulse: 78   Resp: 16   Temp:    SpO2: 95%       Past Medical History:   has a past medical history of Asthma, Depression, Diverticulosis of colon (without mention of hemorrhage), Enthesopathy of hip region, Esophageal reflux, Irritable bowel syndrome, Nocturia, Osteoporosis, unspecified, Other and unspecified hyperlipidemia, Postinflammatory pulmonary fibrosis ([address] Sialoadenitis, Synovial cyst of popliteal space, Thoracic or lumbosacral neuritis or radiculitis, unspecified, Type II or unspecified type diabetes mellitus without mention of complication, not stated as uncontrolled, and Unspecified essential hypertension. Past Surgical History:   has a past surgical history that includes Cholecystectomy; polypectomy; Hysterectomy; Ovary removal; lipoma resection; Colonoscopy (2009); Colonoscopy (02/2016); and bronchoscopy (N/A, 12/21/2018).     Medications:   Scheduled Meds:    ipratropium-albuterol  1 ampule Inhalation Q4H WA    ciprofloxacin  250 mg Oral 2 times per day    predniSONE  20 mg Oral Daily    atovaquone  1,500 mg Oral Daily    metoprolol tartrate  25 mg Oral BID    amLODIPine  5 mg Oral Daily    budesonide-formoterol  2 puff Inhalation BID    calcium-vitamin D  1 tablet Oral BID    cetirizine  10 mg Oral Daily    dicyclomine  10 mg Oral Daily    fenofibrate  160 mg Oral Daily    escitalopram  10 mg Oral Daily    gabapentin  300 mg Oral Daily    losartan  100 mg Oral Daily    montelukast  10 mg Oral Nightly    metFORMIN  1,000 mg Oral BID WC    insulin lispro  0-6 Units Subcutaneous TID WC    insulin lispro  0-3 Units Subcutaneous Nightly    sodium chloride flush  10 mL Intravenous 2 times per day    enoxaparin  30 mg Subcutaneous Daily    magnesium oxide  400 mg Oral BID     Continuous Infusions:    dextrose       PRN Meds: haloperidol lactate, ipratropium-albuterol, glucose, dextrose, glucagon (rDNA), dextrose, sodium chloride flush, acetaminophen **OR** acetaminophen, polyethylene glycol, promethazine **OR** ondansetron  Home Meds:   Prior to Admission medications    Medication Sig Start Date End Date Taking?  Authorizing Provider   magnesium oxide (MAG-OX) 400 (241.3 Mg) MG TABS tablet TAKE ONE TABLET BY MOUTH TWICE A DAY 9/10/20  Yes Gume Street MD   amLODIPine (NORVASC) 5 MG tablet Take 1 tablet by mouth daily 8/19/20  Yes Gume Street MD   fenofibrate (TRIGLIDE) 160 MG tablet TAKE 1 TABLET DAILY 7/23/20  Yes Gume [address] MD   calcium-vitamin D (OSCAL 500/200 D-3) 500-200 MG-UNIT per tablet Take 1 tablet by mouth 2 times daily  Patient taking differently: Take 1 tablet by mouth daily  6/24/20  Yes Gume [address] MD   alendronate (FOSAMAX) 70 MG tablet Take 1 tablet by mouth every 7 days Take on empty stomach in am with full glass of water and remain upright for 30 minutes 6/24/20  Yes Gume Street MD   losartan (COZAAR) 100 MG tablet TAKE 1 TABLET DAILY 5/14/20  Yes Gume Street MD   budesonide-formoterol [facility]) 160-4.5 MCG/ACT AERO Inhale 2 puffs into the lungs 2 times daily 8/21/19  Yes Doug Vazquez DO   escitalopram (LEXAPRO) 10 MG tablet TAKE ONE TABLET BY MOUTH DAILY 5/8/19  Yes Gume Street MD   montelukast (SINGULAIR) 10 MG tablet TAKE ONE TABLET BY MOUTH DAILY 4/9/19  Yes Gume [address] MD   predniSONE (DELTASONE) 10 MG tablet Take 1 tablet by mouth daily 2/4/19  Yes Tiffanie eMng DO   albuterol sulfate  (90 Base) MCG/ACT inhaler Inhale 2 puffs into the lungs every 4 hours (while awake) And as needed every 4 hours at nights 10/15/18  Yes Ellen Walters MD   gabapentin (NEURONTIN) 300 MG capsule TAKE ONE CAPSULE BY MOUTH THREE TIMES A DAY  Patient taking differently: Take 300 mg by mouth daily. 5/2/18 9/16/20 Yes Ellen Walters MD   cetirizine (ZYRTEC ALLERGY) 10 MG tablet Take 1 tablet by mouth daily 2/20/18  Yes Ellen Walters MD   metFORMIN (GLUCOPHAGE) 1000 MG tablet TAKE ONE TABLET BY MOUTH TWICE A DAY WITH MEALS  Patient taking differently: TAKE ONE TABLET BY MOUTH ONCE DAILY WITH  A MEAL 12/8/17  Yes Ellen Walters MD   dicyclomine (BENTYL) 10 MG capsule TAKE 1 CAPSULE DAILY 12/14/15  Yes Ellen Walters MD   acebutolol (SECTRAL) 400 MG capsule TAKE 1 CAPSULE TWICE A DAY  Patient taking differently: Take 400 mg by mouth daily  10/5/15  Yes Ellen Walters MD   dapsone 100 MG tablet TAKE ONE TABLET BY MOUTH DAILY 9/16/19   Tiffanie Meng DO     Coumadin Use Last 7 Days:  no  Antiplatelet drug therapy use last 7 days: no  Other anticoagulant use last 7 days: no  Additional Medication Information:        Pre-Sedation Documentation and Exam:   I have reviewed the patient's history and review of systems.     Mallampati Airway Assessment:  normal neck range of motion    Prior History of Anesthesia Complications:   none    ASA Classification:  Class 2 - A normal healthy patient with mild systemic disease    Sedation/ Anesthesia Plan:   intravenous sedation    Medications Planned:   midazolam (Versed) intravenously and fentanyl intravenously    Patient is an appropriate candidate for plan of sedation: yes    Electronically signed by Saulo Gallagher MD on 9/21/2020 at 1:16 PM

## 2020-09-21 NOTE — PLAN OF CARE
Problem: Falls - Risk of:  Goal: Will remain free from falls  Description: Will remain free from falls  Outcome: Met This Shift  Goal: Absence of physical injury  Description: Absence of physical injury  Outcome: Met This Shift     Problem: Pain:  Description: Pain management should include both nonpharmacologic and pharmacologic interventions. Goal: Pain level will decrease  Description: Pain level will decrease  Outcome: Met This Shift  Goal: Control of acute pain  Description: Control of acute pain  Outcome: Met This Shift  Goal: Control of chronic pain  Description: Control of chronic pain  Outcome: Met This Shift     Problem:  Activity:  Goal: Fatigue will decrease  Description: Fatigue will decrease  Outcome: Ongoing     Problem: Cardiac:  Goal: Hemodynamic stability will improve  Description: Hemodynamic stability will improve  Outcome: Ongoing     Problem: Coping:  Goal: Level of anxiety will decrease  Description: Level of anxiety will decrease  Outcome: Met This Shift  Goal: Ability to cope will improve  Description: Ability to cope will improve  Outcome: Met This Shift  Goal: Ability to establish a method of communication will improve  Description: Ability to establish a method of communication will improve  Outcome: Met This Shift     Problem: Nutritional:  Goal: Consumption of the prescribed amount of daily calories will improve  Description: Consumption of the prescribed amount of daily calories will improve  Outcome: Ongoing     Problem: Respiratory:  Goal: Ability to maintain adequate ventilation will improve  Description: Ability to maintain adequate ventilation will improve  Outcome: Ongoing  Goal: Complications related to the disease process, condition or treatment will be avoided or minimized  Description: Complications related to the disease process, condition or treatment will be avoided or minimized  Outcome: Ongoing     Problem: Skin Integrity:  Goal: Risk for impaired skin integrity will decrease  Description: Risk for impaired skin integrity will decrease  Outcome: Ongoing  Goal: Will show no infection signs and symptoms  Description: Will show no infection signs and symptoms  Outcome: Ongoing  Goal: Absence of new skin breakdown  Description: Absence of new skin breakdown  Outcome: Ongoing     Problem: Restraint Use - Nonviolent/Non-Self-Destructive Behavior:  Goal: Absence of restraint indications  Description: Absence of restraint indications  Outcome: Met This Shift  Goal: Absence of restraint-related injury  Description: Absence of restraint-related injury  Outcome: Met This Shift

## 2020-09-21 NOTE — PROGRESS NOTES
Pulmonary Progress Note    CC:  Follow up ILD, hypoxia    Subjective:  On 2 liters of oxygen  Breathing is tight with ambulation       Intake/Output Summary (Last 24 hours) at 9/21/2020 0838  Last data filed at 9/21/2020 0600  Gross per 24 hour   Intake 1260 ml   Output --   Net 1260 ml         PHYSICAL EXAM:  Blood pressure 113/62, pulse 81, temperature 97.7 °F (36.5 °C), temperature source Oral, resp. rate 19, height 5' 4\" (1.626 m), weight 132 lb 4.4 oz (60 kg), SpO2 93 %, not currently breastfeeding.'  Gen: No distress. On toilet  Eyes: PERRL. No sclera icterus. No conjunctival injection. ENT: No discharge. Pharynx clear. External appearance of ears and nose normal.  Neck: Trachea midline. No obvious mass. Resp: Clear but diminished   CV: Regular rate. Regular rhythm. No murmur or rub. GI: Non-tender. Non-distended. No hernia. Skin: Warm, dry, normal texture and turgor. No nodule on exposed extremities. Lymph: No cervical LAD. No supraclavicular LAD. M/S: No cyanosis. No clubbing. No joint deformity. Neuro: Moves all four extremities. CN 2-12 tested, no defect noted.   Ext:   no edema    Medications:    Scheduled Meds:   ciprofloxacin  250 mg Oral 2 times per day    predniSONE  20 mg Oral Daily    atovaquone  1,500 mg Oral Daily    metoprolol tartrate  25 mg Oral BID    amLODIPine  5 mg Oral Daily    budesonide-formoterol  2 puff Inhalation BID    calcium-vitamin D  1 tablet Oral BID    cetirizine  10 mg Oral Daily    dicyclomine  10 mg Oral Daily    fenofibrate  160 mg Oral Daily    escitalopram  10 mg Oral Daily    gabapentin  300 mg Oral Daily    losartan  100 mg Oral Daily    montelukast  10 mg Oral Nightly    metFORMIN  1,000 mg Oral BID WC    insulin lispro  0-6 Units Subcutaneous TID WC    insulin lispro  0-3 Units Subcutaneous Nightly    sodium chloride flush  10 mL Intravenous 2 times per day    enoxaparin  30 mg Subcutaneous Daily    magnesium oxide  400 mg Oral BID Continuous Infusions:   dextrose         PRN Meds:  haloperidol lactate, ipratropium-albuterol, glucose, dextrose, glucagon (rDNA), dextrose, sodium chloride flush, acetaminophen **OR** acetaminophen, polyethylene glycol, promethazine **OR** ondansetron    Labs:  CBC:   Recent Labs     09/19/20  0904 09/20/20  0618 09/21/20  0557   WBC 9.6 11.6*  --    HGB 10.2* 10.0*  --    HCT 30.1* 30.1* 32.2*   .4* 104.0*  --     207  --      BMP:   Recent Labs     09/20/20  0618   *   K 4.9   CL 95*   CO2 27   BUN 29*   CREATININE 0.9     LIVER PROFILE:   Recent Labs     09/19/20  0738 09/20/20  0618   AST 43* 35   ALT 55* 55*   BILIDIR 0.7* 0.7*   BILITOT 2.5* 2.5*   ALKPHOS 34* 33*     PT/INR:   Recent Labs     09/21/20  0754   PROTIME 11.7   INR 1.01     APTT:   Recent Labs     09/21/20  0754   APTT 31.1     UA:  Recent Labs     09/18/20  1645   COLORU DK YELLOW   PHUR 6.0   WBCUA 397*   RBCUA 3   BACTERIA 4+*   CLARITYU CLOUDY*   SPECGRAV 1.020   LEUKOCYTESUR LARGE*   UROBILINOGEN 1.0   BILIRUBINUR SMALL*   BLOODU Negative   GLUCOSEU Negative     No results for input(s): PH, PCO2, PO2 in the last 72 hours. Films:  Chest imaging reports were reviewed and imaging was reviewed by me and showed no new imagew      Cultures:  Negative    I reviewed the labs and images listed above    Assessment:   · Acute Hypoxic Respiratory Failure with saturations less than 90% on room air  · ILD exacerbation  · Anemia      Plan:  Titrate oxygen for saturations greater than or equal to 90%  Add Duonebs q 4  Symbicort q 12  · Likely needs home oxygen evaluation at DC  · 20 mg of prednisone at DC  · Atovaquone for PJP prohylaxis while on prednisone 20 mg or greater.   Likely to need life long prednisone  · DVT prophylaxis with lovenox    Might have to go home on oxygen for the time being  Ok to DC  She follows with me          Tess Yao, DO  [facility]

## 2020-09-21 NOTE — PROGRESS NOTES
Hematology Oncology Daily Progress Note    Admit Date: 9/16/2020  Hospital day several    Subjective:     Patient has complaints of improved weakness--denies sob/cp. Medication side effects: none    Scheduled Meds:   ciprofloxacin  250 mg Oral 2 times per day    predniSONE  20 mg Oral Daily    atovaquone  1,500 mg Oral Daily    metoprolol tartrate  25 mg Oral BID    amLODIPine  5 mg Oral Daily    budesonide-formoterol  2 puff Inhalation BID    calcium-vitamin D  1 tablet Oral BID    cetirizine  10 mg Oral Daily    dicyclomine  10 mg Oral Daily    fenofibrate  160 mg Oral Daily    escitalopram  10 mg Oral Daily    gabapentin  300 mg Oral Daily    losartan  100 mg Oral Daily    montelukast  10 mg Oral Nightly    metFORMIN  1,000 mg Oral BID WC    insulin lispro  0-6 Units Subcutaneous TID WC    insulin lispro  0-3 Units Subcutaneous Nightly    sodium chloride flush  10 mL Intravenous 2 times per day    enoxaparin  30 mg Subcutaneous Daily    magnesium oxide  400 mg Oral BID     Continuous Infusions:   dextrose       PRN Meds:haloperidol lactate, ipratropium-albuterol, glucose, dextrose, glucagon (rDNA), dextrose, sodium chloride flush, acetaminophen **OR** acetaminophen, polyethylene glycol, promethazine **OR** ondansetron    Review of Systems  Pertinent items are noted in HPI. REVIEW OF SYSTEMS:         · Constitutional: Denies fever, sweats, weight loss     · Eyes: No visual changes or diplopia. No scleral icterus. · ENT: No Headaches, hearing loss or vertigo. No mouth sores or sore throat. · Cardiovascular: No chest pain, dyspnea on exertion, palpitations or loss of consciousness. · Respiratory: No cough or wheezing, no sputum production. No hemoptysis. .    · Gastrointestinal: No abdominal pain, appetite loss, blood in stools. No change in bowel habits. · Genitourinary: No dysuria, trouble voiding, or hematuria. · Musculoskeletal:  Generalized weakness.  No joint complaints. · Integumentary: No rash or pruritis. · Neurological: No headache, diplopia. No change in gait, balance, or coordination. No paresthesias. · Endocrine: No temperature intolerance. No excessive thirst, fluid intake, or urination. · Hematologic/Lymphatic: No abnormal bruising or ecchymoses, blood clots or swollen lymph nodes. · Allergic/Immunologic: No nasal congestion or hives. ·     Objective:     Patient Vitals for the past 8 hrs:   BP Temp Temp src Pulse Resp SpO2 Weight   09/21/20 0434 113/62 97.7 °F (36.5 °C) Oral 81 19 93 % 132 lb 4.4 oz (60 kg)   09/20/20 2350 130/73 98.1 °F (36.7 °C) Oral 72 18 95 % --     I/O last 3 completed shifts: In: 26 [P.O.:1260; I.V.:10]  Out: -   No intake/output data recorded.     /62   Pulse 81   Temp 97.7 °F (36.5 °C) (Oral)   Resp 19   Ht 5' 4\" (1.626 m)   Wt 132 lb 4.4 oz (60 kg)   SpO2 93%   BMI 22.71 kg/m²     General Appearance:    Alert, cooperative, no distress, appears stated age   Head:    Normocephalic, without obvious abnormality, atraumatic   Eyes:    PERRL, conjunctiva/corneas clear, EOM's intact, fundi     benign, both eyes        Ears:    Normal TM's and external ear canals, both ears   Nose:   Nares normal, septum midline, mucosa normal, no drainage    or sinus tenderness   Throat:   Lips, mucosa, and tongue normal; teeth and gums normal   Neck:   Supple, symmetrical, trachea midline, no adenopathy;        thyroid:  No enlargement/tenderness/nodules; no carotid    bruit or JVD   Back:     Symmetric, no curvature, ROM normal, no CVA tenderness   Lungs:     Clear to auscultation bilaterally, respirations unlabored   Chest wall:    No tenderness or deformity   Heart:    Regular rate and rhythm, S1 and S2 normal, no murmur, rub   or gallop   Abdomen:     Soft, non-tender, bowel sounds active all four quadrants,     no masses, no organomegaly           Extremities:   Extremities normal, atraumatic, no cyanosis or edema   Pulses:   2+

## 2020-09-21 NOTE — PROGRESS NOTES
Occupational Therapy  Attempt Note    Yahir Solorio  9/21/2020    OT orders received. OT attempted to see for therapy evaluation, but was unable to see d/t pt being out of room for bone biopsy. Will cont to follow and attempt again as therapy schedule permits.      Randall Guthrie, OTR/L [id number]

## 2020-09-21 NOTE — PROGRESS NOTES
Patient in bed asleep  at bedside and concerned about pt being NPO and . RN contacted Dr. Sivan Albert per  request, waiting for response.

## 2020-09-21 NOTE — PLAN OF CARE
Problem: Falls - Risk of:  Goal: Will remain free from falls  Description: Will remain free from falls  Outcome: Ongoing  Goal: Absence of physical injury  Description: Absence of physical injury  Outcome: Ongoing  Fall risk assessed. Precautions in place. Bed low and locked with side rails up x3. Nonskid socks on when OOB. Bed/chair alarm utilized. Tele cam in place. Call light within reach. Frequent checks performed. No falls at this time. Problem: Pain:  Description: Pain management should include both nonpharmacologic and pharmacologic interventions. Goal: Pain level will decrease  Description: Pain level will decrease  Outcome: Ongoing  Goal: Control of acute pain  Description: Control of acute pain  Outcome: Ongoing  Goal: Control of chronic pain  Description: Control of chronic pain  Outcome: Ongoing  Pain level assessed. Pt able to rate pain on a scale of 0-10. Pt denies pain at this time. Will continue to monitor for s/s of discomfort. Problem:  Activity:  Goal: Fatigue will decrease  Description: Fatigue will decrease  Outcome: Ongoing     Problem: Cardiac:  Goal: Hemodynamic stability will improve  Description: Hemodynamic stability will improve  Outcome: Ongoing     Problem: Coping:  Goal: Level of anxiety will decrease  Description: Level of anxiety will decrease  Outcome: Ongoing  Goal: Ability to cope will improve  Description: Ability to cope will improve  Outcome: Ongoing  Goal: Ability to establish a method of communication will improve  Description: Ability to establish a method of communication will improve  Outcome: Ongoing     Problem: Nutritional:  Goal: Consumption of the prescribed amount of daily calories will improve  Description: Consumption of the prescribed amount of daily calories will improve  Outcome: Ongoing     Problem: Respiratory:  Goal: Ability to maintain adequate ventilation will improve  Description: Ability to maintain adequate ventilation will improve  Outcome: Ongoing  Goal: Complications related to the disease process, condition or treatment will be avoided or minimized  Description: Complications related to the disease process, condition or treatment will be avoided or minimized  Outcome: Ongoing  Pt respiratory status assessed. No s/s of respiratory complications. Oxygen saturations >90% at all times with supplemental O2 as needed. Encourage to cough and deep breath. Encourage HHN as ordered. Will assess respiratory status every shift and PRN. Problem: Skin Integrity:  Goal: Risk for impaired skin integrity will decrease  Description: Risk for impaired skin integrity will decrease  Outcome: Ongoing  Goal: Will show no infection signs and symptoms  Description: Will show no infection signs and symptoms  Outcome: Ongoing  Goal: Absence of new skin breakdown  Description: Absence of new skin breakdown  Outcome: Ongoing  Skin assessed per protocol. Bharat score obtained. Skin kept clean, dry and warm. Encouraged pt to reposition to reduce the risk of PUs. Pillow support in place. Will continue to monitor.       Problem: Nutrition  Goal: Optimal nutrition therapy  Outcome: Ongoing

## 2020-09-22 ENCOUNTER — TELEPHONE (OUTPATIENT)
Dept: PULMONOLOGY | Age: 75
End: 2020-09-22

## 2020-09-22 VITALS
HEART RATE: 73 BPM | SYSTOLIC BLOOD PRESSURE: 118 MMHG | BODY MASS INDEX: 22.32 KG/M2 | WEIGHT: 130.73 LBS | OXYGEN SATURATION: 98 % | TEMPERATURE: 97.7 F | RESPIRATION RATE: 16 BRPM | HEIGHT: 64 IN | DIASTOLIC BLOOD PRESSURE: 68 MMHG

## 2020-09-22 LAB
GLUCOSE BLD-MCNC: 106 MG/DL (ref 70–99)
GLUCOSE BLD-MCNC: 123 MG/DL (ref 70–99)
GLUCOSE BLD-MCNC: 84 MG/DL (ref 70–99)
LACTATE DEHYDROGENASE: 242 U/L (ref 100–190)
PERFORMED ON: ABNORMAL
PERFORMED ON: ABNORMAL
PERFORMED ON: NORMAL

## 2020-09-22 PROCEDURE — 2700000000 HC OXYGEN THERAPY PER DAY

## 2020-09-22 PROCEDURE — 99239 HOSP IP/OBS DSCHRG MGMT >30: CPT | Performed by: INTERNAL MEDICINE

## 2020-09-22 PROCEDURE — 6370000000 HC RX 637 (ALT 250 FOR IP): Performed by: INTERNAL MEDICINE

## 2020-09-22 PROCEDURE — 99232 SBSQ HOSP IP/OBS MODERATE 35: CPT | Performed by: INTERNAL MEDICINE

## 2020-09-22 PROCEDURE — 6360000002 HC RX W HCPCS: Performed by: INTERNAL MEDICINE

## 2020-09-22 PROCEDURE — 2580000003 HC RX 258: Performed by: INTERNAL MEDICINE

## 2020-09-22 PROCEDURE — 1800000000 HC LEAVE OF ABSENCE

## 2020-09-22 PROCEDURE — 36415 COLL VENOUS BLD VENIPUNCTURE: CPT

## 2020-09-22 PROCEDURE — 94640 AIRWAY INHALATION TREATMENT: CPT

## 2020-09-22 PROCEDURE — 83615 LACTATE (LD) (LDH) ENZYME: CPT

## 2020-09-22 PROCEDURE — 94760 N-INVAS EAR/PLS OXIMETRY 1: CPT

## 2020-09-22 RX ORDER — BUDESONIDE AND FORMOTEROL FUMARATE DIHYDRATE 160; 4.5 UG/1; UG/1
2 AEROSOL RESPIRATORY (INHALATION) 2 TIMES DAILY
Qty: 1 INHALER | Refills: 3 | Status: SHIPPED
Start: 2020-09-22 | End: 2020-09-22 | Stop reason: HOSPADM

## 2020-09-22 RX ORDER — POLYETHYLENE GLYCOL 3350 17 G/17G
17 POWDER, FOR SOLUTION ORAL DAILY PRN
Qty: 527 G | Refills: 1 | COMMUNITY
Start: 2020-09-22 | End: 2020-10-22

## 2020-09-22 RX ORDER — GABAPENTIN 300 MG/1
300 CAPSULE ORAL DAILY
Qty: 1 CAPSULE | Refills: 0 | Status: SHIPPED
Start: 2020-09-22 | End: 2020-09-22

## 2020-09-22 RX ORDER — ATOVAQUONE 750 MG/5ML
1500 SUSPENSION ORAL DAILY
Qty: 300 ML | Refills: 3 | Status: SHIPPED | OUTPATIENT
Start: 2020-09-23 | End: 2020-10-23

## 2020-09-22 RX ORDER — CIPROFLOXACIN 250 MG/1
250 TABLET, FILM COATED ORAL EVERY 12 HOURS SCHEDULED
Qty: 8 TABLET | Refills: 0 | Status: SHIPPED | OUTPATIENT
Start: 2020-09-22 | End: 2020-09-26

## 2020-09-22 RX ORDER — PREDNISONE 10 MG/1
20 TABLET ORAL DAILY
Qty: 60 TABLET | Refills: 3 | Status: SHIPPED
Start: 2020-09-22 | End: 2020-11-02 | Stop reason: DRUGHIGH

## 2020-09-22 RX ORDER — ALBUTEROL SULFATE 90 UG/1
2 AEROSOL, METERED RESPIRATORY (INHALATION) EVERY 6 HOURS PRN
Qty: 1 INHALER | Refills: 3 | Status: SHIPPED | OUTPATIENT
Start: 2020-09-22 | End: 2020-11-03 | Stop reason: ALTCHOICE

## 2020-09-22 RX ORDER — ALENDRONATE SODIUM 70 MG/1
70 TABLET ORAL
Qty: 4 TABLET | Refills: 5 | Status: SHIPPED | OUTPATIENT
Start: 2020-09-22 | End: 2020-12-09 | Stop reason: SDUPTHER

## 2020-09-22 RX ORDER — GABAPENTIN 300 MG/1
300 CAPSULE ORAL DAILY
Qty: 30 CAPSULE | Refills: 3 | Status: SHIPPED | OUTPATIENT
Start: 2020-09-22 | End: 2020-10-23 | Stop reason: SDUPTHER

## 2020-09-22 RX ORDER — DICYCLOMINE HYDROCHLORIDE 10 MG/1
CAPSULE ORAL
Qty: 30 CAPSULE | Refills: 3 | Status: SHIPPED | OUTPATIENT
Start: 2020-09-22 | End: 2020-10-23 | Stop reason: SDUPTHER

## 2020-09-22 RX ADMIN — METFORMIN HYDROCHLORIDE 1000 MG: 500 TABLET ORAL at 09:42

## 2020-09-22 RX ADMIN — IPRATROPIUM BROMIDE AND ALBUTEROL SULFATE 1 AMPULE: .5; 3 SOLUTION RESPIRATORY (INHALATION) at 08:16

## 2020-09-22 RX ADMIN — SODIUM CHLORIDE, PRESERVATIVE FREE 10 ML: 5 INJECTION INTRAVENOUS at 09:43

## 2020-09-22 RX ADMIN — LOSARTAN POTASSIUM 100 MG: 100 TABLET, FILM COATED ORAL at 09:43

## 2020-09-22 RX ADMIN — GABAPENTIN 300 MG: 300 CAPSULE ORAL at 09:42

## 2020-09-22 RX ADMIN — DICYCLOMINE HYDROCHLORIDE 10 MG: 10 CAPSULE ORAL at 09:42

## 2020-09-22 RX ADMIN — Medication 1500 MG: at 09:44

## 2020-09-22 RX ADMIN — IPRATROPIUM BROMIDE AND ALBUTEROL SULFATE 1 AMPULE: .5; 3 SOLUTION RESPIRATORY (INHALATION) at 12:11

## 2020-09-22 RX ADMIN — BUDESONIDE AND FORMOTEROL FUMARATE DIHYDRATE 2 PUFF: 160; 4.5 AEROSOL RESPIRATORY (INHALATION) at 08:16

## 2020-09-22 RX ADMIN — ENOXAPARIN SODIUM 30 MG: 30 INJECTION SUBCUTANEOUS at 09:44

## 2020-09-22 RX ADMIN — Medication 400 MG: at 09:43

## 2020-09-22 RX ADMIN — FENOFIBRATE 160 MG: 160 TABLET ORAL at 09:43

## 2020-09-22 RX ADMIN — CIPROFLOXACIN HYDROCHLORIDE 250 MG: 250 TABLET, FILM COATED ORAL at 09:42

## 2020-09-22 RX ADMIN — IPRATROPIUM BROMIDE AND ALBUTEROL SULFATE 1 AMPULE: .5; 3 SOLUTION RESPIRATORY (INHALATION) at 15:53

## 2020-09-22 RX ADMIN — OYSTER SHELL CALCIUM WITH VITAMIN D 1 TABLET: 500; 200 TABLET, FILM COATED ORAL at 09:42

## 2020-09-22 RX ADMIN — ESCITALOPRAM OXALATE 10 MG: 10 TABLET ORAL at 09:42

## 2020-09-22 RX ADMIN — PREDNISONE 20 MG: 20 TABLET ORAL at 09:43

## 2020-09-22 RX ADMIN — AMLODIPINE BESYLATE 5 MG: 5 TABLET ORAL at 09:44

## 2020-09-22 RX ADMIN — CETIRIZINE HYDROCHLORIDE 10 MG: 10 TABLET, FILM COATED ORAL at 09:42

## 2020-09-22 RX ADMIN — METOPROLOL TARTRATE 25 MG: 25 TABLET, FILM COATED ORAL at 09:42

## 2020-09-22 ASSESSMENT — ENCOUNTER SYMPTOMS
GASTROINTESTINAL NEGATIVE: 1
TROUBLE SWALLOWING: 0
COUGH: 0
SHORTNESS OF BREATH: 1
CHEST TIGHTNESS: 0
WHEEZING: 0

## 2020-09-22 ASSESSMENT — PAIN SCALES - WONG BAKER
WONGBAKER_NUMERICALRESPONSE: 0
WONGBAKER_NUMERICALRESPONSE: 0

## 2020-09-22 ASSESSMENT — PAIN SCALES - GENERAL
PAINLEVEL_OUTOF10: 0

## 2020-09-22 NOTE — PROGRESS NOTES
Hematology Oncology Daily Progress Note    Admit Date: 9/16/2020  Hospital day several    Subjective:     Patient has complaints of improved weakness--denies sob/cp. Her mental status is back to baseline and it is been a rather remarkable recovery. She denies any pain. Scheduled Meds:   ipratropium-albuterol  1 ampule Inhalation Q4H WA    ciprofloxacin  250 mg Oral 2 times per day    predniSONE  20 mg Oral Daily    atovaquone  1,500 mg Oral Daily    metoprolol tartrate  25 mg Oral BID    amLODIPine  5 mg Oral Daily    budesonide-formoterol  2 puff Inhalation BID    calcium-vitamin D  1 tablet Oral BID    cetirizine  10 mg Oral Daily    dicyclomine  10 mg Oral Daily    fenofibrate  160 mg Oral Daily    escitalopram  10 mg Oral Daily    gabapentin  300 mg Oral Daily    losartan  100 mg Oral Daily    montelukast  10 mg Oral Nightly    metFORMIN  1,000 mg Oral BID WC    insulin lispro  0-6 Units Subcutaneous TID WC    insulin lispro  0-3 Units Subcutaneous Nightly    sodium chloride flush  10 mL Intravenous 2 times per day    enoxaparin  30 mg Subcutaneous Daily    magnesium oxide  400 mg Oral BID     Continuous Infusions:   dextrose       PRN Meds:haloperidol lactate, ipratropium-albuterol, glucose, dextrose, glucagon (rDNA), dextrose, sodium chloride flush, acetaminophen **OR** acetaminophen, polyethylene glycol, promethazine **OR** ondansetron    Review of Systems  Pertinent items are noted in HPI. Objective:     Patient Vitals for the past 8 hrs:   BP Temp Temp src Pulse Resp SpO2   09/22/20 1554 -- -- -- -- 16 98 %   09/22/20 1330 118/68 97.7 °F (36.5 °C) Oral 73 18 95 %   09/22/20 1215 -- -- -- -- -- (!) 85 %   09/22/20 1212 -- -- -- -- -- 93 %     I/O last 3 completed shifts: In: 500 [P.O.:480; I.V.:20]  Out: -   No intake/output data recorded.     /68   Pulse 73   Temp 97.7 °F (36.5 °C) (Oral)   Resp 16   Ht 5' 4\" (1.626 m)   Wt 130 lb 11.7 oz (59.3 kg)   SpO2 98% 9/22/2020 at 6:21 PM

## 2020-09-22 NOTE — CARE COORDINATION
Received notice of dc home today. Spouse is aware and agreeable to dc home and he will provide transportation. Home o2 has been ordered per Dr Lj Skelton. nikolas signed per Dr Lj Skelton and pt and spouse have selected [facility] home [facility]. They are checking staff availability. Will await call back.    Electronically signed by BENITA Reyna on 9/22/2020 at 1:58 PM

## 2020-09-22 NOTE — PROGRESS NOTES
Daily    magnesium oxide  400 mg Oral BID       Continuous Infusions:   dextrose         PRN Meds:  haloperidol lactate, ipratropium-albuterol, glucose, dextrose, glucagon (rDNA), dextrose, sodium chloride flush, acetaminophen **OR** acetaminophen, polyethylene glycol, promethazine **OR** ondansetron    Labs:  CBC:   Recent Labs     09/20/20 0618 09/21/20  0557   WBC 11.6*  --    HGB 10.0*  --    HCT 30.1* 32.2*   .0*  --      --      BMP:   Recent Labs     09/20/20 0618   *   K 4.9   CL 95*   CO2 27   BUN 29*   CREATININE 0.9     LIVER PROFILE:   Recent Labs     09/20/20 0618   AST 35   ALT 55*   BILIDIR 0.7*   BILITOT 2.5*   ALKPHOS 33*     PT/INR:   Recent Labs     09/21/20  0754   PROTIME 11.7   INR 1.01     APTT:   Recent Labs     09/21/20  0754   APTT 31.1     UA:  No results for input(s): NITRITE, COLORU, PHUR, LABCAST, WBCUA, RBCUA, MUCUS, TRICHOMONAS, YEAST, BACTERIA, CLARITYU, SPECGRAV, LEUKOCYTESUR, UROBILINOGEN, BILIRUBINUR, BLOODU, GLUCOSEU, AMORPHOUS in the last 72 hours. Invalid input(s): Marilyn Tom  No results for input(s): PH, PCO2, PO2 in the last 72 hours.         Films:  Chest imaging reports were reviewed and imaging was reviewed by me and showed no new imagew      Cultures:  Negative    I reviewed the labs and images listed above    Assessment:   · Acute Hypoxic Respiratory Failure with saturations less than 90% on room air  · ILD exacerbation  · Anemia      Plan:  Titrate oxygen for saturations greater than or equal to 90%  Home oxygen assessment   Duonebs q 4  Symbicort q 12  · 20 mg of prednisone at DC with atovaquone while on prednisone 20 mg or greater  · DVT prophylaxis with lovenox    Ok to DC   I will arrange follow up with me          Yanet Padgett DO  [facility]monary

## 2020-09-22 NOTE — CARE COORDINATION
9/22 Call received from [facility] regarding Duc Garcia. She has decided that she does want [facility] afterall. CM will attempt to email [facility] list for choice. Electronically signed by Tiana oRllins RN on 9/22/2020 at 11:30 AM

## 2020-09-22 NOTE — PROGRESS NOTES
CLINICAL PHARMACY NOTE: MEDS TO [facility] Select Patient?: No  Total # of Prescriptions Filled: 6   The following medications were delivered to the patient:  Current Discharge Medication List      START taking these medications    Details   ciprofloxacin (CIPRO) 250 MG tablet Take 1 tablet by mouth every 12 hours for 4 days  Qty: 8 tablet, Refills: 0      polyethylene glycol (GLYCOLAX) 17 g packet Take 17 g by mouth daily as needed for Constipation  Qty: 527 g, Refills: 1      atovaquone (MEPRON) 750 MG/5ML suspension Take 10 mLs by mouth daily  Qty: 300 mL, Refills: 3      mometasone-formoterol (DULERA) 200-5 MCG/ACT inhaler Inhale 2 puffs into the lungs every 12 hours  Qty: 1 Inhaler, Refills: 0         · Dicyclomine 10mg caps  · Prednisone 10mg tabs  · Gabapentin 300mg caps  ·   Total # of Interventions Completed: 0  Time Spent (min): 30    Additional Documentation:    Only had 210mL of atovaquone. Informed patient she will need to have the remainder transferred out or call us for refill.

## 2020-09-22 NOTE — PROGRESS NOTES
This RN prepared pt for discharge to home at [phone]. This RN provided discharge education regarding medication regimen, and home care. Pt. Verbalized understanding. Denies questions. No LDAs present at discharge. Patient recieved oxygen tank with instructions for home. Discontinued IV without complications. Pt. tolerated well. Pt is being transported to home with .

## 2020-09-22 NOTE — PLAN OF CARE
Problem: Falls - Risk of:  Goal: Will remain free from falls  Description: Will remain free from falls  9/22/2020 1156 by Brady Paulino RN  Outcome: Met This Shift     Problem: Falls - Risk of:  Goal: Absence of physical injury  Description: Absence of physical injury  Outcome: Met This Shift     Problem: Pain:  Description: Pain management should include both nonpharmacologic and pharmacologic interventions. Goal: Pain level will decrease  Description: Pain level will decrease  9/22/2020 1156 by Brady Paulino RN  Outcome: Met This Shift     Problem: Pain:  Description: Pain management should include both nonpharmacologic and pharmacologic interventions. Goal: Control of acute pain  Description: Control of acute pain  Outcome: Met This Shift     Problem: Pain:  Description: Pain management should include both nonpharmacologic and pharmacologic interventions. Goal: Control of chronic pain  Description: Control of chronic pain  Outcome: Met This Shift     Problem: Coping:  Goal: Level of anxiety will decrease  Description: Level of anxiety will decrease  Outcome: Met This Shift     Problem: Coping:  Goal: Ability to cope will improve  Description: Ability to cope will improve  Outcome: Met This Shift     Problem: Coping:  Goal: Ability to establish a method of communication will improve  Description: Ability to establish a method of communication will improve  Outcome: Met This Shift     Problem: Skin Integrity:  Goal: Risk for impaired skin integrity will decrease  Description: Risk for impaired skin integrity will decrease  9/22/2020 1156 by Brady Paulino RN  Outcome: Met This Shift  9/21/2020 2307 by Laverne Torres RN  Note: Pt will show no signs of skin breakdown this shift.       Problem: Skin Integrity:  Goal: Will show no infection signs and symptoms  Description: Will show no infection signs and symptoms  Outcome: Met This Shift     Problem: Skin Integrity:  Goal: Absence of new skin breakdown  Description: Absence of new skin breakdown  Outcome: Met This Shift     Problem: Activity:  Goal: Fatigue will decrease  Description: Fatigue will decrease  Outcome: Ongoing     Problem: Cardiac:  Goal: Hemodynamic stability will improve  Description: Hemodynamic stability will improve  Outcome: Ongoing     Problem: Nutritional:  Goal: Consumption of the prescribed amount of daily calories will improve  Description: Consumption of the prescribed amount of daily calories will improve  Outcome: Ongoing     Problem: Respiratory:  Goal: Ability to maintain adequate ventilation will improve  Description: Ability to maintain adequate ventilation will improve  Outcome: Ongoing  Goal: Complications related to the disease process, condition or treatment will be avoided or minimized  Description: Complications related to the disease process, condition or treatment will be avoided or minimized  Outcome: Ongoing     Problem: Nutrition  Goal: Optimal nutrition therapy  Outcome: Ongoing     Problem: Restraint Use - Nonviolent/Non-Self-Destructive Behavior:  Goal: Absence of restraint indications  Description: Absence of restraint indications  9/22/2020 1156 by Breezy Paul, RN  Outcome: Completed  9/21/2020 2307 by Kymberly Crane RN  Note: Pt will remain out of restraints this shift. Will continue to monitor.    Goal: Absence of restraint-related injury  Description: Absence of restraint-related injury  Outcome: Completed

## 2020-09-22 NOTE — DISCHARGE INSTR - COC
Continuity of Care Form    Patient Name: Romain Ridley   :    MRN:  [id number]    Admit date:  2020  Discharge date:  2020    Code Status Order: Full Code   Advance Directives:   [facility] Documentation       Date/Time Healthcare Directive Type of Healthcare Directive Copy in [facility] [address] Agent's Name Healthcare Agent's Phone Number    [phone]  Yes, patient has an advance directive for healthcare treatment  Durable power of  for health care  No, copy requested from family  --  --  --            Admitting Physician:  Cindy Jackson MD  PCP: Cindy Jackson MD    Discharging Nurse: Charity Tse [address] Unit/Room#: [address] Unit Phone Number: [phone]      Emergency Contact:   Extended Emergency Contact Information  Primary Emergency Contact: Margaret Deshpande  Address: [address] [place] [address] Phone: [phone]  Mobile Phone: [phone]  Relation: Spouse   needed? No    Past Surgical History:  Past Surgical History:   Procedure Laterality Date    BRONCHOSCOPY N/A 2018    BRONCHOSCOPY WITH BAL performed by Arianna Marquez DO at [address] [place] [address]      DR. Jean-no polyps    COLONOSCOPY  2016    normal-DR. Ramsay    CT BONE MARROW BIOPSY  2020    CT BONE MARROW BIOPSY 2020 [facility] CT    HYSTERECTOMY      partial    LIPOMA RESECTION      abdominal wall    OVARY REMOVAL      left    POLYPECTOMY         Immunization History:   Immunization History   Administered Date(s) Administered    Influenza Vaccine, unspecified formulation 11/10/2015    Influenza Whole 10/22/2010    Influenza, High Dose (Fluzone 65 yrs and older) 2011, 2012, 2016, 2017, 10/15/2018    Influenza, Triv, inactivated, subunit, adjuvanted, IM (Fluad 65 yrs and older) 11/08/2019    Pneumococcal Conjugate 13-valent ([id number]) 08/05/2016    Pneumococcal Polysaccharide ([id number]) 09/01/2007, 09/06/2013, 11/08/2019    Td, unspecified formulation 06/18/2014       Active Problems:  Patient Active Problem List   Diagnosis Code    Diverticulosis of large intestine K57.30    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 M70.72    Low back pain M54.5    Lumbar disc herniation with radiculopathy M51.16    Degenerative disc disease, lumbar M51.36    Tendonitis-3rd flexor tendon left hand.  M77.9    Depression F32.9    Allergic dermatitis L23.9    Trigger finger, left ring finger M65.342    COPD (chronic obstructive pulmonary disease) ([facility]) J44.9    Rib pain on right side R07.81    Trochanteric bursitis of left hip M70.62    Osteopenia of multiple sites M85.89    Abnormal CT of the chest R93.89    ILD (interstitial lung disease) ([facility]) J84.9    Weight loss R63.4    Type 2 diabetes mellitus without complication, without long-term current use of insulin ([facility]) E11.9    compression fracture -T11 S22.080A, S32.010A    Personal history of steroid therapy Z92.241    Post-menopausal Z78.0    Urinary frequency R35.0    Acute hypoxemic respiratory failure ([facility]) J96.01    Rectal bleeding K62.5    RLQ abdominal pain R10.31    Acute delirium R41.0    Other acquired hemolytic anemias ([facility])-due drug  Dapsone D59.8    Acute cystitis without hematuria N30.00    Anemia D64.9       Isolation/Infection:   Isolation            No Isolation          Patient Infection Status       None to display            Nurse Assessment:  Last Vital Signs: /69   Pulse 89   Temp 98 °F (36.7 °C) (Oral)   Resp 18   Ht 5' 4\" (1.626 m)   Wt 130 lb 11.7 oz (59.3 kg)   SpO2 95%   BMI 22.44 kg/m²     Last documented pain score (0-10 scale): Pain Level: 0  Last Weight:   Wt Readings from Last 1 Encounters:   09/22/20 130 lb 11.7 oz (59.3 kg)     Mental Status:  oriented and alert    IV Access:  - None    Nursing Mobility/ADLs:  Walking   Assisted  Transfer  Independent  Bathing  Assisted  Dressing  Independent  Toileting  Independent  Feeding  Independent  Med Admin  Assisted  Med Delivery   whole    Wound Care Documentation and Therapy:  Puncture 09/21/20 Buttocks Right (Active)   Number of days: 0        Elimination:  Continence:   · Bowel: Yes  · Bladder: Yes  Urinary Catheter: None   Colostomy/Ileostomy/Ileal Conduit: No       Date of Last BM: 09/21/2020    Intake/Output Summary (Last 24 hours) at 9/22/2020 1112  Last data filed at 9/22/2020 0943  Gross per 24 hour   Intake 500 ml   Output --   Net 500 ml     I/O last 3 completed shifts: In: 250 [P.O.:240; I.V.:10]  Out: -     Safety Concerns: At Risk for Falls    Impairments/Disabilities:      None    Nutrition Therapy:  Current Nutrition Therapy:   - Oral Diet:  Carb Control 4 carbs/meal (1800kcals/day) and Low Sodium (3-4gm)    Routes of Feeding: Oral  Liquids: No Restrictions  Daily Fluid Restriction: no  Last Modified Barium Swallow with Video (Video Swallowing Test): not done    Treatments at the Time of Hospital Discharge:   Respiratory Treatments: symbicort, duoneb   Oxygen Therapy:  is on oxygen at 3 L/min per nasal cannula.   Ventilator:    - No ventilator support    Rehab Therapies: Physical Therapy and Occupational Therapy  Weight Bearing Status/Restrictions: No weight bearing restirctions  Other Medical Equipment (for information only, NOT a DME order):  walker  Other Treatments: N/A    Patient's personal belongings (please select all that are sent with patient):  Dentures lower pajamas shoes    RN SIGNATURE:  Electronically signed by Brady Paulino RN on 9/22/20 at 5:09 PM EDT    CASE MANAGEMENT/SOCIAL WORK SECTION    Inpatient Status Date: ***    Readmission Risk Assessment Score:  Readmission Risk              Risk of Unplanned Readmission:        24           [facility]charg[facility]   [facility]   · Address:  · Phone:[phone]  · Fax:[phone]    Dialysis Facility (if applicable)   · Name:  · Address:  · Dialysis Schedule:  · Phone:  · Fax:    / signature: Electronically signed by BENITA Rome on 9/22/2020 at 12:08 PM    PHYSICIAN SECTION    Prognosis: guarded    Condition at Discharge: improved    Rehab Potential (if transferring to Rehab): fair to good    Recommended Labs or Other Treatments After Discharge: continuous home oxygen 3 LPM by canula and need portable unit also  See DR. Mellisa Carranza in office in 1 week. RN for assessment. Arrange for appointments with DR. Solis,DR. Yesenia Saucedo and DR. Carmela Guthrie    Physician Certification: I certify the above information and transfer of Julian Cormier  is necessary for the continuing treatment of the diagnosis listed and that she requires oxygen for greater 30 days.      Update Admission H&P: No change in H&P    PHYSICIAN SIGNATURE:  Electronically signed by Tierra Delgado MD on 9/22/20 at 11:15 AM EDT

## 2020-09-22 NOTE — PLAN OF CARE
Problem: Falls - Risk of:  Goal: Will remain free from falls  Description: Will remain free from falls  9/21/2020 2307 by Saige Sanders RN  Note: Pt remains free from falls this shift. Fall precautions in place. Will continue to monitor. 9/21/2020 1534 by Caitlin Pruitt RN  Outcome: Met This Shift  Goal: Absence of physical injury  Description: Absence of physical injury  9/21/2020 1534 by Caitlin Pruitt RN  Outcome: Met This Shift     Problem: Pain:  Goal: Pain level will decrease  Description: Pain level will decrease  9/21/2020 2307 by Saige Sanders RN  Note: Pt will report a decrease in pain this shift. Pain will be assessed frequently and treated per order. 9/21/2020 1534 by Caitlin Pruitt RN  Outcome: Met This Shift  Goal: Control of acute pain  Description: Control of acute pain  9/21/2020 1534 by Caitlin Pruitt RN  Outcome: Met This Shift  Goal: Control of chronic pain  Description: Control of chronic pain  9/21/2020 1534 by Caitlin Pruitt RN  Outcome: Met This Shift     Problem: Skin Integrity:  Goal: Risk for impaired skin integrity will decrease  Description: Risk for impaired skin integrity will decrease  9/21/2020 2307 by Saige Sanders RN  Note: Pt will show no signs of skin breakdown this shift.   9/21/2020 1534 by Caitlin Pruitt RN  Outcome: Ongoing  Goal: Will show no infection signs and symptoms  Description: Will show no infection signs and symptoms  9/21/2020 1534 by Caitlin Pruitt RN  Outcome: Ongoing  Goal: Absence of new skin breakdown  Description: Absence of new skin breakdown  9/21/2020 1534 by Caitlin Pruitt RN  Outcome: Ongoing     Problem: Restraint Use - Nonviolent/Non-Self-Destructive Behavior:  Goal: Absence of restraint indications  Description: Absence of restraint indications  9/21/2020 2307 by Saige Sanders RN  Note: Pt will remain out of restraints this shift. Will continue to monitor.    9/21/2020 1534 by Caitlin Pruitt RN  Outcome: Met This Shift  Goal: Absence of

## 2020-09-22 NOTE — TELEPHONE ENCOUNTER
Mercer Lesch, DO [facility] Staff               Patient needs hospital follow up in 2-4 weeks.  No PFTs      Scheduled 10/19/20

## 2020-09-22 NOTE — PROGRESS NOTES
IM Progress Note      Subjective: The patient is feeling better and walks with walker and need not need much help using walker ,getting out of bed or for her ADLs-as per her nurse    She denies any pain and feels her breathing is better even with exertion  Has no other cp gi or gu symptoms  Has no abdominal pain or rectal bleeding and appetite is good    Review of Systems:  Review of Systems   Constitutional: Positive for unexpected weight change. Negative for activity change, appetite change, chills, fatigue and fever. HENT: Negative for trouble swallowing. Respiratory: Positive for shortness of breath (only with exertion). Negative for cough, chest tightness and wheezing. Cardiovascular: Negative for chest pain, palpitations and leg swelling. Gastrointestinal: Negative. Genitourinary: Negative. Neurological: Negative for dizziness, light-headedness and headaches. Objective:    /69   Pulse 89   Temp 98 °F (36.7 °C) (Oral)   Resp 18   Ht 5' 4\" (1.626 m)   Wt 130 lb 11.7 oz (59.3 kg)   SpO2 95%   BMI 22.44 kg/m²     Intake/Output Summary (Last 24 hours) at 9/22/2020 1054  Last data filed at 9/22/2020 0943  Gross per 24 hour   Intake 500 ml   Output --   Net 500 ml         Physical Exam  Vitals signs and nursing note reviewed. Constitutional:       General: She is not in acute distress. HENT:      Mouth/Throat:      Mouth: Mucous membranes are moist.      Pharynx: Oropharynx is clear. Eyes:      General: No scleral icterus. Extraocular Movements: Extraocular movements intact. Conjunctiva/sclera: Conjunctivae normal.      Pupils: Pupils are equal, round, and reactive to light. Neck:      Thyroid: No thyromegaly. Vascular: No carotid bruit. Cardiovascular:      Rate and Rhythm: Normal rate and regular rhythm. Pulses: Normal pulses. Heart sounds: Normal heart sounds. No murmur. No gallop. Pulmonary:      Effort: No respiratory distress. Breath sounds: Normal breath sounds. No wheezing, rhonchi or rales. Abdominal:      General: Bowel sounds are normal. There is no distension. Palpations: Abdomen is soft. There is no hepatomegaly, splenomegaly or mass. Tenderness: There is no abdominal tenderness. Musculoskeletal:      Right lower leg: No edema. Left lower leg: No edema. Lymphadenopathy:      Cervical: No cervical adenopathy. Neurological:      Mental Status: She is alert and oriented to person, place, and time.              CBC:   Recent Labs     09/20/20 0618   WBC 11.6*   HGB 10.0*        BMP:    Recent Labs     09/20/20 0618   *   K 4.9   CL 95*   CO2 27   BUN 29*   CREATININE 0.9   GLUCOSE 84     Hepatic:   Recent Labs     09/20/20 0618   AST 35   ALT 55*   BILITOT 2.5*   ALKPHOS 33*     INR:   Recent Labs     09/21/20  0754   INR 1.01        Glucose:    Recent Labs     09/20/20 0618   GLUCOSE 84              Assessment/Plan:    Active Hospital Problems    Diagnosis Date Noted    Acute cystitis without hematuria [N30.00]-complete cipro 09/19/2020    Anemia [D64.9]-w/u in progress and BM biopsy report pending     RLQ abdominal pain [R10.31]-no recurrence 09/18/2020    Acute delirium [R41.0]-improving 09/18/2020    Other acquired hemolytic anemias (HCC)-due drug  Dapsone [D59.8]-stable and will follow up with hematology as OPT 09/18/2020    Rectal bleeding [K62.5]-tracey colonoscopy as opt and she will contact [facility] for scheduling when ok with her pulmonary status  09/17/2020    Acute hypoxemic respiratory failure ([facility] [J96.01]-improved and will be on oxygen all the time 09/16/2020    Type 2 diabetes mellitus without complication, without long-term current use of insulin (HCC) [E11.9]-controlled 05/15/2020    ILD (interstitial lung disease) ([address] [J84.9]-will be on steroids  12/21/2018    COPD (chronic obstructive pulmonary disease) ([facility] [J44.9]-stable 11/29/2016    Depression [F32.9]-stable

## 2020-09-22 NOTE — PROGRESS NOTES
Pt resting in bed comfortably at this time with eyes closed. Shift assessment and PM medications complete. Pt states minor headache but does no request pain medications at this time. Tele sitter in place for confusion and fall risk. No further needs voiced. Call light in reach. Bed alarm on and in lowest position. Will continue to monitor.

## 2020-09-22 NOTE — PROGRESS NOTES
[facility]    Respiratory Therapy   Home Oxygen Evaluation        Name: Franki Johnson Record Number: [id number]  Age: 67 y.o. Gender:  female   : 1945  Today's date: 2020  Room: J9Q-6713/4116-01      Assessment        /69   Pulse 89   Temp 98 °F (36.7 °C) (Oral)   Resp 18   Ht 5' 4\" (1.626 m)   Wt 130 lb 11.7 oz (59.3 kg)   SpO2 (!) 85%   BMI 22.44 kg/m²     Patient Active Problem List   Diagnosis    Diverticulosis of large intestine    Postinflammatory pulmonary fibrosis (HCC)    Essential hypertension    Irritable bowel syndrome    Osteoporosis    Nocturia    Thoracic or lumbosacral neuritis or radiculitis, unspecified    Hyperlipidemia    Synovial cyst of popliteal space    Enthesopathy of hip region    Peripheral neuropathy,both lower extremities    Left lumbar radiculopathy-L4    Bursitis of left hip,trochanteric.  Low back pain    Lumbar disc herniation with radiculopathy    Degenerative disc disease, lumbar    Tendonitis-3rd flexor tendon left hand.     Depression    Allergic dermatitis    Trigger finger, left ring finger    COPD (chronic obstructive pulmonary disease) (HCC)    Rib pain on right side    Trochanteric bursitis of left hip    Osteopenia of multiple sites    Abnormal CT of the chest    ILD (interstitial lung disease) ([address]    Weight loss    Type 2 diabetes mellitus without complication, without long-term current use of insulin (HCC)    compression fracture -T11    Personal history of steroid therapy    Post-menopausal    Urinary frequency    Acute hypoxemic respiratory failure (HCC)    Rectal bleeding    RLQ abdominal pain    Acute delirium    Other acquired hemolytic anemias (HCC)-due drug  Dapsone    Acute cystitis without hematuria    Anemia       Social History:  Social History     Tobacco Use    Smoking status: Never Smoker    Smokeless tobacco: Never Used   Substance Use Topics    Alcohol use: Yes    Drug use: No       Patient Room Air saturation at rest 85  %      Oxygen saturations of 88% or less on RA qualifies patient for Home Oxygen    Patient resting on 3  lmp  with an oxygen saturation of  93 %         Qualifying patient for home oxygen with ambulation and continuous flow  @ 3 lpm.      In your clinical assessment does the Patient Require Portable Oxygen Tanks?     Yes              [employer] care [employer] contacted to follow care of patients home oxygen needs on 9/22/2020 at 12:17 PM    Patient/caregiver was educated on Home Oxygen process:  Yes      Level of patient/caregiver understanding able to:   [] Verbalize understanding   [] Demonstrate understanding       [] Teach back        [] Needs reinforcement        []  No available caregiver               []  Other:     Response to education:  Good     Time Spent with Home O2 Set Up:  10  minutes     Osiris Che RCP on 9/22/2020 at 12:17 PM

## 2020-09-23 ENCOUNTER — CARE COORDINATION (OUTPATIENT)
Dept: CASE MANAGEMENT | Age: 75
End: 2020-09-23

## 2020-09-23 ENCOUNTER — HOSPITAL ENCOUNTER (INPATIENT)
Age: 75
LOS: 2 days | Discharge: HOME | DRG: 189 | End: 2020-09-25
Attending: STUDENT IN AN ORGANIZED HEALTH CARE EDUCATION/TRAINING PROGRAM | Admitting: INTERNAL MEDICINE
Payer: MEDICARE

## 2020-09-23 ENCOUNTER — APPOINTMENT (OUTPATIENT)
Dept: GENERAL RADIOLOGY | Age: 75
DRG: 189 | End: 2020-09-23
Payer: MEDICARE

## 2020-09-23 DIAGNOSIS — R09.02 HYPOXIA: ICD-10-CM

## 2020-09-23 DIAGNOSIS — R06.89 DYSPNEA AND RESPIRATORY ABNORMALITIES: ICD-10-CM

## 2020-09-23 DIAGNOSIS — J44.1 COPD EXACERBATION (HCC): Primary | ICD-10-CM

## 2020-09-23 DIAGNOSIS — R06.00 DYSPNEA AND RESPIRATORY ABNORMALITIES: ICD-10-CM

## 2020-09-23 PROBLEM — J96.01 ACUTE RESPIRATORY FAILURE WITH HYPOXIA (HCC): Status: ACTIVE | Noted: 2020-09-23

## 2020-09-23 LAB
A/G RATIO: 1.5 (ref 1.1–2.2)
ALBUMIN SERPL-MCNC: 3.3 G/DL (ref 3.1–4.9)
ALBUMIN SERPL-MCNC: 4.3 G/DL (ref 3.4–5)
ALP BLD-CCNC: 42 U/L (ref 40–129)
ALPHA-1-GLOBULIN: 0.3 G/DL (ref 0.2–0.4)
ALPHA-2-GLOBULIN: 0.6 G/DL (ref 0.4–1.1)
ALT SERPL-CCNC: 42 U/L (ref 10–40)
ANION GAP SERPL CALCULATED.3IONS-SCNC: 19 MMOL/L (ref 3–16)
AST SERPL-CCNC: 35 U/L (ref 15–37)
BASE EXCESS VENOUS: 2.5 MMOL/L
BASOPHILS ABSOLUTE: 0 K/UL (ref 0–0.2)
BASOPHILS RELATIVE PERCENT: 0.3 %
BETA GLOBULIN: 1 G/DL (ref 0.9–1.6)
BILIRUB SERPL-MCNC: 2.7 MG/DL (ref 0–1)
BUN BLDV-MCNC: 27 MG/DL (ref 7–20)
CALCIUM SERPL-MCNC: 10.5 MG/DL (ref 8.3–10.6)
CARBOXYHEMOGLOBIN: 1 %
CHLORIDE BLD-SCNC: 91 MMOL/L (ref 99–110)
CO2: 20 MMOL/L (ref 21–32)
CREAT SERPL-MCNC: 1 MG/DL (ref 0.6–1.2)
EOSINOPHILS ABSOLUTE: 0 K/UL (ref 0–0.6)
EOSINOPHILS RELATIVE PERCENT: 0 %
GAMMA GLOBULIN: 0.7 G/DL (ref 0.6–1.8)
GFR AFRICAN AMERICAN: >60
GFR NON-AFRICAN AMERICAN: 54
GLOBULIN: 2.8 G/DL
GLUCOSE BLD-MCNC: 146 MG/DL (ref 70–99)
GLUCOSE BLD-MCNC: 190 MG/DL (ref 70–99)
HCO3 VENOUS: 26 MMOL/L (ref 23–29)
HCT VFR BLD CALC: 35 % (ref 36–48)
HEMOGLOBIN: 11.9 G/DL (ref 12–16)
LYMPHOCYTES ABSOLUTE: 1.9 K/UL (ref 1–5.1)
LYMPHOCYTES RELATIVE PERCENT: 18.6 %
MCH RBC QN AUTO: 36.8 PG (ref 26–34)
MCHC RBC AUTO-ENTMCNC: 34.1 G/DL (ref 31–36)
MCV RBC AUTO: 108 FL (ref 80–100)
METHEMOGLOBIN VENOUS: 3 %
MONOCYTES ABSOLUTE: 0.5 K/UL (ref 0–1.3)
MONOCYTES RELATIVE PERCENT: 5.1 %
NEUTROPHILS ABSOLUTE: 7.7 K/UL (ref 1.7–7.7)
NEUTROPHILS RELATIVE PERCENT: 76 %
O2 CONTENT, VEN: 4 ML/DL
O2 SAT, VEN: 22 %
O2 THERAPY: ABNORMAL
PCO2, VEN: 33.6 MMHG (ref 40–50)
PDW BLD-RTO: 18.2 % (ref 12.4–15.4)
PERFORMED ON: ABNORMAL
PH VENOUS: 7.49 (ref 7.35–7.45)
PLATELET # BLD: 252 K/UL (ref 135–450)
PMV BLD AUTO: 8.7 FL (ref 5–10.5)
PO2, VEN: 16 MMHG
POTASSIUM REFLEX MAGNESIUM: 4.6 MMOL/L (ref 3.5–5.1)
PRO-BNP: 152 PG/ML (ref 0–449)
PROCALCITONIN: 0.07 NG/ML (ref 0–0.15)
RBC # BLD: 3.24 M/UL (ref 4–5.2)
SODIUM BLD-SCNC: 130 MMOL/L (ref 136–145)
SPE/IFE INTERPRETATION: NORMAL
TCO2 CALC VENOUS: 27 MMOL/L
TOTAL PROTEIN: 5.8 G/DL (ref 6.4–8.2)
TOTAL PROTEIN: 7.1 G/DL (ref 6.4–8.2)
TROPONIN: <0.01 NG/ML
WBC # BLD: 10.1 K/UL (ref 4–11)

## 2020-09-23 PROCEDURE — 94640 AIRWAY INHALATION TREATMENT: CPT

## 2020-09-23 PROCEDURE — 87088 URINE BACTERIA CULTURE: CPT

## 2020-09-23 PROCEDURE — 85025 COMPLETE CBC W/AUTO DIFF WBC: CPT

## 2020-09-23 PROCEDURE — 71045 X-RAY EXAM CHEST 1 VIEW: CPT

## 2020-09-23 PROCEDURE — 81001 URINALYSIS AUTO W/SCOPE: CPT

## 2020-09-23 PROCEDURE — 96375 TX/PRO/DX INJ NEW DRUG ADDON: CPT

## 2020-09-23 PROCEDURE — 93005 ELECTROCARDIOGRAM TRACING: CPT | Performed by: STUDENT IN AN ORGANIZED HEALTH CARE EDUCATION/TRAINING PROGRAM

## 2020-09-23 PROCEDURE — 36415 COLL VENOUS BLD VENIPUNCTURE: CPT

## 2020-09-23 PROCEDURE — 87086 URINE CULTURE/COLONY COUNT: CPT

## 2020-09-23 PROCEDURE — 2000000000 HC ICU R&B

## 2020-09-23 PROCEDURE — 87186 SC STD MICRODIL/AGAR DIL: CPT

## 2020-09-23 PROCEDURE — 99285 EMERGENCY DEPT VISIT HI MDM: CPT

## 2020-09-23 PROCEDURE — 84484 ASSAY OF TROPONIN QUANT: CPT

## 2020-09-23 PROCEDURE — 6360000002 HC RX W HCPCS: Performed by: STUDENT IN AN ORGANIZED HEALTH CARE EDUCATION/TRAINING PROGRAM

## 2020-09-23 PROCEDURE — 87077 CULTURE AEROBIC IDENTIFY: CPT

## 2020-09-23 PROCEDURE — 80053 COMPREHEN METABOLIC PANEL: CPT

## 2020-09-23 PROCEDURE — 84145 PROCALCITONIN (PCT): CPT

## 2020-09-23 PROCEDURE — 6370000000 HC RX 637 (ALT 250 FOR IP): Performed by: STUDENT IN AN ORGANIZED HEALTH CARE EDUCATION/TRAINING PROGRAM

## 2020-09-23 PROCEDURE — 82803 BLOOD GASES ANY COMBINATION: CPT

## 2020-09-23 PROCEDURE — 94660 CPAP INITIATION&MGMT: CPT

## 2020-09-23 PROCEDURE — 83880 ASSAY OF NATRIURETIC PEPTIDE: CPT

## 2020-09-23 PROCEDURE — 1111F DSCHRG MED/CURRENT MED MERGE: CPT

## 2020-09-23 PROCEDURE — 96365 THER/PROPH/DIAG IV INF INIT: CPT

## 2020-09-23 RX ORDER — SODIUM CHLORIDE 0.9 % (FLUSH) 0.9 %
10 SYRINGE (ML) INJECTION EVERY 12 HOURS SCHEDULED
Status: DISCONTINUED | OUTPATIENT
Start: 2020-09-24 | End: 2020-09-25 | Stop reason: HOSPADM

## 2020-09-23 RX ORDER — LOSARTAN POTASSIUM 100 MG/1
100 TABLET ORAL DAILY
Status: DISCONTINUED | OUTPATIENT
Start: 2020-09-24 | End: 2020-09-25 | Stop reason: HOSPADM

## 2020-09-23 RX ORDER — DEXAMETHASONE SODIUM PHOSPHATE 4 MG/ML
10 INJECTION, SOLUTION INTRA-ARTICULAR; INTRALESIONAL; INTRAMUSCULAR; INTRAVENOUS; SOFT TISSUE ONCE
Status: COMPLETED | OUTPATIENT
Start: 2020-09-23 | End: 2020-09-23

## 2020-09-23 RX ORDER — BUDESONIDE AND FORMOTEROL FUMARATE DIHYDRATE 160; 4.5 UG/1; UG/1
2 AEROSOL RESPIRATORY (INHALATION) 2 TIMES DAILY
Status: DISCONTINUED | OUTPATIENT
Start: 2020-09-24 | End: 2020-09-25 | Stop reason: HOSPADM

## 2020-09-23 RX ORDER — FENOFIBRATE 160 MG/1
160 TABLET ORAL DAILY
Status: DISCONTINUED | OUTPATIENT
Start: 2020-09-24 | End: 2020-09-25 | Stop reason: HOSPADM

## 2020-09-23 RX ORDER — ATOVAQUONE 750 MG/5ML
1500 SUSPENSION ORAL DAILY
Status: DISCONTINUED | OUTPATIENT
Start: 2020-09-24 | End: 2020-09-25 | Stop reason: HOSPADM

## 2020-09-23 RX ORDER — MONTELUKAST SODIUM 10 MG/1
10 TABLET ORAL NIGHTLY
Status: DISCONTINUED | OUTPATIENT
Start: 2020-09-24 | End: 2020-09-25 | Stop reason: HOSPADM

## 2020-09-23 RX ORDER — CIPROFLOXACIN 500 MG/1
250 TABLET, FILM COATED ORAL EVERY 12 HOURS SCHEDULED
Status: DISCONTINUED | OUTPATIENT
Start: 2020-09-24 | End: 2020-09-25

## 2020-09-23 RX ORDER — IPRATROPIUM BROMIDE AND ALBUTEROL SULFATE 2.5; .5 MG/3ML; MG/3ML
1 SOLUTION RESPIRATORY (INHALATION) ONCE
Status: COMPLETED | OUTPATIENT
Start: 2020-09-23 | End: 2020-09-23

## 2020-09-23 RX ORDER — AMLODIPINE BESYLATE 5 MG/1
5 TABLET ORAL DAILY
Status: DISCONTINUED | OUTPATIENT
Start: 2020-09-24 | End: 2020-09-25 | Stop reason: HOSPADM

## 2020-09-23 RX ORDER — ONDANSETRON 2 MG/ML
4 INJECTION INTRAMUSCULAR; INTRAVENOUS EVERY 6 HOURS PRN
Status: DISCONTINUED | OUTPATIENT
Start: 2020-09-23 | End: 2020-09-25 | Stop reason: HOSPADM

## 2020-09-23 RX ORDER — MAGNESIUM SULFATE IN WATER 40 MG/ML
2 INJECTION, SOLUTION INTRAVENOUS ONCE
Status: COMPLETED | OUTPATIENT
Start: 2020-09-23 | End: 2020-09-23

## 2020-09-23 RX ORDER — NICOTINE POLACRILEX 4 MG
15 LOZENGE BUCCAL PRN
Status: DISCONTINUED | OUTPATIENT
Start: 2020-09-23 | End: 2020-09-25 | Stop reason: HOSPADM

## 2020-09-23 RX ORDER — POLYETHYLENE GLYCOL 3350 17 G/17G
17 POWDER, FOR SOLUTION ORAL DAILY PRN
Status: DISCONTINUED | OUTPATIENT
Start: 2020-09-23 | End: 2020-09-25 | Stop reason: HOSPADM

## 2020-09-23 RX ORDER — METHYLPREDNISOLONE SODIUM SUCCINATE 125 MG/2ML
60 INJECTION, POWDER, LYOPHILIZED, FOR SOLUTION INTRAMUSCULAR; INTRAVENOUS EVERY 6 HOURS
Status: DISCONTINUED | OUTPATIENT
Start: 2020-09-24 | End: 2020-09-25

## 2020-09-23 RX ORDER — DICYCLOMINE HYDROCHLORIDE 10 MG/1
10 CAPSULE ORAL DAILY
Status: DISCONTINUED | OUTPATIENT
Start: 2020-09-24 | End: 2020-09-25 | Stop reason: HOSPADM

## 2020-09-23 RX ORDER — POLYETHYLENE GLYCOL 3350 17 G/17G
17 POWDER, FOR SOLUTION ORAL DAILY PRN
Status: DISCONTINUED | OUTPATIENT
Start: 2020-09-23 | End: 2020-09-24 | Stop reason: ALTCHOICE

## 2020-09-23 RX ORDER — ACETAMINOPHEN 650 MG/1
650 SUPPOSITORY RECTAL EVERY 6 HOURS PRN
Status: DISCONTINUED | OUTPATIENT
Start: 2020-09-23 | End: 2020-09-25 | Stop reason: HOSPADM

## 2020-09-23 RX ORDER — 0.9 % SODIUM CHLORIDE 0.9 %
1000 INTRAVENOUS SOLUTION INTRAVENOUS ONCE
Status: DISCONTINUED | OUTPATIENT
Start: 2020-09-23 | End: 2020-09-24

## 2020-09-23 RX ORDER — IPRATROPIUM BROMIDE AND ALBUTEROL SULFATE 2.5; .5 MG/3ML; MG/3ML
1 SOLUTION RESPIRATORY (INHALATION) EVERY 4 HOURS PRN
Status: DISCONTINUED | OUTPATIENT
Start: 2020-09-23 | End: 2020-09-25 | Stop reason: HOSPADM

## 2020-09-23 RX ORDER — LORAZEPAM 2 MG/ML
0.5 INJECTION INTRAMUSCULAR ONCE
Status: COMPLETED | OUTPATIENT
Start: 2020-09-23 | End: 2020-09-23

## 2020-09-23 RX ORDER — PROMETHAZINE HYDROCHLORIDE 25 MG/1
12.5 TABLET ORAL EVERY 6 HOURS PRN
Status: DISCONTINUED | OUTPATIENT
Start: 2020-09-23 | End: 2020-09-25 | Stop reason: HOSPADM

## 2020-09-23 RX ORDER — DEXTROSE MONOHYDRATE 50 MG/ML
100 INJECTION, SOLUTION INTRAVENOUS PRN
Status: DISCONTINUED | OUTPATIENT
Start: 2020-09-23 | End: 2020-09-25 | Stop reason: HOSPADM

## 2020-09-23 RX ORDER — GABAPENTIN 300 MG/1
300 CAPSULE ORAL DAILY
Status: DISCONTINUED | OUTPATIENT
Start: 2020-09-24 | End: 2020-09-25 | Stop reason: HOSPADM

## 2020-09-23 RX ORDER — DEXTROSE MONOHYDRATE 25 G/50ML
12.5 INJECTION, SOLUTION INTRAVENOUS PRN
Status: DISCONTINUED | OUTPATIENT
Start: 2020-09-23 | End: 2020-09-25 | Stop reason: HOSPADM

## 2020-09-23 RX ORDER — ACETAMINOPHEN 325 MG/1
650 TABLET ORAL EVERY 6 HOURS PRN
Status: DISCONTINUED | OUTPATIENT
Start: 2020-09-23 | End: 2020-09-25 | Stop reason: HOSPADM

## 2020-09-23 RX ORDER — SODIUM CHLORIDE 0.9 % (FLUSH) 0.9 %
10 SYRINGE (ML) INJECTION PRN
Status: DISCONTINUED | OUTPATIENT
Start: 2020-09-23 | End: 2020-09-25 | Stop reason: HOSPADM

## 2020-09-23 RX ORDER — IPRATROPIUM BROMIDE AND ALBUTEROL SULFATE 2.5; .5 MG/3ML; MG/3ML
1 SOLUTION RESPIRATORY (INHALATION)
Status: DISCONTINUED | OUTPATIENT
Start: 2020-09-24 | End: 2020-09-25 | Stop reason: HOSPADM

## 2020-09-23 RX ADMIN — MAGNESIUM SULFATE HEPTAHYDRATE 2 G: 40 INJECTION, SOLUTION INTRAVENOUS at 21:19

## 2020-09-23 RX ADMIN — DEXAMETHASONE SODIUM PHOSPHATE 10 MG: 4 INJECTION, SOLUTION INTRAMUSCULAR; INTRAVENOUS at 21:23

## 2020-09-23 RX ADMIN — LORAZEPAM 0.5 MG: 2 INJECTION INTRAMUSCULAR; INTRAVENOUS at 23:26

## 2020-09-23 RX ADMIN — IPRATROPIUM BROMIDE AND ALBUTEROL SULFATE 1 AMPULE: .5; 3 SOLUTION RESPIRATORY (INHALATION) at 21:13

## 2020-09-23 ASSESSMENT — PAIN SCALES - WONG BAKER: WONGBAKER_NUMERICALRESPONSE: 0

## 2020-09-23 NOTE — CARE COORDINATION
medications. Covid Risk Education    Patient has following risk factors of: COPD and diabetes. Education provided regarding infection prevention, and signs and symptoms of COVID-19 and when to seek medical attention with patient who verbalized understanding. Discussed exposure protocols and quarantine From CDC: Are you at higher risk for severe illness?   and given an opportunity for questions and concerns. The patient agrees to contact the COVID-19 hotline [phone] or PCP office for questions related to COVID-19. For more information on steps you can take to protect yourself, see CDC's How to Protect Yourself     Patient/family/caregiver given information for [employer] [facility] and agrees to enroll no  Patient's preferred e-mail: declines  Patient's preferred phone number: declines    Discussed follow-up appointments. If no appointment was previously scheduled, appointment scheduling offered: Yes. Is follow up appointment scheduled within 7 days of discharge? No  Non-[facility] follow up appointment(s): N/A    Plan for follow-up call in 7-10 days based on severity of symptoms and risk factors. Plan for next call: symptom management-COPD  CTN provided contact information for future needs.         Non-face-to-face services provided:  Obtained and reviewed discharge summary and/or continuity of care documents  Assessment and support for treatment adherence and medication management-medication list reviewed & 1111f completed    Care Transitions 24 Hour Call    Do you have any ongoing symptoms?:  No  Do you have a copy of your discharge instructions?:  Yes  Do you have all of your prescriptions and are they filled?:  Yes  Have you been contacted by a [address]?:  No  Have you scheduled your follow up appointment?:  No  Were you discharged with any Home Care or [address] [place] [address] Ave:  Yes  Post Acute Services:  Home Health (Comment: [facility] [phone])  Do you feel like you have everything you need to keep you well at home?:  Yes  Care Transitions Interventions       Initial attempt at CTN/COVID 19 monitoring discharge phone call. Lyda Hashimoto states they have not heard from [address]. She states the new home oxygen supplies & equipment were delivered last evening. She is using 3lpm continuously. Lyda Hashimoto has a follow up scheduled with . Her  will schedule her follow up with . Her  will provide her transportation. Lyda Hashimoto states she has a walker at home, but she uses a cane for ambulation. She denies any abd pain or nausea. Discussed role of CTN. Lyda Hashimoto denies any needs today. She had her  take writer's contact info. Lyda Hashimoto is agreeable to follow up from CTN. Writer placed call to care [facility]. Left message. Contact info provided. Requested return call to CTN.  Attempting to confirm receipt of referral.    Follow Up  Future Appointments   Date Time Provider Lila Arias   10/19/2020 11:20 AM Ngoc Young DO [facility] PULM FELICITA Beavers RN

## 2020-09-23 NOTE — DISCHARGE SUMMARY
[address] [address]                               DISCHARGE SUMMARY    PATIENT NAME: Fernando Fontanez                    :        1945  MED REC NO:   [id number]                          ROOM:       4511  ACCOUNT NO:   [de-identified]                           ADMIT DATE: 2020  PROVIDER:     Jaylon Lyle MD                  DISCHARGE DATE:  2020    FINAL DIAGNOSES:  1. Acute respiratory failure with hypoxia. 2.  Chronic respiratory failure with hypoxia. 3.  Exacerbation of interstitial lung disease. 4.  Exacerbation of chronic obstructive pulmonary disease. 5.  Acute delirium probably related to hospitalization. 6.  Rectal bleeding, the etiology to be determined. 7.  Acute hemolytic anemia possibly related to dapsone. 8.  Type 2 diabetes mellitus. 9.  Essential hypertension. 10.  Acute cystitis with E. coli. HISTORY OF PRESENT ILLNESS:  This patient is admitted with history of  shortness of breath getting worse over the last two weeks to the point  she has been not able to walk around in her house and seen in the  office. It was difficult to get O2 sats, but in the emergency room,  they were able to get 89 on room air and with walking just transfer from  the bed to chair, it went down to 84. Her vitals are stable except for  a pulse of 110. She denies any cough or fever or chills, orthopnea or  paroxysmal nocturnal dyspnea or wheezing. She had on and off abdominal  pain right lower quadrant and had a bright red blood passed about a  month _____. She has no urinary symptoms. Examination wise, she is  alert, oriented, looked a little cachectic with some weight loss. She  has minimal right lower quadrant abdominal tenderness, no masses. Liver  and spleen are not felt. Heart revealed slight tachycardia without any  gallop or murmur.   Lung examination showed decreased breath sounds with  few crackles at bases, no wheezes. There is no JVP. No edema of the  legs. Pulses are very well felt. Her lab data revealed the patient to  have CMP and BMP are normal.  Sugars are 119, 109. Protein 6.9. BNP is  49. Troponin is 0.01. Her ALT and AST are normal.  Bilirubin is 3.1,  indirect bilirubin is 2.4. LDH is 242. Serum haptoglobin is less than  10. Her hemoglobin is normal with MCV of 106 but subsequently, her  hemoglobin during the hospitalization dropped from 11.1 to 10 gm. Reticulocyte count is 5.25. Her chest x-ray is clear. No acute process  was identified. Her CT of the chest did not show any evidence of  pulmonary embolism. It showed centrilobular emphysema, chronic fibrotic  changes in the chest, chronic ground-glass opacities are seen in the  upper lobes. CT of the abdomen and pelvis did not show any  abnormalities. Impression was acute respiratory failure and hypoxia  with possible exacerbation of COPD and/or interstitial lung disease. Apparently, her  says she has stopped taking the prednisone and  several other medications over the last several months and she did not  see Dr. Brandyn Dominguez for a long time. This is in a patient with history of  diabetes, hypertension, chronic depression, and also with history of  weight loss, rectal bleeding. HOSPITAL COURSE:  The patient's shortness of breath was treated with  oxygen. She was given hand-held nebulizer as needed and also continued  on home medications including budesonide inhaler and also Singulair. Her blood pressure medications were continued, and Dr. Brandyn Dominguez had seen  the patient in consultation and she was given high-dose steroids for a  day or two and subsequently changed to 20 mg, and he thinks she should  be on the 20 mg a day for a considerable period of time. With this, she  gradually showed improvement in her symptoms. Breathing has improved.    She was able to walk to the bathroom without too much problems and she  started eating better. At this time, her echocardiogram did not show  any abnormalities or LV dysfunction. At this time, the cause of her  worsening shortness of breath was (1) she is not taking medications; (2)  worsening of the COPD and her interstitial lung disease. At the time of  discharge, her condition has improved and arrangements for home oxygen  will be done at 3 L per minute continuously with portable oxygen unit. The patient developed acute delirium; when she was in the hospital,  becoming violent, agitated, not able to cooperate for nursing care. She  needed to be restrained and given Haldol as needed. But subsequent to  that, her confusion has improved and at the time of discharge, she still  gets a little confused at night but it is probably related to her acute  illness and being in a hospital and hopefully, this will continue and  there were no long-time problems related to this and hopefully that will  continue. As far as her diabetes and hypertension, they are very well  controlled with the medications she is on. Her blood sugars are in the  range of 150, 178, 180. She did have evidence of hemolysis. Her urine  hemosiderin was negative. Plasma free hemoglobin was negative. Her LDH  is elevated. Her retic count is elevated and Bethel test is negative. Dr. Santana Sahu had seen the patient in consultation. He did a bone  marrow aspiration biopsy and the results are not back and it will be  followed up by him once the results are back. At this time, he does not  think she needed any more medications, and then observing for any  problems. Her bilirubin stayed around 3.1, 3.2 gm. Hemoglobin stayed  around 10 gm. Her dapsone was discontinued and she was put on  atovaquone 1500 mg daily, and this will be continued for prophylaxis  secondary to pneumocystis and other infections.   She was also found to  have urinary tract infection with E. coli, and she was started on Cipro  750 mg b.i.d. and she will complete the course for seven days. At this  time, it was felt she showed enough improvement for her to be discharged  home. Dr. Jaky Rowley had seen the patient in consultation and he felt she  needs outpatient colonoscopy at some point even though she had a  colonoscopy negative in 2018. Once her _____ condition improves, she  was advised to call Dr. Jaky Rowley for arrangement of colonoscopy. Her  stools for occult blood was positive here. At this point, it was felt  she could be discharged home and her oxygen at home was 3 L per minute. She walks with a walker. DIET:  Diabetic, no added salt. ACTIVITIES:  As tolerated. CONDITION AT THE TIME OF DISCHARGE:  Improved. DISCHARGE MEDICATIONS:  I did discussed all the medications with her   in detail and medications I am dictating are the ones she should  be taking at home that includes atovaquone 750 mg/5 mL, 10 mL every day,  Cipro 250 mg every 12 for four days, Dulera two puffs every 12 hours,  MiraLax 1 tablespoon daily as needed for constipation, albuterol two  puffs q.i.d. p.r.n. for wheezing or shortness of breath, gabapentin 300  mg daily, metformin 1000 mg two times a day, Os-Lawson 500/200 one tablet  two times a day, prednisone 10 mg tablet two tablets daily, alendronate  70 mg weekly, amlodipine 5 mg daily, dicyclomine 10 mg at bedtime one  capsule daily, fenofibrate 160 daily, losartan 100 mg daily, Singulair  10 mg daily, and we did stop her acebutolol, budesonide, dapsone,  Lexapro, cetirizine and magnesium oxide, and the prescriptions send to  the drug store were albuterol, alendronate, atovaquone, Cipro,  dicyclomine, gabapentin and prednisone and should continue Dulera or the  steroid inhaler she has at home.   She will be advised to see me in the  office in a week, and she and her  were advised to follow and  make arrangements for her to follow up with Dr. Uli Watkins, Dr. Dawson Boyle  and  Mick Morejon.         Hilary Campos MD    D: 09/22/2020 18:29:19       T: 09/22/2020 21:25:41     [email or web address]  Job#: [id number]     Doc#: [id number]    CC:

## 2020-09-24 PROBLEM — J96.11 CHRONIC RESPIRATORY FAILURE WITH HYPOXIA (HCC): Status: ACTIVE | Noted: 2020-09-24

## 2020-09-24 LAB
ANION GAP SERPL CALCULATED.3IONS-SCNC: 11 MMOL/L (ref 3–16)
BACTERIA: ABNORMAL /HPF
BASOPHILS ABSOLUTE: 0 K/UL (ref 0–0.2)
BASOPHILS RELATIVE PERCENT: 0.1 %
BILIRUBIN URINE: NEGATIVE
BLOOD, URINE: NEGATIVE
BUN BLDV-MCNC: 25 MG/DL (ref 7–20)
CALCIUM SERPL-MCNC: 9 MG/DL (ref 8.3–10.6)
CHLORIDE BLD-SCNC: 96 MMOL/L (ref 99–110)
CLARITY: ABNORMAL
CO2: 24 MMOL/L (ref 21–32)
COLD AGGLUTININ TITER: NORMAL
COLOR: ABNORMAL
CREAT SERPL-MCNC: 0.8 MG/DL (ref 0.6–1.2)
EKG ATRIAL RATE: 98 BPM
EKG DIAGNOSIS: NORMAL
EKG P AXIS: 65 DEGREES
EKG P-R INTERVAL: 130 MS
EKG Q-T INTERVAL: 344 MS
EKG QRS DURATION: 84 MS
EKG QTC CALCULATION (BAZETT): 439 MS
EKG R AXIS: -31 DEGREES
EKG T AXIS: 2 DEGREES
EKG VENTRICULAR RATE: 98 BPM
EOSINOPHILS ABSOLUTE: 0 K/UL (ref 0–0.6)
EOSINOPHILS RELATIVE PERCENT: 0 %
EPITHELIAL CELLS, UA: 2 /HPF (ref 0–5)
GFR AFRICAN AMERICAN: >60
GFR NON-AFRICAN AMERICAN: >60
GLUCOSE BLD-MCNC: 147 MG/DL (ref 70–99)
GLUCOSE BLD-MCNC: 155 MG/DL (ref 70–99)
GLUCOSE BLD-MCNC: 156 MG/DL (ref 70–99)
GLUCOSE BLD-MCNC: 157 MG/DL (ref 70–99)
GLUCOSE BLD-MCNC: 186 MG/DL (ref 70–99)
GLUCOSE URINE: NEGATIVE MG/DL
HCT VFR BLD CALC: 33.2 % (ref 36–48)
HEMOGLOBIN: 11.3 G/DL (ref 12–16)
HYALINE CASTS: 0 /LPF (ref 0–8)
KETONES, URINE: NEGATIVE MG/DL
LEUKOCYTE ESTERASE, URINE: ABNORMAL
LYMPHOCYTES ABSOLUTE: 0.3 K/UL (ref 1–5.1)
LYMPHOCYTES RELATIVE PERCENT: 5.1 %
MAGNESIUM: 2.5 MG/DL (ref 1.8–2.4)
MCH RBC QN AUTO: 36.7 PG (ref 26–34)
MCHC RBC AUTO-ENTMCNC: 34 G/DL (ref 31–36)
MCV RBC AUTO: 107.9 FL (ref 80–100)
MICROSCOPIC EXAMINATION: YES
MONOCYTES ABSOLUTE: 0.2 K/UL (ref 0–1.3)
MONOCYTES RELATIVE PERCENT: 2.9 %
NEUTROPHILS ABSOLUTE: 6 K/UL (ref 1.7–7.7)
NEUTROPHILS RELATIVE PERCENT: 91.9 %
NITRITE, URINE: NEGATIVE
PDW BLD-RTO: 17.6 % (ref 12.4–15.4)
PERFORMED ON: ABNORMAL
PH UA: 8 (ref 5–8)
PHOSPHORUS: 3.3 MG/DL (ref 2.5–4.9)
PLATELET # BLD: 224 K/UL (ref 135–450)
PMV BLD AUTO: 8.2 FL (ref 5–10.5)
POTASSIUM REFLEX MAGNESIUM: 4.3 MMOL/L (ref 3.5–5.1)
PROTEIN UA: NEGATIVE MG/DL
RBC # BLD: 3.07 M/UL (ref 4–5.2)
RBC UA: 1 /HPF (ref 0–4)
SODIUM BLD-SCNC: 131 MMOL/L (ref 136–145)
SPECIFIC GRAVITY UA: 1.01 (ref 1–1.03)
URINE REFLEX TO CULTURE: YES
URINE TYPE: ABNORMAL
UROBILINOGEN, URINE: 1 E.U./DL
WBC # BLD: 6.5 K/UL (ref 4–11)
WBC UA: 12 /HPF (ref 0–5)

## 2020-09-24 PROCEDURE — 36415 COLL VENOUS BLD VENIPUNCTURE: CPT

## 2020-09-24 PROCEDURE — 6370000000 HC RX 637 (ALT 250 FOR IP): Performed by: INTERNAL MEDICINE

## 2020-09-24 PROCEDURE — 97530 THERAPEUTIC ACTIVITIES: CPT

## 2020-09-24 PROCEDURE — 2580000003 HC RX 258: Performed by: INTERNAL MEDICINE

## 2020-09-24 PROCEDURE — 97161 PT EVAL LOW COMPLEX 20 MIN: CPT

## 2020-09-24 PROCEDURE — 93010 ELECTROCARDIOGRAM REPORT: CPT | Performed by: INTERNAL MEDICINE

## 2020-09-24 PROCEDURE — 83735 ASSAY OF MAGNESIUM: CPT

## 2020-09-24 PROCEDURE — 83036 HEMOGLOBIN GLYCOSYLATED A1C: CPT

## 2020-09-24 PROCEDURE — 84100 ASSAY OF PHOSPHORUS: CPT

## 2020-09-24 PROCEDURE — 1200000000 HC SEMI PRIVATE

## 2020-09-24 PROCEDURE — 6360000002 HC RX W HCPCS: Performed by: INTERNAL MEDICINE

## 2020-09-24 PROCEDURE — 97165 OT EVAL LOW COMPLEX 30 MIN: CPT

## 2020-09-24 PROCEDURE — 99223 1ST HOSP IP/OBS HIGH 75: CPT | Performed by: INTERNAL MEDICINE

## 2020-09-24 PROCEDURE — APPNB15 APP NON BILLABLE TIME 0-15 MINS: Performed by: NURSE PRACTITIONER

## 2020-09-24 PROCEDURE — 80048 BASIC METABOLIC PNL TOTAL CA: CPT

## 2020-09-24 PROCEDURE — 85025 COMPLETE CBC W/AUTO DIFF WBC: CPT

## 2020-09-24 PROCEDURE — 94640 AIRWAY INHALATION TREATMENT: CPT

## 2020-09-24 PROCEDURE — 6370000000 HC RX 637 (ALT 250 FOR IP): Performed by: STUDENT IN AN ORGANIZED HEALTH CARE EDUCATION/TRAINING PROGRAM

## 2020-09-24 PROCEDURE — 94760 N-INVAS EAR/PLS OXIMETRY 1: CPT

## 2020-09-24 RX ORDER — LORAZEPAM 0.5 MG/1
0.5 TABLET ORAL EVERY 6 HOURS PRN
Status: DISCONTINUED | OUTPATIENT
Start: 2020-09-24 | End: 2020-09-25 | Stop reason: HOSPADM

## 2020-09-24 RX ADMIN — METFORMIN HYDROCHLORIDE 1000 MG: 500 TABLET ORAL at 08:51

## 2020-09-24 RX ADMIN — MONTELUKAST 10 MG: 10 TABLET, FILM COATED ORAL at 21:17

## 2020-09-24 RX ADMIN — INSULIN LISPRO 1 UNITS: 100 INJECTION, SOLUTION INTRAVENOUS; SUBCUTANEOUS at 08:51

## 2020-09-24 RX ADMIN — GABAPENTIN 300 MG: 300 CAPSULE ORAL at 08:51

## 2020-09-24 RX ADMIN — CIPROFLOXACIN HYDROCHLORIDE 250 MG: 500 TABLET, FILM COATED ORAL at 08:50

## 2020-09-24 RX ADMIN — METHYLPREDNISOLONE SODIUM SUCCINATE 60 MG: 125 INJECTION, POWDER, FOR SOLUTION INTRAMUSCULAR; INTRAVENOUS at 06:04

## 2020-09-24 RX ADMIN — OYSTER SHELL CALCIUM WITH VITAMIN D 1 TABLET: 500; 200 TABLET, FILM COATED ORAL at 08:51

## 2020-09-24 RX ADMIN — INSULIN LISPRO 1 UNITS: 100 INJECTION, SOLUTION INTRAVENOUS; SUBCUTANEOUS at 13:16

## 2020-09-24 RX ADMIN — METFORMIN HYDROCHLORIDE 1000 MG: 500 TABLET ORAL at 16:57

## 2020-09-24 RX ADMIN — CIPROFLOXACIN HYDROCHLORIDE 250 MG: 500 TABLET, FILM COATED ORAL at 21:17

## 2020-09-24 RX ADMIN — FENOFIBRATE 160 MG: 160 TABLET ORAL at 10:24

## 2020-09-24 RX ADMIN — INSULIN LISPRO 1 UNITS: 100 INJECTION, SOLUTION INTRAVENOUS; SUBCUTANEOUS at 21:17

## 2020-09-24 RX ADMIN — Medication 10 ML: at 21:17

## 2020-09-24 RX ADMIN — METHYLPREDNISOLONE SODIUM SUCCINATE 60 MG: 125 INJECTION, POWDER, FOR SOLUTION INTRAMUSCULAR; INTRAVENOUS at 23:52

## 2020-09-24 RX ADMIN — OYSTER SHELL CALCIUM WITH VITAMIN D 1 TABLET: 500; 200 TABLET, FILM COATED ORAL at 21:17

## 2020-09-24 RX ADMIN — ATOVAQUONE 1500 MG: 750 SUSPENSION ORAL at 10:24

## 2020-09-24 RX ADMIN — DICYCLOMINE HYDROCHLORIDE 10 MG: 10 CAPSULE ORAL at 08:51

## 2020-09-24 RX ADMIN — IPRATROPIUM BROMIDE AND ALBUTEROL SULFATE 1 AMPULE: .5; 3 SOLUTION RESPIRATORY (INHALATION) at 08:56

## 2020-09-24 RX ADMIN — IPRATROPIUM BROMIDE AND ALBUTEROL SULFATE 1 AMPULE: .5; 3 SOLUTION RESPIRATORY (INHALATION) at 15:51

## 2020-09-24 RX ADMIN — IPRATROPIUM BROMIDE AND ALBUTEROL SULFATE 1 AMPULE: .5; 3 SOLUTION RESPIRATORY (INHALATION) at 11:58

## 2020-09-24 RX ADMIN — INSULIN LISPRO 1 UNITS: 100 INJECTION, SOLUTION INTRAVENOUS; SUBCUTANEOUS at 00:47

## 2020-09-24 RX ADMIN — AMLODIPINE BESYLATE 5 MG: 5 TABLET ORAL at 08:51

## 2020-09-24 RX ADMIN — IPRATROPIUM BROMIDE AND ALBUTEROL SULFATE 1 AMPULE: .5; 3 SOLUTION RESPIRATORY (INHALATION) at 20:38

## 2020-09-24 RX ADMIN — Medication 10 ML: at 08:52

## 2020-09-24 RX ADMIN — Medication 2 PUFF: at 08:56

## 2020-09-24 RX ADMIN — Medication 2 PUFF: at 20:38

## 2020-09-24 RX ADMIN — METHYLPREDNISOLONE SODIUM SUCCINATE 60 MG: 125 INJECTION, POWDER, FOR SOLUTION INTRAMUSCULAR; INTRAVENOUS at 17:46

## 2020-09-24 RX ADMIN — METHYLPREDNISOLONE SODIUM SUCCINATE 60 MG: 125 INJECTION, POWDER, FOR SOLUTION INTRAMUSCULAR; INTRAVENOUS at 13:16

## 2020-09-24 RX ADMIN — INSULIN LISPRO 1 UNITS: 100 INJECTION, SOLUTION INTRAVENOUS; SUBCUTANEOUS at 16:57

## 2020-09-24 RX ADMIN — LOSARTAN POTASSIUM 100 MG: 100 TABLET, FILM COATED ORAL at 10:24

## 2020-09-24 RX ADMIN — ENOXAPARIN SODIUM 40 MG: 40 INJECTION SUBCUTANEOUS at 08:52

## 2020-09-24 RX ADMIN — LORAZEPAM 0.5 MG: 0.5 TABLET ORAL at 10:24

## 2020-09-24 RX ADMIN — METHYLPREDNISOLONE SODIUM SUCCINATE 60 MG: 125 INJECTION, POWDER, FOR SOLUTION INTRAMUSCULAR; INTRAVENOUS at 00:30

## 2020-09-24 ASSESSMENT — PAIN SCALES - WONG BAKER
WONGBAKER_NUMERICALRESPONSE: 0

## 2020-09-24 ASSESSMENT — PAIN SCALES - GENERAL: PAINLEVEL_OUTOF10: 0

## 2020-09-24 NOTE — PROGRESS NOTES
Admitting  note dictated-[id number]  1- acute on chronic respiratory failure with hypoxia-cause of decompensation -uncertain  2-ILD  3-COPD  4-HTN  5-DIABETES MELLITUS -2   6-Hemolytic anemia-drug induced-off dapsone now. 7-h/o delirium during recent hospitalization  7- UTI and is on cipro now.     Discussed with her and her   Continue current medications with IV steroids   She is off BIPAP now and is on high flow oxygen  Pulmonary consulted

## 2020-09-24 NOTE — CONSULTS
REASON FOR CONSULTATION/CC: ILD      Consult at request of Jarred Randhawa MD for      PCP: Jarred Randhawa MD  Established Pulmonologist:   None    HISTORY OF PRESENT ILLNESS: Lulu Dotson is a 67y.o. year old female with a history of ILD who presents with :     Patient was discharged on the 22nd of this month. She was admitted for symptoms of shortness of breath for 2 weeks with noted exertional hypoxemia down to 84%. Patient was treated with oxygen, steroids, budesonide and subsequently improved. She is weaned down to prednisone and discharge. Echocardiogram was normal.  Shortness of breath was thought to be COPD exacerbation secondary to noncompliance. She was discharged on 3 L nasal cannula. Per the H&P, she previously presented with shortness of breath and was found in to be hypoxemic in the emergency room 76% on 2 L was placed on nonrebreather. However, she was treated with BiPAP overnight and subsequently weaned down to room air off BiPAP. Chest x-ray without change. She states shortness of breath was sudden on set of shortness of breath. No wheezing or cough. She also had some chest pain as well. This is resolved. She was still taking the prednisone.      PAST MEDICAL HISTORY:  Past Medical History:   Diagnosis Date    Asthma     Depression 6/28/2013    Diverticulosis of colon (without mention of hemorrhage)     Enthesopathy of hip region     left - mild    Esophageal reflux     Irritable bowel syndrome     Nocturia     Osteoporosis, unspecified     Other and unspecified hyperlipidemia     Postinflammatory pulmonary fibrosis (HCC)     Sialoadenitis     left    Synovial cyst of popliteal space     left knee    Thoracic or lumbosacral neuritis or radiculitis, unspecified     left - L5 - S1    Type II or unspecified type diabetes mellitus without mention of complication, not stated as uncontrolled     Unspecified essential hypertension        PAST SURGICAL HISTORY:  Past Surgical History:   Procedure Laterality Date    BRONCHOSCOPY N/A 12/21/2018    BRONCHOSCOPY WITH BAL performed by Arianna Marquez DO at [id number] [place] [address]    DR. Jean-no polyps    COLONOSCOPY  02/2016    normal-DR. Ramsay    CT BONE MARROW BIOPSY  9/21/2020    CT BONE MARROW BIOPSY 9/21/2020 [facility] CT    HYSTERECTOMY      partial    LIPOMA RESECTION      abdominal wall    OVARY REMOVAL      left    POLYPECTOMY         FAMILY HISTORY:  family history includes Cirrhosis in her father; Heart Disease in her mother; Osteoporosis in her maternal grandmother, mother, and sister; Stroke in her mother. SOCIAL HISTORY:   reports that she has never smoked. She has never used smokeless tobacco.    Scheduled Meds:   sodium chloride  1,000 mL Intravenous Once    amLODIPine  5 mg Oral Daily    atovaquone  1,500 mg Oral Daily    calcium-vitamin D  1 tablet Oral BID    ciprofloxacin  250 mg Oral 2 times per day    dicyclomine  10 mg Oral Daily    fenofibrate  160 mg Oral Daily    gabapentin  300 mg Oral Daily    losartan  100 mg Oral Daily    metFORMIN  1,000 mg Oral BID WC    budesonide-formoterol  2 puff Inhalation BID    montelukast  10 mg Oral Nightly    methylPREDNISolone  60 mg Intravenous Q6H    insulin lispro  0-6 Units Subcutaneous TID WC    insulin lispro  0-3 Units Subcutaneous Nightly    sodium chloride flush  10 mL Intravenous 2 times per day    enoxaparin  40 mg Subcutaneous Daily    ipratropium-albuterol  1 ampule Inhalation Q4H WA       Continuous Infusions:   dextrose         PRN Meds:  LORazepam, glucose, dextrose, glucagon (rDNA), dextrose, sodium chloride flush, acetaminophen **OR** acetaminophen, polyethylene glycol, promethazine **OR** ondansetron, ipratropium-albuterol    ALLERGIES:  Patient is allergic to latex; ange-seltzer [aspirin effervescent]; and sulfa antibiotics.     REVIEW OF SYSTEMS:  Constitutional: Negative for fever   HENT: Negative for sore throat  Eyes: Negative for redness   Respiratory: ++ for dyspnea, - cough (resolved)  Cardiovascular: Negative for chest pain  Gastrointestinal: Negative for vomiting, diarrhea   Genitourinary: Negative for hematuria   Musculoskeletal: Negative for arthralgias   Skin: Negative for rash  Neurological: Negative for syncope  Hematological: Negative for adenopathy  Psychiatric/Behavorial: Negative for anxiety    Objective:   PHYSICAL EXAM:  Blood pressure (!) 99/52, pulse 94, temperature 98 °F (36.7 °C), temperature source Axillary, resp. rate 17, height 5' 4\" (1.626 m), weight 128 lb 12 oz (58.4 kg), SpO2 98 %, not currently breastfeeding.'  Gen: No distress. Eyes: PERRL. No sclera icterus. No conjunctival injection. ENT: No discharge. Pharynx clear. External appearance of ears and nose normal.  Neck: Trachea midline. No obvious mass. Resp: No accessory muscle use. + crackles. No wheezes. No rhonchi. CV: Regular rate. Regular rhythm. No murmur or rub. No edema. GI: Non-tender. Non-distended. No hernia. Skin: Warm, dry, normal texture and turgor. No nodule on exposed extremities. Lymph: No cervical LAD. No supraclavicular LAD. M/S: No cyanosis. No clubbing. No joint deformity. Neuro: Moves all four extremities. Psych: Oriented x 3. No anxiety. Awake. Alert. Intact judgement and insight.       Data Reviewed:   LABS:  CBC:   Recent Labs     09/23/20 2108 09/24/20 0357   WBC 10.1 6.5   HGB 11.9* 11.3*   HCT 35.0* 33.2*   .0* 107.9*    224     BMP:   Recent Labs     09/23/20 2108 09/24/20 0357   * 131*   K 4.6 4.3   CL 91* 96*   CO2 20* 24   BUN 27* 25*   CREATININE 1.0 0.8     LIVER PROFILE:   Recent Labs     09/23/20 2108   AST 35   ALT 42*   BILITOT 2.7*   ALKPHOS 42     PT/INR:   Recent Labs     09/21/20 0754   PROTIME 11.7   INR 1.01     APTT:   Recent Labs     09/21/20  0754   APTT 31.1     UA:  Recent Labs     09/23/20  4226 COLORU DK YELLOW   PHUR 8.0   WBCUA 12*   RBCUA 1   BACTERIA 4+*   CLARITYU TURBID*   SPECGRAV 1.015   LEUKOCYTESUR MODERATE*   UROBILINOGEN 1.0   BILIRUBINUR Negative   BLOODU Negative   GLUCOSEU Negative     No results for input(s): PHART, NND4UQI, PO2ART in the last 72 hours. Vent Information  Skin Assessment: Redness (see comment/note)(bridge of nose)  FiO2 : 100 %  SpO2: 98 %  I Time/ I Time %: 0.9 s  Mask Type: Full face mask  Mask Size: Medium    Radiology Review:  Pertinent images / reports were reviewed as a part of this visit. CT Chest w/ contrast: No results found for this or any previous visit. CT Chest w/o contrast: No results found for this or any previous visit. CTPA:   Results for orders placed during the hospital encounter of 09/16/20   CT CHEST PULMONARY EMBOLISM W CONTRAST    Narrative EXAMINATION:  CTA OF THE CHEST; CT OF THE ABDOMEN AND PELVIS WITH CONTRAST 9/16/2020 3:56 pm    TECHNIQUE:  CTA of the chest was performed after the administration of intravenous  contrast.  Multiplanar reformatted images are provided for review. MIP  images are provided for review. Dose modulation, iterative reconstruction,  and/or weight based adjustment of the mA/kV was utilized to reduce the  radiation dose to as low as reasonably achievable.; CT of the abdomen and  pelvis was performed with the administration of intravenous contrast.  Multiplanar reformatted images are provided for review. Dose modulation,  iterative reconstruction, and/or weight based adjustment of the mA/kV was  utilized to reduce the radiation dose to as low as reasonably achievable.     COMPARISON:  08/26/2020 CT    HISTORY:  ORDERING SYSTEM PROVIDED HISTORY: hypoxic to the 80s, worsening shortness of  breath, tachypneic, h/o ILD, lungs clear, assessing for perfusion etiology,  r/o embolus  TECHNOLOGIST PROVIDED HISTORY:  Reason for exam:->hypoxic to the 80s, worsening shortness of breath,  tachypneic, h/o ILD, lungs clear, assessing for perfusion etiology, r/o  embolus  Reason for Exam: hypoxic to the 80s, worsening shortness of breath,  tachypneic, h/o ILD, lungs clear, assessing for perfusion etiology, r/o  embolus  Acuity: Acute  Type of Exam: Initial; ORDERING SYSTEM PROVIDED HISTORY: periumbilical  tenderness, abdominal pain  TECHNOLOGIST PROVIDED HISTORY:  Reason for exam:->periumbilical tenderness, abdominal pain  Additional Contrast?->None  Reason for Exam: periumbilical tenderness, abdominal pain nausea, sob, chol,  Acuity: Acute  Type of Exam: Initial    FINDINGS:    Chest:    Pulmonary arteries: Study is of good technical quality for evaluation of  pulmonary embolism. There are no filling defects to suggest pulmonary  embolism. Main pulmonary artery is normal in caliber. No evidence of right  ventricular strain. Mediastinum: The heart size is normal. Aorta is normal in caliber. Superior  mediastinum is normal. No mediastinal or hilar lymph node enlargement. Lungs/pleura: The airways are patent. No pleural fluid. Moderate changes of  centrilobular emphysema. Scattered ground-glass opacities are present in the  right upper lobe. There is also a band of fibrotic change at the right base. Soft Tissues/Bones: T11 compression fracture stable and remote. No acute  skeletal findings. Abdomen/Pelvis:    Organs: The gallbladder is absent. The liver, biliary ducts, pancreas and  spleen are normal.  Kidneys and adrenal glands are normal.    GI/Bowel: The stomach, duodenum and small bowel are normal. A normal appendix  is visualized. Diverticular changes in the sigmoid colon with no acute  inflammation. Pelvis: The bladder is unremarkable. Postsurgical change of hysterectomy. Peritoneum/Retroperitoneum: The aorta tapers normally. No lymph node  enlargement. Bones/Soft Tissues: No significant skeletal abnormalities. Impression 1. No pulmonary embolism.   2. Centrilobular emphysema and chronic fibrotic changes in the chest.  Scattered ground-glass opacities in the right upper lobe appear to be  chronic. No focal consolidation to suggest pneumonia. 3. No acute finding in the abdomen or pelvis. CXR PA/LAT:   Results for orders placed during the hospital encounter of 09/16/20   XR CHEST (2 VW)    Narrative EXAMINATION:  TWO XRAY VIEWS OF THE CHEST    9/16/2020 2:57 pm    COMPARISON:  05/29/2018 08/26/2020    HISTORY:  ORDERING SYSTEM PROVIDED HISTORY: Shortness of breath  TECHNOLOGIST PROVIDED HISTORY:  Reason for exam:->Shortness of breath  Reason for Exam: sob, nausea abdominal pain    FINDINGS:  The lungs are without acute focal process. There is no effusion or  pneumothorax. The cardiomediastinal silhouette is stable. The osseous  structures are stable. Unchanged anterior wedging T11. Impression No acute process. CXR portable:   Results for orders placed during the hospital encounter of 09/23/20   XR CHEST PORTABLE    Narrative EXAMINATION:  ONE XRAY VIEW OF THE CHEST    9/23/2020 9:04 pm    COMPARISON:  09/16/2020    HISTORY:  ORDERING SYSTEM PROVIDED HISTORY: SOB  TECHNOLOGIST PROVIDED HISTORY:  Reason for exam:->SOB  Reason for Exam: Shortness of Breath  Acuity: Acute  Type of Exam: Initial    FINDINGS:  Elevation of the right hemidiaphragm is again seen. The lungs are clear. The heart size is normal.  The costophrenic angles are sharp. There is no  discernible pneumothorax. Impression No acute disease. Assessment:        Interstitial lung disease, NSIP versus chronic HP, chronic prednisone 20 mg daily  Centrilobular emphysema  Chronic hypoxemic respiratory failure, 3 L nasal cannula  Macrocytic anemia, positive guaiac, low haptoglobin indicating hemolysis, which did not respond to steroids.   Bone marrow       Plan:      Interstitial lung disease with emphysema  -NSIP versus chronic HP  -Treated with chronic prednisone 20 mg daily with Symbicort  -She was recently admitted for acute hypoxemia thought to be ILD exacerbation  -No change in chest x-ray over the last year. Minimal change in CAT scan of the last year.  -Elevated LDH. On prophylactic P KAIT antibiotics  -Overnight she was weaned from 100% BiPAP down to room air. She was discharged on 3 L nasal cannula. - transfer to med / surg. Macrocytic hemolytic anemia  -Bone marrow biopsy completed on the 21st.  Concerning for microangiopathic process. -Did not respond to Solu-Medrol/prednisone.  -Hemoglobin increased from discharge. -Monitor. Nutrition  - DIET CARB CONTROL; Carb Control: 4 carb choices (60 gms)/meal; No Added Salt (3-4 GM)           This note was transcribed using [id number] [employer]. Please disregard any translational errors.     Thank you for the consult    Fausto Mccracken [facility]  [phone]

## 2020-09-24 NOTE — PROGRESS NOTES
Occupational Therapy   Occupational Therapy Initial Assessment  Date: 2020   Patient Name: Mary Randhawa  MRN: [id number]     : 1945    Date of Service: 2020    Discharge Recommendations:  Home with Home health OT, 2-3 sessions per week, 24 hour supervision or assist     Mary Randhawa scored a 21/ on the AM-PAC ADL Inpatient form. Current research shows that an AM-PAC score of 18 or greater is typically associated with a discharge to the patient's home setting. Based on the patient's AM-PAC score, and their current ADL deficits, it is recommended that the patient have 2-3 sessions per week of Occupational Therapy at d/c to increase the patient's independence. At this time, this patient demonstrates the endurance and safety to discharge home with 24 hour supervision/assistance and home OT and a follow up treatment frequency of 2-3x/wk. Please see assessment section for further patient specific details. If patient discharges prior to next session this note will serve as a discharge summary. Please see below for the latest assessment towards goals. Assessment   Assessment: 68 y/o female admitted  with COPD and acute repiratory distress. Recent admit - for COPD exacerbation, discharged home on 3L O2. PTA, pt lives with  in a mult-level home and independently completes ADLs. Today, pt on room air and appeared anxious. Pt required min A for bed mobility and transfers. Pt ambulated around room with RW and min A; pt with slight posterior lean when upright. Required cuing for safe hand placement when transferring; educated pt on importance. Pt donned new briefs with min A; anticipate pt will require min A for LE bathing, toileting and grooming and SBA for UE bathing/dressing d/t balance and ROM observed. Pt O2 sats remained above 88% with activity throughout session, but SOB with activity.  Pt would benefit from skilled therapy while in acute care to [facility] safety and level of independence. Anticipate pt will be safe to return home with 24 hour supervision/assistance and home OT. Pt agreeable to [address]. Prognosis: Fair  Decision Making: Low Complexity  OT Education: OT Role;Plan of Care;Transfer Training  REQUIRES OT FOLLOW UP: Yes  Activity Tolerance  Activity Tolerance: Patient Tolerated treatment well  Safety Devices  Safety Devices in place: Yes  Type of devices: Left in chair;Call light within reach;Nurse notified; Chair alarm in place;Gait belt;Patient at risk for falls           Patient Diagnosis(es): There were no encounter diagnoses. has a past medical history of Asthma, Depression, Diverticulosis of colon (without mention of hemorrhage), Enthesopathy of hip region, Esophageal reflux, Irritable bowel syndrome, Nocturia, Osteoporosis, unspecified, Other and unspecified hyperlipidemia, Postinflammatory pulmonary fibrosis ([address] Sialoadenitis, Synovial cyst of popliteal space, Thoracic or lumbosacral neuritis or radiculitis, unspecified, Type II or unspecified type diabetes mellitus without mention of complication, not stated as uncontrolled, and Unspecified essential hypertension. has a past surgical history that includes Cholecystectomy; polypectomy; Hysterectomy; Ovary removal; lipoma resection; Colonoscopy (2009); Colonoscopy (02/2016); bronchoscopy (N/A, 12/21/2018); and CT BIOPSY BONE MARROW (9/21/2020). Restrictions  Restrictions/Precautions  Restrictions/Precautions: Fall Risk    Subjective   General  Chart Reviewed: Yes  Patient assessed for rehabilitation services?: Yes  Additional Pertinent Hx: 66 y/o female admitted 9/23 with COPD and acute repiratory distress. Recent admit 9/16-9/22 for COPD exacerbation, discharged home on 3L O2. Family / Caregiver Present: No  Referring Practitioner: EZIO Cheng - CNP  Subjective  Subjective: Pt in bed upon arrival and agreeable to OT evaluation. Pt appears very anxious.   General Comment  Comments: Per RN, clement for therapy. Social/Functional History  Social/Functional History  Lives With: Spouse(Jesus)  Type of Home: House  Home Layout: Bed/Bath upstairs, 1/2 bath on main level, Laundry in basement, Multi-level(2 + basement)  Home Access: Ramped entrance, Stairs to enter with rails  Entrance Stairs - Number of Steps: 1 GASTON with a post and a swing to hold onto or ramp onto back patio to enter house; Flight of stairs upstairs up to bed/bath  Bathroom Shower/Tub: Tub/Shower unit, Walk-in shower(primarily uses walk in shower)  Bathroom Toilet: Standard  Bathroom Equipment: Shower chair, Grab bars in shower, Grab bars around toilet  Bathroom Accessibility: Accessible  Home Equipment: Cane, Rolling walker, Oxygen(3L O2 via NC all the time (new since previous admit))  ADL Assistance: Independent  Homemaking Assistance: (spouse primarily does cook, cleaning, laundry, and grocery shopping)  Ambulation Assistance: Independent(was using a cane PRN prior to last admission. 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      Objective   Orientation  Overall Orientation Status: Within Functional Limits     Balance  Sitting Balance: Contact guard assistance(EOB in prep for transfer and seated in recliner to nagi briefs)  Standing Balance: Minimal assistance  Standing Balance  Time: ~30 seconds  Activity: Transfers  Comment: With RW    Functional Mobility  Functional - Mobility Device: Rolling Walker  Assist Level: Minimal assistance  Functional Mobility Comments: bed > recliner on other side of bed with min A and RW; slight posterior lean    ADL  LE Dressing: Minimal assistance(Donned new briefs)  Additional Comments: Anticipate pt will require min A for LE bathing, toileting and grooming and SBA for UE bathing/dressing d/t balance and ROM observed. Bed mobility  Supine to Sit: Minimal assistance  Sit to Supine: Unable to assess(Pt in recliner at end of session.)     Transfers  Sit to stand: Minimal assistance(From EOB)  Stand to sit: Minimal assistance(To recliner)  Transfer Comments: to/from RW; cuing for safe hand placement     Cognition  Overall Cognitive Status: [facility]  Cognition Comment: Pt appears very anxious. LUE AROM (degrees)  LUE AROM : WFL  Left Hand AROM (degrees)  Left Hand AROM: WFL  RUE AROM (degrees)  RUE AROM : WFL  Right Hand AROM (degrees)  Right Hand AROM: WFL  LUE Strength  LUE Strength Comment: Did not complete d/t SOB  RUE Strength  RUE Strength Comment: Did not complete d/t SOB     Plan   Plan  Times per week: 3-5  Current Treatment Recommendations: Strengthening, Patient/Caregiver Education & Training, Balance Training, Functional Mobility Training, Endurance Training, Safety Education & Training, Self-Care / ADL    AM[facility] Score  [facility] Inpatient Daily Activity Raw Score: 21 (09/24/20 1248)  -PAC Inpatient ADL T-Scale Score : 44.27 (09/24/20 1248)  ADL Inpatient CMS 0-100% Score: 32.79 (09/24/20 1248)  ADL Inpatient CMS G-Code Modifier : Zayda Monson (09/24/20 1248)    Goals  Short term goals  Time Frame for Short term goals: Prior to DC: Short term goal 1: Pt will complete bed mobility independently. Short term goal 2: Pt will complete functional transfers for ADL tasks independently. Short term goal 3: Pt will complete functional mobility for ADL tasks mod I. Short term goal 4: Pt will tolerate standing > 5 minutes for functional tasks independently. Short term goal 5: Pt will complete toileting independently.   Long term goals  Time Frame for Long term goals : STGS=LTGS  Patient Goals   Patient goals : No goals stated       Therapy Time   Individual Concurrent Group Co-treatment   Time In 1115 Time Out 1145         Minutes 30         Timed Code Treatment Minutes: 15 Minutes     This note to serve as OT d/c summary if pt is d/c-ed prior to next therapy session. Linda Medrano, S/OT    I provided direct supervision and agree with note provided by S/OT.   Shana Cordova, OTR/L

## 2020-09-24 NOTE — PROGRESS NOTES
Comprehensive Nutrition Assessment    Type and Reason for Visit:  Initial, Positive Nutrition Screen(wt loss)    Nutrition Recommendations/Plan:   Add chocolate Glucerna bid to start    Nutrition Assessment:  Pt meets criteria for moderate malnutrition AEB inadequate po & significant wt loss. Pt with pmh of IBS, DM, HTN, Interstitial Lung Disease, adm with respiratory failure. Diet adv to [facility] / No Added Salt. Pt reported that she is eating better. When asked about po at home, pt felt that she was eating, , however, reported that she is not eating as much as she used to. This decrease in po likely contributed to significant wt loss of 21.8% over the past 6 months with wt in March of 2020 at 165 lbs. Discussed importance of nutrient dense choices. Comprehended per feedback. Pt agreed to take chocolate Glucerna bid to start. No other ed needs voiced. Malnutrition Assessment:  Malnutrition Status: Moderate malnutrition    Context:  Chronic Illness     Findings of the 6 clinical characteristics of malnutrition:  Energy Intake:  7 - 75% or less estimated energy requirements for 1 month or longer  Weight Loss:  7 - Greater than 10% over 6 months     Body Fat Loss:  Unable to assess     Muscle Mass Loss:  Unable to assess    Fluid Accumulation:  No significant fluid accumulation     Strength:  Not Performed    Estimated Daily Nutrient Needs:  Energy (kcal):  6686-1128 (25-20 x ABW 59 kg); Weight Used for Energy Requirements:        Protein (g):  71-83 (1.2-1.4 x ABW); Weight Used for Protein Requirements:           Fluid (ml/day):  1 ml per kcal; Weight Used for Fluid Requirements:         Nutrition Related Findings:  Noted no edema.       Wounds:  None       Current Nutrition Therapies:    DIET CARB CONTROL; Carb Control: 4 carb choices (60 gms)/meal; No Added Salt (3-4 GM)  Dietary Nutrition Supplements: Diabetic Oral Supplement    Anthropometric Measures:  · Height: 5' 4\" (162.6 cm)  · Current Body Weight: 129 lb (58.5 kg)   · Usual Body Weight: 165 lb (74.8 kg)     · Ideal Body Weight: 120 lbs; % Ideal Body Weight 107.5 %   · BMI: 22.1  · Adjusted Body Weight:  ; No Adjustment   · BMI Categories: Normal Weight (BMI 22.0 to 24.9) age over 72       Nutrition Diagnosis:   · Inadequate oral intake related to inadequate protein-energy intake as evidenced by weight loss      Nutrition Interventions:   Food and/or Nutrient Delivery:  Continue Current Diet, Start Oral Nutrition Supplement  Nutrition Education/Counseling:  No recommendation at this time   Coordination of Nutrition Care:  Continued Inpatient Monitoring    Goals:  consume >/= to 50 %       Nutrition Monitoring and Evaluation:   Food/Nutrient Intake Outcomes:  Food and Nutrient Intake, Supplement Intake  Physical Signs/Symptoms Outcomes:  Biochemical Data, Constipation, Diarrhea, Fluid Status or Edema, Weight, Skin, Nutrition Focused Physical Findings     Discharge Planning:    Continue Oral Nutrition Supplement     Electronically signed by Maricruz Mendes RD, LD on 9/24/20 at 2:04 PM EDT    Contact: [phone]

## 2020-09-24 NOTE — H&P
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 In addition, she had an episode of acute delirium  during the hospitalization which had improved at the time of discharge. After she went home, I discussed this with her  and also this  morning, she has been taking the medications. She ate very well. She  was walking, going to the bathroom and all. But after the evening, he  was helping her with the bath and she did very well, but after she came  out of the shower, she started having difficulty breathing which he is  not able to help. She did complain of any chest pain, tightness or  heaviness but she could not get her breath. Actually, she was not able  to give much of history because she may have just woken up from sleep  this morning. But either way, she did not have any other symptoms. She  was found to have O2 sat of 71% and she was started on oxygen, and in  the emergency room, she was put on BiPAP and subsequently admitted for  further evaluation and management. Since admission to the ICU, she was  able to be off of the BiPAP but they were not able to get her off of the  BiPAP in the ER. Now, she is on high-flow oxygen. She feels no  shortness of breath.   She lung  disease, COPD, asthma, hypertension, diabetes, hemorrhagic anemia which  is due to dapsone which she is off now. In addition, she had a history  of delirium during recent hospitalization and she is currently on Cipro  for UTI. PLAN OF TREATMENT:  I discussed a bit with her and her  in detail  about her current condition. She is put on IV steroids and continue  home medications. Monitor blood sugars, oxygen sats and change in her  mental status. Currently, she is on high-flow oxygen and Pulmonary has  been consulted.         Aisha Carlin MD    D: 09/24/2020 8:29:15       T: 09/24/2020 12:08:33     [email or web address]  Job#: [id number]     Doc#: [id number]    CC:

## 2020-09-24 NOTE — ED NOTES
Bed: A-14  Expected date:   Expected time:   Means of arrival: [facility]  Comments:  Resp Distress      Meri Atkins RN  09/23/20 2902

## 2020-09-24 NOTE — FLOWSHEET NOTE
[address] from [place]. Pt with eyes closed. Oximetry 95 on room air. 0830 Pt awake, up to bsc for dark ml, strong urine. Pt denies pain, sob. Seems very anxious with activity, resp rapid. Oximetry dipped to 88% then back up to 97% after return to bed. Pt cont to deny sob. States was doing well at home, cooking dinner when suddenly sob. Had o2 on at 2L at time. States son called 46. Dr Jarred Ayala into see pt, updated, no new orders. Pt declines mask. 0900 Dr Mary Oquendo here for rounds along with CNP and ICU pharmacist, updated, new orders rec/d.    1030 Up to commode again, oximetry stable at 92%. Very anxious and winded. Ativan given on request. Ate approx 1/2 of breakfast.    1200 Up in chair per PT/OT. Pt states nerve pill helpful. 1400 Eating lunch.  at bedside. 1500 Asleep in chair. Oximetry 93% on room air. 1745 Back to bed on request. [place], resp rapid,  but o2 sat in low to mid 90's. Pt states she's sl sob.  remains with pt. Electronically signed by Dane Goodwin RN on 9/24/2020 at 2131 [facility] PM    [phone] Pt and  informed of transfer. 1830 Report called to Aide on 4th floor, 4122.  Electronically signed by Dane Goodwin RN on 9/24/2020 at 6:32 PM

## 2020-09-24 NOTE — PROGRESS NOTES
At arrived to room 4122 from ICU. Pt A/O x4 pt denies any pain or SOB. Pt has call light within reach, bed locked and in lowest position. Pt denies any needs at this time. VSS. Will continue to monitor.

## 2020-09-24 NOTE — ED PROVIDER NOTES
Primary Care Physician: Suha Desai MD   Attending Physician: Suha Desai MD     History   Chief Complaint   Patient presents with    Shortness of Breath     pt brought in by EMS from home. pt was discharged home from hospital yesterday with home oxygen. pt started feeling SOB today was 76% on 2L NC when EMS arrived. pt was given breathing treatments by EMS. SONU Jaquez is a 67 y.o. female diabetes, who was admitted here and discharged 24 hours ago after she was admitted for respiratory failure. He returns this evening brought by EMS in respiratory distress. She said symptoms started suddenly while she was at home. Meds was called and per reports patient was starting 76% 2 L of oxygen. Was placed on a nonrebreather and brought to the emergency room. On arrival she was in respiratory distress but denied any chest pain. No fevers, chills, nausea, vomiting. Past Medical History:   Diagnosis Date    Asthma     Depression 6/28/2013    Diverticulosis of colon (without mention of hemorrhage)     Enthesopathy of hip region     left - mild    Esophageal reflux     Irritable bowel syndrome     Nocturia     Osteoporosis, unspecified     Other and unspecified hyperlipidemia     Postinflammatory pulmonary fibrosis (HCC)     Sialoadenitis     left    Synovial cyst of popliteal space     left knee    Thoracic or lumbosacral neuritis or radiculitis, unspecified     left - L5 - S1    Type II or unspecified type diabetes mellitus without mention of complication, not stated as uncontrolled     Unspecified essential hypertension         Past Surgical History:   Procedure Laterality Date    BRONCHOSCOPY N/A 12/21/2018    BRONCHOSCOPY WITH BAL performed by Katrina Fontanez DO at [address] [place] [address]  2009    DR. Jean-no polyps    COLONOSCOPY  02/2016    normal-DR. Ramsay    CT BONE MARROW BIOPSY  9/21/2020    CT BONE MARROW BIOPSY 9/21/2020 [facility] CT    HYSTERECTOMY      partial    LIPOMA RESECTION      abdominal wall    OVARY REMOVAL      left    POLYPECTOMY          Family History   Problem Relation Age of Onset    Heart Disease Mother     Stroke Mother     Osteoporosis Mother     Cirrhosis Father         Liver    Osteoporosis Sister     Osteoporosis Maternal Grandmother         Social History     Socioeconomic History    Marital status:      Spouse name: Adis Viera Number of children: 3    Years of education: 15    Highest education level: High school graduate   Occupational History    Occupation: retired   Social Needs    Financial resource strain: Not hard at all   Varun-Faye insecurity     Worry: Never true     Inability: Never true    Transportation needs     Medical: No     Non-medical: No   Tobacco Use    Smoking status: Never Smoker    Smokeless tobacco: Never Used   Substance and Sexual Activity    Alcohol use:  Yes    Drug use: No    Sexual activity: None   Lifestyle    Physical activity     Days per week: None     Minutes per session: None    Stress: None   Relationships    Social connections     Talks on phone: None     Gets together: None     Attends [employer] service: None     Active member of club or organization: None     Attends meetings of clubs or organizations: None     Relationship status: None    Intimate partner violence     Fear of current or ex partner: None     Emotionally abused: None     Physically abused: None     Forced sexual activity: None   Other Topics Concern    None   Social History Narrative    None        Review of Systems   10 total systems reviewed and found to be negative unless otherwise noted in HPI     Physical Exam   /65   Pulse 93   Temp 97.4 °F (36.3 °C) (Oral)   Resp 16   Ht 5' 4\" (1.626 m)   Wt 128 lb 12 oz (58.4 kg)   SpO2 95%   BMI 22.10 kg/m²      CONSTITUTIONAL: Ill looking and in respiratory distress  HEAD: atraumatic, normocephalic   EYES: PERRL, No injection, discharge or scleral icterus. ENT: Moist mucous membranes. NECK: Normal ROM, NO LAD   CARDIOVASCULAR: Regular rate and rhythm. No murmurs or gallop. PULMONARY/CHEST: Using abdominal muscles and wheezing bilaterally. ABDOMEN: Soft, Non-distended and non-tender, without guarding or rebound. SKIN: Acyanotic, warm, dry   MUSCULOSKELETAL: No swelling, tenderness or deformity   NEUROLOGICAL: Awake and oriented x 3. Pulses intact. Grossly nonfocal   Nursing note and vitals reviewed.      ED Course & Medical Decision Making   Medications   amLODIPine (NORVASC) tablet 5 mg (5 mg Oral Given 9/24/20 0851)   atovaquone (MEPRON) suspension 1,500 mg (1,500 mg Oral Given 9/24/20 1024)   calcium-vitamin D 500-200 MG-UNIT per tablet 1 tablet (1 tablet Oral Given 9/24/20 0851)   ciprofloxacin (CIPRO) tablet 250 mg (250 mg Oral Given 9/24/20 0850)   dicyclomine (BENTYL) capsule 10 mg (10 mg Oral Given 9/24/20 0851)   fenofibrate (TRIGLIDE) tablet 160 mg (160 mg Oral Given 9/24/20 1024)   gabapentin (NEURONTIN) capsule 300 mg (300 mg Oral Given 9/24/20 0851)   losartan (COZAAR) tablet 100 mg (100 mg Oral Given 9/24/20 1024)   metFORMIN (GLUCOPHAGE) tablet 1,000 mg (1,000 mg Oral Given 9/24/20 1657)   budesonide-formoterol (SYMBICORT) 160-4.5 MCG/ACT inhaler 2 puff (2 puffs Inhalation Given 9/24/20 2038)   montelukast (SINGULAIR) tablet 10 mg (has no administration in time range)   methylPREDNISolone sodium (SOLU-MEDROL) injection 60 mg (60 mg Intravenous Given 9/24/20 1746)   insulin lispro (HUMALOG) injection vial 0-6 Units (1 Units Subcutaneous Given 9/24/20 1657)   insulin lispro (HUMALOG) injection vial 0-3 Units (1 Units Subcutaneous Given 9/24/20 0047)   glucose (GLUTOSE) 40 % oral gel 15 g (has no administration in time range)   dextrose 50 % IV solution (has no administration in time range)   glucagon (rDNA) injection 1 mg (has no administration in time range)   dextrose 5 % solution (has no administration in time range)   sodium chloride flush 0.9 % injection 10 mL (10 mLs Intravenous Given 9/24/20 0852)   sodium chloride flush 0.9 % injection 10 mL (has no administration in time range)   acetaminophen (TYLENOL) tablet 650 mg (has no administration in time range)     Or   acetaminophen (TYLENOL) suppository 650 mg (has no administration in time range)   polyethylene glycol (GLYCOLAX) packet 17 g (has no administration in time range)   promethazine (PHENERGAN) tablet 12.5 mg (has no administration in time range)     Or   ondansetron (ZOFRAN) injection 4 mg (has no administration in time range)   enoxaparin (LOVENOX) injection 40 mg (40 mg Subcutaneous Given 9/24/20 0852)   ipratropium-albuterol (DUONEB) nebulizer solution 1 ampule (1 ampule Inhalation Given 9/24/20 2038)   ipratropium-albuterol (DUONEB) nebulizer solution 1 ampule (has no administration in time range)   LORazepam (ATIVAN) tablet 0.5 mg (0.5 mg Oral Given 9/24/20 1024)   magnesium sulfate 2 g in 50 mL IVPB premix (0 g Intravenous Stopped 9/23/20 2153)   ipratropium-albuterol (DUONEB) nebulizer solution 1 ampule (1 ampule Inhalation Given 9/23/20 2113)   Dexamethasone Sodium Phosphate injection 10 mg (10 mg Intravenous Given 9/23/20 2123)   LORazepam (ATIVAN) injection 0.5 mg (0.5 mg Intravenous Given 9/23/20 2326)      Labs Reviewed   CBC WITH AUTO DIFFERENTIAL - Abnormal; Notable for the following components:       Result Value    RBC 3.24 (*)     Hemoglobin 11.9 (*)     Hematocrit 35.0 (*)     .0 (*)     MCH 36.8 (*)     RDW 18.2 (*)     All other components within normal limits    Narrative:     Performed at:  [address] [address]   Phone [phone]   COMPREHENSIVE METABOLIC PANEL W/ REFLEX TO MG FOR LOW K - Abnormal; Notable for the following components:    Sodium 130 (*)     Chloride 91 (*)     CO2 20 (*)     Anion Gap 19 (*)     Glucose 146 (*)     BUN 27 (*)     GFR Non- 54 (*)     Total Bilirubin 2.7 (*)     ALT 42 (*)     All other components within normal limits    Narrative:     Performed at:  [address] [place]   Phone [phone]   URINE RT REFLEX TO CULTURE - Abnormal; Notable for the following components:    Clarity, UA TURBID (*)     Leukocyte Esterase, Urine MODERATE (*)     All other components within normal limits    Narrative:     Performed at:  [address] [address]   Phone [phone]   BLOOD GAS, VENOUS - Abnormal; Notable for the following components:    pH, Juan C 7.490 (*)     pCO2, Juan C 33.6 (*)     All other components within normal limits    Narrative:     Performed at:  [address] [place]   Phone [phone]   MICROSCOPIC URINALYSIS - Abnormal; Notable for the following components:    Bacteria, UA 4+ (*)     WBC, UA 12 (*)     All other components within normal limits    Narrative:     Performed at:  [address] [employer] [address]   Phone [phone]   BASIC METABOLIC PANEL W/ REFLEX TO MG FOR LOW K - Abnormal; Notable for the following components:    Sodium 131 (*)     Chloride 96 (*)     Glucose 155 (*)     BUN 25 (*)     All other components within normal limits    Narrative:     Performed at:  [address] [facility] [employer]   Phone [phone]   CBC WITH AUTO DIFFERENTIAL - Abnormal; Notable for the following components:    RBC 3.07 (*)     Hemoglobin 11.3 (*)     Hematocrit 33.2 (*)     .9 (*)     MCH 36.7 (*)     RDW 17.6 (*)     Lymphocytes Absolute 0.3 (*)     All other components within normal limits    Narrative:     Performed at:  [address] [place] [address] [facility] [employer] [address]   Phone [phone]   MAGNESIUM - Abnormal; Notable for the following components:    Magnesium 2.50 (*)     All other components within normal limits    Narrative:     Performed at:  [facility]  [address] [facility] [employer] [address]   Phone [phone]   POCT GLUCOSE - Abnormal; Notable for the following components:    POC Glucose 190 (*)     All other components within normal limits    Narrative:     Performed at:  [facility]  [address] [facility] [employer] [address]   Phone [phone]   POCT GLUCOSE - Abnormal; Notable for the following components:    POC Glucose 147 (*)     All other components within normal limits    Narrative:     Performed at:  [facility]  [address] [facility] [employer] [address]   Phone [phone]   POCT GLUCOSE - Abnormal; Notable for the following components:    POC Glucose 157 (*)     All other components within normal limits    Narrative:     Performed at:  [facility]  [address] [facility] [employer] [address]   Phone [phone]   POCT GLUCOSE - Abnormal; Notable for the following components:    POC Glucose 186 (*)     All other components within normal limits    Narrative:     Performed at:  [facility]  [address] [facility] [employer]   Phone [phone]   POCT GLUCOSE - Abnormal; Notable for the following components:    POC Glucose 156 (*)     All other components within normal limits    Narrative:     Performed at:  [facility]  [address] [facility] [employer] [address]   Phone [phone]   CULTURE, URINE   TROPONIN    Narrative:     Performed at:  [address] [employer]   Phone [phone]   BRAIN NATRIURETIC PEPTIDE    Narrative: Performed at:  [facility]  [address] [place] [facility] [facility] [address]   Phone [phone]   PROCALCITONIN    Narrative:     Performed at:  [facility]  [address] [facility] [address]   Phone [phone]   PHOSPHORUS    Narrative:     Performed at:  [facility]  [address] [facility] [address]   Phone [phone]   HEMOGLOBIN A1C   CBC WITH AUTO DIFFERENTIAL   BASIC METABOLIC PANEL   MAGNESIUM   PHOSPHORUS   POCT GLUCOSE   POCT GLUCOSE   POCT GLUCOSE   POCT GLUCOSE   POCT GLUCOSE      XR CHEST PORTABLE   Final Result   No acute disease. Xr Chest (2 Vw) Result Date: 9/16/2020  EXAMINATION: TWO XRAY VIEWS OF THE CHEST 9/16/2020 2:57 pm COMPARISON: 05/29/2018 08/26/2020 HISTORY: ORDERING SYSTEM PROVIDED HISTORY: Shortness of breath TECHNOLOGIST PROVIDED HISTORY: Reason for exam:->Shortness of breath Reason for Exam: sob, nausea abdominal pain FINDINGS: The lungs are without acute focal process. There is no effusion or pneumothorax. The cardiomediastinal silhouette is stable. The osseous structures are stable. Unchanged anterior wedging T11. No acute process. Ct Guided Needle Placement Result Date: 9/21/2020  Radiology exam is complete. No Radiologist dictation. Please follow up with ordering provider. Ct Abdomen Pelvis W Iv Contrast Additional Contrast? None    Result Date: 9/16/2020  EXAMINATION: CTA OF THE CHEST; CT OF THE ABDOMEN AND PELVIS WITH CONTRAST 9/16/2020 3:56 pm TECHNIQUE: CTA of the chest was performed after the administration of intravenous contrast.  Multiplanar reformatted images are provided for review. MIP images are provided for review.  Dose modulation, iterative reconstruction, and/or weight based adjustment of the mA/kV was utilized to reduce the radiation dose to as low as reasonably achievable.; CT of the abdomen and pelvis was performed with the administration of intravenous contrast. Multiplanar reformatted images are provided for review. Dose modulation, iterative reconstruction, and/or weight based adjustment of the mA/kV was utilized to reduce the radiation dose to as low as reasonably achievable. COMPARISON: 08/26/2020 CT HISTORY: ORDERING SYSTEM PROVIDED HISTORY: hypoxic to the 80s, worsening shortness of breath, tachypneic, h/o ILD, lungs clear, assessing for perfusion etiology, r/o embolus TECHNOLOGIST PROVIDED HISTORY: Reason for exam:->hypoxic to the 80s, worsening shortness of breath, tachypneic, h/o ILD, lungs clear, assessing for perfusion etiology, r/o embolus Reason for Exam: hypoxic to the 80s, worsening shortness of breath, tachypneic, h/o ILD, lungs clear, assessing for perfusion etiology, r/o embolus Acuity: Acute Type of Exam: Initial; ORDERING SYSTEM PROVIDED HISTORY: periumbilical tenderness, abdominal pain TECHNOLOGIST PROVIDED HISTORY: Reason for exam:->periumbilical tenderness, abdominal pain Additional Contrast?->None Reason for Exam: periumbilical tenderness, abdominal pain nausea, sob, chol, Acuity: Acute Type of Exam: Initial FINDINGS: Chest: Pulmonary arteries: Study is of good technical quality for evaluation of pulmonary embolism. There are no filling defects to suggest pulmonary embolism. Main pulmonary artery is normal in caliber. No evidence of right ventricular strain. Mediastinum: The heart size is normal. Aorta is normal in caliber. Superior mediastinum is normal. No mediastinal or hilar lymph node enlargement. Lungs/pleura: The airways are patent. No pleural fluid. Moderate changes of centrilobular emphysema. Scattered ground-glass opacities are present in the right upper lobe. There is also a band of fibrotic change at the right base. Soft Tissues/Bones: T11 compression fracture stable and remote. No acute skeletal findings. Abdomen/Pelvis: Organs: The gallbladder is absent.   The liver, biliary ducts, hypoxic to the 80s, worsening shortness of breath, tachypneic, h/o ILD, lungs clear, assessing for perfusion etiology, r/o embolus Acuity: Acute Type of Exam: Initial; ORDERING SYSTEM PROVIDED HISTORY: periumbilical tenderness, abdominal pain TECHNOLOGIST PROVIDED HISTORY: Reason for exam:->periumbilical tenderness, abdominal pain Additional Contrast?->None Reason for Exam: periumbilical tenderness, abdominal pain nausea, sob, chol, Acuity: Acute Type of Exam: Initial FINDINGS: Chest: Pulmonary arteries: Study is of good technical quality for evaluation of pulmonary embolism. There are no filling defects to suggest pulmonary embolism. Main pulmonary artery is normal in caliber. No evidence of right ventricular strain. Mediastinum: The heart size is normal. Aorta is normal in caliber. Superior mediastinum is normal. No mediastinal or hilar lymph node enlargement. Lungs/pleura: The airways are patent. No pleural fluid. Moderate changes of centrilobular emphysema. Scattered ground-glass opacities are present in the right upper lobe. There is also a band of fibrotic change at the right base. Soft Tissues/Bones: T11 compression fracture stable and remote. No acute skeletal findings. Abdomen/Pelvis: Organs: The gallbladder is absent. The liver, biliary ducts, pancreas and spleen are normal.  Kidneys and adrenal glands are normal. GI/Bowel: The stomach, duodenum and small bowel are normal. A normal appendix is visualized. Diverticular changes in the sigmoid colon with no acute inflammation. Pelvis: The bladder is unremarkable. Postsurgical change of hysterectomy. Peritoneum/Retroperitoneum: The aorta tapers normally. No lymph node enlargement. Bones/Soft Tissues: No significant skeletal abnormalities. 1. No pulmonary embolism. 2. Centrilobular emphysema and chronic fibrotic changes in the chest. Scattered ground-glass opacities in the right upper lobe appear to be chronic.   No focal consolidation to suggest pneumonia. 3. No acute finding in the abdomen or pelvis. Ct Biopsy Bone Marrow Result Date: 9/21/2020  PROCEDURE: CT GUIDED BONE MARROW ASPIRATION AND CORE NEEDLE BONE BIOPSY OF THE RIGHT ILIAC BONE. MODERATE CONSCIOUS SEDATION 9/21/2020 HISTORY: ORDERING SYSTEM PROVIDED HISTORY: anemia TECHNOLOGIST PROVIDED HISTORY: Reason for exam:->anemia SEDATION: Moderate conscious sedation was administered for 15 minutes and monitored by the radiologist and radiology nurse TECHNIQUE: Informed consent was obtained following a detailed explanation of the procedure including risks, benefits, and alternatives. Universal protocol was followed. Axial images were obtained through the iliac bones using CT guidance and a suitable skin site was prepped and draped in sterile fashion. Local anesthesia was achieved with lidocaine. An 11 gauge Roomish bone marrow biopsy needle was advanced into the right iliac bone and approximately 7 mL of bone marrow aspirate was obtained. A single core biopsy specimen was obtained and the patient tolerated the procedure well. EBL: Minimal, less than 5 cc Dose modulation, iterative reconstruction, and/or weight based adjustment of the mA/kV was utilized to reduce the radiation dose to as low as reasonably achievable. Successful CT guided bone marrow aspiration and core biopsy of the iliac bone. Ct Chest Abdomen Pelvis Wo Contrast Result Date: 8/29/2020  EXAMINATION: CT OF THE CHEST, ABDOMEN, AND PELVIS WITHOUT CONTRAST 8/26/2020 1:07 pm TECHNIQUE: CT of the chest, abdomen and pelvis was performed without the administration of intravenous contrast. Multiplanar reformatted images are provided for review. Dose modulation, iterative reconstruction, and/or weight based adjustment of the mA/kV was utilized to reduce the radiation dose to as low as reasonably achievable.  COMPARISON: 02/21/2020, 11/14/2019, 10/29/2018, 08/15/2007 HISTORY: ORDERING SYSTEM PROVIDED HISTORY: Weight loss, abnormal TECHNOLOGIST PROVIDED HISTORY: Additional Contrast?->Radiologist Recommendation Reason for Exam: abnormal weight loss Acuity: Acute Type of Exam: Subsequent/Follow-up Relevant Medical/Surgical History: hx domenic, hyst FINDINGS: Chest: Mediastinum: The thyroid is unremarkable. There is no pathologic lymphadenopathy within the chest or mediastinum. There is atherosclerotic disease of the thoracic aorta, without evidence of aneurysm. No pericardial abnormality is identified. Lungs/pleura: The central airways are patent. No localized focus of acute airspace consolidation is identified. There is a postbiopsy scar within the right middle lobe. There is an additional postbiopsy scar within the right lower lobe as well. There are multiple persistent reticular and ground-glass nodular opacities throughout both lungs which are similar in comparison with the study of 11/14/2019, and most likely represent an element of chronic interstitial lung disease, to include either chronic hypersensitivity pneumonitis, cryptogenic organizing pneumonia, or respiratory bronchiolitis. There is no evidence of a pneumothorax or pleural effusion. No new suspicious pulmonary nodule or mass is evident. Soft Tissues/Bones: There is degenerative change throughout the thoracic spine. There is a chronic wedge compression fracture of T11. No osteolytic or osteoblastic lesion is seen. Abdomen/Pelvis: Organs: Evaluation of the solid organs is limited by lack of IV contrast. The patient is status post cholecystectomy. The liver and spleen are unremarkable in noncontrast appearance. The pancreas is normal.  The adrenal glands are unremarkable bilaterally. Noncontrast images of the kidneys and ureters demonstrate no evidence of a renal or ureteral calculus or hydronephrosis. There is chronic bilateral perinephric stranding, likely secondary to chronic medical renal disease.   The bilateral kidneys are otherwise grossly unremarkable in noncontrast appearance. GI/Bowel: Evaluation of the hollow GI tract demonstrates a small sliding-type hiatal hernia. There is mild nonspecific wall thickening involving the cecum, ascending colon, and proximal transverse colon, which while could represent lack of distention, a focal infectious or inflammatory colitis is also on the differential in the right clinical setting. The remainder of the colon is unremarkable. The small bowel is unremarkable, without dilatation or obstruction. Pelvis: The urinary bladder is normal.  The patient is status post hysterectomy. There is no free pelvic fluid or pathologic pelvic lymphadenopathy. Peritoneum/Retroperitoneum: No intraperitoneal free air or free fluid is identified. No pathologic lymphadenopathy is seen. The abdominal aorta is unremarkable in noncontrast appearance. No significant abdominal wall hernia is identified. There is a small 1.6 x 1.4 cm fat-containing umbilical hernia without incarceration or bowel involvement. Bones/Soft Tissues: There is degenerative change throughout the lumbar spine. No osteolytic or osteoblastic lesion is seen. 1. No definite acute process within the chest, abdomen, or pelvis. 2. Multifocal reticular and ground-glass nodular opacities throughout both lungs are similar in comparison with the prior study of 11/14/2019, and likely reflect a chronic interstitial lung process, to include chronic hypersensitivity pneumonitis, cryptogenic organizing pneumonia, or respiratory bronchiolitis. However, consider short-term chest CT follow-up in 3-6 months to ensure stability of the multifocal ground-glass nodules, as a slow-growing malignancy is not entirely excluded. 3. Wall thickening involving the right hemicolon may be secondary to lack of distention. However, a focal infectious or inflammatory colitis is also a diagnostic consideration. Clinical correlation is advised.         EKG INTERPRETATION:  EKG by my preliminary

## 2020-09-24 NOTE — PLAN OF CARE
Nutrition Problem #1: Inadequate oral intake  Intervention: Food and/or Nutrient Delivery: Continue Current Diet, Start Oral Nutrition Supplement  Nutritional Goals: consume >/= to 50 %

## 2020-09-24 NOTE — PROGRESS NOTES
Medication Reconciliation    List of medications patient is currently taking is incomplete. Notes regarding home medications:   1. UNABLE to assess medications with patient secondary to SOB and family unsure of medications at this time. Patient recently discharged yesterday 09/22/20. 2. Patient's family reports that the patient only fills with [address] @[phone].  3. In looking at [employer] records and the dispense report the two medications I have questions on is metformin 1,000 mg and montelukast 10 mg. All the other medications can be found on the dispense report or were recently prescribed on 09/22/20.     Brendan Barthel, Pharmacy Intern  9/23/2020 10:44 PM

## 2020-09-25 ENCOUNTER — TELEPHONE (OUTPATIENT)
Dept: INTERNAL MEDICINE CLINIC | Age: 75
End: 2020-09-25

## 2020-09-25 VITALS
OXYGEN SATURATION: 91 % | TEMPERATURE: 97.2 F | WEIGHT: 128.75 LBS | DIASTOLIC BLOOD PRESSURE: 56 MMHG | HEIGHT: 64 IN | HEART RATE: 93 BPM | BODY MASS INDEX: 21.98 KG/M2 | SYSTOLIC BLOOD PRESSURE: 101 MMHG | RESPIRATION RATE: 16 BRPM

## 2020-09-25 LAB
ANION GAP SERPL CALCULATED.3IONS-SCNC: 9 MMOL/L (ref 3–16)
BASOPHILS ABSOLUTE: 0 K/UL (ref 0–0.2)
BASOPHILS RELATIVE PERCENT: 0.1 %
BUN BLDV-MCNC: 33 MG/DL (ref 7–20)
CALCIUM SERPL-MCNC: 10 MG/DL (ref 8.3–10.6)
CHLORIDE BLD-SCNC: 96 MMOL/L (ref 99–110)
CO2: 27 MMOL/L (ref 21–32)
CREAT SERPL-MCNC: 0.8 MG/DL (ref 0.6–1.2)
CRYOGLOBULIN, QUALITATIVE: NORMAL
EOSINOPHILS ABSOLUTE: 0 K/UL (ref 0–0.6)
EOSINOPHILS RELATIVE PERCENT: 0 %
ESTIMATED AVERAGE GLUCOSE: 53.7 MG/DL
GFR AFRICAN AMERICAN: >60
GFR NON-AFRICAN AMERICAN: >60
GLUCOSE BLD-MCNC: 133 MG/DL (ref 70–99)
GLUCOSE BLD-MCNC: 140 MG/DL (ref 70–99)
GLUCOSE BLD-MCNC: 148 MG/DL (ref 70–99)
HBA1C MFR BLD: <3.5 %
HCT VFR BLD CALC: 31 % (ref 36–48)
HEMOGLOBIN: 10.4 G/DL (ref 12–16)
LYMPHOCYTES ABSOLUTE: 0.7 K/UL (ref 1–5.1)
LYMPHOCYTES RELATIVE PERCENT: 6 %
MAGNESIUM: 2.1 MG/DL (ref 1.8–2.4)
MCH RBC QN AUTO: 36.9 PG (ref 26–34)
MCHC RBC AUTO-ENTMCNC: 33.7 G/DL (ref 31–36)
MCV RBC AUTO: 109.6 FL (ref 80–100)
MONOCYTES ABSOLUTE: 0.6 K/UL (ref 0–1.3)
MONOCYTES RELATIVE PERCENT: 4.9 %
NEUTROPHILS ABSOLUTE: 10.5 K/UL (ref 1.7–7.7)
NEUTROPHILS RELATIVE PERCENT: 89 %
ORGANISM: ABNORMAL
PDW BLD-RTO: 16.8 % (ref 12.4–15.4)
PERFORMED ON: ABNORMAL
PERFORMED ON: ABNORMAL
PHOSPHORUS: 3.2 MG/DL (ref 2.5–4.9)
PLATELET # BLD: 240 K/UL (ref 135–450)
PMV BLD AUTO: 8.3 FL (ref 5–10.5)
POTASSIUM SERPL-SCNC: 4.8 MMOL/L (ref 3.5–5.1)
RBC # BLD: 2.83 M/UL (ref 4–5.2)
SODIUM BLD-SCNC: 132 MMOL/L (ref 136–145)
URINE CULTURE, ROUTINE: ABNORMAL
WBC # BLD: 11.8 K/UL (ref 4–11)

## 2020-09-25 PROCEDURE — 99232 SBSQ HOSP IP/OBS MODERATE 35: CPT | Performed by: INTERNAL MEDICINE

## 2020-09-25 PROCEDURE — 94761 N-INVAS EAR/PLS OXIMETRY MLT: CPT

## 2020-09-25 PROCEDURE — 97530 THERAPEUTIC ACTIVITIES: CPT

## 2020-09-25 PROCEDURE — 36415 COLL VENOUS BLD VENIPUNCTURE: CPT

## 2020-09-25 PROCEDURE — 2580000003 HC RX 258: Performed by: INTERNAL MEDICINE

## 2020-09-25 PROCEDURE — 80048 BASIC METABOLIC PNL TOTAL CA: CPT

## 2020-09-25 PROCEDURE — 94640 AIRWAY INHALATION TREATMENT: CPT

## 2020-09-25 PROCEDURE — 6360000002 HC RX W HCPCS: Performed by: INTERNAL MEDICINE

## 2020-09-25 PROCEDURE — 83735 ASSAY OF MAGNESIUM: CPT

## 2020-09-25 PROCEDURE — 6370000000 HC RX 637 (ALT 250 FOR IP): Performed by: INTERNAL MEDICINE

## 2020-09-25 PROCEDURE — 85025 COMPLETE CBC W/AUTO DIFF WBC: CPT

## 2020-09-25 PROCEDURE — 6370000000 HC RX 637 (ALT 250 FOR IP): Performed by: STUDENT IN AN ORGANIZED HEALTH CARE EDUCATION/TRAINING PROGRAM

## 2020-09-25 PROCEDURE — 84100 ASSAY OF PHOSPHORUS: CPT

## 2020-09-25 PROCEDURE — 97535 SELF CARE MNGMENT TRAINING: CPT

## 2020-09-25 PROCEDURE — 99239 HOSP IP/OBS DSCHRG MGMT >30: CPT | Performed by: INTERNAL MEDICINE

## 2020-09-25 RX ORDER — CIPROFLOXACIN 250 MG/1
250 TABLET, FILM COATED ORAL EVERY 12 HOURS SCHEDULED
Qty: 10 TABLET | Refills: 0 | Status: SHIPPED | OUTPATIENT
Start: 2020-09-25 | End: 2020-09-30

## 2020-09-25 RX ORDER — PREDNISONE 20 MG/1
20 TABLET ORAL DAILY
Status: DISCONTINUED | OUTPATIENT
Start: 2020-09-25 | End: 2020-09-25 | Stop reason: HOSPADM

## 2020-09-25 RX ORDER — CIPROFLOXACIN 500 MG/1
250 TABLET, FILM COATED ORAL EVERY 12 HOURS SCHEDULED
Status: DISCONTINUED | OUTPATIENT
Start: 2020-09-25 | End: 2020-09-25 | Stop reason: HOSPADM

## 2020-09-25 RX ADMIN — DICYCLOMINE HYDROCHLORIDE 10 MG: 10 CAPSULE ORAL at 08:28

## 2020-09-25 RX ADMIN — Medication 2 PUFF: at 08:04

## 2020-09-25 RX ADMIN — OYSTER SHELL CALCIUM WITH VITAMIN D 1 TABLET: 500; 200 TABLET, FILM COATED ORAL at 08:27

## 2020-09-25 RX ADMIN — LOSARTAN POTASSIUM 100 MG: 100 TABLET, FILM COATED ORAL at 08:28

## 2020-09-25 RX ADMIN — FENOFIBRATE 160 MG: 160 TABLET ORAL at 08:27

## 2020-09-25 RX ADMIN — ENOXAPARIN SODIUM 40 MG: 40 INJECTION SUBCUTANEOUS at 08:31

## 2020-09-25 RX ADMIN — Medication 10 ML: at 08:32

## 2020-09-25 RX ADMIN — ATOVAQUONE 1500 MG: 750 SUSPENSION ORAL at 10:41

## 2020-09-25 RX ADMIN — IPRATROPIUM BROMIDE AND ALBUTEROL SULFATE 1 AMPULE: .5; 3 SOLUTION RESPIRATORY (INHALATION) at 12:01

## 2020-09-25 RX ADMIN — METFORMIN HYDROCHLORIDE 1000 MG: 500 TABLET ORAL at 08:27

## 2020-09-25 RX ADMIN — LORAZEPAM 0.5 MG: 0.5 TABLET ORAL at 03:51

## 2020-09-25 RX ADMIN — CIPROFLOXACIN HYDROCHLORIDE 250 MG: 500 TABLET, FILM COATED ORAL at 08:28

## 2020-09-25 RX ADMIN — AMLODIPINE BESYLATE 5 MG: 5 TABLET ORAL at 08:28

## 2020-09-25 RX ADMIN — GABAPENTIN 300 MG: 300 CAPSULE ORAL at 08:27

## 2020-09-25 RX ADMIN — INSULIN LISPRO 1 UNITS: 100 INJECTION, SOLUTION INTRAVENOUS; SUBCUTANEOUS at 12:53

## 2020-09-25 RX ADMIN — IPRATROPIUM BROMIDE AND ALBUTEROL SULFATE 1 AMPULE: .5; 3 SOLUTION RESPIRATORY (INHALATION) at 08:04

## 2020-09-25 RX ADMIN — PREDNISONE 20 MG: 20 TABLET ORAL at 10:41

## 2020-09-25 RX ADMIN — METHYLPREDNISOLONE SODIUM SUCCINATE 60 MG: 125 INJECTION, POWDER, FOR SOLUTION INTRAMUSCULAR; INTRAVENOUS at 06:26

## 2020-09-25 ASSESSMENT — ENCOUNTER SYMPTOMS
WHEEZING: 0
COUGH: 0
SHORTNESS OF BREATH: 0
CHEST TIGHTNESS: 0
GASTROINTESTINAL NEGATIVE: 1
TROUBLE SWALLOWING: 0

## 2020-09-25 ASSESSMENT — PAIN SCALES - GENERAL: PAINLEVEL_OUTOF10: 0

## 2020-09-25 NOTE — PROGRESS NOTES
dextrose, glucagon (rDNA), dextrose, sodium chloride flush, acetaminophen **OR** acetaminophen, polyethylene glycol, promethazine **OR** ondansetron, ipratropium-albuterol    Labs:  CBC:   Recent Labs     09/23/20 2108 09/24/20 0357 09/25/20  0647   WBC 10.1 6.5 11.8*   HGB 11.9* 11.3* 10.4*   HCT 35.0* 33.2* 31.0*   .0* 107.9* 109.6*    224 240     BMP:   Recent Labs     09/23/20 2108 09/24/20 0357 09/25/20  0647   * 131* 132*   K 4.6 4.3 4.8   CL 91* 96* 96*   CO2 20* 24 27   PHOS  --  3.3 3.2   BUN 27* 25* 33*   CREATININE 1.0 0.8 0.8     LIVER PROFILE:   Recent Labs     09/23/20 2108   AST 35   ALT 42*   BILITOT 2.7*   ALKPHOS 42     PT/INR: No results for input(s): PROTIME, INR in the last 72 hours. APTT: No results for input(s): APTT in the last 72 hours. UA:  Recent Labs     09/23/20  2333   COLORU DK YELLOW   PHUR 8.0   WBCUA 12*   RBCUA 1   BACTERIA 4+*   CLARITYU TURBID*   SPECGRAV 1.015   LEUKOCYTESUR MODERATE*   UROBILINOGEN 1.0   BILIRUBINUR Negative   BLOODU Negative   GLUCOSEU Negative     No results for input(s): PH, PCO2, PO2 in the last 72 hours. I reviewed the labs and images listed above    Assessment:   · Acute Hypoxic Respiratory Failure with saturations less than 90% on room air, resolved  · ILD   · COPD with asthma      Plan:  · Prednisone 20 mg at DC  · Atovaquone for PJP prophylaxis  · Bronchodilators   · DVT prophylaxis with lovenox    Ok to DC home  Has an appt with me on 10/19.   Keep on 20 mg of prednisone until then    D/W Dr. Augustus Givens, DO  [facility]

## 2020-09-25 NOTE — PROGRESS NOTES
Occupational Therapy  Facility/Department: [facility] MED SURG  Daily Treatment Note  NAME: Siva Villalba  : [id number]  MRN: [id number]    Date of Service: 2020    Discharge Recommendations:  24 hour supervision or assist  OT Equipment Recommendations  Equipment Needed: No    Siva Villalba scored a  on the AM-PAC ADL Inpatient form. At this time, no further OT is recommended upon discharge. Recommend patient returns to prior setting with prior services. Assessment   Performance deficits / Impairments: Decreased functional mobility ; Decreased safe awareness;Decreased balance;Decreased ADL status; Decreased endurance;Decreased strength  Assessment: 66 y/o female admitted  with COPD and acute repiratory distress. Recent admit - for COPD exacerbation, discharged home on 3L O2. PTA, pt lives with  in a mult-level home and independently completes ADLs. Today, pt initially completed functional mobility to bathroom without AD and CGA. Pt slightly SOB with activity on RA but unable to achieve spO2 reading 2/2 cold fingers. Pt recovered with brief seated rest breaks. Pt then ambulated chair <> doorway with RW and improved balance requiring SBA. Pt would benefit from skilled therapy while in acute care to [facility] safety and level of independence. Anticipate pt will be safe to return home with 24 hour supervision/assistance. Pt declining home OT at this time. Prognosis: Fair  OT Education: OT Role;Plan of Care;Transfer Training  REQUIRES OT FOLLOW UP: Yes  Activity Tolerance  Activity Tolerance: Patient Tolerated treatment well  Safety Devices  Safety Devices in place: Yes  Type of devices: Left in chair;Call light within reach;Nurse notified; Chair alarm in place;Gait belt;Patient at risk for falls         Patient Diagnosis(es): The primary encounter diagnosis was COPD exacerbation ([facility] [address] Diagnoses of Hypoxia and Dyspnea and respiratory abnormalities were also pertinent to this visit. has a past medical history of Asthma, Depression, Diverticulosis of colon (without mention of hemorrhage), Enthesopathy of hip region, Esophageal reflux, Irritable bowel syndrome, Nocturia, Osteoporosis, unspecified, Other and unspecified hyperlipidemia, Postinflammatory pulmonary fibrosis ([address] Sialoadenitis, Synovial cyst of popliteal space, Thoracic or lumbosacral neuritis or radiculitis, unspecified, Type II or unspecified type diabetes mellitus without mention of complication, not stated as uncontrolled, and Unspecified essential hypertension. has a past surgical history that includes Cholecystectomy; polypectomy; Hysterectomy; Ovary removal; lipoma resection; Colonoscopy (2009); Colonoscopy (02/2016); bronchoscopy (N/A, 12/21/2018); and CT BIOPSY BONE MARROW (9/21/2020). Restrictions  Restrictions/Precautions  Restrictions/Precautions: Fall Risk  Position Activity Restriction  Other position/activity restrictions: O2 : currently RA although Home O2 2-3L pta. Subjective   General  Chart Reviewed: Yes  Patient assessed for rehabilitation services?: Yes  Additional Pertinent Hx: 68 y/o female admitted 9/23 with COPD and acute repiratory distress. Recent admit 9/16-9/22 for COPD exacerbation, discharged home on 3L O2. Family / Caregiver Present: No  Referring Practitioner: EZIO Giraldo - CNP  Subjective  Subjective: Pt in bed upon arrival and agreeable to OT. Pt slightly SOB with activity but unable to obtain spO2 reading 2/2 cold fingers. HR 120s with activity. General Comment  Comments: Per RN, okay for therapy.         Objective    ADL  Grooming: Stand by assistance(standing at sink to wash hands)  LE Dressing: Contact guard assistance(able to nagi/doff depends seated on toilet)  Toileting: Contact guard assistance        Balance  Sitting Balance: Stand by assistance  Standing Balance: Contact guard assistance  Standing Balance  Time: stood prn for toileting and grooming tasks  Functional Mobility  Functional Mobility Comments: chair <> bathroom without AD and CGA. Pt then ambulated chair <> doorway 2x with RW and SBA  Toilet Transfers  Toilet - Technique: Ambulating  Equipment Used: Standard toilet  Toilet Transfer: Stand by assistance     Bed mobility  Comment: Pt in chair at start/end of session  Transfers  Sit to stand: Stand by assistance  Stand to sit: Stand by assistance        Plan   Plan  Times per week: 3-5  Current Treatment Recommendations: Strengthening, Patient/Caregiver Education & Training, Balance Training, Functional Mobility Training, Endurance Training, Safety Education & Training, Self-Care / ADL    AM-PAC Score  AM-[facility] Inpatient Daily Activity Raw Score: 21 (09/25/20 1313)  AM-PAC Inpatient ADL T-Scale Score : 44.27 (09/25/20 1313)  ADL Inpatient CMS 0-100% Score: 32.79 (09/25/20 1313)  ADL Inpatient CMS G-Code Modifier : Regine Duel (09/25/20 1313)    Goals  Short term goals  Time Frame for Short term goals: Prior to DC: goals ongoing  Short term goal 1: Pt will complete bed mobility independently. Short term goal 2: Pt will complete functional transfers for ADL tasks independently. Short term goal 3: Pt will complete functional mobility for ADL tasks mod I. Short term goal 4: Pt will tolerate standing > 5 minutes for functional tasks independently. Short term goal 5: Pt will complete toileting independently. Long term goals  Time Frame for Long term goals : STGS=LTGS  Patient Goals   Patient goals : No goals stated       Therapy Time   Individual Concurrent Group Co-treatment   Time In 1130         Time Out 1200         Minutes 30         Timed Code Treatment Minutes: 30 Minutes     This note to serve as OT d/c summary if pt is d/c-ed prior to next therapy session.     Shana Cordova, OTR/L

## 2020-09-25 NOTE — PROGRESS NOTES
Physical Therapy    Facility/Department: [facility] MED SURG  Initial Assessment    NAME: Mónica Beach  :   MRN: [id number]    Date of Service: 2020    Discharge Recommendations:  Continue to assess pending progress   PT Equipment Recommendations  Other: Will monitor for potential equipt needs. Mónica Beach scored a 18/24 on the AM-PAC short mobility form. Current research shows that an AM-PAC score of 18 or greater is typically associated with a discharge to the patient's home setting. Based on the patient's AM-PAC score and their current functional mobility deficits, it is recommended that the patient have 2-3 sessions per week of Physical Therapy at d/c to increase the patient's independence. At this time, this patient demonstrates the endurance and safety to discharge home with Home PT Evaluation  and a follow up treatment frequency of 2-3x/wk. Please see assessment section for further patient specific details. If patient discharges prior to next session this note will serve as a discharge summary. Please see below for the latest assessment towards goals. Assessment   Body structures, Functions, Activity limitations: Decreased functional mobility ; Decreased endurance  Assessment: 66 y/o female admit 2020 with COPD, Acute Respiratory Distress. Recent admit -2020 with COPD Exac (d/c home with O2 3L). PMH as noted including IBS, Diverticulosis, T/L Neuritis, ILD. PTA pt living with  in multi level home with bed/bath 2nd floor and 1/2 bath main level. Has ramp access. Pt reports adequate assist/support for d/c home. Would benefit from [facility] upon d/c. Prognosis: Good  Decision Making: Low Complexity  History: 66 y/o female admit 2020 with COPD, Acute Respiratory Distress. Recent admit -2020 with COPD Exac (d/c home with O2 3L). PMH as noted including IBS, Diverticulosis, T/L Neuritis, ILD. Exam: See above.   Clinical Presentation: See above. Patient Education: Role of PT, POC, Need to call for assist.  Barriers to Learning: Alittle anxious at times. REQUIRES PT FOLLOW UP: Yes  Activity Tolerance  Activity Tolerance: Patient Tolerated treatment well  Activity Tolerance: Pt paula OOB, short distances although alittle anxious (improving during eval). Patient Diagnosis(es): The primary encounter diagnosis was COPD exacerbation ([facility] [address] Diagnoses of Hypoxia and Dyspnea and respiratory abnormalities were also pertinent to this visit. has a past medical history of Asthma, Depression, Diverticulosis of colon (without mention of hemorrhage), Enthesopathy of hip region, Esophageal reflux, Irritable bowel syndrome, Nocturia, Osteoporosis, unspecified, Other and unspecified hyperlipidemia, Postinflammatory pulmonary fibrosis ([facility] [address] Sialoadenitis, Synovial cyst of popliteal space, Thoracic or lumbosacral neuritis or radiculitis, unspecified, Type II or unspecified type diabetes mellitus without mention of complication, not stated as uncontrolled, and Unspecified essential hypertension. has a past surgical history that includes Cholecystectomy; polypectomy; Hysterectomy; Ovary removal; lipoma resection; Colonoscopy (2009); Colonoscopy (02/2016); bronchoscopy (N/A, 12/21/2018); and CT BIOPSY BONE MARROW (9/21/2020). Restrictions  Restrictions/Precautions  Restrictions/Precautions: Fall Risk  Position Activity Restriction  Other position/activity restrictions: O2 : currently RA although Home O2 2-3L pta. Vision/Hearing  Vision: Impaired  Vision Exceptions: Wears glasses for reading  Hearing: Within functional limits     Subjective  General  Chart Reviewed: Yes  Patient assessed for rehabilitation services?: Yes  Additional Pertinent Hx: 68 y/o female admit 9/23/2020 with COPD, Acute Respiratory Distress. Recent admit 9/16-9/22/2020 with COPD Exac (d/c home with O2 3L).   PMH as noted including IBS, Diverticulosis, T/L Neuritis, ILD.  Family / Caregiver Present: No  Referring Practitioner: Kacy Jimenez NP  Diagnosis: COPD Exac, Acute Respiratory Distress. Follows Commands: Within Functional Limits  Subjective  Subjective: Pt agreeable to PT Eval/Rx (alittle anxious at times). Pain Screening  Patient Currently in Pain: No          Orientation  Orientation  Overall Orientation Status: Within Functional Limits(Alittle anxious although improving during eval.)  Social/Functional History  Social/Functional History  Lives With: Spouse( Brenda Ken). )  Type of Home: House  Home Layout: Multi-level, Bed/Bath upstairs, 1/2 bath on main level, Laundry in basement(2 story with basement.)  Home Access: Ramped entrance, Stairs to enter with rails  Entrance Stairs - Number of Steps: 1 GASTON with a post and a swing (??) to hold onto or ramp onto back patio to enter house; Flight of stairs upstairs up to bed/bath  Bathroom Shower/Tub: Tub/Shower unit, Walk-in shower(Pt uses Walk-In Shower.)  Bathroom Toilet: Standard  Bathroom Equipment: Grab bars in shower, Shower chair, Grab bars around toilet  Bathroom Accessibility: Accessible  Home Equipment: Cane, Rolling walker, Oxygen(3L O2 via NC all the time (new since previous admit))  ADL Assistance: [address] [place] [address]: ( takes care of homemaking needs.)  Ambulation Assistance: Independent(Pt was using a cane PRN prior to last admission. Pt has been using RW the 1 day at home before being readmitted.)  Transfer Assistance: Independent  Active : No( drives.)  Occupation: Retired  Type of occupation: Retired : worked at [employer] ([address]). IADL Comments: Sleeps in a regular bed  Additional Comments: Pt was recently admitted to [facility] 9/16-9/22 with COPD exacerbation and was discharged home on 3L O2.   Cognition        Objective          AROM RLE (degrees)  RLE AROM: WFL  AROM LLE (degrees)  LLE AROM : WFL  AROM RUE (degrees)  RUE AROM : WFL  AROM LUE (degrees)  LUE AROM : WFL  Strength RLE  Strength RLE: WFL  Strength LLE  Strength LLE: WFL  Strength RUE  Strength RUE: WFL  Strength LUE  Strength LUE: WFL     Sensation  Overall Sensation Status: WFL  Bed mobility  Supine to Sit: Minimal assistance  Transfers  Sit to Stand: Minimal Assistance(With Rolling Walker. Cues for safe hand placement.)  Stand to sit: Minimal Assistance(With Rolling Walker. Cues for safe hand placement.)  Ambulation  Ambulation?: Yes  Ambulation 1  Surface: level tile  Device: Rolling Walker  Quality of Gait: Pt amb within hospital room setting ~20' with Rolling Walker Min assist.  Cues for upright posture, diminished step length/clearance. Slow guarded. Plan   Plan  Times per week: 3-5x week while in acute care setting. Current Treatment Recommendations: Functional Mobility Training, Transfer Training, Gait Training, Safety Education & Training, Patient/Caregiver Education & Training  Safety Devices  Type of devices: Call light within reach, Chair alarm in place, Left in chair, Nurse notified         AM-PAC Score  AM-PAC Inpatient Mobility Raw Score : 18 (09/25/20 0634)  AM-PAC Inpatient T-Scale Score : 43.63 (09/25/20 0634)  Mobility Inpatient CMS 0-100% Score: 46.58 (09/25/20 1548)  Mobility Inpatient CMS G-Code Modifier : CK (09/25/20 8666)          Goals  Short term goals  Time Frame for Short term goals: Upon d/c acute care setting. Short term goal 1: Bed Mob Independent. Short term goal 2: Transfers with assist device SBA. Short term goal 3: Amb with assist device 100' SBA/CGA. Patient Goals   Patient goals : Return home with .        Therapy Time   Individual Concurrent Group Co-treatment   Time In 1115         Time Out 1145         Minutes [address]

## 2020-09-25 NOTE — CARE COORDINATION
Go reviewing patient chart notes for possible need for increased respiratory therapy at home. Go's RT provided the following info that would be needed to be documented in pt's chart for insurance coverage of either device (in addition to DME Orders). Bipap: ABG, OVNIGHT, statement saying IAN is not cause of hypercapnia    NIV: ABG, note stating: Bi-level/RAD has been tried and failed due to the severity of the patients condition while compliant with Bipap. Without the use of NIV, or an interrupted in ventilation could result in death.   *that exact statement is required per [facility] [address]! ! Go will continue to follow.     Thank you for the referral.  Electronically signed by Brandyn Montague on 9/25/2020 at 9:24 AM Cell ph# [phone]

## 2020-09-25 NOTE — PROGRESS NOTES
IM Progress Note      Subjective: The patient is doing better today and sitting in chair and oxygen sat is 94% on RA  She still is little agitated and confused and she does not know where  her  is and had a heart attack  She knows she is in hospital and and has lung problems  She denies any pain in chest cough wheezing or dyspnea  Appetite is ok and ha s no other cp gi or gu symptoms  Review of Systems:  Review of Systems   Constitutional: Negative for activity change, appetite change, fever and unexpected weight change. HENT: Negative for trouble swallowing. Respiratory: Negative for cough, chest tightness, shortness of breath and wheezing. Cardiovascular: Negative for chest pain, palpitations and leg swelling. Gastrointestinal: Negative. Genitourinary: Negative. Neurological: Negative for dizziness, light-headedness and headaches. Objective:    /63   Pulse 98   Temp 98.2 °F (36.8 °C) (Oral)   Resp 12   Ht 5' 4\" (1.626 m)   Wt 128 lb 12 oz (58.4 kg)   SpO2 92%   BMI 22.10 kg/m²     Intake/Output Summary (Last 24 hours) at 9/25/2020 0941  Last data filed at 9/24/2020 1800  Gross per 24 hour   Intake 920 ml   Output 225 ml   Net 695 ml           Physical Exam  Vitals signs and nursing note reviewed. Constitutional:       General: She is not in acute distress. Eyes:      General: No scleral icterus. Extraocular Movements: Extraocular movements intact. Conjunctiva/sclera: Conjunctivae normal.      Pupils: Pupils are equal, round, and reactive to light. Neck:      Thyroid: No thyromegaly. Vascular: No carotid bruit or JVD. Cardiovascular:      Rate and Rhythm: Normal rate and regular rhythm. Pulses: Normal pulses. Heart sounds: Normal heart sounds. No murmur. No gallop. Pulmonary:      Effort: No respiratory distress. Breath sounds: Normal breath sounds. No wheezing, rhonchi or rales.    Musculoskeletal:      Right lower leg: No edema. Left lower leg: No edema. Lymphadenopathy:      Cervical: No cervical adenopathy. Neurological:      Mental Status: She is alert and oriented to person, place, and time. Psychiatric:      Comments: Little agitated mood and has sight delusional thoughts             CBC:   Recent Labs     09/24/20 0357 09/25/20  0647   WBC 6.5 11.8*   HGB 11.3* 10.4*    240     BMP:    Recent Labs     09/24/20 0357 09/25/20  0647   * 132*   K 4.3 4.8   CL 96* 96*   CO2 24 27   BUN 25* 33*   CREATININE 0.8 0.8   GLUCOSE 155* 133*     Hepatic:   Recent Labs     09/23/20 2108   AST 35   ALT 42*   BILITOT 2.7*   ALKPHOS 42     Troponin:   Recent Labs     09/23/20 2108   TROPONINI <0.01        Glucose:    Recent Labs     09/23/20 2108 09/24/20 0357 09/25/20  0647   GLUCOSE 146* 155* 133*              Assessment/Plan:    Active Hospital Problems    Diagnosis Date Noted    Chronic respiratory failure with hypoxia (HCC) [J96.11]-improved and she sis on RA and doing well  09/24/2020    Acute respiratory failure with hypoxia (HCC) [J96.01]-resolved 09/23/2020    Acute cystitis without hematuria [N30.00]-on cipro and still growing e-coli and will continue for 7 days 09/19/2020    Other acquired hemolytic anemias (HCC)-due drug  Dapsone [D59.8]-no change 09/18/2020    Acute delirium [R41.0]-still not normal and may be from steroids  09/18/2020    Type 2 diabetes mellitus without complication, without long-term current use of insulin (HCC) [E11.9]-controlled 05/15/2020    ILD (interstitial lung disease) ([facility] [address] [J84.9]-no change 12/21/2018    COPD (chronic obstructive pulmonary disease) ([facility] [address] [J44.9]-stable 11/29/2016    Essential hypertension [I10]-controlled        Start po steroids and discussed with DR. Patricia Felix  Possible discharge today  Did very well and will discharge her today and follow up in office  Diet meds activity discussed with her  Aurora Carranza  Electronically signed by Jericho Roach MD on 9/25/2020 at 9:41 AM

## 2020-09-25 NOTE — DISCHARGE INSTR - COC
Continuity of Care Form    Patient Name: Dillon Patel   :    MRN:  [id number]    Admit date:  2020  Discharge date:  ***    Code Status Order: Full Code   Advance Directives:   Advance Care Flowsheet Documentation     Date/Time Healthcare Directive Type of Healthcare Directive Copy in [address] Agent's Name Healthcare Agent's Phone Number    20 0000  No, patient does not have an advance directive for healthcare treatment -- -- -- -- --          Admitting Physician:  Adalid Loya MD  PCP: Adalid Loya MD    Discharging Nurse: [facility] Unit/Room#: V6V-1316/2375-33  Discharging Unit Phone Number: ***    Emergency Contact:   Extended Emergency Contact Information  Primary Emergency Contact: Jesus Aiken  Address: [address] [place] [address] Phone: [phone]  Mobile Phone: [phone]  Relation: Spouse   needed? No    Past Surgical History:  Past Surgical History:   Procedure Laterality Date    BRONCHOSCOPY N/A 2018    BRONCHOSCOPY WITH BAL performed by Qi Martin DO at [address] [place] [address]    DR. Jean-no polyps    COLONOSCOPY  2016    normal-DR. Ramsay    CT BONE MARROW BIOPSY  2020    CT BONE MARROW BIOPSY 2020 [facility]    HYSTERECTOMY      partial    LIPOMA RESECTION      abdominal wall    OVARY REMOVAL      left    POLYPECTOMY         Immunization History:   Immunization History   Administered Date(s) Administered    Influenza Vaccine, unspecified formulation 11/10/2015    Influenza Whole 10/22/2010    Influenza, High Dose (Fluzone 65 yrs and older) 2011, 2012, 2016, 2017, 10/15/2018    Influenza, Triv, inactivated, subunit, adjuvanted, IM (Fluad 65 yrs and older) 2019    Pneumococcal Conjugate 13-valent (Ovmymqo45) 2016    Pneumococcal Polysaccharide (Thvkolyig92) 09/01/2007, 09/06/2013, 11/08/2019    Td, unspecified formulation 06/18/2014       Active Problems:  Patient Active Problem List   Diagnosis Code    Diverticulosis of large intestine K57.30    Postinflammatory pulmonary fibrosis ([facility] [address] J84.10    Essential hypertension I10    Irritable bowel syndrome K58.9    Osteoporosis M81.0    Nocturia R35.1    Thoracic or lumbosacral neuritis or radiculitis, unspecified [id number]    Hyperlipidemia E78.5    Synovial cyst of popliteal space M71.20    Enthesopathy of hip region M76.899    Peripheral neuropathy,both lower extremities G62.9    Left lumbar radiculopathy-L4 M54.16    Low back pain M54.5    Lumbar disc herniation with radiculopathy M51.16    Degenerative disc disease, lumbar M51.36    Tendonitis-3rd flexor tendon left hand.  M77.9    Depression F32.9    Allergic dermatitis L23.9    Trigger finger, left ring finger M65.342    COPD with asthma ([facility]) J44.9    Rib pain on right side R07.81    Trochanteric bursitis of left hip M70.62    Osteopenia of multiple sites M85.89    Abnormal CT of the chest R93.89    ILD (interstitial lung disease) ([facility]) J84.9    Weight loss R63.4    Type 2 diabetes mellitus without complication, without long-term current use of insulin ([facility]) E11.9    compression fracture -T11 S22.080A, S32.010A    Personal history of steroid therapy Z92.241    Post-menopausal Z78.0    Urinary frequency R35.0    Rectal bleeding K62.5    RLQ abdominal pain R10.31    Acute delirium R41.0    Other acquired hemolytic anemias ([facility])-due drug  Dapsone D59.8    Acute cystitis without hematuria N30.00    Anemia D64.9    Acute respiratory failure with hypoxia ([facility]) J96.01    Chronic respiratory failure with hypoxia ([facility]) J96.11       Isolation/Infection:   Isolation          No Isolation        Patient Infection Status     None to display          Nurse Assessment:  Last Vital Signs: BP (!) 101/56   Pulse 93   Temp 97.2 °F (36.2 °C) (Axillary)   Resp 16   Ht 5' 4\" (1.626 m)   Wt 128 lb 12 oz (58.4 kg)   SpO2 91%   BMI 22.10 kg/m²     Last documented pain score (0-10 scale): Pain Level: 0  Last Weight:   Wt Readings from Last 1 Encounters:   20 128 lb 12 oz (58.4 kg)     Mental Status:  {IP PT MENTAL STATUS:}    IV Access:  { PEDRO IV ACCESS:607366028}    Nursing Mobility/ADLs:  Walking   {CHP DME WWVA:298635350}  Transfer  {CHP DME KOO}  Bathing  {CHP DME FZCN:778004628}  Dressing  {CHP DME XVDC:238776501}  Toileting  {CHP DME AECM:915378526}  Feeding  {P DME NOPY:467125907}  Med Admin  {P DME RYKI:854290537}  Med Delivery   { PEDRO MED Delivery:334630358}    Wound Care Documentation and Therapy:  Puncture 20 Buttocks Right (Active)   Number of days: 4        Elimination:  Continence:   · Bowel: {YES / TH:59092}  · Bladder: {YES / EW:95645}  Urinary Catheter: {Urinary Catheter:211459735}   Colostomy/Ileostomy/Ileal Conduit: {YES / HF:10361}       Date of Last BM: ***    Intake/Output Summary (Last 24 hours) at 2020 1520  Last data filed at 2020 1159  Gross per 24 hour   Intake 680 ml   Output 150 ml   Net 530 ml     I/O last 3 completed shifts:   In: 65 [P.O.:680]  Out: 150 [Urine:150]    Safety Concerns:     [address] Safety Concerns:416841819}    Impairments/Disabilities:      [address] Impairments/Disabilities:008752628}    Nutrition Therapy:  Current Nutrition Therapy:   [address] Diet List:223189693}    Routes of Feeding: {CHP DME Other Feedings:797355794}  Liquids: {Slp liquid thickness:45081}  Daily Fluid Restriction: {CHP DME Yes amt example:396585656}  Last Modified Barium Swallow with Video (Video Swallowing Test): {Done Not Done ZQTI:244975436}    Treatments at the Time of Hospital Discharge:   Respiratory Treatments: ***  Oxygen Therapy:  {Therapy; copd oxygen:58267}  Ventilator:    {MH CC Vent PRBO:587537964}    Rehab Therapies: {THERAPEUTIC INTERVENTION:2420407133}  Weight Bearing

## 2020-09-25 NOTE — PROGRESS NOTES
CMU notified this writer of HR tachy at 162. Pt in room awake and worrying about her . 1: 1 reassurance given and encouraged pt to take deep breaths and relax. Medicated with Ativan per prn orders.

## 2020-09-25 NOTE — PROGRESS NOTES
Pt awake and AAO lying in bed watching tV. Denies pain. Pt admitted acutely with resp failure. Pt transferred from ICU this evening. Lungs decreased. No sob or cough noted. On RA. Belly round and soft with active BS> Voids without difficulty. Pt up from bed to BR with CGA and no device. Urinated ml hazy urine. No edema noted. HL to R FA and L hand. Pt is on scheduled Solumedrol. On telemetry. Call light in reach. Bed alarm on.

## 2020-09-26 ENCOUNTER — CARE COORDINATION (OUTPATIENT)
Dept: CASE MANAGEMENT | Age: 75
End: 2020-09-26

## 2020-09-26 PROCEDURE — 1111F DSCHRG MED/CURRENT MED MERGE: CPT | Performed by: INTERNAL MEDICINE

## 2020-09-26 NOTE — DISCHARGE SUMMARY
[address] [place] [address] Anette Ramsey 16                               DISCHARGE SUMMARY    PATIENT NAME: Vonnie Castleman                    :        1945  MED REC NO:   [id number]                          ROOM:       4122  ACCOUNT NO:   [de-identified]                           ADMIT DATE: 2020  PROVIDER:     Jacquelin Carter MD                  DISCHARGE DATE:  2020    HISTORY OF PRESENT ILLNESS:  This patient is a 67-year-old female who  was recently just discharged a day before admission after treatment for  her respiratory failure and hypoxia, and she did fine the night of  discharge and the day after discharge, but when her  has helped  her into the bathroom to take a shower, she was okay, but when she got  over the shower, she became acutely short of breath and she was not able  to control her symptoms. She has no chest pain, cough or wheezing, and  the [facility] was called and her O2 sat was 71% even with oxygen on  which she is supposed to be getting. Probably, she was suffocating at  home, maybe not getting when she was taking oxygen, maybe that caused  her symptoms, but either way, she was brought to the hospital emergency  room and she needed a high-flow nonrebreather mask initially. Subsequently, needed BiPAP. Her workup did not reveal any pneumonia,  heart failure or any other abnormalities. Her lungs were clear. There  is no edema. Pulses are very well felt. Sodium is 130, chloride is 27,  creatinine 1.0, and her liver profile is normal.  Her hemoglobin is  11.5. Her urine culture still is growing E. coli, even though she is on  Cipro, and her chest x-ray is clear. Her EKG is unremarkable except for  sinus rhythm and PVCs.   In the hospital, she was admitted to ICU for a  night, and during that period of time, she was gradually able to get rid  of the BiPAP and put on high-flow oxygen, and by next day morning, her  breathing has improved and she is not even needing any supplemental  oxygen at all. O2 sats remained in 93%, 94%, 95% on room air. The  patient was seen by Dr. Ish Hager and subsequently by Dr. Yesenia Saucedo. She was  continued on home medications. Blood sugars and blood pressures were  monitored and there were no new changes in her vitals, and she was given  initially Solu-Medrol 60 mg every four or six hours, and subsequently,  on the day of discharge, they were changed to 20 mg.  I did discuss this  with Dr. Yesenia Saucedo also. He felt she had a very responsive interstitial  lung disease and he suggested to continue prednisone 20 mg daily until  seen by him in first week of 10/2020. At this time, she was able to  tolerate all her activities and her  will take care of her at  home. FINAL DIAGNOSES:  1. Acute respiratory failure with hypoxia. 2.  Chronic respiratory failure with hypoxia. 3.  Interstitial lung disease. 4.  COPD with asthma. 5.  Delirium. 6.  Acute cystitis with hematuria. 7.  Diabetes mellitus type 2.  8.  Essential hypertension. 9.  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 In the hospital, she had not had delirium like she had in the prior  hospitalization, but she was still at times confused and not sure what  is going on. As she was not able to see her , she thinks he  might have had a heart attack, but she was reassured that it did not  happen. Diagnoses and treatment plans were discussed with the patient  and her  in detail. At this time, it was felt that she could be  discharged. CONDITION AT THE TIME OF DISCHARGE:  Improved. DIET:  Low-carb, no-added salt diet. ACTIVITIES:  As tolerated. Oxygen 3 L per minute.     At the time of discharge, her medications include Cipro 250 mg every 12  hours for five more days after she goes home and then metformin 1000 mg  two times a day, Os-Lawson with vitamin D 500/200 two times a day,  albuterol 2 puffs every four to six hours as needed for wheezing,  Fosamax 70 mg q. weekly, amlodipine 5 mg daily, Mepron 1500 mg daily,  dicyclomine 10 mg daily, fenofibrate 160 daily, gabapentin 300 mg daily,  losartan 100 mg daily, Dulera 200/5 two puffs twice a day, Singulair 10  mg daily, MiraLax one tablespoon daily as needed, prednisone 20 mg  daily. The patient will be seen in the office in about a week's time. The patient's  was advised to call me for any further problems.         Hue Peña MD    D: 09/25/2020 17:12:44       T: 09/25/2020 19:15:34     [email or web address]  Job#: [id number]     Doc#: [id number]    CC:

## 2020-09-26 NOTE — CARE COORDINATION
[facility] Transitions Initial Follow Up Call    Call within 2 business days of discharge: Yes    Patient: Nataly Spivey Patient :    MRN: [id number]  Reason for Admission: ARF   Discharge Date: 20 RARS: Readmission Risk Score: 24      Last Discharge [facility]       Complaint Diagnosis Description Type Department Provider    20 Shortness of Breath COPD exacerbation ([address] . .. ED to Hosp-Admission (Discharged) (ADMITTED) Apoorva Arguelles MD; [place]. .. Patient contacted regarding Glennie Show. Discussed COVID-19 related testing which was not done at this time. Test results were not done. Patient informed of results, if available? Yes    Care Transition Nurse/ Ambulatory Care Manager contacted the patient by telephone to perform post discharge assessment. Call within 2 business days of discharge: Yes. Verified name and  with patient as identifiers. Provided introduction to self, and explanation of the CTN/ACM role, and reason for call due to risk factors for infection and/or exposure to COVID-19. Symptoms reviewed with patient who verbalized the following symptoms: no new symptoms and no worsening symptoms. Due to no new or worsening symptoms encounter was not routed to provider for escalation. Discussed follow-up appointments. If no appointment was previously scheduled, appointment scheduling offered: [facility] follow up appointment(s):   Future Appointments   Date Time Provider Lila Arias   10/19/2020 11:20 AM [facility] Castaneda [facility]     [facility] follow up appointment(s):     Non-face-to-face services provided:  Obtained and reviewed discharge summary and/or continuity of care documents     Advance Care Planning:   Does patient have an Advance Directive:  not on file. Patient has following risk factors of: acute respiratory failure.  CTN/ACM reviewed discharge instructions, medical action plan and red flags such as increased shortness of breath, increasing fever and signs of decompensation with patient who verbalized understanding. Discussed exposure protocols and quarantine with CDC Guidelines What to do if you are sick with coronavirus disease 2019.  Patient was given an opportunity for questions and concerns. The patient agrees to contact the [facility] exposure line [phone], local health department [place] Department of Health: ([phone]) and PCP office for questions related to their healthcare. CTN/ACM provided contact information for future needs. Reviewed and educated patient on any new and changed medications related to discharge diagnosis       Plan for follow-up call in 5-7 days based on severity of symptoms and risk factors. Spoke with patient who reported she is doing well and slept very good last night. Patient denied any cp or sob. CTN reviewed all medications with patient who reported she is taking all as prescribed. Patient encouraged to contact her PCP on Monday to setup follow up apt. Denies any acute needs at present time. Agreeable to f/u calls. Educated on the use of urgent care or physicians 24 hr access line if assistance is needed after hours.          Follow Up  Future Appointments   Date Time Provider Lila Arias   10/19/2020 11:20 AM Gayla Recinos DO [facility] PULM FELICITA Wilson RN

## 2020-09-28 LAB
Lab: NORMAL
REPORT: NORMAL
THIS TEST SENT TO: NORMAL

## 2020-09-29 ENCOUNTER — TELEPHONE (OUTPATIENT)
Dept: INTERNAL MEDICINE CLINIC | Age: 75
End: 2020-09-29

## 2020-09-30 ENCOUNTER — TELEPHONE (OUTPATIENT)
Dept: INTERNAL MEDICINE CLINIC | Age: 75
End: 2020-09-30

## 2020-10-02 ENCOUNTER — TELEPHONE (OUTPATIENT)
Dept: INTERNAL MEDICINE CLINIC | Age: 75
End: 2020-10-02

## 2020-10-02 NOTE — TELEPHONE ENCOUNTER
Mindy from [employer] [phone] says pt BP is 88/52 and she is totally asystematic. But her BP is just way lower than what it was when she went out there for her first visit.  Please advise

## 2020-10-05 ENCOUNTER — OFFICE VISIT (OUTPATIENT)
Dept: INTERNAL MEDICINE CLINIC | Age: 75
End: 2020-10-05
Payer: MEDICARE

## 2020-10-05 VITALS
WEIGHT: 123.2 LBS | DIASTOLIC BLOOD PRESSURE: 70 MMHG | HEART RATE: 80 BPM | BODY MASS INDEX: 21.15 KG/M2 | SYSTOLIC BLOOD PRESSURE: 100 MMHG | TEMPERATURE: 97.5 F | OXYGEN SATURATION: 96 % | RESPIRATION RATE: 24 BRPM

## 2020-10-05 PROBLEM — J96.01 ACUTE RESPIRATORY FAILURE WITH HYPOXIA (HCC): Status: RESOLVED | Noted: 2020-09-23 | Resolved: 2020-10-05

## 2020-10-05 PROBLEM — N30.00 ACUTE CYSTITIS WITHOUT HEMATURIA: Status: RESOLVED | Noted: 2020-09-19 | Resolved: 2020-10-05

## 2020-10-05 PROBLEM — D59.9 ACUTE HEMOLYTIC ANEMIA (HCC): Status: ACTIVE | Noted: 2020-10-05

## 2020-10-05 PROCEDURE — 90694 VACC AIIV4 NO PRSRV 0.5ML IM: CPT | Performed by: INTERNAL MEDICINE

## 2020-10-05 PROCEDURE — G0008 ADMIN INFLUENZA VIRUS VAC: HCPCS | Performed by: INTERNAL MEDICINE

## 2020-10-05 PROCEDURE — 99214 OFFICE O/P EST MOD 30 MIN: CPT | Performed by: INTERNAL MEDICINE

## 2020-10-05 PROCEDURE — 1111F DSCHRG MED/CURRENT MED MERGE: CPT | Performed by: INTERNAL MEDICINE

## 2020-10-05 RX ORDER — OMEPRAZOLE 40 MG/1
40 CAPSULE, DELAYED RELEASE ORAL
Qty: 30 CAPSULE | Refills: 3 | Status: SHIPPED | OUTPATIENT
Start: 2020-10-05 | End: 2021-02-01

## 2020-10-05 RX ORDER — AMLODIPINE BESYLATE 5 MG/1
5 TABLET ORAL DAILY
COMMUNITY
End: 2020-10-22 | Stop reason: DRUGHIGH

## 2020-10-05 RX ORDER — FOLIC ACID 1 MG/1
1 TABLET ORAL DAILY
COMMUNITY

## 2020-10-05 ASSESSMENT — ENCOUNTER SYMPTOMS
ABDOMINAL PAIN: 1
BLOOD IN STOOL: 0
CONSTIPATION: 0
VOMITING: 0
CHEST TIGHTNESS: 0
NAUSEA: 0
WHEEZING: 0
SHORTNESS OF BREATH: 1
TROUBLE SWALLOWING: 0
COUGH: 0
SORE THROAT: 0
DIARRHEA: 0

## 2020-10-05 NOTE — PROGRESS NOTES
Vaccine Information Sheet, \"Influenza - Inactivated\"  given to Jamal Lal, or parent/legal guardian of  Jamal Lal and verbalized understanding. Patient responses:    Have you ever had a reaction to a flu vaccine? No  Are you able to eat eggs without adverse effects? Yes  Do you have any current illness? No  Have you ever had Guillian Beatty Syndrome? No    Flu vaccine given per order. Please see immunization tab.

## 2020-10-05 NOTE — PROGRESS NOTES
hypoxia (HCC)       Allergies   Allergen Reactions    Latex     Neda-[place]van[place] [Aspirin Effervescent] Hives     Tongue swelling,but advil does not bother her.  Sulfa Antibiotics        Medications listed as ordered at the time of discharge from hospital   Nelli, [facility] Medication Instructions DENIA:    Printed on:10/05/20 0784   Medication Information                      albuterol sulfate  (90 Base) MCG/ACT inhaler  Inhale 2 puffs into the lungs every 6 hours as needed for Wheezing And as needed every 4 hours at nights             alendronate (FOSAMAX) 70 MG tablet  Take 1 tablet by mouth every 7 days Take on empty stomach in am with full glass of water and remain upright for 30 minutes             amLODIPine (NORVASC) 5 MG tablet  Take 5 mg by mouth daily             atovaquone (MEPRON) 750 MG/5ML suspension  Take 10 mLs by mouth daily             calcium-vitamin D (OSCAL 500/200 D-3) 500-200 MG-UNIT per tablet  Take 1 tablet by mouth 2 times daily             dicyclomine (BENTYL) 10 MG capsule  TAKE 1 CAPSULE DAILY             fenofibrate (TRIGLIDE) 160 MG tablet  TAKE 1 TABLET DAILY             folic acid (FOLVITE) 1 MG tablet  Take 1 mg by mouth daily             gabapentin (NEURONTIN) 300 MG capsule  Take 1 capsule by mouth daily for 30 days.              losartan (COZAAR) 100 MG tablet  TAKE 1 TABLET DAILY             metFORMIN (GLUCOPHAGE) 1000 MG tablet  TAKE ONE TABLET BY MOUTH TWICE A DAY WITH MEALS             mometasone-formoterol (DULERA) 200-5 MCG/ACT inhaler  Inhale 2 puffs into the lungs every 12 hours             montelukast (SINGULAIR) 10 MG tablet  TAKE ONE TABLET BY MOUTH DAILY             polyethylene glycol (GLYCOLAX) 17 g packet  Take 17 g by mouth daily as needed for Constipation             predniSONE (DELTASONE) 10 MG tablet  Take 2 tablets by mouth daily                   Medications marked \"taking\" at this time  Outpatient Medications Marked as Taking for the 10/5/20 encounter (Office Visit) with Ildefonso Najera MD   Medication Sig Dispense Refill    amLODIPine (NORVASC) 5 MG tablet Take 5 mg by mouth daily      folic acid (FOLVITE) 1 MG tablet Take 1 mg by mouth daily      predniSONE (DELTASONE) 10 MG tablet Take 2 tablets by mouth daily 60 tablet 3    polyethylene glycol (GLYCOLAX) 17 g packet Take 17 g by mouth daily as needed for Constipation 527 g 1    atovaquone (MEPRON) 750 MG/5ML suspension Take 10 mLs by mouth daily 300 mL 3    albuterol sulfate  (90 Base) MCG/ACT inhaler Inhale 2 puffs into the lungs every 6 hours as needed for Wheezing And as needed every 4 hours at nights 1 Inhaler 3    gabapentin (NEURONTIN) 300 MG capsule Take 1 capsule by mouth daily for 30 days.  30 capsule 3    alendronate (FOSAMAX) 70 MG tablet Take 1 tablet by mouth every 7 days Take on empty stomach in am with full glass of water and remain upright for 30 minutes 4 tablet 5    dicyclomine (BENTYL) 10 MG capsule TAKE 1 CAPSULE DAILY 30 capsule 3    mometasone-formoterol (DULERA) 200-5 MCG/ACT inhaler Inhale 2 puffs into the lungs every 12 hours 1 Inhaler 0    fenofibrate (TRIGLIDE) 160 MG tablet TAKE 1 TABLET DAILY 90 tablet 1    calcium-vitamin D (OSCAL 500/200 D-3) 500-200 MG-UNIT per tablet Take 1 tablet by mouth 2 times daily (Patient taking differently: Take 1 tablet by mouth daily ) 1 tablet 0    losartan (COZAAR) 100 MG tablet TAKE 1 TABLET DAILY 90 tablet 1    montelukast (SINGULAIR) 10 MG tablet TAKE ONE TABLET BY MOUTH DAILY 30 tablet 5    metFORMIN (GLUCOPHAGE) 1000 MG tablet TAKE ONE TABLET BY MOUTH TWICE A DAY WITH MEALS (Patient taking differently: Take 1,000 mg by mouth 2 times daily (with meals) ) 180 tablet 1        Medications patient taking as of now reconciled against medications ordered at time of hospital discharge: Yes    Chief Complaint   Patient presents with    [facility] from [facility] discharged 9/25/20 hypoxia HPI    Inpatient course: Discharge summary reviewed- see chart. Interval history/Current status: Seeing Pulmonary on 10/19/20  She still feels week and appetite is not good  She is on oxygen all the time and she feels is better with oxygen   Has no cough or congestion and sleeps well  Has no gi or gu symptoms. bm are loose and 2 times a day ,may be   She c/o pain lower rib cage area and goes around her ribs to back and not go straight through-not there all the time and she has it all days but her  states she never complained to him -increases a little when she moves and not with breathing   She does well when her PT comes to work with her. Review of Systems   Constitutional: Positive for activity change, appetite change, fatigue and unexpected weight change. Negative for chills and fever. HENT: Negative for sore throat and trouble swallowing. Respiratory: Positive for shortness of breath. Negative for cough, chest tightness and wheezing. Cardiovascular: Positive for chest pain (but shoes her epi area as site of pain ). Gastrointestinal: Positive for abdominal pain. Negative for blood in stool, constipation, diarrhea, nausea and vomiting. Genitourinary: Negative. Neurological: Negative for dizziness, light-headedness and headaches. Vitals:    10/05/20 1532 10/05/20 1542   BP: 104/60    Site: Left Upper Arm    Position: Sitting    Cuff Size: Medium Adult    Pulse: 104    Resp: 24    Temp: 97.5 °F (36.4 °C)    SpO2: (!) 80% 96%   Weight: 123 lb 3.2 oz (55.9 kg)      Body mass index is 21.15 kg/m². Wt Readings from Last 3 Encounters:   10/05/20 123 lb 3.2 oz (55.9 kg)   09/25/20 128 lb 12 oz (58.4 kg)   09/22/20 130 lb 11.7 oz (59.3 kg)     BP Readings from Last 3 Encounters:   10/05/20 104/60   09/25/20 (!) 101/56   09/22/20 118/68       Physical Exam  Vitals signs and nursing note reviewed. Constitutional:       General: She is not in acute distress. Appearance:  Ill appearance: looks weak. HENT:      Mouth/Throat:      Mouth: Mucous membranes are moist.      Pharynx: Oropharynx is clear. Eyes:      General: No scleral icterus. Extraocular Movements: Extraocular movements intact. Conjunctiva/sclera: Conjunctivae normal.      Pupils: Pupils are equal, round, and reactive to light. Neck:      Thyroid: No thyromegaly. Vascular: No carotid bruit. Cardiovascular:      Rate and Rhythm: Normal rate and regular rhythm. Pulses: Normal pulses. Heart sounds: Normal heart sounds. No murmur. No gallop. Pulmonary:      Effort: No respiratory distress. Breath sounds: Normal breath sounds. No wheezing or rhonchi. Rales: few heard at bases. Abdominal:      General: Bowel sounds are normal. There is no distension. Palpations: Abdomen is soft. There is no hepatomegaly, splenomegaly or mass. Tenderness: Tenderness: slight epigastric tenderness. There is no guarding or rebound. Musculoskeletal:      Right lower leg: No edema. Left lower leg: No edema. Lymphadenopathy:      Cervical: No cervical adenopathy. Neurological:      Mental Status: She is alert and oriented to person, place, and time.              Assessment/Plan:  -1 chornic respiratory failure with hypoxia-she is on oxygen 2-3 liters /minute  2-epigastric pain possible gastritis and may be from prednisone also and has no signs of acute abdomen   3-ILD unchanged  4-continues with confusion at home   5- hypotension  may be from medication and not getting enough fluid and capric intake  6- diabetes stable   7 Hemolytic anemia-followed by hematology    Plan-she is currently off amlodipine and still bp is low and will stop losartan and discussed with her   Need to get enough fluids daily at least 1500 cc  Can give ensure 1 can 2 times daily   Stop fenofibrate   Add omeprazole 40 mg daily   Giving S57 and folic acid daily -as per her hematologist.      Medical Decision Making: highly complex

## 2020-10-05 NOTE — PATIENT INSTRUCTIONS
n-she is currently off amlodipine and still bp is low and will stop losartan and discussed with her   Need to get enough fluids daily at least 1500 cc  Can give ensure 1 can 2 times daily   Stop fenofibrate   Add omeprazole 40 mg daily   Giving Z75 and folic acid daily -as per her hematologist.

## 2020-10-19 ENCOUNTER — OFFICE VISIT (OUTPATIENT)
Dept: PULMONOLOGY | Age: 75
End: 2020-10-19
Payer: MEDICARE

## 2020-10-19 VITALS
DIASTOLIC BLOOD PRESSURE: 80 MMHG | WEIGHT: 129 LBS | BODY MASS INDEX: 22.02 KG/M2 | HEART RATE: 50 BPM | TEMPERATURE: 98.6 F | RESPIRATION RATE: 16 BRPM | HEIGHT: 64 IN | OXYGEN SATURATION: 96 % | SYSTOLIC BLOOD PRESSURE: 138 MMHG

## 2020-10-19 PROCEDURE — 99214 OFFICE O/P EST MOD 30 MIN: CPT | Performed by: INTERNAL MEDICINE

## 2020-10-19 RX ORDER — PREDNISONE 10 MG/1
15 TABLET ORAL DAILY
Qty: 45 TABLET | Refills: 11 | Status: SHIPPED | OUTPATIENT
Start: 2020-10-19 | End: 2021-11-03

## 2020-10-19 NOTE — PATIENT INSTRUCTIONS
Decrease prednisone to 15 mg once a day starting next week (finished 20 mg this week)    Stop atovaquone next week    Continue with dulera twice a day    Use oxygen with exertion and sleep    Follow up in 2 months     Ok to have colonoscopy

## 2020-10-19 NOTE — PROGRESS NOTES
Chief complaint  This is a 67y.o. year old female  who comes to see me with a chief complaint of   Chief Complaint   Patient presents with   [address] from Hospital       [facility]  Here with a cc of ILD    Had to recent admission for flare up of ILD and hypoxia. Apparently had stopped all her medications the first time and then bounced back for unclear reasons. Was not on oxygen previously but was discharged with it this time. On prednisone 20 mg, atovaquone daily, dulera (when she remembers) and oxygen with exertion and occasional sleep. She does feel ok and better than when she was admitted.  is present. She is repeating a lot of things to me    Past Medical History:   Diagnosis Date    Asthma     Depression 6/28/2013    Diverticulosis of colon (without mention of hemorrhage)     Enthesopathy of hip region     left - mild    Esophageal reflux     Irritable bowel syndrome     Nocturia     Osteoporosis, unspecified     Other and unspecified hyperlipidemia     Postinflammatory pulmonary fibrosis (HCC)     Sialoadenitis     left    Synovial cyst of popliteal space     left knee    Thoracic or lumbosacral neuritis or radiculitis, unspecified     left - L5 - S1    Type II or unspecified type diabetes mellitus without mention of complication, not stated as uncontrolled     Unspecified essential hypertension        Past Surgical History:   Procedure Laterality Date    BRONCHOSCOPY N/A 12/21/2018    BRONCHOSCOPY WITH BAL performed by Jeannine Durham DO at [address] [place] [address]  2009    DR. Jean-no polyps    COLONOSCOPY  02/2016    normal-DR. Ramsay    CT BONE MARROW BIOPSY  9/21/2020    CT BONE MARROW BIOPSY 9/21/2020 [facility] CT    HYSTERECTOMY      partial    LIPOMA RESECTION      abdominal wall    OVARY REMOVAL      left    POLYPECTOMY         Social History     Socioeconomic History    Marital status:      Spouse name: Denny Eber James of children: 3    Years of education: 15    Highest education level: High school graduate   Occupational History    Occupation: retired   Social Needs    Financial resource strain: Not hard at all   [employer] insecurity     Worry: Never true     Inability: Never true   [employer] needs     Medical: No     Non-medical: No   Tobacco Use    Smoking status: Never Smoker    Smokeless tobacco: Never Used   Substance and Sexual Activity    Alcohol use:  Yes    Drug use: No    Sexual activity: Not on file   Lifestyle    Physical activity     Days per week: Not on file     Minutes per session: Not on file    Stress: Not on file   Relationships    Social connections     Talks on phone: Not on file     Gets together: Not on file     Attends Gnosticist service: Not on file     Active member of club or organization: Not on file     Attends meetings of clubs or organizations: Not on file     Relationship status: Not on file    Intimate partner violence     Fear of current or ex partner: Not on file     Emotionally abused: Not on file     Physically abused: Not on file     Forced sexual activity: Not on file   Other Topics Concern    Not on file   Social History Narrative    Not on file       Family History   Problem Relation Age of Onset    Heart Disease Mother     Stroke Mother     Osteoporosis Mother     Cirrhosis Father         Liver    Osteoporosis Sister     Osteoporosis Maternal Grandmother          Current Outpatient Medications:     predniSONE (DELTASONE) 10 MG tablet, Take 1.5 tablets by mouth daily, Disp: 45 tablet, Rfl: 11    amLODIPine (NORVASC) 5 MG tablet, Take 5 mg by mouth daily, Disp: , Rfl:     folic acid (FOLVITE) 1 MG tablet, Take 1 mg by mouth daily, Disp: , Rfl:     omeprazole (PRILOSEC) 40 MG delayed release capsule, Take 1 capsule by mouth every morning (before breakfast), Disp: 30 capsule, Rfl: 3    predniSONE (DELTASONE) 10 MG tablet, Take 2 tablets by mouth daily, Disp: 60 BP: 138/80   Pulse: 50   Resp: 16   Temp: 98.6 °F (37 °C)   SpO2: 96%       GENERAL:  Well nourished, alert, appears stated age, no distress, forgetful, repeating words and phrases  HEENT:  No scleral icterus, no conjunctival irritation  NECK:  No thyromegaly, no bruits  LYMPH:  No cervical or supraclavicular adenopathy  HEART:  Regular rate and rhythm, no murmurs  LUNGS:  Scattered crackles  ABDOMEN:  No distention, no organomegaly  EXTREMITIES:  No edema, no digital clubbing  NEURO:  No localizing deficits, CN II-XII intact. Confused about things, repeats things     Pulmonary Function Testing 11/2018  MY IMPRESSION  Overall Mild restriction, no bronchodilator response and moderate reduction in diffusing capacity     Chest imaging from 11/2019 is reviewed and compared to 9/2020. My interpretation chronic interstitial changes with reticular markings, ground glass, nodular opacities, mosaic attenuation and bronchiectasis. I don't appreciate emphysema to any significant degree like was mentioned on radiology report     ECHO  None  Lab Results   Component Value Date    WBC 11.8 (H) 09/25/2020    HGB 10.4 (L) 09/25/2020    HCT 31.0 (L) 09/25/2020    .6 (H) 09/25/2020     09/25/2020       No results found for: BNP    Lab Results   Component Value Date    CREATININE 0.8 09/25/2020    BUN 33 (H) 09/25/2020     (L) 09/25/2020    K 4.8 09/25/2020    CL 96 (L) 09/25/2020    CO2 27 09/25/2020     Lung Biopsy from 10/2007         -     Chronic interstitial pneumonia with chronic bronchiolitis and focal  organizing pneumonia (see comment).     Comment(s)       The combination of chronic interstitial pneumonia, bronchiolitis, and  organizing pneumonia raises hypersensitivity pneumonia (extrinsic allergic  alveolitis) as the chief etiologic consideration.  In the absence of an  identifiable antigenic exposure, similar changes can be seen in patients with  various forms of drug-induced lung disease.  In the absence of any history to  incriminate either an antigenic or drug exposure, these findings are most  consistent with nonspecific interstitial pneumonia (NSIP). I reviewed above labs and studies pertinent to this visit and date    Bronchoscopy  12/2018  -  No malignant cells identified    -Increased CD4/CD8 ratio and decreased /CD4 ratio.  These  results may be present in pulmonary sarcoidosis, viral infection,  alveolitis, and interstitial syndromes    Differential:  Neutrophilic, lymphocytic    Cultures all negative    12/2018  VINCENT  negative  ANCA (myeloperioxidase/serine protease 3)  negative  RF (RA):   negative  Anti-Scl 70 (Scleroderma):  negative  Anti-DS DNA (SLE):  negative  Anti- SSA (Ro) (Sjogrens):  negative  Anti-SSB (La) (Sjogrens):  negative  Anti-Bhumika (polymyositis/dermatomyositis):  negative  Anti-Smith (SLE):  negative  Anti-RNP (MCTD):  negative  CK: (inflammatory myopathy) 145  Sed rate  18    Total IgE Level:  116  Significant allergies:  none    HP panel:  Negative    I reviewed the above labs and images     Assessment/Plan:  1. ILD (interstitial lung disease) (HCC)  Decrease prednisone to 15 mg. She likely needs lifelong prednisone. Not a candidate for anything else due to memory issues. Can stop atovaquone now that dose of prednisone is below 20 mg. Continue with dulera as inhaled steroids may help as well. Not sure if her plan will cover dulera but can substitute with anything else cheaper  - predniSONE (DELTASONE) 10 MG tablet; Take 1.5 tablets by mouth daily  Dispense: 45 tablet; Refill: 11    2. Chronic respiratory failure with hypoxia (HCC)  Uses 2 liters with exertion and sleep. Needs to use it more with sleep than she is.   Might help with some memory issues if using it at night      Follow up in 2 months

## 2020-10-21 ENCOUNTER — CARE COORDINATION (OUTPATIENT)
Dept: CASE MANAGEMENT | Age: 75
End: 2020-10-21

## 2020-10-21 NOTE — CARE COORDINATION
[facility] Transitions Follow Up Call    10/21/2020    Patient: Charisma Up  Patient : 1945   MRN: [id number]  Reason for Admission: ARF  Discharge Date: 20 RARS: Readmission Risk Score: 24      Care Transitions Subsequent and Final Call    Subsequent and Final Calls  Care Transitions Interventions  Other Interventions:        Attempted to reach patient via phone for  post hospital transition call. VM left stating purpose of call along with my contact information requesting a return call.         Follow Up  Future Appointments   Date Time Provider Lila Arias   2020  1:15 PM Hyacinth Kang MD [facility]   2020  1:20 PM Josselin Navarrete  [facility]       Trino Escobar, RN

## 2020-10-22 ENCOUNTER — TELEPHONE (OUTPATIENT)
Dept: INTERNAL MEDICINE CLINIC | Age: 75
End: 2020-10-22

## 2020-10-22 NOTE — TELEPHONE ENCOUNTER
Called Mindy and left  to call [facility] office back at [phone] regarding yasir fegruson.

## 2020-10-22 NOTE — TELEPHONE ENCOUNTER
RN returning call from office about restarting a medication Amalodipine for PT, please call back and advise

## 2020-10-23 ENCOUNTER — CARE COORDINATION (OUTPATIENT)
Dept: CASE MANAGEMENT | Age: 75
End: 2020-10-23

## 2020-10-23 ENCOUNTER — TELEPHONE (OUTPATIENT)
Dept: INTERNAL MEDICINE CLINIC | Age: 75
End: 2020-10-23

## 2020-10-23 RX ORDER — GABAPENTIN 300 MG/1
300 CAPSULE ORAL DAILY
Qty: 30 CAPSULE | Refills: 3 | Status: SHIPPED | OUTPATIENT
Start: 2020-10-23 | End: 2021-03-19

## 2020-10-23 RX ORDER — DICYCLOMINE HYDROCHLORIDE 10 MG/1
CAPSULE ORAL
Qty: 30 CAPSULE | Refills: 3 | Status: SHIPPED | OUTPATIENT
Start: 2020-10-23 | End: 2021-02-19

## 2020-10-23 NOTE — CARE COORDINATION
[facility] Transitions Follow Up Call    10/23/2020    Patient: Parker Duran  Patient :    MRN: [id number]  Reason for Admission: Acute hypoxic resp failure  Discharge Date: 20 RARS: Readmission Risk Score: 24         Spoke with: Tiffanie Prideshereerahat (patient)    Final attempt at CTN/COVID 19 monitoring follow up phone call. Spoke with Zeynep Ortiz. She states she is doing \"much better\". She states she saw her physician and he felt she was doing well. Zeynep Ortiz denies any needs today. Informed Zeynep Ortiz this would be the final CTN outreach. Encouraged her to contact her PCP with any future medical issues or concerns.     Follow Up  Future Appointments   Date Time Provider Lila Arias   2020  1:15 PM Rossi Rodriguez MD [facility]   2020  1:20 PM Fatuma Bran DO [facility]       Linus Steiner RN

## 2020-10-23 NOTE — TELEPHONE ENCOUNTER
Refill metformin 12/8/17 dicyclomine and gabapentin last filled 9/22/20 to [facility] would like sent to [facility]                                                                                 Last ov 10/5/20

## 2020-10-23 NOTE — TELEPHONE ENCOUNTER
Pt needs refills. Metformin 1000 mg caps, #180, 1 op bid;  Dicyclomine 10 mg, #30, 1 po qd;  Gabapentin 300 mg, #30, 1 po qd.     Barbara

## 2020-10-28 ENCOUNTER — TELEPHONE (OUTPATIENT)
Dept: INTERNAL MEDICINE CLINIC | Age: 75
End: 2020-10-28

## 2020-11-02 ENCOUNTER — OFFICE VISIT (OUTPATIENT)
Dept: INTERNAL MEDICINE CLINIC | Age: 75
End: 2020-11-02
Payer: MEDICARE

## 2020-11-02 VITALS
TEMPERATURE: 98 F | RESPIRATION RATE: 18 BRPM | DIASTOLIC BLOOD PRESSURE: 80 MMHG | HEART RATE: 86 BPM | WEIGHT: 130.8 LBS | SYSTOLIC BLOOD PRESSURE: 140 MMHG | OXYGEN SATURATION: 99 % | BODY MASS INDEX: 22.45 KG/M2

## 2020-11-02 LAB
ALBUMIN SERPL-MCNC: 3.6 G/DL (ref 3.4–5)
ALP BLD-CCNC: 42 U/L (ref 40–129)
ALT SERPL-CCNC: 21 U/L (ref 10–40)
ANION GAP SERPL CALCULATED.3IONS-SCNC: 12 MMOL/L (ref 3–16)
AST SERPL-CCNC: 12 U/L (ref 15–37)
BASOPHILS ABSOLUTE: 0 K/UL (ref 0–0.2)
BASOPHILS RELATIVE PERCENT: 0.3 %
BILIRUB SERPL-MCNC: 0.3 MG/DL (ref 0–1)
BILIRUBIN DIRECT: <0.2 MG/DL (ref 0–0.3)
BILIRUBIN, INDIRECT: ABNORMAL MG/DL (ref 0–1)
BUN BLDV-MCNC: 18 MG/DL (ref 7–20)
CALCIUM SERPL-MCNC: 9.5 MG/DL (ref 8.3–10.6)
CHLORIDE BLD-SCNC: 101 MMOL/L (ref 99–110)
CO2: 27 MMOL/L (ref 21–32)
CREAT SERPL-MCNC: 0.7 MG/DL (ref 0.6–1.2)
EOSINOPHILS ABSOLUTE: 0 K/UL (ref 0–0.6)
EOSINOPHILS RELATIVE PERCENT: 0.1 %
GFR AFRICAN AMERICAN: >60
GFR NON-AFRICAN AMERICAN: >60
GLUCOSE BLD-MCNC: 113 MG/DL (ref 70–99)
HCT VFR BLD CALC: 40.7 % (ref 36–48)
HEMOGLOBIN: 13.7 G/DL (ref 12–16)
LYMPHOCYTES ABSOLUTE: 1.7 K/UL (ref 1–5.1)
LYMPHOCYTES RELATIVE PERCENT: 21.2 %
MCH RBC QN AUTO: 35.1 PG (ref 26–34)
MCHC RBC AUTO-ENTMCNC: 33.6 G/DL (ref 31–36)
MCV RBC AUTO: 104.4 FL (ref 80–100)
MONOCYTES ABSOLUTE: 0.7 K/UL (ref 0–1.3)
MONOCYTES RELATIVE PERCENT: 9.1 %
NEUTROPHILS ABSOLUTE: 5.6 K/UL (ref 1.7–7.7)
NEUTROPHILS RELATIVE PERCENT: 69.3 %
PDW BLD-RTO: 13.2 % (ref 12.4–15.4)
PLATELET # BLD: 185 K/UL (ref 135–450)
PMV BLD AUTO: 8.5 FL (ref 5–10.5)
POTASSIUM SERPL-SCNC: 4 MMOL/L (ref 3.5–5.1)
RBC # BLD: 3.9 M/UL (ref 4–5.2)
SODIUM BLD-SCNC: 140 MMOL/L (ref 136–145)
TOTAL PROTEIN: 5.6 G/DL (ref 6.4–8.2)
WBC # BLD: 8 K/UL (ref 4–11)

## 2020-11-02 PROCEDURE — 36415 COLL VENOUS BLD VENIPUNCTURE: CPT | Performed by: INTERNAL MEDICINE

## 2020-11-02 PROCEDURE — 99214 OFFICE O/P EST MOD 30 MIN: CPT | Performed by: INTERNAL MEDICINE

## 2020-11-02 RX ORDER — MONTELUKAST SODIUM 10 MG/1
TABLET ORAL
Qty: 90 TABLET | Refills: 1 | Status: SHIPPED | OUTPATIENT
Start: 2020-11-02 | End: 2021-06-03

## 2020-11-02 RX ORDER — GLUCOSAMINE HCL/CHONDROITIN SU 500-400 MG
CAPSULE ORAL
Qty: 100 STRIP | Refills: 3 | Status: SHIPPED | OUTPATIENT
Start: 2020-11-02

## 2020-11-02 RX ORDER — LOSARTAN POTASSIUM 50 MG/1
50 TABLET ORAL DAILY
COMMUNITY
End: 2021-06-07 | Stop reason: SDUPTHER

## 2020-11-02 RX ORDER — LANCETS 30 GAUGE
1 EACH MISCELLANEOUS 2 TIMES DAILY
Qty: 100 EACH | Refills: 3 | Status: SHIPPED | OUTPATIENT
Start: 2020-11-02 | End: 2021-05-18

## 2020-11-02 ASSESSMENT — ENCOUNTER SYMPTOMS
GASTROINTESTINAL NEGATIVE: 1
SHORTNESS OF BREATH: 1
TROUBLE SWALLOWING: 0
SORE THROAT: 1
COUGH: 0
WHEEZING: 0
CHEST TIGHTNESS: 0

## 2020-11-02 NOTE — PATIENT INSTRUCTIONS
Advised her  to check blood sugars 2 times daily as she is on prednisone now and this will increase her blood sugars    Continue  omeprazole and other medications   Continue losartan 50 mg daily   See in 6 weeks

## 2020-11-02 NOTE — PROGRESS NOTES
Subjective:      Patient ID: Tyron Arango is a 67 y.o. female. HPIBP was 141/80 last week by visiting nurse. She feels much better and and is getting around much better   Not needing oxygen during day and uses at nights  Prednisone dose now is 15 mg daily. She has no abdominal pain and taking omeprazole daily   Has no gi or gu symptoms  Appetite  is fair and  feels way better   feels she gets confused all the time--feels memory is poor for date time and day  Bland sno other cp symptoms  Review of Systems   Constitutional: Negative for activity change, appetite change, chills, fever and unexpected weight change. HENT: Positive for sore throat. Negative for trouble swallowing. Respiratory: Positive for shortness of breath. Negative for cough, chest tightness and wheezing. Cardiovascular: Negative for chest pain, palpitations and leg swelling. Gastrointestinal: Negative. Genitourinary: Negative. Neurological: Negative for dizziness, light-headedness and headaches. Objective:   Physical Exam  Vitals signs and nursing note reviewed. Constitutional:       General: She is not in acute distress. Eyes:      General: No scleral icterus. Extraocular Movements: Extraocular movements intact. Conjunctiva/sclera: Conjunctivae normal.      Pupils: Pupils are equal, round, and reactive to light. Neck:      Thyroid: No thyromegaly. Vascular: No carotid bruit or JVD. Cardiovascular:      Rate and Rhythm: Normal rate and regular rhythm. Pulses: Normal pulses. Heart sounds: Normal heart sounds. No murmur. No gallop. Pulmonary:      Effort: No respiratory distress. Breath sounds: Normal breath sounds. No wheezing, rhonchi or rales. Musculoskeletal:      Right lower leg: No edema. Left lower leg: No edema. Lymphadenopathy:      Cervical: No cervical adenopathy. Neurological:      Mental Status: She is alert and oriented to person, place, and time. Comments: nt very much oriented to time and year and month   Psychiatric:         Mood and Affect: Mood normal.         Behavior: Behavior normal.         Assessment:      htn now well controlled  Diabetes stable    Chronic respiratory failure- with hypoxia and s on home oxygen at nights  Confusion  could be from steroids and ot dementia  Abdominal pain has improved with omeprazole  Plan:      Advised her  to check blood sugars 2 times daily as she is on prednisone now and this will increase her blood sugars    Continue  omeprazole and other medications   Continue losartan 50 mg daily   See in 6 weeks  Irvin Unger MD

## 2020-11-03 NOTE — PROGRESS NOTES
[address] time__1015__________        Surgery time__1145__________    Take the following medications with a sip of water: Follow your MD/Surgeons pre-procedure instructions regarding your medications    Do not eat or drink anything after 12:00 midnight prior to your surgery. This includes water chewing gum, mints and ice chips. You may brush your teeth and gargle the morning of your surgery, but do not swallow the water     Please see your family doctor/pediatrician for a history and physical and/or concerning medications. Bring any test results/reports from your physicians office. If you are under the care of a heart doctor or specialist doctor, please be aware that you may be asked to them for clearance    You may be asked to stop blood thinners such as Coumadin, Plavix, Fragmin, Lovenox, etc., or any anti-inflammatories such as:  Aspirin, Ibuprofen, Advil, Naproxen prior to your surgery. We also ask that you stop any OTC medications such as fish oil, vitamin E, glucosamine, garlic, Multivitamins, COQ 10, etc. MAY TAKE TYLENOL    We ask that you do not smoke 24 hours prior to surgery  We ask that you do not  drink any alcoholic beverages 24 hours prior to surgery     You must make arrangements for a responsible adult to take you home after your surgery. For your safety you will not be allowed to leave alone or drive yourself home. Your surgery will be cancelled if you do not have a ride home. Also for your safety, it is strongly suggested that someone stay with you the first 24 hours after your surgery. A parent or legal guardian must accompany a child scheduled for surgery and plan to stay at the hospital until the child is discharged. Please do not bring other children with you. For your comfort, please wear simple loose fitting clothing to the hospital.  Please do not bring valuables.     Do not wear any make-up or nail polish on your fingers or toes      For your safety, please do not wear any jewelry or body piercing's on the day of surgery. All jewelry must be removed. If you have dentures, they will be removed before going to operating room. For your convenience, we will provide you with a container. If you wear contact lenses or glasses, they will be removed, please bring a case for them. If you have a living will and a durable power of  for healthcare, please bring in a copy. As part of our patient safety program to minimize surgical site infections, we ask you to do the following:    · Please notify your surgeon if you develop any illness between         now and the  day of your surgery. · This includes a cough, cold, fever, sore throat, nausea,         or vomiting, and diarrhea, etc.  ·  Please notify your surgeon if you experience dizziness, shortness         of breath or blurred vision between now and the time of your surgery. Do not shave your operative site 96 hours prior to surgery. For face and neck surgery, men may use an electric razor 48 hours   prior to surgery. You may shower the night before surgery or the morning of   your surgery with an antibacterial soap. You will need to bring a photo ID and insurance card    [facility] has an onsite pharmacy, would you like to utilize our pharmacy     If you will be staying overnight and use a C-pap machine, please bring   your C-pap to hospital     Our goal is to provide you with excellent care, therefore, visitors will be limited to two(2) in the room at a time so that we may focus on providing this care for you. Please contact pre-admission testing if you have any further questions. [facility] phone number:  [phone] PAT fax number:  [phone]  Please note these are generalized instructions for all surgical cases, you may be provided with more specific instructions according to your surgery.

## 2020-11-03 NOTE — PROGRESS NOTES
Preoperative Screening for Elective Surgery/Invasive Procedures While COVID-19 present in the community     Have you tested positive or have been told to self-isolate for COVID-19 like symptoms within the past 28 days? NO   Do you currently have any of the following symptoms? o Fever >100.0 F or 99.9 F in immunocompromised patients? NO  o New onset cough, shortness of breath or difficulty breathing? NO  o New onset sore throat, myalgia (muscle aches and pains), headache, loss of taste/smell or diarrhea? NO   Have you had a potential exposure to COVID-19 within the past 14 days by:  o Close contact with a confirmed case? NO  o Close contact with a healthcare worker,  or essential infrastructure worker (grocery store, [employer], gas station, public utilities or transportation)? YES-PCP  o Do you reside in a congregate setting such as; skilled nursing facility, adult home, correctional facility, homeless shelter or other institutional setting? NO  o Have you had recent travel to a known COVID-19 hotspot? NO    Indicate if the patient has a positive screen by answering yes to one or more of the above questions. Patients who test positive or screen positive prior to surgery or on the day of surgery should be evaluated in conjunction with the surgeon/proceduralist/anesthesiologist to determine the urgency of the procedure.

## 2020-11-04 ENCOUNTER — OFFICE VISIT (OUTPATIENT)
Dept: PRIMARY CARE CLINIC | Age: 75
End: 2020-11-04
Payer: MEDICARE

## 2020-11-04 PROCEDURE — 99212 OFFICE O/P EST SF 10 MIN: CPT | Performed by: NURSE PRACTITIONER

## 2020-11-04 NOTE — PROGRESS NOTES
Patient called to see if she made an appointment for her covid testing-patient states hasn;t  Yet-spoke with  and he states that he can bring her today-husbandinstructed that [place] location closes at 1pm-covid-19 appointment line made aware that patient is coming today for testing

## 2020-11-04 NOTE — PROGRESS NOTES
Patient presented to [facility] drive up clinic for preop testing. Patient was swabbed and given information advising them to remain isolated until procedure date.

## 2020-11-05 ENCOUNTER — ANESTHESIA EVENT (OUTPATIENT)
Dept: ENDOSCOPY | Age: 75
End: 2020-11-05
Payer: MEDICARE

## 2020-11-05 LAB — SARS-COV-2: NOT DETECTED

## 2020-11-06 ENCOUNTER — ANESTHESIA (OUTPATIENT)
Dept: ENDOSCOPY | Age: 75
End: 2020-11-06
Payer: MEDICARE

## 2020-11-06 ENCOUNTER — HOSPITAL ENCOUNTER (OUTPATIENT)
Age: 75
Setting detail: OUTPATIENT SURGERY
Discharge: HOME | End: 2020-11-06
Attending: INTERNAL MEDICINE | Admitting: INTERNAL MEDICINE
Payer: MEDICARE

## 2020-11-06 VITALS
WEIGHT: 127.87 LBS | HEART RATE: 80 BPM | SYSTOLIC BLOOD PRESSURE: 154 MMHG | OXYGEN SATURATION: 99 % | TEMPERATURE: 97 F | BODY MASS INDEX: 22.66 KG/M2 | RESPIRATION RATE: 15 BRPM | HEIGHT: 63 IN | DIASTOLIC BLOOD PRESSURE: 88 MMHG

## 2020-11-06 VITALS
RESPIRATION RATE: 16 BRPM | DIASTOLIC BLOOD PRESSURE: 74 MMHG | OXYGEN SATURATION: 100 % | SYSTOLIC BLOOD PRESSURE: 139 MMHG

## 2020-11-06 LAB
GLUCOSE BLD-MCNC: 88 MG/DL (ref 70–99)
GLUCOSE BLD-MCNC: 90 MG/DL (ref 70–99)
PERFORMED ON: NORMAL
PERFORMED ON: NORMAL

## 2020-11-06 PROCEDURE — 3700000001 HC ADD 15 MINUTES (ANESTHESIA): Performed by: INTERNAL MEDICINE

## 2020-11-06 PROCEDURE — 3700000000 HC ANESTHESIA ATTENDED CARE: Performed by: INTERNAL MEDICINE

## 2020-11-06 PROCEDURE — 7100000001 HC PACU RECOVERY - ADDTL 15 MIN: Performed by: INTERNAL MEDICINE

## 2020-11-06 PROCEDURE — 6360000002 HC RX W HCPCS: Performed by: NURSE ANESTHETIST, CERTIFIED REGISTERED

## 2020-11-06 PROCEDURE — 2580000003 HC RX 258: Performed by: ANESTHESIOLOGY

## 2020-11-06 PROCEDURE — 2709999900 HC NON-CHARGEABLE SUPPLY: Performed by: INTERNAL MEDICINE

## 2020-11-06 PROCEDURE — 7100000010 HC PHASE II RECOVERY - FIRST 15 MIN: Performed by: INTERNAL MEDICINE

## 2020-11-06 PROCEDURE — 3609010600 HC COLONOSCOPY POLYPECTOMY SNARE/COLD BIOPSY: Performed by: INTERNAL MEDICINE

## 2020-11-06 PROCEDURE — 7100000000 HC PACU RECOVERY - FIRST 15 MIN: Performed by: INTERNAL MEDICINE

## 2020-11-06 PROCEDURE — 7100000011 HC PHASE II RECOVERY - ADDTL 15 MIN: Performed by: INTERNAL MEDICINE

## 2020-11-06 PROCEDURE — 2500000003 HC RX 250 WO HCPCS: Performed by: NURSE ANESTHETIST, CERTIFIED REGISTERED

## 2020-11-06 PROCEDURE — 88305 TISSUE EXAM BY PATHOLOGIST: CPT

## 2020-11-06 RX ORDER — MORPHINE SULFATE 2 MG/ML
1 INJECTION, SOLUTION INTRAMUSCULAR; INTRAVENOUS EVERY 5 MIN PRN
Status: DISCONTINUED | OUTPATIENT
Start: 2020-11-06 | End: 2020-11-06 | Stop reason: HOSPADM

## 2020-11-06 RX ORDER — SODIUM CHLORIDE 0.9 % (FLUSH) 0.9 %
10 SYRINGE (ML) INJECTION EVERY 12 HOURS SCHEDULED
Status: DISCONTINUED | OUTPATIENT
Start: 2020-11-06 | End: 2020-11-06 | Stop reason: HOSPADM

## 2020-11-06 RX ORDER — ONDANSETRON 2 MG/ML
4 INJECTION INTRAMUSCULAR; INTRAVENOUS
Status: DISCONTINUED | OUTPATIENT
Start: 2020-11-06 | End: 2020-11-06 | Stop reason: HOSPADM

## 2020-11-06 RX ORDER — OXYCODONE HYDROCHLORIDE 5 MG/1
5 TABLET ORAL PRN
Status: DISCONTINUED | OUTPATIENT
Start: 2020-11-06 | End: 2020-11-06 | Stop reason: HOSPADM

## 2020-11-06 RX ORDER — FENTANYL CITRATE 50 UG/ML
50 INJECTION, SOLUTION INTRAMUSCULAR; INTRAVENOUS EVERY 5 MIN PRN
Status: DISCONTINUED | OUTPATIENT
Start: 2020-11-06 | End: 2020-11-06 | Stop reason: HOSPADM

## 2020-11-06 RX ORDER — SODIUM CHLORIDE 0.9 % (FLUSH) 0.9 %
10 SYRINGE (ML) INJECTION PRN
Status: DISCONTINUED | OUTPATIENT
Start: 2020-11-06 | End: 2020-11-06 | Stop reason: HOSPADM

## 2020-11-06 RX ORDER — FENTANYL CITRATE 50 UG/ML
25 INJECTION, SOLUTION INTRAMUSCULAR; INTRAVENOUS EVERY 5 MIN PRN
Status: DISCONTINUED | OUTPATIENT
Start: 2020-11-06 | End: 2020-11-06 | Stop reason: HOSPADM

## 2020-11-06 RX ORDER — PROPOFOL 10 MG/ML
INJECTION, EMULSION INTRAVENOUS CONTINUOUS PRN
Status: DISCONTINUED | OUTPATIENT
Start: 2020-11-06 | End: 2020-11-06 | Stop reason: SDUPTHER

## 2020-11-06 RX ORDER — OXYCODONE HYDROCHLORIDE 10 MG/1
10 TABLET ORAL PRN
Status: DISCONTINUED | OUTPATIENT
Start: 2020-11-06 | End: 2020-11-06 | Stop reason: HOSPADM

## 2020-11-06 RX ORDER — MEPERIDINE HYDROCHLORIDE 25 MG/ML
12.5 INJECTION INTRAMUSCULAR; INTRAVENOUS; SUBCUTANEOUS EVERY 5 MIN PRN
Status: DISCONTINUED | OUTPATIENT
Start: 2020-11-06 | End: 2020-11-06 | Stop reason: HOSPADM

## 2020-11-06 RX ORDER — PROPOFOL 10 MG/ML
INJECTION, EMULSION INTRAVENOUS PRN
Status: DISCONTINUED | OUTPATIENT
Start: 2020-11-06 | End: 2020-11-06 | Stop reason: SDUPTHER

## 2020-11-06 RX ORDER — MORPHINE SULFATE 2 MG/ML
2 INJECTION, SOLUTION INTRAMUSCULAR; INTRAVENOUS EVERY 5 MIN PRN
Status: DISCONTINUED | OUTPATIENT
Start: 2020-11-06 | End: 2020-11-06 | Stop reason: HOSPADM

## 2020-11-06 RX ORDER — LIDOCAINE HYDROCHLORIDE 20 MG/ML
INJECTION, SOLUTION EPIDURAL; INFILTRATION; INTRACAUDAL; PERINEURAL PRN
Status: DISCONTINUED | OUTPATIENT
Start: 2020-11-06 | End: 2020-11-06 | Stop reason: SDUPTHER

## 2020-11-06 RX ORDER — SODIUM CHLORIDE 9 MG/ML
INJECTION, SOLUTION INTRAVENOUS CONTINUOUS
Status: DISCONTINUED | OUTPATIENT
Start: 2020-11-06 | End: 2020-11-06 | Stop reason: HOSPADM

## 2020-11-06 RX ADMIN — PROPOFOL 30 MG: 10 INJECTION, EMULSION INTRAVENOUS at 11:31

## 2020-11-06 RX ADMIN — SODIUM CHLORIDE: 9 INJECTION, SOLUTION INTRAVENOUS at 11:13

## 2020-11-06 RX ADMIN — PROPOFOL 50 MG: 10 INJECTION, EMULSION INTRAVENOUS at 11:28

## 2020-11-06 RX ADMIN — PROPOFOL 140 MCG/KG/MIN: 10 INJECTION, EMULSION INTRAVENOUS at 11:28

## 2020-11-06 RX ADMIN — LIDOCAINE HYDROCHLORIDE 60 MG: 20 INJECTION, SOLUTION EPIDURAL; INFILTRATION; INTRACAUDAL; PERINEURAL at 11:28

## 2020-11-06 ASSESSMENT — PULMONARY FUNCTION TESTS
PIF_VALUE: 0

## 2020-11-06 ASSESSMENT — ENCOUNTER SYMPTOMS
DYSPNEA ACTIVITY LEVEL: AFTER AMBULATING 1 FLIGHT OF STAIRS
SHORTNESS OF BREATH: 1

## 2020-11-06 ASSESSMENT — PAIN SCALES - GENERAL
PAINLEVEL_OUTOF10: 0

## 2020-11-06 ASSESSMENT — LIFESTYLE VARIABLES: SMOKING_STATUS: 0

## 2020-11-06 ASSESSMENT — PAIN - FUNCTIONAL ASSESSMENT: PAIN_FUNCTIONAL_ASSESSMENT: 0-10

## 2020-11-06 NOTE — PROGRESS NOTES
Pt up to chair with no changes. Discharge instructions given with  at bedside, no further questions. Pt taken to car via wheelchair.

## 2020-11-06 NOTE — H&P
[address] [place] [address]   Pre-operative History and Physical    Patient: Aki Malone  : [birth year]  Acct#:     HISTORY OF PRESENT ILLNESS:    The patient is a 67 y.o. female who presents with heme + stool. She has a PMH of DM type II HTN, ILD, COPD, IBS, osteoporosis and presented on 2020 with shortness of breath, chest pain, nausea, lower abdominal pain and rectal bleeding.  She was admitted with acute hypoxic respiratory failure suspected probably due to worsening ILD or COPD exacerbation.  Pulmonology consulted and started steroids.  We have been consulted regarding abdominal pain and rectal bleeding.  she has been having lower abdominal pain. It is not worse or better with bowel movements.  Only thing that makes the pain better is rest.  She states her bowel pattern has been normal, she denies diarrhea or constipation.  Approximately 2 weeks ago she had an episode of passing bloody stools.  She had no abdominal or rectal pain at that time.  Her abdominal pain started after that episode of bleeding.  She reports for the past 1 to 2 months she has been losing weight even though she has been eating but appetite poor. She had a colonoscopy a couple years ago by Dr. Janak Lazcano which she believes results were normal (report not available).  She denies any known family history of colon cancer or colon polyps.  CT A/P showed no acute findings.  CTA chest negative for PE, showed emphysema and chronic fibrotic changes.  Blood work notes hemoglobin of 11.1, MCV of 103.3, low haptoglobin of  <10, elevated LD, bilirubin of 2.7 (predominantly indirect), normal transaminases. Follows with Dr. Elsie King for hemolytic anemia. Planning colonoscopy today for the hematochezia and heme + stool and abdominal pain.       Past Medical History:        Diagnosis Date    Arthritis     Asthma     Depression 2013    Diverticulosis of colon (without mention of hemorrhage)     Enthesopathy of hip region     left - mild    Esophageal reflux     Full dentures     Irritable bowel syndrome     Nocturia     Osteoporosis, unspecified     Other and unspecified hyperlipidemia     Postinflammatory pulmonary fibrosis (HCC)     Sialoadenitis     left    Synovial cyst of popliteal space     left knee    Thoracic or lumbosacral neuritis or radiculitis, unspecified     left - L5 - S1    Type II or unspecified type diabetes mellitus without mention of complication, not stated as uncontrolled     Unspecified essential hypertension     Wears glasses       Past Surgical History:        Procedure Laterality Date    BRONCHOSCOPY N/A 12/21/2018    BRONCHOSCOPY WITH BAL performed by Ericka Cronin DO at [address] [place] [address]    DR. Jean-no polyps    COLONOSCOPY  02/2016    normal-DR. Ramsay    CT BONE MARROW BIOPSY  9/21/2020    CT BONE MARROW BIOPSY 9/21/2020 [facility] CT    HYSTERECTOMY      partial    LIPOMA RESECTION      abdominal wall    OVARY REMOVAL      left    POLYPECTOMY        Medications Prior to Admission:   No current facility-administered medications on file prior to encounter. Current Outpatient Medications on File Prior to Encounter   Medication Sig Dispense Refill    OXYGEN Inhale 3 L into the lungs nightly      dicyclomine (BENTYL) 10 MG capsule TAKE 1 CAPSULE DAILY 30 capsule 3    gabapentin (NEURONTIN) 300 MG capsule Take 1 capsule by mouth daily for 30 days.  30 capsule 3    metFORMIN (GLUCOPHAGE) 1000 MG tablet TAKE ONE TABLET BY MOUTH TWICE A DAY WITH MEALS 180 tablet 1    predniSONE (DELTASONE) 10 MG tablet Take 1.5 tablets by mouth daily 45 tablet 11    folic acid (FOLVITE) 1 MG tablet Take 1 mg by mouth daily      omeprazole (PRILOSEC) 40 MG delayed release capsule Take 1 capsule by mouth every morning (before breakfast) 30 capsule 3    alendronate (FOSAMAX) 70 MG tablet Take 1 tablet by mouth every 7 days Take on empty stomach in am with full glass of water and remain upright for 30 minutes 4 tablet 5    mometasone-formoterol (DULERA) 200-5 MCG/ACT inhaler Inhale 2 puffs into the lungs every 12 hours 1 Inhaler 0    calcium-vitamin D (OSCAL 500/200 D-3) 500-200 MG-UNIT per tablet Take 1 tablet by mouth 2 times daily (Patient taking differently: Take 1 tablet by mouth daily ) 1 tablet 0        Allergies:  Latex;  Neda-seltzer [aspirin effervescent]; and Sulfa antibiotics    Social History:   Social History     Socioeconomic History    Marital status:      Spouse name: Jagdeep Aleman Number of children: 3    Years of education: 15    Highest education level: High school graduate   Occupational History    Occupation: retired   Social Needs    Financial resource strain: Not hard at all   Sarbari-[employer]bet[employer] insecurity     Worry: Never true     Inability: Never true   Mobio needs     Medical: No     Non-medical: No   Tobacco Use    Smoking status: Never Smoker    Smokeless tobacco: Never Used   Substance and Sexual Activity    Alcohol use: Not Currently    Drug use: Never    Sexual activity: Not Currently   Lifestyle    Physical activity     Days per week: Not on file     Minutes per session: Not on file    Stress: Not on file   Relationships    Social connections     Talks on phone: Not on file     Gets together: Not on file     Attends Congregation service: Not on file     Active member of club or organization: Not on file     Attends meetings of clubs or organizations: Not on file     Relationship status: Not on file    Intimate partner violence     Fear of current or ex partner: Not on file     Emotionally abused: Not on file     Physically abused: Not on file     Forced sexual activity: Not on file   Other Topics Concern    Not on file   Social History Narrative    Not on file      Family History:       Problem Relation Age of Onset    Heart Disease Mother     Stroke Mother     Osteoporosis Mother     Cirrhosis Father         Liver  Osteoporosis Sister     Osteoporosis Maternal Grandmother         PHYSICAL EXAM:      BP (!) 168/100   Pulse 86   Temp 96.8 °F (36 °C) (Temporal)   Resp 14   Ht 5' 3\" (1.6 m)   Wt 127 lb 13.9 oz (58 kg)   SpO2 98%   BMI 22.65 kg/m²  I        Heart:  RRR    Lungs:  CTA b    Abdomen:  S/NT/ND/+BS      ASSESSMENT AND PLAN:  ASA: per anesthesai  Mallampati: per anesthesia  1. Patient is a 67 y.o. female here for colonoscopy   2. Procedure options, risks and benefits reviewed with the patient. The patient expresses understanding.     Jeb Leonard

## 2020-11-06 NOTE — PROGRESS NOTES
Pt alert and oriented, denies pain, abdomen soft, non-distended. Pt taking PO snacks. Pt's  at bedside.

## 2020-11-06 NOTE — OP NOTE
[address] [place] [address]  Endoscopy Note    Patient: Valerie Maldonado  :   Acct#:     Procedure: Colonoscopy with intubation of the terminal ileum  Colonoscopy with biopsy    Date:  2020    Surgeon:  Caden Danielle MD    Referring Physician:  Dr. Marci Mcmahon and Dr. Humberto Christie    Anesthesia:  TIVA    Indications: This is a 67y.o. year old female who presents today with   heme + stool. She has a PMH of DM type II HTN, ILD, COPD, IBS, osteoporosis and presented on 2020 with shortness of breath, chest pain, nausea, lower abdominal pain and rectal bleeding.  She was admitted with acute hypoxic respiratory failure suspected probably due to worsening ILD or COPD exacerbation.  Pulmonology consulted and started steroids.  We have been consulted regarding abdominal pain and rectal bleeding.  she has been having lower abdominal pain. It is not worse or better with bowel movements.  Only thing that makes the pain better is rest.  She states her bowel pattern has been normal, she denies diarrhea or constipation.  Approximately 2 weeks ago she had an episode of passing bloody stools.  She had no abdominal or rectal pain at that time.  Her abdominal pain started after that episode of bleeding.  She reports for the past 1 to 2 months she has been losing weight even though she has been eating but appetite poor. She had a colonoscopy a couple years ago by Dr. Zoey Rain which she believes results were normal (report not available).  She denies any known family history of colon cancer or colon polyps.  CT A/P showed no acute findings.  CTA chest negative for PE, showed emphysema and chronic fibrotic changes.  Blood work notes hemoglobin of 11.1, MCV of 103.3, low haptoglobin of  <10, elevated LD, bilirubin of 2.7 (predominantly indirect), normal transaminases. Follows with Dr. Humberto Christie for hemolytic anemia. Planning colonoscopy today for the hematochezia and heme + stool and abdominal pain. Previous Colonoscopy: YES  Date: unknown  Greater than 3 years: YES      Procedure: An informed consent was obtained from the patient after explanation of indications, benefits, possible risks and complications of the procedure. The patient was then taken to the endoscopy suite, placed in the left lateral decubitus position, and the above IV anesthesia was administered. A digital rectal examination was performed and revealed negative without mass, lesions or tenderness. The Olympus pediatric video colonoscope was placed in the patient's rectum under digital direction and advanced to the cecum. The cecum was identified by characteristic anatomy and ballottment. The prep was good. The ileocecal valve was identified and intubated. The ascending colon was examined twice to assure no sessile polyps missed. The scope was then withdrawn back through the cecum, ascending, transverse, descending and sigmoid colons. Carefull circumferential examination of the mucosa in these areas was performed. The scope was then withdrawn into the rectum and retroflexed. The scope was straightened, the colon was decompressed and the scope was withdrawn from the patient. Findings:  1. Normal Ileum  2. A 2mm and a 3mm polyp were identified in the cecum and removed completely with cold biopsy polypectomy down to a clean base confirmed by post-polypectomy photograph. All polyp tissue sent off for pathologic evaluation. A 3mm and a 2mm polyp were identified in the ascending colon and removed completely with cold biopsy polypectomy down to a clean base confirmed by post-polypectomy photograph. All polyp tissue sent off for pathologic evaluation. A 3mm polyp was identified in the distal transverse colon and removed completely with cold biopsy polypectomy down to a clean base confirmed by post-polypectomy photograph. All polyp tissue sent off for pathologic evaluation.   3.  Grade 2 internal hemorrhoids as likely cause

## 2020-11-06 NOTE — ANESTHESIA PRE PROCEDURE
Department of Anesthesiology  Preprocedure Note       Name:  Aki Malone   Age:  67 y.o.  :  1945                                          MRN:  [id number]         Date:  2020      Surgeon: Dl Mccartney):  Isabella Renteria MD    Procedure: Procedure(s):  COLONOSCOPY    Medications prior to admission:   Prior to Admission medications    Medication Sig Start Date End Date Taking? Authorizing Provider   OXYGEN Inhale 3 L into the lungs nightly   Yes Historical Provider, MD   losartan (COZAAR) 50 MG tablet Take 50 mg by mouth daily   Yes Historical Provider, MD   montelukast (SINGULAIR) 10 MG tablet TAKE ONE TABLET BY MOUTH DAILY 20  Yes Diamond Capellan MD   blood glucose monitor strips Test 2 times a day & as needed for symptoms of irregular blood glucose. Dispense sufficient amount for indicated testing frequency plus additional to accommodate PRN testing needs. 20  Yes Diamond Capellan MD   Lancets MISC 1 each by Does not apply route 2 times daily 20  Yes Diamond Capellan MD   dicyclomine (BENTYL) 10 MG capsule TAKE 1 CAPSULE DAILY 10/23/20  Yes Diamond Capellan MD   gabapentin (NEURONTIN) 300 MG capsule Take 1 capsule by mouth daily for 30 days.  10/23/20 11/22/20 Yes Diamond Capellan MD   metFORMIN (GLUCOPHAGE) 1000 MG tablet TAKE ONE TABLET BY MOUTH TWICE A DAY WITH MEALS 10/23/20  Yes Diamond Capellan MD   predniSONE (DELTASONE) 10 MG tablet Take 1.5 tablets by mouth daily 10/19/20  Yes Todd Albert DO   folic acid (FOLVITE) 1 MG tablet Take 1 mg by mouth daily   Yes Historical Provider, MD   omeprazole (PRILOSEC) 40 MG delayed release capsule Take 1 capsule by mouth every morning (before breakfast) 10/5/20  Yes Diamond Capellan MD   alendronate (FOSAMAX) 70 MG tablet Take 1 tablet by mouth every 7 days Take on empty stomach in am with full glass of water and remain upright for 30 minutes 20  Yes Diamond Capellan MD   mometasone-formoterol [facility]) 200-5 MCG/ACT inhaler Inhale 2 puffs into the lungs every 12 hours 9/22/20  Yes Shorty Blood MD   calcium-vitamin D (OSCAL 500/200 D-3) 500-200 MG-UNIT per tablet Take 1 tablet by mouth 2 times daily  Patient taking differently: Take 1 tablet by mouth daily  6/24/20  Yes Shorty Blood MD       Current medications:    Current Facility-Administered Medications   Medication Dose Route Frequency Provider Last Rate Last Dose    0.9 % sodium chloride infusion   Intravenous Continuous Yvonne Bartholomew MD        sodium chloride flush 0.9 % injection 10 mL  10 mL Intravenous 2 times per day Yvonne Bartholomew MD        sodium chloride flush 0.9 % injection 10 mL  10 mL Intravenous PRN Yvonne Bartholomew MD           Allergies: Allergies   Allergen Reactions    Latex Rash    Neda-[place]wick [Aspirin Effervescent] Hives     Tongue swelling,but advil does not bother her.  Sulfa Antibiotics Hives       Problem List:    Patient Active Problem List   Diagnosis Code    Diverticulosis of large intestine K57.30    Postinflammatory pulmonary fibrosis ([facility] J84.10    Essential hypertension I10    Irritable bowel syndrome K58.9    Osteoporosis M81.0    Nocturia R35.1    Thoracic or lumbosacral neuritis or radiculitis, unspecified [id number]    Hyperlipidemia E78.5    Synovial cyst of popliteal space M71.20    Enthesopathy of hip region M76.899    Peripheral neuropathy,both lower extremities G62.9    Left lumbar radiculopathy-L4 M54.16    Low back pain M54.5    Lumbar disc herniation with radiculopathy M51.16    Degenerative disc disease, lumbar M51.36    Tendonitis-3rd flexor tendon left hand.  M77.9    Depression F32.9    Allergic dermatitis L23.9    Trigger finger, left ring finger M65.342    COPD with asthma ([facility]) J44.9    Rib pain on right side R07.81    Trochanteric bursitis of left hip M70.62    Osteopenia of multiple sites M85.89    Abnormal CT of the chest R93.89    ILD (interstitial lung disease) ([facility] J84.9    Weight loss R63.4    Type 2 diabetes mellitus without complication, without long-term current use of insulin (HCC) E11.9    compression fracture -T11 S22.080A, S32.010A    Personal history of steroid therapy Z92.241    Post-menopausal Z78.0    Urinary frequency R35.0    Rectal bleeding K62.5    RLQ abdominal pain R10.31    Acute delirium R41.0    Other acquired hemolytic anemias (HCC)-due drug  Dapsone D59.8    Anemia D64.9    Chronic respiratory failure with hypoxia (HCC) J96.11    Acute hemolytic anemia (HCC) D59.9       Past Medical History:        Diagnosis Date    Arthritis     Asthma     Depression 6/28/2013    Diverticulosis of colon (without mention of hemorrhage)     Enthesopathy of hip region     left - mild    Esophageal reflux     Full dentures     Irritable bowel syndrome     Nocturia     Osteoporosis, unspecified     Other and unspecified hyperlipidemia     Postinflammatory pulmonary fibrosis (HCC)     Sialoadenitis     left    Synovial cyst of popliteal space     left knee    Thoracic or lumbosacral neuritis or radiculitis, unspecified     left - L5 - S1    Type II or unspecified type diabetes mellitus without mention of complication, not stated as uncontrolled     Unspecified essential hypertension     Wears glasses        Past Surgical History:        Procedure Laterality Date    BRONCHOSCOPY N/A 12/21/2018    BRONCHOSCOPY WITH BAL performed by All Lopez DO at [address] [place] [address]  2009    DR. Jean-no polyps    COLONOSCOPY  02/2016    normal-DR. Ramsay    CT BONE MARROW BIOPSY  9/21/2020    CT BONE MARROW BIOPSY 9/21/2020 [facility] CT    HYSTERECTOMY      partial    LIPOMA RESECTION      abdominal wall    OVARY REMOVAL      left    POLYPECTOMY         Social History:    Social History     Tobacco Use    Smoking status: Never Smoker    Smokeless tobacco: Never Used   Substance Use Topics    Alcohol use: Not Currently                                Counseling given: Not Answered      Vital Signs (Current):   Vitals:    11/03/20 1317 11/06/20 0957 11/06/20 1009   BP:   (!) 168/100   Pulse:   86   Resp:   14   Temp:   96.8 °F (36 °C)   TempSrc:   Temporal   SpO2:   98%   Weight: 130 lb (59 kg) 127 lb 13.9 oz (58 kg)    Height: 5' 3\" (1.6 m)                                                BP Readings from Last 3 Encounters:   11/06/20 (!) 168/100   11/02/20 (!) 140/80   10/19/20 138/80       NPO Status: Time of last liquid consumption: 2100                        Time of last solid consumption: 2100                        Date of last liquid consumption: 11/05/20                        Date of last solid food consumption: 11/05/20    BMI:   Wt Readings from Last 3 Encounters:   11/06/20 127 lb 13.9 oz (58 kg)   11/02/20 130 lb 12.8 oz (59.3 kg)   10/19/20 129 lb (58.5 kg)     Body mass index is 22.65 kg/m².     CBC:   Lab Results   Component Value Date    WBC 8.0 11/02/2020    RBC 3.90 11/02/2020    HGB 13.7 11/02/2020    HCT 40.7 11/02/2020    .4 11/02/2020    RDW 13.2 11/02/2020     11/02/2020       CMP:   Lab Results   Component Value Date     11/02/2020    K 4.0 11/02/2020    K 4.3 09/24/2020     11/02/2020    CO2 27 11/02/2020    BUN 18 11/02/2020    CREATININE 0.7 11/02/2020    GFRAA >60 11/02/2020    GFRAA >60 05/31/2013    AGRATIO 1.5 09/23/2020    LABGLOM >60 11/02/2020    LABGLOM 49.7 08/03/2011    GLUCOSE 113 11/02/2020    GLUCOSE 191 08/03/2011    PROT 5.6 11/02/2020    PROT 6.7 08/03/2011    CALCIUM 9.5 11/02/2020    BILITOT 0.3 11/02/2020    ALKPHOS 42 11/02/2020    AST 12 11/02/2020    ALT 21 11/02/2020       POC Tests:   Recent Labs     11/06/20  1016   POCGLU 88       Coags:   Lab Results   Component Value Date    PROTIME 11.7 09/21/2020    INR 1.01 09/21/2020    APTT 31.1 09/21/2020       HCG (If Applicable): No results found for: PREGTESTUR, PREGSERUM, HCG, HCGQUANT     ABGs: No results found for: PHART, PO2ART, YSS8WIJ, AEP5EYF, BEART, L5NWIRTT     Type & Screen (If Applicable):  No results found for: LABABO, LABRH    Drug/Infectious Status (If Applicable):  No results found for: HIV, HEPCAB    COVID-19 Screening (If Applicable):   Lab Results   Component Value Date    COVID19 Not Detected 11/04/2020         Anesthesia Evaluation  Patient summary reviewed and Nursing notes reviewed no history of anesthetic complications:   Airway: Mallampati: II  TM distance: >3 FB   Neck ROM: full  Mouth opening: > = 3 FB Dental:    (+) upper dentures, lower dentures and edentulous      Pulmonary: breath sounds clear to auscultation  (+) COPD:  shortness of breath:  asthma:     (-) pneumonia, recent URI, sleep apnea and not a current smoker                          ROS comment: On 2.5l nasal cannula at home, mostly at night   Cardiovascular:  Exercise tolerance: poor (<4 METS),   (+) hypertension:, LUNSFORD: after ambulating 1 flight of stairs, hyperlipidemia    (-) valvular problems/murmurs, past MI, CAD, CABG/stent,  angina and orthopnea      Rhythm: regular                      Neuro/Psych:   (+) neuromuscular disease:, psychiatric history:            GI/Hepatic/Renal:   (+) GERD:, bowel prep,           Endo/Other:    (+) DiabetesType II DM, , : arthritis: OA., .                 Abdominal:           Vascular:                                      Anesthesia Plan      MAC     ASA 3       Induction: intravenous. MIPS: Prophylactic antiemetics administered. Anesthetic plan and risks discussed with patient and spouse. Plan discussed with CRNA. Ruthie Herrera MD   11/6/2020        This pre-anesthesia assessment may be used as a history and physical.    DOS STAFF ADDENDUM:    Pt seen and examined, chart reviewed (including anesthesia, drug and allergy history). No interval changes to history and physical examination.   Anesthetic plan, risks, benefits, alternatives, and personnel involved discussed with patient. Patient verbalized an understanding and agrees to proceed.       Linda Martinez MD  November 6, 2020  10:24 AM

## 2020-11-06 NOTE — ANESTHESIA POSTPROCEDURE EVALUATION
Department of Anesthesiology  Postprocedure Note    Patient: Swati Pleitez  MRN: [id number]  YOB: 1945  Date of evaluation: 11/6/2020  Time:  1:51 PM     Procedure Summary     Date:  11/06/20 Room / Location:  [address] [place] [address]    Anesthesia Start:  7748 Anesthesia Stop:  1200    Procedure:  COLONOSCOPY POLYPECTOMY SNARE/COLD BIOPSY (N/A ) Diagnosis:       Rectal bleed      (RECTAL BLEED)    Surgeon:  Talisha Coleman MD Responsible Provider:  Lisa Smith MD    Anesthesia Type:  MAC ASA Status:  3          Anesthesia Type: MAC    Lakia Phase I: Lakia Score: 10    Lakia Phase II: Lakia Score: 10    Last vitals: Reviewed and per EMR flowsheets.        Anesthesia Post Evaluation    Patient location during evaluation: PACU  Patient participation: complete - patient participated  Level of consciousness: awake and alert  Pain score: 0  Airway patency: patent  Nausea & Vomiting: no nausea and no vomiting  Complications: no  Cardiovascular status: blood pressure returned to baseline  Respiratory status: acceptable  Hydration status: euvolemic

## 2020-12-09 RX ORDER — ALENDRONATE SODIUM 70 MG/1
70 TABLET ORAL
Qty: 4 TABLET | Refills: 3 | Status: SHIPPED | OUTPATIENT
Start: 2020-12-09 | End: 2021-04-01

## 2020-12-14 ENCOUNTER — OFFICE VISIT (OUTPATIENT)
Dept: INTERNAL MEDICINE CLINIC | Age: 75
End: 2020-12-14
Payer: MEDICARE

## 2020-12-14 VITALS
OXYGEN SATURATION: 99 % | WEIGHT: 131.6 LBS | SYSTOLIC BLOOD PRESSURE: 130 MMHG | TEMPERATURE: 97.7 F | HEART RATE: 94 BPM | DIASTOLIC BLOOD PRESSURE: 90 MMHG | BODY MASS INDEX: 23.31 KG/M2 | RESPIRATION RATE: 16 BRPM

## 2020-12-14 PROCEDURE — 99214 OFFICE O/P EST MOD 30 MIN: CPT | Performed by: INTERNAL MEDICINE

## 2020-12-14 ASSESSMENT — ENCOUNTER SYMPTOMS
CHEST TIGHTNESS: 1
COUGH: 0
GASTROINTESTINAL NEGATIVE: 1
WHEEZING: 0
SHORTNESS OF BREATH: 1

## 2020-12-14 NOTE — PROGRESS NOTES
2020    Ziggy Henning (:  1945) is a 67 y.o. female, here for evaluation of the following medical concerns:htn diabetes and ILD    HPI  Uses oxygen at home at nights and some times during day also  Has no cough or wheezing   Ha s no orthopnea or pnd  She feels she still gets confused some times  Ha sno heart burns   Feels tight in chest and oxygen resolves and not get with exertion  Has no gi or gu symptoms  Banding helping her hemorrhoids and not bleeding in past week  Review of Systems   Constitutional: Negative for activity change, appetite change, chills, fever and unexpected weight change. Respiratory: Positive for chest tightness and shortness of breath. Negative for cough and wheezing. Cardiovascular: Negative for chest pain, palpitations and leg swelling. Gastrointestinal: Negative. Genitourinary: Negative. Neurological: Negative for dizziness, light-headedness and headaches. Prior to Visit Medications    Medication Sig Taking? Authorizing Provider   alendronate (FOSAMAX) 70 MG tablet Take 1 tablet by mouth every 7 days Take on empty stomach in am with full glass of water and remain upright for 30 minutes Yes Breanna Trivedi MD   OXYGEN Inhale 3 L into the lungs nightly Yes Historical Provider, MD   losartan (COZAAR) 50 MG tablet Take 50 mg by mouth daily Yes Historical Provider, MD   montelukast (SINGULAIR) 10 MG tablet TAKE ONE TABLET BY MOUTH DAILY Yes Breanna Trivedi MD   blood glucose monitor strips Test 2 times a day & as needed for symptoms of irregular blood glucose. Dispense sufficient amount for indicated testing frequency plus additional to accommodate PRN testing needs. Yes Breanna Trivedi MD   Lancets MISC 1 each by Does not apply route 2 times daily Yes Breanna Trivedi MD   dicyclomine (BENTYL) 10 MG capsule TAKE 1 CAPSULE DAILY Yes Breanna Trivedi MD   gabapentin (NEURONTIN) 300 MG capsule Take 1 capsule by mouth daily for 30 days.  Yes Alana Casiano Orlando Solo MD   metFORMIN (GLUCOPHAGE) 1000 MG tablet TAKE ONE TABLET BY MOUTH TWICE A DAY WITH MEALS Yes Uriel Campbell MD   predniSONE (DELTASONE) 10 MG tablet Take 1.5 tablets by mouth daily Yes Johnny Beavers DO   folic acid (FOLVITE) 1 MG tablet Take 1 mg by mouth daily Yes Cynthia Stratton MD   omeprazole (PRILOSEC) 40 MG delayed release capsule Take 1 capsule by mouth every morning (before breakfast) Yes Uriel Campbell MD   mometasone-formoterol [facility]) 200-5 MCG/ACT inhaler Inhale 2 puffs into the lungs every 12 hours Yes Uriel Campbell MD   calcium-vitamin D (OSCAL 500/200 D-3) 500-200 MG-UNIT per tablet Take 1 tablet by mouth 2 times daily  Patient taking differently: Take 1 tablet by mouth daily  Yes Uriel Campbell MD        Allergies   Allergen Reactions    Latex Rash    Neda-Onamia [Aspirin Effervescent] Hives     Tongue swelling,but advil does not bother her.  Sulfa Antibiotics Hives       Past Medical History:   Diagnosis Date    Arthritis     Asthma     Depression 6/28/2013    Diverticulosis of colon (without mention of hemorrhage)     Enthesopathy of hip region     left - mild    Esophageal reflux     Full dentures     Irritable bowel syndrome     Nocturia     Osteoporosis, unspecified     Other and unspecified hyperlipidemia     Postinflammatory pulmonary fibrosis (HCC)     Sialoadenitis     left    Synovial cyst of popliteal space     left knee    Thoracic or lumbosacral neuritis or radiculitis, unspecified     left - L5 - S1    Type II or unspecified type diabetes mellitus without mention of complication, not stated as uncontrolled     Unspecified essential hypertension     Wears glasses        Past Surgical History:   Procedure Laterality Date    BRONCHOSCOPY N/A 12/21/2018    BRONCHOSCOPY WITH BAL performed by Johnny Beavers DO at [address] [place] [address]    DR. Jean-no polyps    COLONOSCOPY 02/2016    normal-DR. Ramsay    COLONOSCOPY N/A 11/6/2020    COLONOSCOPY POLYPECTOMY SNARE/COLD BIOPSY performed by Delmy Bond MD at [facility] [address]  9/21/2020    CT BONE MARROW BIOPSY 9/21/2020 [facility]    HYSTERECTOMY      partial    LIPOMA RESECTION      abdominal wall    OVARY REMOVAL      left    POLYPECTOMY         Social History     Socioeconomic History    Marital status:      Spouse name: Anabel Hector Number of children: 3    Years of education: 12    Highest education level: High school graduate   Occupational History    Occupation: retired   Social Needs    Financial resource strain: Not hard at all   Varun-Faye insecurity     Worry: Never true     Inability: Never true   [employer] needs     Medical: No     Non-medical: No   Tobacco Use    Smoking status: Never Smoker    Smokeless tobacco: Never Used   Substance and Sexual Activity    Alcohol use: Not Currently    Drug use: Never    Sexual activity: Not Currently   Lifestyle    Physical activity     Days per week: Not on file     Minutes per session: Not on file    Stress: Not on file   Relationships    Social connections     Talks on phone: Not on file     Gets together: Not on file     Attends Judaism service: Not on file     Active member of club or organization: Not on file     Attends meetings of clubs or organizations: Not on file     Relationship status: Not on file    Intimate partner violence     Fear of current or ex partner: Not on file     Emotionally abused: Not on file     Physically abused: Not on file     Forced sexual activity: Not on file   Other Topics Concern    Not on file   Social History Narrative    Not on file        Family History   Problem Relation Age of Onset    Heart Disease Mother     Stroke Mother     Osteoporosis Mother     Cirrhosis Father         Liver    Osteoporosis Sister     Osteoporosis Maternal Grandmother        Vitals:    12/14/20 1315   BP: (!) 164/103   Site: Left Upper Arm   Position: Sitting   Cuff Size: Large Adult   Pulse: 94   Resp: 16   Temp: 97.7 °F (36.5 °C)   SpO2: 99%   Weight: 131 lb 9.6 oz (59.7 kg)     Estimated body mass index is 23.31 kg/m² as calculated from the following:    Height as of 11/3/20: 5' 3\" (1.6 m). Weight as of this encounter: 131 lb 9.6 oz (59.7 kg). Physical Exam  Vitals signs and nursing note reviewed. Constitutional:       General: She is not in acute distress. Eyes:      General: No scleral icterus. Extraocular Movements: Extraocular movements intact. Conjunctiva/sclera: Conjunctivae normal.      Pupils: Pupils are equal, round, and reactive to light. Neck:      Thyroid: No thyromegaly. Vascular: No carotid bruit or JVD. Cardiovascular:      Rate and Rhythm: Normal rate and regular rhythm. Pulses: Normal pulses. Heart sounds: Normal heart sounds. No murmur. No gallop. Pulmonary:      Effort: No respiratory distress. Breath sounds: Normal breath sounds. No wheezing or rhonchi. Rales: has at bases. Musculoskeletal:      Right lower leg: No edema. Left lower leg: No edema. Lymphadenopathy:      Cervical: No cervical adenopathy. Neurological:      Mental Status: She is alert and oriented to person, place, and time. Psychiatric:         Mood and Affect: Mood normal.         Behavior: Behavior normal.         ASSESSMENT/PLAN:    1- HTN controlled  Diabetes seem to be stable but not checking sugars at home tho she is on steroids  Encephalopathy  stable possible this is steroid effect  ILD-stable  Copd and asthma are well controlled   Hemolytic anemia -recent cbc is normal.  Plan  Discussed with her and her  and need to check blood sugars -2 times daily and call if sugars are high   Continue current medications  See in  3 months  Seeing DR. Gaffney tomorrow. and follows up with DR. Андрей Lee and DR. Waterhouse-Now taking prednisone 10 mg daily  An  electronic signature was used to authenticate this note.     --Homa Hector MD on 12/14/2020 at 1:34 PM

## 2020-12-14 NOTE — PATIENT INSTRUCTIONS
Discussed with her and her  and need to check blood sugars -2 times daily and call if sugars are high   Continue current medications  See in  3 months

## 2020-12-30 ENCOUNTER — OFFICE VISIT (OUTPATIENT)
Dept: PULMONOLOGY | Age: 75
End: 2020-12-30
Payer: MEDICARE

## 2020-12-30 VITALS
BODY MASS INDEX: 23.57 KG/M2 | RESPIRATION RATE: 16 BRPM | OXYGEN SATURATION: 98 % | WEIGHT: 133 LBS | DIASTOLIC BLOOD PRESSURE: 80 MMHG | SYSTOLIC BLOOD PRESSURE: 132 MMHG | TEMPERATURE: 97.5 F | HEART RATE: 87 BPM | HEIGHT: 63 IN

## 2020-12-30 PROCEDURE — 99213 OFFICE O/P EST LOW 20 MIN: CPT | Performed by: INTERNAL MEDICINE

## 2020-12-30 NOTE — PROGRESS NOTES
Chief complaint  This is a 67y.o. year old female  who comes to see me with a chief complaint of   Chief Complaint   Patient presents with    Follow-up     2 mo       HPI  Here with a cc of ILD    She is doing well. She has remained on 15 mg of prednisone since last visit. She has been weaned off day time oxygen but still using 3 liters with sleep.  says her oxygen saturations are never below 90% with exertion. On no inhalers at this time either. Feels stable to slightly better. No other changes     Past Medical History:   Diagnosis Date    Arthritis     Asthma     Depression 6/28/2013    Diverticulosis of colon (without mention of hemorrhage)     Enthesopathy of hip region     left - mild    Esophageal reflux     Full dentures     Irritable bowel syndrome     Nocturia     Osteoporosis, unspecified     Other and unspecified hyperlipidemia     Postinflammatory pulmonary fibrosis (HCC)     Sialoadenitis     left    Synovial cyst of popliteal space     left knee    Thoracic or lumbosacral neuritis or radiculitis, unspecified     left - L5 - S1    Type II or unspecified type diabetes mellitus without mention of complication, not stated as uncontrolled     Unspecified essential hypertension     Wears glasses        Past Surgical History:   Procedure Laterality Date    BRONCHOSCOPY N/A 12/21/2018    BRONCHOSCOPY WITH BAL performed by Tanner Ryan DO at [address] [place] [address]    DR. Jean-no polyps    COLONOSCOPY  02/2016    normal-DR. Ramsay    COLONOSCOPY N/A 11/6/2020    COLONOSCOPY POLYPECTOMY SNARE/COLD BIOPSY performed by Home Salcedo MD at [facility]  9/21/2020    CT BONE MARROW BIOPSY 9/21/2020 [facility] CT    HYSTERECTOMY      partial    LIPOMA RESECTION      abdominal wall    OVARY REMOVAL      left    POLYPECTOMY         Social History     Socioeconomic History    Marital status:  Spouse name: Kacie Nath Number of children: 3    Years of education: 15    Highest education level: High school graduate   Occupational History    Occupation: retired   Social Needs    Financial resource strain: Not hard at all   Varun-Faye insecurity     Worry: Never true     Inability: Never true   [employer] needs     Medical: No     Non-medical: No   Tobacco Use    Smoking status: Never Smoker    Smokeless tobacco: Never Used   Substance and Sexual Activity    Alcohol use: Not Currently     Comment: rarely    Drug use: Never    Sexual activity: Not Currently   Lifestyle    Physical activity     Days per week: Not on file     Minutes per session: Not on file    Stress: Not on file   Relationships    Social connections     Talks on phone: Not on file     Gets together: Not on file     Attends Latter-day service: Not on file     Active member of club or organization: Not on file     Attends meetings of clubs or organizations: Not on file     Relationship status: Not on file    Intimate partner violence     Fear of current or ex partner: Not on file     Emotionally abused: Not on file     Physically abused: Not on file     Forced sexual activity: Not on file   Other Topics Concern    Not on file   Social History Narrative    Not on file       Family History   Problem Relation Age of Onset    Heart Disease Mother     Stroke Mother     Osteoporosis Mother     Cirrhosis Father         Liver    Osteoporosis Sister     Osteoporosis Maternal Grandmother          Current Outpatient Medications:     alendronate (FOSAMAX) 70 MG tablet, Take 1 tablet by mouth every 7 days Take on empty stomach in am with full glass of water and remain upright for 30 minutes, Disp: 4 tablet, Rfl: 3    OXYGEN, Inhale 3 L into the lungs nightly, Disp: , Rfl:     losartan (COZAAR) 50 MG tablet, Take 50 mg by mouth daily, Disp: , Rfl:     montelukast (SINGULAIR) 10 MG tablet, TAKE ONE TABLET BY MOUTH DAILY, Disp: 90 tablet, Rfl: 1    blood glucose monitor strips, Test 2 times a day & as needed for symptoms of irregular blood glucose. Dispense sufficient amount for indicated testing frequency plus additional to accommodate PRN testing needs. , Disp: 100 strip, Rfl: 3    Lancets MISC, 1 each by Does not apply route 2 times daily, Disp: 100 each, Rfl: 3    dicyclomine (BENTYL) 10 MG capsule, TAKE 1 CAPSULE DAILY, Disp: 30 capsule, Rfl: 3    metFORMIN (GLUCOPHAGE) 1000 MG tablet, TAKE ONE TABLET BY MOUTH TWICE A DAY WITH MEALS, Disp: 180 tablet, Rfl: 1    predniSONE (DELTASONE) 10 MG tablet, Take 1.5 tablets by mouth daily, Disp: 45 tablet, Rfl: 11    folic acid (FOLVITE) 1 MG tablet, Take 1 mg by mouth daily, Disp: , Rfl:     omeprazole (PRILOSEC) 40 MG delayed release capsule, Take 1 capsule by mouth every morning (before breakfast), Disp: 30 capsule, Rfl: 3    mometasone-formoterol (DULERA) 200-5 MCG/ACT inhaler, Inhale 2 puffs into the lungs every 12 hours, Disp: 1 Inhaler, Rfl: 0    calcium-vitamin D (OSCAL 500/200 D-3) 500-200 MG-UNIT per tablet, Take 1 tablet by mouth 2 times daily (Patient taking differently: Take 1 tablet by mouth daily ), Disp: 1 tablet, Rfl: 0    gabapentin (NEURONTIN) 300 MG capsule, Take 1 capsule by mouth daily for 30 days. , Disp: 30 capsule, Rfl: 3      PHYSICAL EXAM:  Vitals:    12/30/20 1309   BP: 132/80   Pulse: 87   Resp: 16   Temp: 97.5 °F (36.4 °C)   SpO2: 98%       GENERAL: Appear frail   HEENT:  No scleral icterus, no conjunctival irritation  NECK:  No thyromegaly, no bruits  LYMPH:  No cervical or supraclavicular adenopathy  HEART:  Regular rate and rhythm, no murmurs  LUNGS:  Clear bilaterally. Slightly diminished   ABDOMEN:  No distention, no organomegaly  EXTREMITIES:  No edema, no digital clubbing  NEURO:  No localizing deficits, CN II-XII intact.   Seems less confused     Pulmonary Function Testing 11/2018  MY IMPRESSION  Overall Mild restriction, no bronchodilator response and moderate reduction in diffusing capacity     Chest imaging from 11/2019 is reviewed and compared to 9/2020. My interpretation chronic interstitial changes with reticular markings, ground glass, nodular opacities, mosaic attenuation and bronchiectasis. I don't appreciate emphysema to any significant degree like was mentioned on radiology report     ECHO  None  Lab Results   Component Value Date    WBC 8.0 11/02/2020    HGB 13.7 11/02/2020    HCT 40.7 11/02/2020    .4 (H) 11/02/2020     11/02/2020       No results found for: BNP    Lab Results   Component Value Date    CREATININE 0.7 11/02/2020    BUN 18 11/02/2020     11/02/2020    K 4.0 11/02/2020     11/02/2020    CO2 27 11/02/2020     Lung Biopsy from 10/2007         -     Chronic interstitial pneumonia with chronic bronchiolitis and focal  organizing pneumonia (see comment). Comment(s)       The combination of chronic interstitial pneumonia, bronchiolitis, and  organizing pneumonia raises hypersensitivity pneumonia (extrinsic allergic  alveolitis) as the chief etiologic consideration.  In the absence of an  identifiable antigenic exposure, similar changes can be seen in patients with  various forms of drug-induced lung disease.  In the absence of any history to  incriminate either an antigenic or drug exposure, these findings are most  consistent with nonspecific interstitial pneumonia (NSIP).   I reviewed above labs and studies pertinent to this visit and date    Bronchoscopy  12/2018  -  No malignant cells identified    -Increased CD4/CD8 ratio and decreased /CD4 ratio.  These  results may be present in pulmonary sarcoidosis, viral infection,  alveolitis, and interstitial syndromes    Differential:  Neutrophilic, lymphocytic    Cultures all negative    12/2018  VINCENT  negative  ANCA (myeloperioxidase/serine protease 3)  negative  RF (RA):   negative  Anti-Scl 70 (Scleroderma):  negative  Anti-DS DNA (SLE):  negative  Anti- SSA (Ro) (Sjogrens):  negative  Anti-SSB (La) (Sjogrens):  negative  Anti-Bhumika (polymyositis/dermatomyositis):  negative  Anti-Smith (SLE):  negative  Anti-RNP (MCTD):  negative  CK: (inflammatory myopathy) 145  Sed rate  18    Total IgE Level:  116  Significant allergies:  none    HP panel:  Negative    I reviewed the above labs and images     Assessment/Plan:  1. ILD (interstitial lung disease) ([facility] [address]  Continue with 15 mg of prednisone until next visit. Might be able to decrease to 10 mg    2. Chronic respiratory failure with hypoxia (HCC)  Uses 3 liters of oxygen with sleep and with exertion when needed. She does benefit from using oxygen.      Flu shot up to date    Her recent admissions were due to acutely stopping her chronic prednisone     Follow up in 3 months

## 2020-12-30 NOTE — Clinical Note
Dr. Esteban Rodriguez,    I am continuing her prednisone at 15 mg until next visit.   She is no longer oxygen dependent with exertion due to prednisone   Thanks  Fidencio Gonzalez

## 2021-02-01 RX ORDER — OMEPRAZOLE 40 MG/1
CAPSULE, DELAYED RELEASE ORAL
Qty: 30 CAPSULE | Refills: 5 | Status: SHIPPED | OUTPATIENT
Start: 2021-02-01 | End: 2021-07-28

## 2021-02-01 NOTE — TELEPHONE ENCOUNTER
Last office visit : 12/14/2020    Future Appointments   Date Time Provider Lila Arias   3/16/2021 12:30 PM Radha Rosa  [address]   3/30/2021  2:00 PM Olaf Baeza [facility]

## 2021-02-19 RX ORDER — DICYCLOMINE HYDROCHLORIDE 10 MG/1
CAPSULE ORAL
Qty: 30 CAPSULE | Refills: 2 | Status: SHIPPED | OUTPATIENT
Start: 2021-02-19 | End: 2021-03-19

## 2021-03-19 RX ORDER — GABAPENTIN 300 MG/1
300 CAPSULE ORAL DAILY
Qty: 30 CAPSULE | Refills: 1 | Status: SHIPPED | OUTPATIENT
Start: 2021-03-19 | End: 2021-06-03

## 2021-03-19 RX ORDER — DICYCLOMINE HYDROCHLORIDE 10 MG/1
CAPSULE ORAL
Qty: 30 CAPSULE | Refills: 0 | Status: SHIPPED | OUTPATIENT
Start: 2021-03-19 | End: 2021-06-14

## 2021-03-19 NOTE — TELEPHONE ENCOUNTER
LAST OFFICE VISIT ;12/14/2020    Future Appointments   Date Time Provider Lila Arias   3/30/2021  2:00 PM Tiffanie Meng [facility]

## 2021-03-30 ENCOUNTER — OFFICE VISIT (OUTPATIENT)
Dept: PULMONOLOGY | Age: 76
End: 2021-03-30
Payer: MEDICARE

## 2021-03-30 VITALS
BODY MASS INDEX: 23.39 KG/M2 | WEIGHT: 132 LBS | DIASTOLIC BLOOD PRESSURE: 88 MMHG | OXYGEN SATURATION: 97 % | HEIGHT: 63 IN | HEART RATE: 60 BPM | RESPIRATION RATE: 16 BRPM | TEMPERATURE: 95 F | SYSTOLIC BLOOD PRESSURE: 138 MMHG

## 2021-03-30 DIAGNOSIS — J84.9 ILD (INTERSTITIAL LUNG DISEASE) (HCC): Primary | ICD-10-CM

## 2021-03-30 DIAGNOSIS — J96.11 CHRONIC RESPIRATORY FAILURE WITH HYPOXIA (HCC): ICD-10-CM

## 2021-03-30 PROCEDURE — 99213 OFFICE O/P EST LOW 20 MIN: CPT | Performed by: INTERNAL MEDICINE

## 2021-03-30 NOTE — PROGRESS NOTES
Chief complaint  This is a 67y.o. year old female  who comes to see me with a chief complaint of   Chief Complaint   Patient presents with    Follow-up     ILD       HPI  Here with a cc of ILD    She has done well since last visit. Remains on prednisone 15 mg with control of breathing. Does get SOB but can walk further than before. Using oxygen up to 3 liters with heavy exertion and sleep.  says her oxygen saturations stay in the 90's nearly all of the time. Not too many side effects from prednisone that she is aware of    Past Medical History:   Diagnosis Date    Arthritis     Asthma     Depression 6/28/2013    Diverticulosis of colon (without mention of hemorrhage)     Enthesopathy of hip region     left - mild    Esophageal reflux     Full dentures     Irritable bowel syndrome     Nocturia     Osteoporosis, unspecified     Other and unspecified hyperlipidemia     Postinflammatory pulmonary fibrosis (HCC)     Sialoadenitis     left    Synovial cyst of popliteal space     left knee    Thoracic or lumbosacral neuritis or radiculitis, unspecified     left - L5 - S1    Type II or unspecified type diabetes mellitus without mention of complication, not stated as uncontrolled     Unspecified essential hypertension     Wears glasses        Past Surgical History:   Procedure Laterality Date    BRONCHOSCOPY N/A 12/21/2018    BRONCHOSCOPY WITH BAL performed by Darin Luis DO at [address] [place] [address]    DR. Jean-no polyps    COLONOSCOPY  02/2016    normal-DR. Ramsay    COLONOSCOPY N/A 11/6/2020    COLONOSCOPY POLYPECTOMY SNARE/COLD BIOPSY performed by Tripp Anne MD at [facility]  9/21/2020    CT BONE MARROW BIOPSY 9/21/2020 [facility] CT    HYSTERECTOMY      partial    LIPOMA RESECTION      abdominal wall    OVARY REMOVAL      left    POLYPECTOMY         Social History     Socioeconomic History    Marital status:      Spouse name: Osito Hernandez Number of children: 3    Years of education: 15    Highest education level: High school graduate   Occupational History    Occupation: retired   Social Needs    Financial resource strain: Not hard at all   Varun-Faye insecurity     Worry: Never true     Inability: Never true   [employer] needs     Medical: No     Non-medical: No   Tobacco Use    Smoking status: Never Smoker    Smokeless tobacco: Never Used   Substance and Sexual Activity    Alcohol use: Not Currently     Comment: rarely    Drug use: Never    Sexual activity: Not Currently   Lifestyle    Physical activity     Days per week: Not on file     Minutes per session: Not on file    Stress: Not on file   Relationships    Social connections     Talks on phone: Not on file     Gets together: Not on file     Attends Tenriism service: Not on file     Active member of club or organization: Not on file     Attends meetings of clubs or organizations: Not on file     Relationship status: Not on file    Intimate partner violence     Fear of current or ex partner: Not on file     Emotionally abused: Not on file     Physically abused: Not on file     Forced sexual activity: Not on file   Other Topics Concern    Not on file   Social History Narrative    Not on file       Family History   Problem Relation Age of Onset    Heart Disease Mother     Stroke Mother     Osteoporosis Mother     Cirrhosis Father         Liver    Osteoporosis Sister     Osteoporosis Maternal Grandmother          Current Outpatient Medications:     gabapentin (NEURONTIN) 300 MG capsule, Take 1 capsule by mouth daily for 30 days. , Disp: 30 capsule, Rfl: 1    dicyclomine (BENTYL) 10 MG capsule, TAKE ONE CAPSULE BY MOUTH DAILY, Disp: 30 capsule, Rfl: 0    omeprazole (PRILOSEC) 40 MG delayed release capsule, TAKE ONE CAPSULE BY MOUTH EVERY MORNING BEFORE BREAKFAST, Disp: 30 capsule, Rfl: 5    alendronate (FOSAMAX) 70 MG tablet, Take 1 tablet by mouth every 7 days Take on empty stomach in am with full glass of water and remain upright for 30 minutes, Disp: 4 tablet, Rfl: 3    OXYGEN, Inhale 3 L into the lungs nightly, Disp: , Rfl:     losartan (COZAAR) 50 MG tablet, Take 50 mg by mouth daily, Disp: , Rfl:     montelukast (SINGULAIR) 10 MG tablet, TAKE ONE TABLET BY MOUTH DAILY, Disp: 90 tablet, Rfl: 1    blood glucose monitor strips, Test 2 times a day & as needed for symptoms of irregular blood glucose. Dispense sufficient amount for indicated testing frequency plus additional to accommodate PRN testing needs. , Disp: 100 strip, Rfl: 3    Lancets MISC, 1 each by Does not apply route 2 times daily, Disp: 100 each, Rfl: 3    metFORMIN (GLUCOPHAGE) 1000 MG tablet, TAKE ONE TABLET BY MOUTH TWICE A DAY WITH MEALS, Disp: 180 tablet, Rfl: 1    predniSONE (DELTASONE) 10 MG tablet, Take 1.5 tablets by mouth daily, Disp: 45 tablet, Rfl: 11    folic acid (FOLVITE) 1 MG tablet, Take 1 mg by mouth daily, Disp: , Rfl:     mometasone-formoterol (DULERA) 200-5 MCG/ACT inhaler, Inhale 2 puffs into the lungs every 12 hours, Disp: 1 Inhaler, Rfl: 0    calcium-vitamin D (OSCAL 500/200 D-3) 500-200 MG-UNIT per tablet, Take 1 tablet by mouth 2 times daily (Patient taking differently: Take 1 tablet by mouth daily ), Disp: 1 tablet, Rfl: 0      PHYSICAL EXAM:  Vitals:    03/30/21 1341   BP: (!) 148/98   Pulse: 60   Resp: 16   Temp: 95 °F (35 °C)   SpO2: 97%       GENERAL: Looks better today, mildly cushingoid   HEENT:  No scleral icterus, no conjunctival irritation  NECK:  No thyromegaly, no bruits  LYMPH:  No cervical or supraclavicular adenopathy  HEART:  Regular rate and rhythm, no murmurs  LUNGS:  Clear with inspiratory squeak on anterior chest   ABDOMEN:  No distention, no organomegaly  EXTREMITIES:  No edema, no digital clubbing  NEURO:  No localizing deficits, CN II-XII intact.       Pulmonary Function Testing 11/2018  MY IMPRESSION  Overall Mild restriction, no bronchodilator response and moderate reduction in diffusing capacity     Chest imaging from 11/2019 is reviewed and compared to 9/2020. My interpretation chronic interstitial changes with reticular markings, ground glass, nodular opacities, mosaic attenuation and bronchiectasis. I don't appreciate emphysema to any significant degree like was mentioned on radiology report     ECHO  None  Lab Results   Component Value Date    WBC 8.0 11/02/2020    HGB 13.7 11/02/2020    HCT 40.7 11/02/2020    .4 (H) 11/02/2020     11/02/2020       No results found for: BNP    Lab Results   Component Value Date    CREATININE 0.7 11/02/2020    BUN 18 11/02/2020     11/02/2020    K 4.0 11/02/2020     11/02/2020    CO2 27 11/02/2020     Lung Biopsy from 10/2007         -     Chronic interstitial pneumonia with chronic bronchiolitis and focal  organizing pneumonia (see comment). Comment(s)       The combination of chronic interstitial pneumonia, bronchiolitis, and  organizing pneumonia raises hypersensitivity pneumonia (extrinsic allergic  alveolitis) as the chief etiologic consideration.  In the absence of an  identifiable antigenic exposure, similar changes can be seen in patients with  various forms of drug-induced lung disease.  In the absence of any history to  incriminate either an antigenic or drug exposure, these findings are most  consistent with nonspecific interstitial pneumonia (NSIP).   I reviewed above labs and studies pertinent to this visit and date    Bronchoscopy  12/2018  -  No malignant cells identified    -Increased CD4/CD8 ratio and decreased /CD4 ratio.  These  results may be present in pulmonary sarcoidosis, viral infection,  alveolitis, and interstitial syndromes    Differential:  Neutrophilic, lymphocytic    Cultures all negative    12/2018  VINCENT  negative  ANCA (myeloperioxidase/serine protease 3)  negative  RF (RA):   negative  Anti-Scl 70 (Scleroderma):  negative  Anti-DS DNA (SLE):  negative  Anti- SSA (Ro) (Sjogrens):  negative  Anti-SSB (La) (Sjogrens):  negative  Anti-Bhumika (polymyositis/dermatomyositis):  negative  Anti-Smith (SLE):  negative  Anti-RNP (MCTD):  negative  CK: (inflammatory myopathy) 145  Sed rate  18    Total IgE Level:  116  Significant allergies:  none    HP panel:  Negative    I reviewed the above labs and images     Assessment/Plan:  1. ILD (interstitial lung disease) (HCC)  NSIP vs Chronic HP. Ok to decrease prednisone to 10 mg.      2. Chronic respiratory failure with hypoxia (HCC)  Uses up to 3 liters of oxygen with exertion and sleep.   Does get benefit from using it    Flu shot up to date  COVID vaccine up to date      Follow up in 3 months

## 2021-04-01 RX ORDER — ALENDRONATE SODIUM 70 MG/1
TABLET ORAL
Qty: 12 TABLET | Refills: 3 | Status: SHIPPED | OUTPATIENT
Start: 2021-04-01 | End: 2022-03-10 | Stop reason: SDUPTHER

## 2021-05-18 RX ORDER — LANCETS 33 GAUGE
EACH MISCELLANEOUS
Qty: 100 EACH | Refills: 5 | Status: SHIPPED | OUTPATIENT
Start: 2021-05-18

## 2021-05-18 NOTE — TELEPHONE ENCOUNTER
Last office visit :12/14/2020    Future Appointments   Date Time Provider Lila Arias   5/25/2021  3:00 PM Stacey Brown MD [facility]   6/2/2021  2:45 PM EZIO Cobb [facility]

## 2021-06-02 ENCOUNTER — VIRTUAL VISIT (OUTPATIENT)
Dept: INTERNAL MEDICINE CLINIC | Age: 76
End: 2021-06-02
Payer: MEDICARE

## 2021-06-02 DIAGNOSIS — Z00.00 ROUTINE GENERAL MEDICAL EXAMINATION AT A HEALTH CARE FACILITY: Primary | ICD-10-CM

## 2021-06-02 PROCEDURE — G0438 PPPS, INITIAL VISIT: HCPCS | Performed by: NURSE PRACTITIONER

## 2021-06-02 SDOH — ECONOMIC STABILITY: FOOD INSECURITY: WITHIN THE PAST 12 MONTHS, THE FOOD YOU BOUGHT JUST DIDN'T LAST AND YOU DIDN'T HAVE MONEY TO GET MORE.: NEVER TRUE

## 2021-06-02 SDOH — ECONOMIC STABILITY: FOOD INSECURITY: WITHIN THE PAST 12 MONTHS, YOU WORRIED THAT YOUR FOOD WOULD RUN OUT BEFORE YOU GOT MONEY TO BUY MORE.: NEVER TRUE

## 2021-06-02 ASSESSMENT — LIFESTYLE VARIABLES: HOW OFTEN DO YOU HAVE A DRINK CONTAINING ALCOHOL: 0

## 2021-06-02 ASSESSMENT — PATIENT HEALTH QUESTIONNAIRE - PHQ9
SUM OF ALL RESPONSES TO PHQ QUESTIONS 1-9: 0
1. LITTLE INTEREST OR PLEASURE IN DOING THINGS: 0
2. FEELING DOWN, DEPRESSED OR HOPELESS: 0
SUM OF ALL RESPONSES TO PHQ9 QUESTIONS 1 & 2: 0

## 2021-06-02 ASSESSMENT — SOCIAL DETERMINANTS OF HEALTH (SDOH): HOW HARD IS IT FOR YOU TO PAY FOR THE VERY BASICS LIKE FOOD, HOUSING, MEDICAL CARE, AND HEATING?: NOT HARD AT ALL

## 2021-06-02 NOTE — PATIENT INSTRUCTIONS
Personalized Preventive Plan for Eugenio Herr - 6/2/2021  Medicare offers a range of preventive health benefits. Some of the tests and screenings are paid in full while other may be subject to a deductible, co-insurance, and/or copay. Some of these benefits include a comprehensive review of your medical history including lifestyle, illnesses that may run in your family, and various assessments and screenings as appropriate. After reviewing your medical record and screening and assessments performed today your provider may have ordered immunizations, labs, imaging, and/or referrals for you. A list of these orders (if applicable) as well as your Preventive Care list are included within your After Visit Summary for your review. Other Preventive Recommendations:    · A preventive eye exam performed by an eye specialist is recommended every 1-2 years to screen for glaucoma; cataracts, macular degeneration, and other eye disorders. · A preventive dental visit is recommended every 6 months. · Try to get at least 150 minutes of exercise per week or 10,000 steps per day on a pedometer . · Order or download the FREE \"Exercise & Physical Activity: Your Everyday Guide\" from [employer]. Call 0-243.885.3549 or search [employer] online. · You need 8855-5887 mg of calcium and 4932-5733 IU of vitamin D per day. It is possible to meet your calcium requirement with diet alone, but a vitamin D supplement is usually necessary to meet this goal.  · When exposed to the sun, use a sunscreen that protects against both UVA and UVB radiation with an SPF of 30 or greater. Reapply every 2 to 3 hours or after sweating, drying off with a towel, or swimming. · Always wear a seat belt when traveling in a car. Always wear a helmet when riding a bicycle or motorcycle.

## 2021-06-02 NOTE — PROGRESS NOTES
tablets by mouth daily Yes Brenda Salmeron DO   folic acid (FOLVITE) 1 MG tablet Take 1 mg by mouth daily Yes Historical Provider, MD   mometasone-formoterol (DULERA) 200-5 MCG/ACT inhaler Inhale 2 puffs into the lungs every 12 hours Yes Lam Orta MD   calcium-vitamin D (OSCAL 500/200 D-3) 500-200 MG-UNIT per tablet Take 1 tablet by mouth 2 times daily  Patient taking differently: Take 1 tablet by mouth daily  Yes Lam Orta MD   gabapentin (NEURONTIN) 300 MG capsule Take 1 capsule by mouth daily for 30 days. Jazzmine Richards MD       Past Medical History:   Diagnosis Date    Arthritis     Asthma     Depression 6/28/2013    Diverticulosis of colon (without mention of hemorrhage)     Enthesopathy of hip region     left - mild    Esophageal reflux     Full dentures     Irritable bowel syndrome     Nocturia     Osteoporosis, unspecified     Other and unspecified hyperlipidemia     Postinflammatory pulmonary fibrosis (HCC)     Sialoadenitis     left    Synovial cyst of popliteal space     left knee    Thoracic or lumbosacral neuritis or radiculitis, unspecified     left - L5 - S1    Type II or unspecified type diabetes mellitus without mention of complication, not stated as uncontrolled     Unspecified essential hypertension     Wears glasses        Past Surgical History:   Procedure Laterality Date    BRONCHOSCOPY N/A 12/21/2018    BRONCHOSCOPY WITH BAL performed by Brenda Salmeron DO at [address] [place] [address]    DR. Jean-no polyps    COLONOSCOPY  02/2016    normal-DR. Ramsay    COLONOSCOPY N/A 11/6/2020    COLONOSCOPY POLYPECTOMY SNARE/COLD BIOPSY performed by Lilliana Zamora MD at [address] [place] [address]  9/21/2020    CT BONE MARROW BIOPSY 9/21/2020 [facility] CT    HYSTERECTOMY      partial    LIPOMA RESECTION      abdominal wall    OVARY REMOVAL      left    POLYPECTOMY         Family History   Problem Relation Age of Onset    Heart Disease Mother     Stroke Mother     Osteoporosis Mother     Cirrhosis Father         Liver    Osteoporosis Sister     Osteoporosis Maternal Grandmother        CareTeam (Including outside providers/suppliers regularly involved in providing care):   Patient Care Team:  Marvin Judd MD as PCP - Miryam Estrada MD as PCP - [facility] Empaneled Provider    Wt Readings from Last 3 Encounters:   03/30/21 132 lb (59.9 kg)   12/30/20 133 lb (60.3 kg)   12/14/20 131 lb 9.6 oz (59.7 kg)      No flowsheet data found. There is no height or weight on file to calculate BMI. Based upon direct observation of the patient, evaluation of cognition reveals  Could not remember the three words today - states that she is distracted with watching a show on tv . Patient's complete Health Risk Assessment and screening values have been reviewed and are found in Flowsheets. The following problems were reviewed today and where indicated follow up appointments were made and/or referrals ordered. Positive Risk Factor Screenings with Interventions:          General Health and ACP:  General  In general, how would you say your health is?: Fair  In the past 7 days, have you experienced any of the following?  New or Increased Pain, New or Increased Fatigue, Loneliness, Social Isolation, Stress or Anger?: None of These  Do you get the social and emotional support that you need?: Yes  Do you have a Living Will?: Yes  Advance Directives     Power of [address] [place] [address] Will ACP-Advance Directive ACP-Power of     Not on File Not on File Not on File Not on File      General Health Risk Interventions:  · none      Hearing/Vision:  No exam data present  Hearing/Vision  Do you or your family notice any trouble with your hearing that hasn't been managed with hearing aids?: No  Do you have difficulty driving, watching TV, or doing any of your daily activities because of your eyesight?: No  Have you had an eye exam within the past year?: (!) No  Hearing/Vision Interventions:  · Vision concerns:  patient encouraged to make appointment with his/her eye specialist    Safety:  Safety  Do you have working smoke detectors?: (!) No  Have all throw rugs been removed or fastened?: Yes  Do you have non-slip mats or surfaces in all bathtubs/showers?: Yes  Do all of your stairways have a railing or banister?: Yes  Are your doorways, halls and stairs free of clutter?: Yes  Do you always fasten your seatbelt when you are in a car?: Yes  Safety Interventions:  · pt has 3 working smoke detectors      Personalized Preventive Plan   Current Health Maintenance Status  Immunization History   Administered Date(s) Administered    Influenza Vaccine, unspecified formulation 11/10/2015    Influenza Whole 10/22/2010    Influenza, High Dose (Fluzone 65 yrs and older) 11/04/2011, 11/16/2012, 11/08/2016, 12/04/2017, 10/15/2018    Influenza, Quadv, adjuvanted, 65 yrs +, IM, PF (Fluad) 10/05/2020    Influenza, Triv, inactivated, subunit, adjuvanted, IM (Fluad 65 yrs and older) 11/08/2019    Pneumococcal Conjugate 13-valent (Ryan Roughen) 08/05/2016    Pneumococcal Polysaccharide ([id number]) 09/01/2007, 09/06/2013, 11/08/2019    Td, unspecified formulation 06/18/2014        Health Maintenance   Topic Date Due    DTaP/Tdap/Td vaccine (1 - Tdap) 08/28/1964    Shingles Vaccine (1 of 2) Never done    Annual Wellness Visit (AWV)  Never done    Potassium monitoring  11/02/2021    Creatinine monitoring  11/02/2021    DEXA (modify frequency per FRAX score)  Completed    Flu vaccine  Completed    Pneumococcal 65+ years Vaccine  Completed    COVID-19 Vaccine  Completed    Hepatitis C screen  Completed    Hepatitis A vaccine  Aged Out    Hib vaccine  Aged Out    Meningococcal (ACWY) vaccine  Aged Out     Recommendations for [employer] Due: see orders and patient instructions/AVS.  .   Recommended screening schedule for the next 5-10 years is provided to the patient in written form: see Patient Instructions/AVS.    Radha Otto was seen today for medicare awv. Diagnoses and all orders for this visit:    Routine general medical examination at a health care facility               Christie Epps is a 67 y.o. female being evaluated by a Virtual Visit (phone) encounter to address concerns as mentioned above. A caregiver was present when appropriate. Due to this being a TeleHealth encounter (During [facility]- public health emergency), evaluation of the following organ systems was limited: Vitals/Constitutional/EENT/Resp/CV/GI//MS/Neuro/Skin/Heme-Lymph-Imm. Pursuant to the emergency declaration under the [address] [place] [address] authority and the [employer] Act, this Virtual Visit was conducted with patient's (and/or legal guardian's) consent, to reduce the patient's risk of exposure to COVID-19 and provide necessary medical care. The patient (and/or legal guardian) has also been advised to contact this office for worsening conditions or problems, and seek emergency medical treatment and/or call 911 if deemed necessary. Patient identification was verified at the start of the visit: Yes    Services were provided through phone to substitute for in-person clinic visit. Patient and provider were located at their individual homes. --EZIO Palm - CNP on 6/2/2021 at 2:42 PM    An electronic signature was used to authenticate this note.

## 2021-06-03 RX ORDER — GABAPENTIN 300 MG/1
CAPSULE ORAL
Qty: 30 CAPSULE | Refills: 3 | Status: SHIPPED | OUTPATIENT
Start: 2021-06-03 | End: 2021-10-15

## 2021-06-03 RX ORDER — MONTELUKAST SODIUM 10 MG/1
TABLET ORAL
Qty: 90 TABLET | Refills: 1 | Status: SHIPPED | OUTPATIENT
Start: 2021-06-03 | End: 2022-01-03

## 2021-06-03 NOTE — TELEPHONE ENCOUNTER
Request gabapentin last filled 3/19/21                                        Last ov 12/14/20                                                     Next ov   6/7/21

## 2021-06-03 NOTE — TELEPHONE ENCOUNTER
Request singular last filled 11/2/20                                     Last ov 12/14/20                                                                 Next ov 6/7/21

## 2021-06-07 ENCOUNTER — OFFICE VISIT (OUTPATIENT)
Dept: INTERNAL MEDICINE CLINIC | Age: 76
End: 2021-06-07
Payer: MEDICARE

## 2021-06-07 VITALS
TEMPERATURE: 98 F | HEART RATE: 86 BPM | HEIGHT: 64 IN | BODY MASS INDEX: 22.5 KG/M2 | OXYGEN SATURATION: 98 % | RESPIRATION RATE: 16 BRPM | SYSTOLIC BLOOD PRESSURE: 140 MMHG | DIASTOLIC BLOOD PRESSURE: 80 MMHG | WEIGHT: 131.8 LBS

## 2021-06-07 DIAGNOSIS — E11.9 TYPE 2 DIABETES MELLITUS WITHOUT COMPLICATION, WITHOUT LONG-TERM CURRENT USE OF INSULIN (HCC): ICD-10-CM

## 2021-06-07 DIAGNOSIS — I10 ESSENTIAL HYPERTENSION: Primary | ICD-10-CM

## 2021-06-07 DIAGNOSIS — J96.11 CHRONIC RESPIRATORY FAILURE WITH HYPOXIA (HCC): ICD-10-CM

## 2021-06-07 DIAGNOSIS — J84.9 ILD (INTERSTITIAL LUNG DISEASE) (HCC): ICD-10-CM

## 2021-06-07 DIAGNOSIS — J44.9 COPD WITH ASTHMA (HCC): ICD-10-CM

## 2021-06-07 DIAGNOSIS — Z91.81 AT HIGH RISK FOR FALLS: ICD-10-CM

## 2021-06-07 PROBLEM — D59.9 ACUTE HEMOLYTIC ANEMIA (HCC): Status: RESOLVED | Noted: 2020-10-05 | Resolved: 2021-06-07

## 2021-06-07 LAB
ANION GAP SERPL CALCULATED.3IONS-SCNC: 18 MMOL/L (ref 3–16)
BASOPHILS ABSOLUTE: 0 K/UL (ref 0–0.2)
BASOPHILS RELATIVE PERCENT: 0.2 %
BUN BLDV-MCNC: 13 MG/DL (ref 7–20)
CALCIUM SERPL-MCNC: 10 MG/DL (ref 8.3–10.6)
CHLORIDE BLD-SCNC: 102 MMOL/L (ref 99–110)
CO2: 22 MMOL/L (ref 21–32)
CREAT SERPL-MCNC: 0.7 MG/DL (ref 0.6–1.2)
EOSINOPHILS ABSOLUTE: 0 K/UL (ref 0–0.6)
EOSINOPHILS RELATIVE PERCENT: 0.5 %
GFR AFRICAN AMERICAN: >60
GFR NON-AFRICAN AMERICAN: >60
GLUCOSE BLD-MCNC: 144 MG/DL (ref 70–99)
HCT VFR BLD CALC: 42.2 % (ref 36–48)
HEMOGLOBIN: 14.4 G/DL (ref 12–16)
LYMPHOCYTES ABSOLUTE: 1.4 K/UL (ref 1–5.1)
LYMPHOCYTES RELATIVE PERCENT: 17.3 %
MCH RBC QN AUTO: 32.2 PG (ref 26–34)
MCHC RBC AUTO-ENTMCNC: 34 G/DL (ref 31–36)
MCV RBC AUTO: 94.6 FL (ref 80–100)
MONOCYTES ABSOLUTE: 0.4 K/UL (ref 0–1.3)
MONOCYTES RELATIVE PERCENT: 5.5 %
NEUTROPHILS ABSOLUTE: 6 K/UL (ref 1.7–7.7)
NEUTROPHILS RELATIVE PERCENT: 76.5 %
PDW BLD-RTO: 13.5 % (ref 12.4–15.4)
PLATELET # BLD: 182 K/UL (ref 135–450)
PMV BLD AUTO: 8.6 FL (ref 5–10.5)
POTASSIUM SERPL-SCNC: 4.6 MMOL/L (ref 3.5–5.1)
RBC # BLD: 4.46 M/UL (ref 4–5.2)
SODIUM BLD-SCNC: 142 MMOL/L (ref 136–145)
WBC # BLD: 7.8 K/UL (ref 4–11)

## 2021-06-07 PROCEDURE — 99214 OFFICE O/P EST MOD 30 MIN: CPT | Performed by: INTERNAL MEDICINE

## 2021-06-07 PROCEDURE — 3288F FALL RISK ASSESSMENT DOCD: CPT | Performed by: INTERNAL MEDICINE

## 2021-06-07 PROCEDURE — 36415 COLL VENOUS BLD VENIPUNCTURE: CPT | Performed by: INTERNAL MEDICINE

## 2021-06-07 RX ORDER — LOSARTAN POTASSIUM 50 MG/1
50 TABLET ORAL DAILY
Qty: 90 TABLET | Refills: 1 | Status: SHIPPED | OUTPATIENT
Start: 2021-06-07 | End: 2021-11-19

## 2021-06-07 ASSESSMENT — PATIENT HEALTH QUESTIONNAIRE - PHQ9
1. LITTLE INTEREST OR PLEASURE IN DOING THINGS: 0
SUM OF ALL RESPONSES TO PHQ QUESTIONS 1-9: 0
SUM OF ALL RESPONSES TO PHQ9 QUESTIONS 1 & 2: 0
2. FEELING DOWN, DEPRESSED OR HOPELESS: 0

## 2021-06-07 ASSESSMENT — ENCOUNTER SYMPTOMS
SHORTNESS OF BREATH: 1
CHEST TIGHTNESS: 0
COUGH: 0
GASTROINTESTINAL NEGATIVE: 1
WHEEZING: 0

## 2021-06-07 NOTE — PATIENT INSTRUCTIONS
Avoid sudden movement   Fall precautions discussed  Use cane as much as possible   Continue current medications  See in  3 months  Patient Education        Preventing Falls: Care Instructions  Your Care Instructions     Getting around your home safely can be a challenge if you have injuries or health problems that make it easy for you to fall. Loose rugs and furniture in walkways are among the dangers for many older people who have problems walking or who have poor eyesight. People who have conditions such as arthritis, osteoporosis, or dementia also have to be careful not to fall. You can make your home safer with a few simple measures. Follow-up care is a key part of your treatment and safety. Be sure to make and go to all appointments, and call your doctor if you are having problems. It's also a good idea to know your test results and keep a list of the medicines you take. How can you care for yourself at home? Taking care of yourself  · You may get dizzy if you do not drink enough water. To prevent dehydration, drink plenty of fluids. Choose water and other caffeine-free clear liquids. If you have kidney, heart, or liver disease and have to limit fluids, talk with your doctor before you increase the amount of fluids you drink. · Exercise regularly to improve your strength, muscle tone, and balance. Walk if you can. Swimming may be a good choice if you cannot walk easily. · Have your vision and hearing checked each year or any time you notice a change. If you have trouble seeing and hearing, you might not be able to avoid objects and could lose your balance. · Know the side effects of the medicines you take. Ask your doctor or pharmacist whether the medicines you take can affect your balance. Sleeping pills or sedatives can affect your balance. · Limit the amount of alcohol you drink. Alcohol can impair your balance and other senses.   · Ask your doctor whether calluses or corns on your feet need to be removed. If you wear loose-fitting shoes because of calluses or corns, you can lose your balance and fall. · Talk to your doctor if you have numbness in your feet. Preventing falls at home  · Remove raised doorway thresholds, throw rugs, and clutter. Repair loose carpet or raised areas in the floor. · Move furniture and electrical cords to keep them out of walking paths. · Use nonskid floor wax, and wipe up spills right away, especially on ceramic tile floors. · If you use a walker or cane, put rubber tips on it. If you use crutches, clean the bottoms of them regularly with an abrasive pad, such as steel wool. · Keep your house well lit, especially [place]le[place] Shahrz[place], and outside walkways. Use night-lights in areas such as hallways and bathrooms. Add extra light switches or use remote switches (such as switches that go on or off when you clap your hands) to make it easier to turn lights on if you have to get up during the night. · Install sturdy handrails on stairways. · Move items in your cabinets so that the things you use a lot are on the lower shelves (about waist level). · Keep a cordless phone and a flashlight with new batteries by your bed. If possible, put a phone in each of the main rooms of your house, or carry a cell phone in case you fall and cannot reach a phone. Or, you can wear a device around your neck or wrist. You push a button that sends a signal for help. · Wear low-heeled shoes that fit well and give your feet good support. Use footwear with nonskid soles. Check the heels and soles of your shoes for wear. Repair or replace worn heels or soles. · Do not wear socks without shoes on wood floors. · Walk on the grass when the sidewalks are slippery. If you live in an area that gets snow and ice in the winter, sprinkle salt on slippery steps and sidewalks.   Preventing falls in the bath  · Install grab bars and nonskid mats inside and outside your shower or tub and near the toilet and sinks.  · Use shower chairs and bath benches. · Use a hand-held shower head that will allow you to sit while showering. · Get into a tub or shower by putting the weaker leg in first. Get out of a tub or shower with your strong side first.  · Repair loose toilet seats and consider installing a raised toilet seat to make getting on and off the toilet easier. · Keep your bathroom door unlocked while you are in the shower. Where can you learn more? Go to https://chpepiceweb.[email or web address] org and sign in to your [employer] account. Enter [phone] in the [facility] box to learn more about \"Preventing Falls: Care Instructions. \"     If you do not have an account, please click on the \"Sign Up Now\" link. Current as of: December 7, 2020               Content Version: 12.8  © 7605-7255 [employer]. Care instructions adapted under license by [facility] If you have questions about a medical condition or this instruction, always ask your healthcare professional. [place] [address] any warranty or liability for your use of this information. Patient Education        Preventing Outdoor Falls: Care Instructions  Your Care Instructions     Worries about falls don't need to keep you indoors. Outdoor activities like walking have big benefits for your health. You will need to watch your step and learn a few safety measures. If you are worried about having a fall outdoors, ask your doctor about exercises, classes, or physical therapy that may help. You can learn ways to gain strength, flexibility, and balance. Ask if it might help to use a cane or walker. You can make your time outdoors safer with a few simple measures. Follow-up care is a key part of your treatment and safety. Be sure to make and go to all appointments, and call your doctor if you are having problems. It's also a good idea to know your test results and keep a list of the medicines you take.   How can you prevent falls outdoors? · Wear shoes with firm soles and low heels. If you have to walk on an icy surface, use grippers that can be worn over your shoes in bad weather. · Be extra careful if weather is bad. Walk on the grass when the sidewalks are slick. If you live in a place that gets snow and ice in the winter, sprinkle salt on slippery stairs and sidewalks. · Be careful getting on or off buses and trains or getting in and out of cars. If handrails are available, use them. · Be careful when you cross the street. Look for crosswalks or places where curb cuts or ramps are present. · Try not to hurry, especially if you are carrying something. · Be cautious in parking lots or garages. There may be curbs or changes in pavement, or the height of the pavement may vary. · Make sure to wear the correct eyeglasses, if you need them. Reading glasses or bifocals can make it harder to see hazards that might be in your way. · If you are walking outdoors for exercise, try to:  ? Walk in well-lighted, well-maintained areas. These include high school or college tracks, shopping malls, and public spaces. ? Walk with a partner. ? Watch out for cracked sidewalks, curbs, changes in the height of the pavement, exposed tree roots, and debris such as fallen leaves or branches. Where can you learn more? Go to https://[employer] BiotechnologypebrendenewRespicardia.healthLocalocracy. org and sign in to your [facility] account. Enter J284 in the [facility] box to learn more about \"Preventing Outdoor Falls: Care Instructions. \"     If you do not have an account, please click on the \"Sign Up Now\" link. Current as of: December 7, 2020               Content Version: 12.8  © 9582-2986 Health[employer]. Care instructions adapted under license by [facility] ([facility]).  If you have questions about a medical condition or this instruction, always ask your healthcare professional. [address] any warranty or liability for your use of this information.

## 2021-06-07 NOTE — PROGRESS NOTES
Subjective:      Patient ID: Joaquin Connell is a 67 y.o. female. Chief Complaint   Patient presents with    Annual Exam     PT fell 1mth ago, bruised elbow        HPI  Had fell 2 times last 6 months and happens when she turns around quickly-looses balance and had no associated symptoms before fall like dizziness and she never passed out and not hurt herself. She does go out with her  for shopping ,boats but when in house she does not do much like before. She does get out og breath and uses oxygen at nights   Has no cough wheezing orthopnea or pnd. Has no other cp gi or gu symptomsd  Review of Systems   Constitutional: Negative for activity change, appetite change, fever and unexpected weight change. Respiratory: Positive for shortness of breath. Negative for cough, chest tightness and wheezing. Cardiovascular: Negative for chest pain, palpitations and leg swelling. Gastrointestinal: Negative. Genitourinary: Negative. Neurological: Negative for dizziness (soem times when she gets up), light-headedness and headaches. Current Outpatient Medications   Medication Sig Dispense Refill    gabapentin (NEURONTIN) 300 MG capsule TAKE ONE CAPSULE BY MOUTH DAILY 30 capsule 3    montelukast (SINGULAIR) 10 MG tablet TAKE ONE TABLET BY MOUTH DAILY 90 tablet 1    alendronate (FOSAMAX) 70 MG tablet TAKE 1 TABLET BY MOUTH ONCE WEEKLY ON AN EMPTY STOMACH BEFORE BREAKFAST. REMAIN UPRIGHT FOR 30 MINUTES & TAKE WITH 8 OUNCES OF WATER 12 tablet 3    dicyclomine (BENTYL) 10 MG capsule TAKE ONE CAPSULE BY MOUTH DAILY 30 capsule 0    omeprazole (PRILOSEC) 40 MG delayed release capsule TAKE ONE CAPSULE BY MOUTH EVERY MORNING BEFORE BREAKFAST 30 capsule 5    OXYGEN Inhale 3 L into the lungs nightly      losartan (COZAAR) 50 MG tablet Take 50 mg by mouth daily      blood glucose monitor strips Test 2 times a day & as needed for symptoms of irregular blood glucose.  Dispense sufficient amount for indicated testing frequency plus additional to accommodate PRN testing needs. 100 strip 3    metFORMIN (GLUCOPHAGE) 1000 MG tablet TAKE ONE TABLET BY MOUTH TWICE A DAY WITH MEALS 180 tablet 1    predniSONE (DELTASONE) 10 MG tablet Take 1.5 tablets by mouth daily 45 tablet 11    folic acid (FOLVITE) 1 MG tablet Take 1 mg by mouth daily      mometasone-formoterol (DULERA) 200-5 MCG/ACT inhaler Inhale 2 puffs into the lungs every 12 hours 1 Inhaler 0    calcium-vitamin D (OSCAL 500/200 D-3) 500-200 MG-UNIT per tablet Take 1 tablet by mouth 2 times daily (Patient taking differently: Take 1 tablet by mouth daily ) 1 tablet 0    Lancets (ONETOUCH DELICA PLUS [id number]) MISC USE 1 LANCET TWO TIMES A  each 5     No current facility-administered medications for this visit. No results for input(s): WBC, HGB, HCT, MCV, PLT in the last 720 hours. Lab Results   Component Value Date     11/02/2020    K 4.0 11/02/2020    K 4.3 09/24/2020     11/02/2020    CO2 27 11/02/2020    BUN 18 11/02/2020    CREATININE 0.7 11/02/2020    GLUCOSE 113 11/02/2020    GLUCOSE 191 08/03/2011    CALCIUM 9.5 11/02/2020        Lab Results   Component Value Date    CHOL 127 05/16/2017    CHOL 142 04/22/2016    CHOL 126 03/30/2015     Lab Results   Component Value Date    TRIG 160 (H) 05/16/2017    TRIG 153 (H) 04/22/2016    TRIG 113 03/30/2015     Lab Results   Component Value Date    HDL 35 (L) 05/16/2017    HDL 37 (L) 04/22/2016    HDL 38 (L) 03/30/2015     Lab Results   Component Value Date    LDLCALC 60 05/16/2017    LDLCALC 74 04/22/2016    LDLCALC 65 03/30/2015     Lab Results   Component Value Date    LABVLDL 32 05/16/2017    LABVLDL 31 04/22/2016    LABVLDL 23 03/30/2015     Lab Results   Component Value Date    CHOLHDLRATIO 3.5 08/03/2011        Objective:   Physical Exam  Vitals and nursing note reviewed. Constitutional:       General: She is not in acute distress.   Eyes:      Extraocular Movements: Extraocular movements intact. Conjunctiva/sclera: Conjunctivae normal.      Pupils: Pupils are equal, round, and reactive to light. Neck:      Thyroid: No thyromegaly. Vascular: No carotid bruit or JVD. Cardiovascular:      Rate and Rhythm: Normal rate and regular rhythm. Pulses: Normal pulses. Heart sounds: Normal heart sounds. No murmur heard. No gallop. Pulmonary:      Effort: No respiratory distress. Breath sounds: Normal breath sounds. No wheezing, rhonchi or rales. Musculoskeletal:      Right lower leg: No edema. Left lower leg: No edema. Lymphadenopathy:      Cervical: No cervical adenopathy. Neurological:      General: No focal deficit present. Mental Status: She is alert and oriented to person, place, and time. Psychiatric:         Mood and Affect: Mood normal.         Assessment:      HTn controlled  Falls -no signs of CVA OR ORTHO HYPOTENSION   Copd stable  Chronic respiratory failure stable  ILD stable       Plan:      Avoid sudden movement   Fall precautions discussed  Use cane as much as possible   Continue current medications  See in  3 months  On the basis of positive falls risk screening, assessment and plan is as follows: home safety tips provided.

## 2021-06-08 LAB
ESTIMATED AVERAGE GLUCOSE: 119.8 MG/DL
HBA1C MFR BLD: 5.8 %

## 2021-06-14 RX ORDER — DICYCLOMINE HYDROCHLORIDE 10 MG/1
CAPSULE ORAL
Qty: 30 CAPSULE | Refills: 2 | Status: SHIPPED | OUTPATIENT
Start: 2021-06-14 | End: 2021-09-10

## 2021-07-02 ENCOUNTER — APPOINTMENT (OUTPATIENT)
Dept: CT IMAGING | Age: 76
End: 2021-07-02
Payer: MEDICARE

## 2021-07-02 ENCOUNTER — HOSPITAL ENCOUNTER (EMERGENCY)
Age: 76
Discharge: HOME | End: 2021-07-02
Payer: MEDICARE

## 2021-07-02 ENCOUNTER — APPOINTMENT (OUTPATIENT)
Dept: GENERAL RADIOLOGY | Age: 76
End: 2021-07-02
Payer: MEDICARE

## 2021-07-02 VITALS
HEIGHT: 63 IN | BODY MASS INDEX: 21.99 KG/M2 | DIASTOLIC BLOOD PRESSURE: 102 MMHG | OXYGEN SATURATION: 98 % | HEART RATE: 92 BPM | TEMPERATURE: 99.3 F | WEIGHT: 124.12 LBS | SYSTOLIC BLOOD PRESSURE: 197 MMHG | RESPIRATION RATE: 13 BRPM

## 2021-07-02 DIAGNOSIS — N30.00 ACUTE CYSTITIS WITHOUT HEMATURIA: Primary | ICD-10-CM

## 2021-07-02 LAB
A/G RATIO: 1.6 (ref 1.1–2.2)
ALBUMIN SERPL-MCNC: 4.5 G/DL (ref 3.4–5)
ALP BLD-CCNC: 49 U/L (ref 40–129)
ALT SERPL-CCNC: 32 U/L (ref 10–40)
ANION GAP SERPL CALCULATED.3IONS-SCNC: 12 MMOL/L (ref 3–16)
AST SERPL-CCNC: 23 U/L (ref 15–37)
BACTERIA: ABNORMAL /HPF
BASOPHILS ABSOLUTE: 0 K/UL (ref 0–0.2)
BASOPHILS RELATIVE PERCENT: 0.5 %
BILIRUB SERPL-MCNC: 2.1 MG/DL (ref 0–1)
BILIRUBIN URINE: NEGATIVE
BLOOD, URINE: ABNORMAL
BUN BLDV-MCNC: 25 MG/DL (ref 7–20)
CALCIUM SERPL-MCNC: 9.5 MG/DL (ref 8.3–10.6)
CHLORIDE BLD-SCNC: 98 MMOL/L (ref 99–110)
CLARITY: CLEAR
CO2: 24 MMOL/L (ref 21–32)
COLOR: YELLOW
CREAT SERPL-MCNC: 0.7 MG/DL (ref 0.6–1.2)
EOSINOPHILS ABSOLUTE: 0 K/UL (ref 0–0.6)
EOSINOPHILS RELATIVE PERCENT: 0.4 %
EPITHELIAL CELLS, UA: 2 /HPF (ref 0–5)
GFR AFRICAN AMERICAN: >60
GFR NON-AFRICAN AMERICAN: >60
GLOBULIN: 2.8 G/DL
GLUCOSE BLD-MCNC: 243 MG/DL (ref 70–99)
GLUCOSE URINE: NEGATIVE MG/DL
HCT VFR BLD CALC: 49.5 % (ref 36–48)
HEMOGLOBIN: 17.2 G/DL (ref 12–16)
HYALINE CASTS: 8 /LPF (ref 0–8)
KETONES, URINE: NEGATIVE MG/DL
LEUKOCYTE ESTERASE, URINE: ABNORMAL
LIPASE: 36 U/L (ref 13–60)
LYMPHOCYTES ABSOLUTE: 2.3 K/UL (ref 1–5.1)
LYMPHOCYTES RELATIVE PERCENT: 25.9 %
MCH RBC QN AUTO: 32.1 PG (ref 26–34)
MCHC RBC AUTO-ENTMCNC: 34.7 G/DL (ref 31–36)
MCV RBC AUTO: 92.7 FL (ref 80–100)
MICROSCOPIC EXAMINATION: YES
MONOCYTES ABSOLUTE: 0.8 K/UL (ref 0–1.3)
MONOCYTES RELATIVE PERCENT: 8.4 %
NEUTROPHILS ABSOLUTE: 5.8 K/UL (ref 1.7–7.7)
NEUTROPHILS RELATIVE PERCENT: 64.8 %
NITRITE, URINE: POSITIVE
PDW BLD-RTO: 13.6 % (ref 12.4–15.4)
PH UA: 7 (ref 5–8)
PLATELET # BLD: 194 K/UL (ref 135–450)
PMV BLD AUTO: 8.4 FL (ref 5–10.5)
POTASSIUM REFLEX MAGNESIUM: 4.4 MMOL/L (ref 3.5–5.1)
PROTEIN UA: NEGATIVE MG/DL
RBC # BLD: 5.34 M/UL (ref 4–5.2)
RBC UA: 9 /HPF (ref 0–4)
SODIUM BLD-SCNC: 134 MMOL/L (ref 136–145)
SPECIFIC GRAVITY UA: >1.03 (ref 1–1.03)
TOTAL PROTEIN: 7.3 G/DL (ref 6.4–8.2)
TROPONIN: <0.01 NG/ML
URINE REFLEX TO CULTURE: YES
URINE TYPE: ABNORMAL
UROBILINOGEN, URINE: 1 E.U./DL
WBC # BLD: 8.9 K/UL (ref 4–11)
WBC UA: 291 /HPF (ref 0–5)

## 2021-07-02 PROCEDURE — 87186 SC STD MICRODIL/AGAR DIL: CPT

## 2021-07-02 PROCEDURE — 6360000002 HC RX W HCPCS: Performed by: PHYSICIAN ASSISTANT

## 2021-07-02 PROCEDURE — 96365 THER/PROPH/DIAG IV INF INIT: CPT

## 2021-07-02 PROCEDURE — 87077 CULTURE AEROBIC IDENTIFY: CPT

## 2021-07-02 PROCEDURE — 84484 ASSAY OF TROPONIN QUANT: CPT

## 2021-07-02 PROCEDURE — 2580000003 HC RX 258: Performed by: PHYSICIAN ASSISTANT

## 2021-07-02 PROCEDURE — 71045 X-RAY EXAM CHEST 1 VIEW: CPT

## 2021-07-02 PROCEDURE — 83690 ASSAY OF LIPASE: CPT

## 2021-07-02 PROCEDURE — 81001 URINALYSIS AUTO W/SCOPE: CPT

## 2021-07-02 PROCEDURE — 80053 COMPREHEN METABOLIC PANEL: CPT

## 2021-07-02 PROCEDURE — 87086 URINE CULTURE/COLONY COUNT: CPT

## 2021-07-02 PROCEDURE — 74177 CT ABD & PELVIS W/CONTRAST: CPT

## 2021-07-02 PROCEDURE — 93005 ELECTROCARDIOGRAM TRACING: CPT | Performed by: EMERGENCY MEDICINE

## 2021-07-02 PROCEDURE — 6360000004 HC RX CONTRAST MEDICATION: Performed by: PHYSICIAN ASSISTANT

## 2021-07-02 PROCEDURE — 85025 COMPLETE CBC W/AUTO DIFF WBC: CPT

## 2021-07-02 PROCEDURE — 99284 EMERGENCY DEPT VISIT MOD MDM: CPT

## 2021-07-02 RX ORDER — CEFUROXIME AXETIL 500 MG/1
500 TABLET ORAL 2 TIMES DAILY
Qty: 14 TABLET | Refills: 0 | Status: SHIPPED | OUTPATIENT
Start: 2021-07-02 | End: 2021-07-09

## 2021-07-02 RX ADMIN — IOPAMIDOL 75 ML: 755 INJECTION, SOLUTION INTRAVENOUS at 14:57

## 2021-07-02 RX ADMIN — CEFTRIAXONE 1000 MG: 1 INJECTION, POWDER, FOR SOLUTION INTRAMUSCULAR; INTRAVENOUS at 17:50

## 2021-07-02 ASSESSMENT — PAIN SCALES - GENERAL
PAINLEVEL_OUTOF10: 0
PAINLEVEL_OUTOF10: 5

## 2021-07-02 ASSESSMENT — PAIN DESCRIPTION - PAIN TYPE: TYPE: ACUTE PAIN

## 2021-07-02 ASSESSMENT — PAIN DESCRIPTION - LOCATION: LOCATION: ABDOMEN

## 2021-07-02 NOTE — ED NOTES
Patient b/p elevated.  She didn't take her b/p medication today      Marty Chandler RN  07/02/21 7429

## 2021-07-02 NOTE — ED NOTES
Gave patient discharge instructions. She states, understanding. Patient discharged to home.       Melissa Acuña RN  07/02/21 3905

## 2021-07-03 LAB
EKG ATRIAL RATE: 107 BPM
EKG DIAGNOSIS: NORMAL
EKG P AXIS: -2 DEGREES
EKG P-R INTERVAL: 126 MS
EKG Q-T INTERVAL: 350 MS
EKG QRS DURATION: 86 MS
EKG QTC CALCULATION (BAZETT): 467 MS
EKG R AXIS: -42 DEGREES
EKG T AXIS: 25 DEGREES
EKG VENTRICULAR RATE: 107 BPM

## 2021-07-03 PROCEDURE — 93010 ELECTROCARDIOGRAM REPORT: CPT | Performed by: INTERNAL MEDICINE

## 2021-07-04 LAB
ORGANISM: ABNORMAL
URINE CULTURE, ROUTINE: ABNORMAL

## 2021-07-04 ASSESSMENT — ENCOUNTER SYMPTOMS
COUGH: 0
SORE THROAT: 0
ABDOMINAL PAIN: 1
SHORTNESS OF BREATH: 1
BACK PAIN: 0
NAUSEA: 0
VOMITING: 0
EYE PAIN: 0

## 2021-07-06 ENCOUNTER — TELEPHONE (OUTPATIENT)
Dept: INTERNAL MEDICINE CLINIC | Age: 76
End: 2021-07-06

## 2021-07-06 NOTE — TELEPHONE ENCOUNTER
----- Message from [employer] sent at 7/6/2021  1:04 PM EDT -----  Subject: Appointment Request    Reason for Call: Routine ED Follow Up Visit    QUESTIONS  Type of Appointment? Established Patient  Reason for appointment request? Other - Msg- No Provider Found  Additional Information for Provider? Pts  called to make ED follow   up appt. Received message- No Provider Found when searching for appt.   ---------------------------------------------------------------------------  --------------  CALL BACK INFO  What is the best way for the office to contact you? OK to leave message on   voicemail  Preferred Call Back Phone Number? [phone]  ---------------------------------------------------------------------------  --------------  SCRIPT ANSWERS  Relationship to Patient? Other  Representative Name? Em Payne  Additional information verified (besides Name and Date of Birth)? Address  Appointment reason? Well Care/Follow Ups  Select a Well Care/Follow Ups appointment reason? Adult ED Follow Up   [Emergency Room, Emergency Department]  (Patient requests to see provider urgently. )? No  Do you have any questions for your primary care provider that need to be   answered prior to your appointment? No  Have you been diagnosed with, awaiting test results for, or told that you   are suspected of having COVID-19 (Coronavirus)? (If patient has tested   negative or was tested as a requirement for work, school, or travel and   not based on symptoms, answer no)? No  Do you currently have flu-like symptoms including fever or chills, cough,   shortness of breath, difficulty breathing, or new loss of taste or smell? No  Have you had close contact with someone with COVID-19 in the last 14 days? No  (Service Expert  click yes below to proceed with [employer] As Usual   Scheduling)?  Yes

## 2021-07-08 ENCOUNTER — TELEPHONE (OUTPATIENT)
Dept: PULMONOLOGY | Age: 76
End: 2021-07-08

## 2021-07-08 NOTE — TELEPHONE ENCOUNTER
Patient was supposed to have an appointment this afternoon (7/8 @ 1:40). It was cancelled on 5/7 by someone who is not in our department. The patient was not aware this had been cancelled and showed up for the appointment. I explained that it was cancelled by someone outside of our office by mistake and offered her the next available afternoon appt (9/22). Is there anywhere we can put her to get her in earlier since the appointment was cancelled by mistake?

## 2021-07-16 ENCOUNTER — OFFICE VISIT (OUTPATIENT)
Dept: INTERNAL MEDICINE CLINIC | Age: 76
End: 2021-07-16
Payer: MEDICARE

## 2021-07-16 VITALS
DIASTOLIC BLOOD PRESSURE: 80 MMHG | OXYGEN SATURATION: 92 % | HEART RATE: 70 BPM | TEMPERATURE: 97.6 F | RESPIRATION RATE: 16 BRPM | SYSTOLIC BLOOD PRESSURE: 120 MMHG | WEIGHT: 127.8 LBS | BODY MASS INDEX: 22.64 KG/M2

## 2021-07-16 DIAGNOSIS — E11.9 TYPE 2 DIABETES MELLITUS WITHOUT COMPLICATION, WITHOUT LONG-TERM CURRENT USE OF INSULIN (HCC): ICD-10-CM

## 2021-07-16 DIAGNOSIS — J96.11 CHRONIC RESPIRATORY FAILURE WITH HYPOXIA (HCC): ICD-10-CM

## 2021-07-16 DIAGNOSIS — J84.9 ILD (INTERSTITIAL LUNG DISEASE) (HCC): ICD-10-CM

## 2021-07-16 DIAGNOSIS — I10 ESSENTIAL HYPERTENSION: Primary | ICD-10-CM

## 2021-07-16 PROBLEM — D59.8 OTHER ACQUIRED HEMOLYTIC ANEMIAS (HCC): Status: RESOLVED | Noted: 2020-09-18 | Resolved: 2021-07-16

## 2021-07-16 PROCEDURE — 3288F FALL RISK ASSESSMENT DOCD: CPT | Performed by: INTERNAL MEDICINE

## 2021-07-16 PROCEDURE — 99214 OFFICE O/P EST MOD 30 MIN: CPT | Performed by: INTERNAL MEDICINE

## 2021-07-16 ASSESSMENT — ENCOUNTER SYMPTOMS
COUGH: 0
TROUBLE SWALLOWING: 0
WHEEZING: 0
CHEST TIGHTNESS: 0
SHORTNESS OF BREATH: 1
GASTROINTESTINAL NEGATIVE: 1

## 2021-07-16 ASSESSMENT — PATIENT HEALTH QUESTIONNAIRE - PHQ9
SUM OF ALL RESPONSES TO PHQ9 QUESTIONS 1 & 2: 0
1. LITTLE INTEREST OR PLEASURE IN DOING THINGS: 0
SUM OF ALL RESPONSES TO PHQ QUESTIONS 1-9: 0
2. FEELING DOWN, DEPRESSED OR HOPELESS: 0
SUM OF ALL RESPONSES TO PHQ QUESTIONS 1-9: 0
SUM OF ALL RESPONSES TO PHQ QUESTIONS 1-9: 0

## 2021-07-16 NOTE — PROGRESS NOTES
Subjective:      Patient ID: Aki Malone is a 67 y.o. female. Chief Complaint   Patient presents with    Follow-Up from Hospital        HPI  Seen in Er for fever and found to have uti and cxr and labs and ct abdomen and pelvis did not show any abnormalities  Appetite improved and completed antibiotics,gained 3 pounds but still feel week -her sugar was high at 248 but A1C in 6/21 was only 5.8  Still has lower back pain and effects her walking and had no falls recently -last one was 6 months back  Has no other cp gi or gu symptoms but for exertional dyspnea   Uses oxygen at nights   Review of Systems   Constitutional: Negative for activity change, appetite change, fatigue and unexpected weight change. HENT: Negative for trouble swallowing. Respiratory: Positive for shortness of breath. Negative for cough, chest tightness and wheezing. Cardiovascular: Negative for chest pain, palpitations and leg swelling. Gastrointestinal: Negative. Genitourinary: Negative. Neurological: Negative for dizziness, light-headedness and headaches. Psychiatric/Behavioral: Confusion: at times forgetful by her . Current Outpatient Medications   Medication Sig Dispense Refill    dicyclomine (BENTYL) 10 MG capsule TAKE ONE CAPSULE BY MOUTH DAILY 30 capsule 2    losartan (COZAAR) 50 MG tablet Take 1 tablet by mouth daily 90 tablet 1    montelukast (SINGULAIR) 10 MG tablet TAKE ONE TABLET BY MOUTH DAILY 90 tablet 1    Lancets (ONETOUCH DELICA PLUS [id number]) MISC USE 1 LANCET TWO TIMES A  each 5    alendronate (FOSAMAX) 70 MG tablet TAKE 1 TABLET BY MOUTH ONCE WEEKLY ON AN EMPTY STOMACH BEFORE BREAKFAST.  REMAIN UPRIGHT FOR 30 MINUTES & TAKE WITH 8 OUNCES OF WATER 12 tablet 3    omeprazole (PRILOSEC) 40 MG delayed release capsule TAKE ONE CAPSULE BY MOUTH EVERY MORNING BEFORE BREAKFAST 30 capsule 5    OXYGEN Inhale 3 L into the lungs nightly      blood glucose monitor strips Test 2 times a day & as needed for symptoms of irregular blood glucose. Dispense sufficient amount for indicated testing frequency plus additional to accommodate PRN testing needs. 100 strip 3    metFORMIN (GLUCOPHAGE) 1000 MG tablet TAKE ONE TABLET BY MOUTH TWICE A DAY WITH MEALS 180 tablet 1    predniSONE (DELTASONE) 10 MG tablet Take 1.5 tablets by mouth daily 45 tablet 11    folic acid (FOLVITE) 1 MG tablet Take 1 mg by mouth daily      mometasone-formoterol (DULERA) 200-5 MCG/ACT inhaler Inhale 2 puffs into the lungs every 12 hours 1 Inhaler 0    calcium-vitamin D (OSCAL 500/200 D-3) 500-200 MG-UNIT per tablet Take 1 tablet by mouth 2 times daily (Patient taking differently: Take 1 tablet by mouth daily ) 1 tablet 0    gabapentin (NEURONTIN) 300 MG capsule TAKE ONE CAPSULE BY MOUTH DAILY 30 capsule 3     No current facility-administered medications for this visit. Recent Labs     07/02/21  1314   WBC 8.9   HGB 17.2*   HCT 49.5*   MCV 92.7           Lab Results   Component Value Date     07/02/2021    K 4.4 07/02/2021    CL 98 07/02/2021    CO2 24 07/02/2021    BUN 25 07/02/2021    CREATININE 0.7 07/02/2021    GLUCOSE 243 07/02/2021    GLUCOSE 191 08/03/2011    CALCIUM 9.5 07/02/2021        Lab Results   Component Value Date    CHOL 127 05/16/2017    CHOL 142 04/22/2016    CHOL 126 03/30/2015     Lab Results   Component Value Date    TRIG 160 (H) 05/16/2017    TRIG 153 (H) 04/22/2016    TRIG 113 03/30/2015     Lab Results   Component Value Date    HDL 35 (L) 05/16/2017    HDL 37 (L) 04/22/2016    HDL 38 (L) 03/30/2015     Lab Results   Component Value Date    LDLCALC 60 05/16/2017    LDLCALC 74 04/22/2016    LDLCALC 65 03/30/2015     Lab Results   Component Value Date    LABVLDL 32 05/16/2017    LABVLDL 31 04/22/2016    LABVLDL 23 03/30/2015     Lab Results   Component Value Date    CHOLHDLRATIO 3.5 08/03/2011        Objective:   Physical Exam  Vitals and nursing note reviewed.    Constitutional:

## 2021-07-16 NOTE — PATIENT INSTRUCTIONS
Discussed with her and her   Continue current medications  And oxygen  See in hr next appointment time

## 2021-07-26 NOTE — TELEPHONE ENCOUNTER
Last office visit: 07/16/2021    Future Appointments   Date Time Provider Lila Jaureguii   9/8/2021  1:00 PM Elloit Hsieh MD [facility]   9/22/2021  2:00 PM Daisy Agrawal [facility]

## 2021-07-28 RX ORDER — OMEPRAZOLE 40 MG/1
CAPSULE, DELAYED RELEASE ORAL
Qty: 30 CAPSULE | Refills: 4 | Status: SHIPPED | OUTPATIENT
Start: 2021-07-28 | End: 2021-12-22

## 2021-09-08 ENCOUNTER — OFFICE VISIT (OUTPATIENT)
Dept: INTERNAL MEDICINE CLINIC | Age: 76
End: 2021-09-08
Payer: MEDICARE

## 2021-09-08 VITALS
DIASTOLIC BLOOD PRESSURE: 84 MMHG | SYSTOLIC BLOOD PRESSURE: 120 MMHG | WEIGHT: 127.8 LBS | OXYGEN SATURATION: 99 % | HEART RATE: 80 BPM | BODY MASS INDEX: 22.64 KG/M2 | RESPIRATION RATE: 16 BRPM

## 2021-09-08 DIAGNOSIS — E11.9 TYPE 2 DIABETES MELLITUS WITHOUT COMPLICATION, WITHOUT LONG-TERM CURRENT USE OF INSULIN (HCC): Primary | ICD-10-CM

## 2021-09-08 DIAGNOSIS — I10 ESSENTIAL HYPERTENSION: ICD-10-CM

## 2021-09-08 DIAGNOSIS — J84.9 ILD (INTERSTITIAL LUNG DISEASE) (HCC): ICD-10-CM

## 2021-09-08 DIAGNOSIS — J44.9 COPD WITH ASTHMA (HCC): ICD-10-CM

## 2021-09-08 LAB — HBA1C MFR BLD: 5.4 %

## 2021-09-08 PROCEDURE — 83036 HEMOGLOBIN GLYCOSYLATED A1C: CPT | Performed by: INTERNAL MEDICINE

## 2021-09-08 PROCEDURE — 99214 OFFICE O/P EST MOD 30 MIN: CPT | Performed by: INTERNAL MEDICINE

## 2021-09-08 RX ORDER — AMLODIPINE BESYLATE 5 MG/1
TABLET ORAL
Qty: 30 TABLET | Refills: 5 | Status: SHIPPED | OUTPATIENT
Start: 2021-09-08 | End: 2022-03-10 | Stop reason: SDUPTHER

## 2021-09-08 ASSESSMENT — ENCOUNTER SYMPTOMS
WHEEZING: 0
CHEST TIGHTNESS: 0
SHORTNESS OF BREATH: 1
TROUBLE SWALLOWING: 0
GASTROINTESTINAL NEGATIVE: 1
COUGH: 0

## 2021-09-08 ASSESSMENT — PATIENT HEALTH QUESTIONNAIRE - PHQ9
2. FEELING DOWN, DEPRESSED OR HOPELESS: 0
1. LITTLE INTEREST OR PLEASURE IN DOING THINGS: 0
SUM OF ALL RESPONSES TO PHQ QUESTIONS 1-9: 0
SUM OF ALL RESPONSES TO PHQ9 QUESTIONS 1 & 2: 0
SUM OF ALL RESPONSES TO PHQ QUESTIONS 1-9: 0
SUM OF ALL RESPONSES TO PHQ QUESTIONS 1-9: 0

## 2021-09-08 NOTE — PATIENT INSTRUCTIONS
Decrease metformin 1000 mg tablet to 1/2 tablet 2 times daily Discussed with her and her      Continue current medications  See in  3 months

## 2021-09-08 NOTE — PROGRESS NOTES
Subjective:      Patient ID: Collette Ricker is a 67 y.o. female. Chief Complaint   Patient presents with    Diabetes    Hypertension        HPIuses oxygen at nights 2.5 LPM and not jani during day  Has slight exertional dyspnea and has no cough or wheezing and has n orthopnea or pnd  Walks with cane  She states she is eating well   She does not need any help in personal care    helps with house chores  Has no heart burns and has no gi or gu symptoms  Not check her sugars at home and has no symptoms of low blood sugars  Review of Systems   Constitutional: Negative for activity change, appetite change, chills, fever and unexpected weight change. HENT: Negative for trouble swallowing. Respiratory: Positive for shortness of breath. Negative for cough, chest tightness and wheezing. Cardiovascular: Negative for chest pain, palpitations and leg swelling. Gastrointestinal: Negative. Genitourinary: Negative. Neurological: Negative for dizziness (may be at times when she gets up), light-headedness and headaches. Psychiatric/Behavioral: Positive for confusion (some days with poor memory). Current Outpatient Medications   Medication Sig Dispense Refill    omeprazole (PRILOSEC) 40 MG delayed release capsule TAKE ONE CAPSULE BY MOUTH EVERY MORNING BEFORE BREAKFAST 30 capsule 4    metFORMIN (GLUCOPHAGE) 1000 MG tablet TAKE ONE TABLET BY MOUTH TWICE A DAY WITH MEALS 180 tablet 1    dicyclomine (BENTYL) 10 MG capsule TAKE ONE CAPSULE BY MOUTH DAILY 30 capsule 2    losartan (COZAAR) 50 MG tablet Take 1 tablet by mouth daily 90 tablet 1    montelukast (SINGULAIR) 10 MG tablet TAKE ONE TABLET BY MOUTH DAILY 90 tablet 1    Lancets (ONETOUCH DELICA PLUS [id number]) MISC USE 1 LANCET TWO TIMES A  each 5    alendronate (FOSAMAX) 70 MG tablet TAKE 1 TABLET BY MOUTH ONCE WEEKLY ON AN EMPTY STOMACH BEFORE BREAKFAST.  REMAIN UPRIGHT FOR 30 MINUTES & TAKE WITH 8 OUNCES OF WATER 12 tablet 3    OXYGEN Inhale 3 L into the lungs nightly      blood glucose monitor strips Test 2 times a day & as needed for symptoms of irregular blood glucose. Dispense sufficient amount for indicated testing frequency plus additional to accommodate PRN testing needs. 100 strip 3    predniSONE (DELTASONE) 10 MG tablet Take 1.5 tablets by mouth daily 45 tablet 11    folic acid (FOLVITE) 1 MG tablet Take 1 mg by mouth daily      mometasone-formoterol (DULERA) 200-5 MCG/ACT inhaler Inhale 2 puffs into the lungs every 12 hours 1 Inhaler 0    calcium-vitamin D (OSCAL 500/200 D-3) 500-200 MG-UNIT per tablet Take 1 tablet by mouth 2 times daily (Patient taking differently: Take 1 tablet by mouth daily ) 1 tablet 0    gabapentin (NEURONTIN) 300 MG capsule TAKE ONE CAPSULE BY MOUTH DAILY 30 capsule 3     No current facility-administered medications for this visit. No results for input(s): WBC, HGB, HCT, MCV, PLT in the last 720 hours. Lab Results   Component Value Date     07/02/2021    K 4.4 07/02/2021    CL 98 07/02/2021    CO2 24 07/02/2021    BUN 25 07/02/2021    CREATININE 0.7 07/02/2021    GLUCOSE 243 07/02/2021    GLUCOSE 191 08/03/2011    CALCIUM 9.5 07/02/2021        Lab Results   Component Value Date    CHOL 127 05/16/2017    CHOL 142 04/22/2016    CHOL 126 03/30/2015     Lab Results   Component Value Date    TRIG 160 (H) 05/16/2017    TRIG 153 (H) 04/22/2016    TRIG 113 03/30/2015     Lab Results   Component Value Date    HDL 35 (L) 05/16/2017    HDL 37 (L) 04/22/2016    HDL 38 (L) 03/30/2015     Lab Results   Component Value Date    LDLCALC 60 05/16/2017    LDLCALC 74 04/22/2016    LDLCALC 65 03/30/2015     Lab Results   Component Value Date    LABVLDL 32 05/16/2017    LABVLDL 31 04/22/2016    LABVLDL 23 03/30/2015     Lab Results   Component Value Date    CHOLHDLRATIO 3.5 08/03/2011        Objective:   Physical Exam  Vitals and nursing note reviewed.    Constitutional:       General: She is not in acute distress. Eyes:      General: No scleral icterus. Extraocular Movements: Extraocular movements intact. Conjunctiva/sclera: Conjunctivae normal.      Pupils: Pupils are equal, round, and reactive to light. Neck:      Thyroid: No thyromegaly. Vascular: No carotid bruit or JVD. Cardiovascular:      Rate and Rhythm: Normal rate and regular rhythm. Pulses: Normal pulses. Heart sounds: Normal heart sounds. No murmur heard. No gallop. Pulmonary:      Effort: No respiratory distress. Breath sounds: Normal breath sounds. No wheezing, rhonchi or rales. Musculoskeletal:      Right lower leg: No edema. Left lower leg: No edema. Lymphadenopathy:      Cervical: No cervical adenopathy. Neurological:      Mental Status: She is alert. Comments: Not oriented to day date and month but knows her address and    Psychiatric:         Mood and Affect: Mood normal.         Assessment:      htn well controlled  Diabetes seem to be well controlled-A1C is 5.4%  ILD stable and maintaining good O2 sat on RA  Has memory deficits -early dementia ,due to steroids?   Copd stable       Plan:     Decrease metformin 1000 mg tablet to 1/2 tablet 2 times daily Discussed with her and her      Continue current medications  See in  3 months     Jenn Wilkins MD

## 2021-09-10 RX ORDER — DICYCLOMINE HYDROCHLORIDE 10 MG/1
CAPSULE ORAL
Qty: 30 CAPSULE | Refills: 3 | Status: SHIPPED | OUTPATIENT
Start: 2021-09-10 | End: 2022-01-10

## 2021-09-10 NOTE — TELEPHONE ENCOUNTER
LOV 9/8/21  Future Appointments   Date Time Provider Lila Arias   9/22/2021  2:00 PM Raj [facility]   12/8/2021  1:15 PM Nereyda Avalos[address]

## 2021-09-22 ENCOUNTER — OFFICE VISIT (OUTPATIENT)
Dept: PULMONOLOGY | Age: 76
End: 2021-09-22
Payer: MEDICARE

## 2021-09-22 VITALS
BODY MASS INDEX: 22.82 KG/M2 | TEMPERATURE: 97.5 F | DIASTOLIC BLOOD PRESSURE: 78 MMHG | HEIGHT: 63 IN | RESPIRATION RATE: 16 BRPM | WEIGHT: 128.8 LBS | OXYGEN SATURATION: 97 % | SYSTOLIC BLOOD PRESSURE: 138 MMHG | HEART RATE: 96 BPM

## 2021-09-22 DIAGNOSIS — J84.9 ILD (INTERSTITIAL LUNG DISEASE) (HCC): Primary | ICD-10-CM

## 2021-09-22 DIAGNOSIS — J96.11 CHRONIC RESPIRATORY FAILURE WITH HYPOXIA (HCC): ICD-10-CM

## 2021-09-22 PROCEDURE — 99214 OFFICE O/P EST MOD 30 MIN: CPT | Performed by: INTERNAL MEDICINE

## 2021-09-22 NOTE — PROGRESS NOTES
Chief complaint  This is a 67y.o. year old female  who comes to see me with a chief complaint of   Chief Complaint   Patient presents with    Other     f/u ILD       HPI  Here with a cc of ILD    She is at least stable. Tolerating prednisone at 10 mg per day. No side effects. Seems to have kept ILD stable as she is no worse than when she was on 15 mg per day. Uses up to 3 liters of oxygen with exertion and sleep.  present. He says her breathing is stable as well       Current Outpatient Medications:     dicyclomine (BENTYL) 10 MG capsule, TAKE ONE CAPSULE BY MOUTH DAILY, Disp: 30 capsule, Rfl: 3    metFORMIN (GLUCOPHAGE) 1000 MG tablet, Take 0.5 tablets by mouth 2 times daily (with meals), Disp: 1 tablet, Rfl: 0    amLODIPine (NORVASC) 5 MG tablet, TAKE ONE TABLET BY MOUTH DAILY, Disp: 30 tablet, Rfl: 5    omeprazole (PRILOSEC) 40 MG delayed release capsule, TAKE ONE CAPSULE BY MOUTH EVERY MORNING BEFORE BREAKFAST, Disp: 30 capsule, Rfl: 4    losartan (COZAAR) 50 MG tablet, Take 1 tablet by mouth daily, Disp: 90 tablet, Rfl: 1    gabapentin (NEURONTIN) 300 MG capsule, TAKE ONE CAPSULE BY MOUTH DAILY, Disp: 30 capsule, Rfl: 3    montelukast (SINGULAIR) 10 MG tablet, TAKE ONE TABLET BY MOUTH DAILY, Disp: 90 tablet, Rfl: 1    Lancets (ONETOUCH DELICA PLUS [id number]) MISC, USE 1 LANCET TWO TIMES A DAY, Disp: 100 each, Rfl: 5    alendronate (FOSAMAX) 70 MG tablet, TAKE 1 TABLET BY MOUTH ONCE WEEKLY ON AN EMPTY STOMACH BEFORE BREAKFAST. REMAIN UPRIGHT FOR 30 MINUTES & TAKE WITH 8 OUNCES OF WATER, Disp: 12 tablet, Rfl: 3    OXYGEN, Inhale 3 L into the lungs nightly, Disp: , Rfl:     blood glucose monitor strips, Test 2 times a day & as needed for symptoms of irregular blood glucose. Dispense sufficient amount for indicated testing frequency plus additional to accommodate PRN testing needs. , Disp: 100 strip, Rfl: 3    predniSONE (DELTASONE) 10 MG tablet, Take 1.5 tablets by mouth daily, Disp: 45 tablet, Rfl: 11    folic acid (FOLVITE) 1 MG tablet, Take 1 mg by mouth daily, Disp: , Rfl:     calcium-vitamin D (OSCAL 500/200 D-3) 500-200 MG-UNIT per tablet, Take 1 tablet by mouth 2 times daily (Patient taking differently: Take 1 tablet by mouth daily ), Disp: 1 tablet, Rfl: 0      PHYSICAL EXAM:  Vitals:    09/22/21 1354   BP: 138/78   Pulse: 96   Resp: 16   Temp: 97.5 °F (36.4 °C)   SpO2: 97%       GENERAL: Looks better today, mildly cushingoid   HEENT:  No scleral icterus, no conjunctival irritation  NECK:  No thyromegaly, no bruits  LYMPH:  No cervical or supraclavicular adenopathy  HEART:  Regular rate and rhythm, no murmurs  LUNGS:  Lower lobe crackles, diminished more in the LLL than RLL   ABDOMEN:  No distention, no organomegaly  EXTREMITIES:  No edema, no digital clubbing  NEURO:  No localizing deficits, CN II-XII intact. Pulmonary Function Testing 11/2018  MY IMPRESSION  Overall Mild restriction, no bronchodilator response and moderate reduction in diffusing capacity     Chest imaging from 11/2019 is reviewed and compared to 9/2020. My interpretation chronic interstitial changes with reticular markings, ground glass, nodular opacities, mosaic attenuation and bronchiectasis. I don't appreciate emphysema to any significant degree like was mentioned on radiology report     Lung Biopsy from 10/2007         -     Chronic interstitial pneumonia with chronic bronchiolitis and focal  organizing pneumonia (see comment).     Comment(s)       The combination of chronic interstitial pneumonia, bronchiolitis, and  organizing pneumonia raises hypersensitivity pneumonia (extrinsic allergic  alveolitis) as the chief etiologic consideration.  In the absence of an  identifiable antigenic exposure, similar changes can be seen in patients with  various forms of drug-induced lung disease.  In the absence of any history to  incriminate either an antigenic or drug exposure, these findings are most  consistent with nonspecific interstitial pneumonia (NSIP). I reviewed above labs and studies pertinent to this visit and date    Bronchoscopy  12/2018  -  No malignant cells identified    -Increased CD4/CD8 ratio and decreased /CD4 ratio.  These  results may be present in pulmonary sarcoidosis, viral infection,  alveolitis, and interstitial syndromes    Differential:  Neutrophilic, lymphocytic    Cultures all negative    12/2018  VINCENT  negative  ANCA (myeloperioxidase/serine protease 3)  negative  RF (RA):   negative  Anti-Scl 70 (Scleroderma):  negative  Anti-DS DNA (SLE):  negative  Anti- SSA (Ro) (Sjogrens):  negative  Anti-SSB (La) (Sjogrens):  negative  Anti-Bhumika (polymyositis/dermatomyositis):  negative  Anti-Smith (SLE):  negative  Anti-RNP (MCTD):  negative  CK: (inflammatory myopathy) 145  Sed rate  18    Total IgE Level:  116  Significant allergies:  none    HP panel:  Negative    I reviewed the above labs and images     Assessment/Plan:  1. ILD (interstitial lung disease) (HCC)  Chronic HP vs NSIP. Continue with prednisone 10 mg per day. Lifelong therapy needed    2. Chronic respiratory failure with hypoxia (HCC)  Uses up to 3 liters with exertion and sleep.   Is mobile and does benefit       COVID vaccine up to date      Follow up in 3 months

## 2021-09-22 NOTE — PATIENT INSTRUCTIONS
Continue with prednisone 10 mg per day    Use oxygen as needed and at night    Follow up in 4-6 months

## 2021-10-15 RX ORDER — GABAPENTIN 300 MG/1
CAPSULE ORAL
Qty: 30 CAPSULE | Refills: 3 | Status: SHIPPED | OUTPATIENT
Start: 2021-10-15 | End: 2022-03-10 | Stop reason: SDUPTHER

## 2021-10-15 NOTE — TELEPHONE ENCOUNTER
Last ov 09/08/21  Future Appointments   Date Time Provider Lila Arias   12/8/2021  1:15 PM Vilma Ayala MD [address]   1/26/2022  1:20 PM Abrahan [facility]

## 2021-11-05 ENCOUNTER — OFFICE VISIT (OUTPATIENT)
Dept: INTERNAL MEDICINE CLINIC | Age: 76
End: 2021-11-05
Payer: MEDICARE

## 2021-11-05 VITALS
HEIGHT: 65 IN | OXYGEN SATURATION: 98 % | RESPIRATION RATE: 18 BRPM | WEIGHT: 133 LBS | DIASTOLIC BLOOD PRESSURE: 68 MMHG | BODY MASS INDEX: 22.16 KG/M2 | SYSTOLIC BLOOD PRESSURE: 120 MMHG | HEART RATE: 88 BPM

## 2021-11-05 DIAGNOSIS — Z01.818 PREOP EXAMINATION: ICD-10-CM

## 2021-11-05 DIAGNOSIS — G62.9 PERIPHERAL POLYNEUROPATHY: ICD-10-CM

## 2021-11-05 DIAGNOSIS — H26.9 CATARACT OF BOTH EYES, UNSPECIFIED CATARACT TYPE: ICD-10-CM

## 2021-11-05 DIAGNOSIS — E78.5 HYPERLIPIDEMIA, UNSPECIFIED HYPERLIPIDEMIA TYPE: ICD-10-CM

## 2021-11-05 DIAGNOSIS — I10 ESSENTIAL HYPERTENSION: ICD-10-CM

## 2021-11-05 DIAGNOSIS — E11.9 TYPE 2 DIABETES MELLITUS WITHOUT COMPLICATION, WITHOUT LONG-TERM CURRENT USE OF INSULIN (HCC): ICD-10-CM

## 2021-11-05 DIAGNOSIS — J84.9 ILD (INTERSTITIAL LUNG DISEASE) (HCC): ICD-10-CM

## 2021-11-05 DIAGNOSIS — J44.9 COPD WITH ASTHMA (HCC): ICD-10-CM

## 2021-11-05 DIAGNOSIS — J96.11 CHRONIC RESPIRATORY FAILURE WITH HYPOXIA (HCC): Primary | ICD-10-CM

## 2021-11-05 PROCEDURE — 99204 OFFICE O/P NEW MOD 45 MIN: CPT | Performed by: INTERNAL MEDICINE

## 2021-11-05 ASSESSMENT — ENCOUNTER SYMPTOMS
GASTROINTESTINAL NEGATIVE: 1
RHINORRHEA: 0
CHEST TIGHTNESS: 0
WHEEZING: 0
SORE THROAT: 0
COUGH: 0
SHORTNESS OF BREATH: 1

## 2021-11-05 NOTE — PROGRESS NOTES
Subjective:      Patient ID: Aminata Wood is a 67 y.o. female. Chief Complaint   Patient presents with    Pre-op Exam        Chief Complaint:   Aminata Wood is a 67 y.o. female who presents for a preoperative physical examination. She is scheduled to have bilateral cataract extraction  done by Dr. Nixon Knight at [facility] on 11/10/21 and 11/17/21. Patient has decreased vision due to cataracts and has diabetes and is on steroids for her lung disease. Has no cp gi or gu symptoms but for exertional dyspnea-uses oxygen at nights  Review of Systems   Constitutional: Positive for fatigue. Negative for activity change, appetite change, chills, fever and unexpected weight change. HENT: Negative for ear pain, postnasal drip, rhinorrhea, sneezing and sore throat. Eyes: Positive for visual disturbance. Respiratory: Positive for shortness of breath. Negative for cough, chest tightness and wheezing. Cardiovascular: Negative for chest pain, palpitations and leg swelling. Gastrointestinal: Negative. Endocrine: Negative. Genitourinary: Negative. Musculoskeletal: Negative. Neurological: Positive for numbness. Negative for dizziness, speech difficulty, weakness, light-headedness (feet) and headaches. Hematological: Does not bruise/bleed easily. Psychiatric/Behavioral: Negative for sleep disturbance.          Past Medical History:   Diagnosis Date    Arthritis     Asthma     Depression 6/28/2013    Diverticulosis of colon (without mention of hemorrhage)     Enthesopathy of hip region     left - mild    Esophageal reflux     Full dentures     Irritable bowel syndrome     Nocturia     Osteoporosis, unspecified     Other and unspecified hyperlipidemia     Postinflammatory pulmonary fibrosis (HCC)     Sialoadenitis     left    Synovial cyst of popliteal space     left knee    Thoracic or lumbosacral neuritis or radiculitis, unspecified     left - L5 - S1    Type II or unspecified type diabetes mellitus without mention of complication, not stated as uncontrolled     Unspecified essential hypertension     Wears glasses                     Previous anesthesia complications: none       Past Surgical History:   Procedure Laterality Date    BRONCHOSCOPY N/A 12/21/2018    BRONCHOSCOPY WITH BAL performed by Aki Chand DO at [address] [place] [address]    DR. Jean-no polyps    COLONOSCOPY  02/2016    normal-DR. Ramsay    COLONOSCOPY N/A 11/6/2020    COLONOSCOPY POLYPECTOMY SNARE/COLD BIOPSY performed by Rogelio Bran MD at [address] [facility]  9/21/2020    CT BONE MARROW BIOPSY 9/21/2020 [facility]    HYSTERECTOMY      partial    LIPOMA RESECTION      abdominal wall    OVARY REMOVAL      left    POLYPECTOMY                                                     Current Outpatient Medications   Medication Sig Dispense Refill    predniSONE (DELTASONE) 10 MG tablet Take 1 tablet by mouth daily 90 tablet 3    gabapentin (NEURONTIN) 300 MG capsule TAKE ONE CAPSULE BY MOUTH DAILY 30 capsule 3    dicyclomine (BENTYL) 10 MG capsule TAKE ONE CAPSULE BY MOUTH DAILY 30 capsule 3    metFORMIN (GLUCOPHAGE) 1000 MG tablet Take 0.5 tablets by mouth 2 times daily (with meals) 1 tablet 0    amLODIPine (NORVASC) 5 MG tablet TAKE ONE TABLET BY MOUTH DAILY 30 tablet 5    omeprazole (PRILOSEC) 40 MG delayed release capsule TAKE ONE CAPSULE BY MOUTH EVERY MORNING BEFORE BREAKFAST 30 capsule 4    losartan (COZAAR) 50 MG tablet Take 1 tablet by mouth daily 90 tablet 1    montelukast (SINGULAIR) 10 MG tablet TAKE ONE TABLET BY MOUTH DAILY 90 tablet 1    Lancets (ONETOUCH DELICA PLUS [id number]) MISC USE 1 LANCET TWO TIMES A  each 5    alendronate (FOSAMAX) 70 MG tablet TAKE 1 TABLET BY MOUTH ONCE WEEKLY ON AN EMPTY STOMACH BEFORE BREAKFAST.  REMAIN UPRIGHT FOR 30 MINUTES & TAKE WITH 8 OUNCES OF WATER 12 tablet 3    OXYGEN Inhale 3 L into the lungs nightly      blood glucose monitor strips Test 2 times a day & as needed for symptoms of irregular blood glucose. Dispense sufficient amount for indicated testing frequency plus additional to accommodate PRN testing needs. 328 strip 3    folic acid (FOLVITE) 1 MG tablet Take 1 mg by mouth daily      calcium-vitamin D (OSCAL 500/200 D-3) 500-200 MG-UNIT per tablet Take 1 tablet by mouth 2 times daily (Patient taking differently: Take 1 tablet by mouth daily ) 1 tablet 0     No current facility-administered medications for this visit. Allergies   Allergen Reactions    Latex Rash    Neda-Monette [Aspirin Effervescent] Hives     Tongue swelling,but advil does not bother her.  Sulfa Antibiotics Hives       Social History     Tobacco Use    Smoking status: Never Smoker    Smokeless tobacco: Never Used   Vaping Use    Vaping Use: Never used   Substance Use Topics    Alcohol use: Not Currently    Drug use: Never        Family History   Problem Relation Age of Onset    Heart Disease Mother     Stroke Mother     Osteoporosis Mother     Cirrhosis Father         Liver    Osteoporosis Sister     Osteoporosis Maternal Grandmother         /68   Pulse 88   Resp 18   Ht 5' 5\" (1.651 m)   Wt 133 lb (60.3 kg)   SpO2 98%   BMI 22.13 kg/m²   Physical Exam  Vitals and nursing note reviewed. Constitutional:       General: She is not in acute distress. HENT:      Head: Normocephalic. Right Ear: Tympanic membrane, ear canal and external ear normal. There is no impacted cerumen. Left Ear: Tympanic membrane, ear canal and external ear normal. There is no impacted cerumen. Nose: Nose normal. No congestion or rhinorrhea. Mouth/Throat:      Mouth: Mucous membranes are moist.      Pharynx: Oropharynx is clear. No oropharyngeal exudate or posterior oropharyngeal erythema. Eyes:      General: No scleral icterus. Right eye: No discharge.          Left eye: No discharge. Extraocular Movements: Extraocular movements intact. Conjunctiva/sclera: Conjunctivae normal.      Pupils: Pupils are equal, round, and reactive to light. Neck:      Thyroid: No thyromegaly. Vascular: No carotid bruit or JVD. Cardiovascular:      Rate and Rhythm: Normal rate and regular rhythm. Pulses: Normal pulses. Heart sounds: Normal heart sounds. No murmur heard. No gallop. Pulmonary:      Effort: No respiratory distress. Breath sounds: Rales (at bases) present. No wheezing or rhonchi. Abdominal:      General: Abdomen is flat. Bowel sounds are normal. There is no distension. Palpations: Abdomen is soft. There is no hepatomegaly, splenomegaly or mass. Tenderness: There is no abdominal tenderness. Hernia: No hernia is present. Musculoskeletal:      Right lower leg: No edema. Left lower leg: No edema. Lymphadenopathy:      Cervical: No cervical adenopathy. Neurological:      Mental Status: She is alert and oriented to person, place, and time. Cranial Nerves: No cranial nerve deficit. Motor: No weakness. Gait: Gait normal.   Psychiatric:         Mood and Affect: Mood normal.       Assessment:         Diagnosis Orders   1. Chronic respiratory failure with hypoxia (HCC)     2. Cataract of both eyes, unspecified cataract type     3. Preop examination     4. COPD with asthma ([address]     5. Essential hypertension     6. Hyperlipidemia, unspecified hyperlipidemia type     7. ILD (interstitial lung disease) ([address]     8. Peripheral polyneuropathy     9. Type 2 diabetes mellitus without complication, without long-term current use of insulin ([address]         She is medically cleared for surgery and anesthesia. Plan:          Per operating surgeon. See also orders filed with this encounter, if any. Need monitoring her respiratory status and oxygenation.   Instructions to patient:Advised to take amlodipine,omeprazolelosartan Cuca on am of surgery with sips of water  Indication for coby-operative beta blocker therapy:Not need       Du Pena MD   11/5/2021 2:20 PM    EKG: not done  BLOOD WORK: Not done

## 2021-11-19 RX ORDER — LOSARTAN POTASSIUM 50 MG/1
TABLET ORAL
Qty: 90 TABLET | Refills: 1 | Status: SHIPPED | OUTPATIENT
Start: 2021-11-19 | End: 2022-03-10 | Stop reason: SDUPTHER

## 2021-11-19 NOTE — TELEPHONE ENCOUNTER
LAST OFFICE VISIT :11/05/2021    Future Appointments   Date Time Provider Lila Juana   12/8/2021  1:15 PM Judith Zabala MD [address]   1/26/2022  1:20 PM Jacob Gentile DO [address] [place] [address]

## 2021-12-05 PROBLEM — Z01.818 PREOP EXAMINATION: Status: RESOLVED | Noted: 2021-11-05 | Resolved: 2021-12-05

## 2021-12-08 ENCOUNTER — OFFICE VISIT (OUTPATIENT)
Dept: INTERNAL MEDICINE CLINIC | Age: 76
End: 2021-12-08
Payer: MEDICARE

## 2021-12-08 VITALS
OXYGEN SATURATION: 98 % | HEIGHT: 65 IN | HEART RATE: 104 BPM | BODY MASS INDEX: 22.19 KG/M2 | SYSTOLIC BLOOD PRESSURE: 110 MMHG | RESPIRATION RATE: 16 BRPM | DIASTOLIC BLOOD PRESSURE: 80 MMHG | WEIGHT: 133.2 LBS

## 2021-12-08 DIAGNOSIS — F02.818 LATE ONSET ALZHEIMER'S DEMENTIA WITH BEHAVIORAL DISTURBANCE (HCC): ICD-10-CM

## 2021-12-08 DIAGNOSIS — G30.1 LATE ONSET ALZHEIMER'S DEMENTIA WITH BEHAVIORAL DISTURBANCE (HCC): ICD-10-CM

## 2021-12-08 DIAGNOSIS — E11.9 TYPE 2 DIABETES MELLITUS WITHOUT COMPLICATION, WITHOUT LONG-TERM CURRENT USE OF INSULIN (HCC): ICD-10-CM

## 2021-12-08 DIAGNOSIS — I10 ESSENTIAL HYPERTENSION: Primary | ICD-10-CM

## 2021-12-08 DIAGNOSIS — J44.9 COPD WITH ASTHMA (HCC): ICD-10-CM

## 2021-12-08 LAB — HBA1C MFR BLD: 6.2 %

## 2021-12-08 PROCEDURE — 83036 HEMOGLOBIN GLYCOSYLATED A1C: CPT | Performed by: INTERNAL MEDICINE

## 2021-12-08 PROCEDURE — 99214 OFFICE O/P EST MOD 30 MIN: CPT | Performed by: INTERNAL MEDICINE

## 2021-12-08 RX ORDER — MEMANTINE HYDROCHLORIDE 5 MG/1
5 TABLET ORAL 2 TIMES DAILY
Qty: 60 TABLET | Refills: 3 | Status: SHIPPED | OUTPATIENT
Start: 2021-12-08 | End: 2022-03-10 | Stop reason: SDUPTHER

## 2021-12-08 RX ORDER — DONEPEZIL HYDROCHLORIDE 5 MG/1
5 TABLET, FILM COATED ORAL NIGHTLY
Qty: 30 TABLET | Refills: 3 | Status: SHIPPED | OUTPATIENT
Start: 2021-12-08 | End: 2022-03-10 | Stop reason: SDUPTHER

## 2021-12-08 ASSESSMENT — ENCOUNTER SYMPTOMS
COUGH: 0
GASTROINTESTINAL NEGATIVE: 1
WHEEZING: 0
CHEST TIGHTNESS: 0
TROUBLE SWALLOWING: 0
SHORTNESS OF BREATH: 0

## 2021-12-08 NOTE — PROGRESS NOTES
Subjective:      Patient ID: Sharona Naylor is a 67 y.o. female. Chief Complaint   Patient presents with    Hypertension    Diabetes        HPI  She feels well and appetite is good and not lost nay weight  She denies any chest pain cough or wheezing and at times gets dyspnea when going up steps  She ha sno other cp gi or gu symptoms  Her  states he memory is poor and she accuses him of being with another women -mostly in pm and some times during day also  Has mild agitation with it and some times needs help of her children to calm her down  Review of Systems   Constitutional: Negative for activity change, appetite change and unexpected weight change. HENT: Negative for trouble swallowing. Respiratory: Negative for cough, chest tightness, shortness of breath and wheezing. Cardiovascular: Negative for chest pain, palpitations and leg swelling. Gastrointestinal: Negative. Genitourinary: Negative. Neurological: Negative for dizziness and light-headedness. Headaches: some times and tylenol helps symptoms. Psychiatric/Behavioral: Positive for behavioral problems and confusion. Negative for sleep disturbance. The patient is not nervous/anxious.         Current Outpatient Medications   Medication Sig Dispense Refill    losartan (COZAAR) 50 MG tablet TAKE 1 TABLET DAILY 90 tablet 1    predniSONE (DELTASONE) 10 MG tablet Take 1 tablet by mouth daily 90 tablet 3    dicyclomine (BENTYL) 10 MG capsule TAKE ONE CAPSULE BY MOUTH DAILY 30 capsule 3    metFORMIN (GLUCOPHAGE) 1000 MG tablet Take 0.5 tablets by mouth 2 times daily (with meals) 1 tablet 0    amLODIPine (NORVASC) 5 MG tablet TAKE ONE TABLET BY MOUTH DAILY 30 tablet 5    omeprazole (PRILOSEC) 40 MG delayed release capsule TAKE ONE CAPSULE BY MOUTH EVERY MORNING BEFORE BREAKFAST 30 capsule 4    montelukast (SINGULAIR) 10 MG tablet TAKE ONE TABLET BY MOUTH DAILY 90 tablet 1    Lancets (ONETOUCH DELICA PLUS [id number]) MISC USE 1 LANCET TWO TIMES A  each 5    alendronate (FOSAMAX) 70 MG tablet TAKE 1 TABLET BY MOUTH ONCE WEEKLY ON AN EMPTY STOMACH BEFORE BREAKFAST. REMAIN UPRIGHT FOR 30 MINUTES & TAKE WITH 8 OUNCES OF WATER 12 tablet 3    OXYGEN Inhale 3 L into the lungs nightly      blood glucose monitor strips Test 2 times a day & as needed for symptoms of irregular blood glucose. Dispense sufficient amount for indicated testing frequency plus additional to accommodate PRN testing needs. 504 strip 3    folic acid (FOLVITE) 1 MG tablet Take 1 mg by mouth daily      calcium-vitamin D (OSCAL 500/200 D-3) 500-200 MG-UNIT per tablet Take 1 tablet by mouth 2 times daily (Patient taking differently: Take 1 tablet by mouth daily ) 1 tablet 0    gabapentin (NEURONTIN) 300 MG capsule TAKE ONE CAPSULE BY MOUTH DAILY 30 capsule 3     No current facility-administered medications for this visit. No results for input(s): WBC, HGB, HCT, MCV, PLT in the last 720 hours. Lab Results   Component Value Date     07/02/2021    K 4.4 07/02/2021    CL 98 07/02/2021    CO2 24 07/02/2021    BUN 25 07/02/2021    CREATININE 0.7 07/02/2021    GLUCOSE 243 07/02/2021    GLUCOSE 191 08/03/2011    CALCIUM 9.5 07/02/2021        Lab Results   Component Value Date    CHOL 127 05/16/2017    CHOL 142 04/22/2016    CHOL 126 03/30/2015     Lab Results   Component Value Date    TRIG 160 (H) 05/16/2017    TRIG 153 (H) 04/22/2016    TRIG 113 03/30/2015     Lab Results   Component Value Date    HDL 35 (L) 05/16/2017    HDL 37 (L) 04/22/2016    HDL 38 (L) 03/30/2015     Lab Results   Component Value Date    LDLCALC 60 05/16/2017    LDLCALC 74 04/22/2016    LDLCALC 65 03/30/2015     Lab Results   Component Value Date    LABVLDL 32 05/16/2017    LABVLDL 31 04/22/2016    LABVLDL 23 03/30/2015     Lab Results   Component Value Date    CHOLHDLRATIO 3.5 08/03/2011        Objective:   Physical Exam  Vitals and nursing note reviewed.    Constitutional:       General: She is not in acute distress. Eyes:      Extraocular Movements: Extraocular movements intact. Conjunctiva/sclera: Conjunctivae normal.      Pupils: Pupils are equal, round, and reactive to light. Neck:      Thyroid: No thyromegaly. Vascular: No carotid bruit. Cardiovascular:      Rate and Rhythm: Normal rate and regular rhythm. Pulses: Normal pulses. Heart sounds: Normal heart sounds. No murmur heard. No gallop. Pulmonary:      Effort: No respiratory distress. Breath sounds: Normal breath sounds. No wheezing, rhonchi or rales. Musculoskeletal:      Right lower leg: No edema. Left lower leg: No edema. Lymphadenopathy:      Cervical: No cervical adenopathy. Neurological:      Mental Status: She is alert.       Comments: She is alert but not know year and not able to do simple calculation  She is pleasant and follows directions well and she knows I am retiring soon   Psychiatric:         Mood and Affect: Mood normal.         Assessment:      htn well controlled  Diabetes seem to be well controlled and A1C is 6.2    Copd /asthma stable ILD -has no symptoms  Has dementia with mild behavioral changes has problems with short term memory-steroids may be slightly contributing to this  Plan:      Discussed with her and her     Continue current medications  Start on Aricept and memantine - dicussed side efefcts  See in  3 months  Joanna Garner MD

## 2021-12-22 RX ORDER — OMEPRAZOLE 40 MG/1
CAPSULE, DELAYED RELEASE ORAL
Qty: 30 CAPSULE | Refills: 4 | Status: SHIPPED | OUTPATIENT
Start: 2021-12-22 | End: 2022-03-10 | Stop reason: SDUPTHER

## 2021-12-22 NOTE — TELEPHONE ENCOUNTER
Future Appointments   Date Time Provider Lila Arias   1/26/2022  1:20 PM Sanchez Lora [facility]   3/10/2022  1:00 PM Kilo Uribe MD [facility]         Last appt on 12.8.2021

## 2022-01-03 RX ORDER — MONTELUKAST SODIUM 10 MG/1
TABLET ORAL
Qty: 90 TABLET | Refills: 1 | Status: SHIPPED | OUTPATIENT
Start: 2022-01-03 | End: 2022-09-28 | Stop reason: SDUPTHER

## 2022-01-03 NOTE — TELEPHONE ENCOUNTER
Future Appointments   Date Time Provider Lila Arias   1/26/2022  1:20 PM Makeda Walker [facility]   3/10/2022  1:00 PM Safia Saavedra MD [facility]     Last appt on 12.8.2021

## 2022-01-10 RX ORDER — DICYCLOMINE HYDROCHLORIDE 10 MG/1
CAPSULE ORAL
Qty: 30 CAPSULE | Refills: 3 | Status: SHIPPED | OUTPATIENT
Start: 2022-01-10 | End: 2022-05-11 | Stop reason: SDUPTHER

## 2022-01-10 NOTE — TELEPHONE ENCOUNTER
Future Appointments   Date Time Provider Lila Arias   1/26/2022  1:20 PM [facility]   3/10/2022  1:00 PM Justino Brantley MD [facility]     Last appt on 12.8.2021

## 2022-03-03 ENCOUNTER — OFFICE VISIT (OUTPATIENT)
Dept: PULMONOLOGY | Age: 77
End: 2022-03-03
Payer: MEDICARE

## 2022-03-03 VITALS
RESPIRATION RATE: 16 BRPM | TEMPERATURE: 97.1 F | HEIGHT: 65 IN | SYSTOLIC BLOOD PRESSURE: 124 MMHG | OXYGEN SATURATION: 91 % | DIASTOLIC BLOOD PRESSURE: 80 MMHG | WEIGHT: 131 LBS | HEART RATE: 57 BPM | BODY MASS INDEX: 21.83 KG/M2

## 2022-03-03 DIAGNOSIS — J96.11 CHRONIC RESPIRATORY FAILURE WITH HYPOXIA (HCC): ICD-10-CM

## 2022-03-03 DIAGNOSIS — J84.9 ILD (INTERSTITIAL LUNG DISEASE) (HCC): Primary | ICD-10-CM

## 2022-03-03 PROCEDURE — 99214 OFFICE O/P EST MOD 30 MIN: CPT | Performed by: INTERNAL MEDICINE

## 2022-03-03 RX ORDER — PREDNISONE 10 MG/1
10 TABLET ORAL DAILY
Qty: 30 TABLET | Refills: 11 | Status: SHIPPED | OUTPATIENT
Start: 2022-03-03 | End: 2022-03-10

## 2022-03-03 ASSESSMENT — COPD QUESTIONNAIRES
QUESTION6_LEAVINGHOUSE: 1
QUESTION4_WALKINCLINE: 1
QUESTION5_HOMEACTIVITIES: 1
QUESTION2_CHESTPHLEGM: 1
QUESTION1_COUGHFREQUENCY: 1
CAT_TOTALSCORE: 9
QUESTION3_CHESTTIGHTNESS: 1
QUESTION8_ENERGYLEVEL: 2
QUESTION7_SLEEPQUALITY: 1

## 2022-03-03 NOTE — PROGRESS NOTES
Chief complaint  This is a 67y.o. year old female  who comes to see me with a chief complaint of   Chief Complaint   Patient presents with    Follow-up     ILD       HPI  Here with a cc of ILD    She is doing well. Tolerating 10 mg of prednisone very well and feels less SOB. Using oxygen less as well. No flare ups and no side effects. She is seen with  Tanesha Banda today         Current Outpatient Medications:     dicyclomine (BENTYL) 10 MG capsule, TAKE ONE CAPSULE BY MOUTH DAILY, Disp: 30 capsule, Rfl: 3    montelukast (SINGULAIR) 10 MG tablet, TAKE ONE TABLET BY MOUTH DAILY, Disp: 90 tablet, Rfl: 1    omeprazole (PRILOSEC) 40 MG delayed release capsule, TAKE ONE CAPSULE BY MOUTH EVERY MORNING BEFORE BREAKFAST, Disp: 30 capsule, Rfl: 4    donepezil (ARICEPT) 5 MG tablet, Take 1 tablet by mouth nightly, Disp: 30 tablet, Rfl: 3    memantine (NAMENDA) 5 MG tablet, Take 1 tablet by mouth 2 times daily, Disp: 60 tablet, Rfl: 3    losartan (COZAAR) 50 MG tablet, TAKE 1 TABLET DAILY, Disp: 90 tablet, Rfl: 1    predniSONE (DELTASONE) 10 MG tablet, Take 1 tablet by mouth daily, Disp: 90 tablet, Rfl: 3    metFORMIN (GLUCOPHAGE) 1000 MG tablet, Take 0.5 tablets by mouth 2 times daily (with meals), Disp: 1 tablet, Rfl: 0    amLODIPine (NORVASC) 5 MG tablet, TAKE ONE TABLET BY MOUTH DAILY, Disp: 30 tablet, Rfl: 5    alendronate (FOSAMAX) 70 MG tablet, TAKE 1 TABLET BY MOUTH ONCE WEEKLY ON AN EMPTY STOMACH BEFORE BREAKFAST.  REMAIN UPRIGHT FOR 30 MINUTES & TAKE WITH 8 OUNCES OF WATER, Disp: 12 tablet, Rfl: 3    folic acid (FOLVITE) 1 MG tablet, Take 1 mg by mouth daily, Disp: , Rfl:     gabapentin (NEURONTIN) 300 MG capsule, TAKE ONE CAPSULE BY MOUTH DAILY, Disp: 30 capsule, Rfl: 3    Lancets (ONETOUCH DELICA PLUS [id number]) MISC, USE 1 LANCET TWO TIMES A DAY, Disp: 100 each, Rfl: 5    OXYGEN, Inhale 3 L into the lungs nightly, Disp: , Rfl:     blood glucose monitor strips, Test 2 times a day & as needed for symptoms of irregular blood glucose. Dispense sufficient amount for indicated testing frequency plus additional to accommodate PRN testing needs. , Disp: 100 strip, Rfl: 3    calcium-vitamin D (OSCAL 500/200 D-3) 500-200 MG-UNIT per tablet, Take 1 tablet by mouth 2 times daily (Patient taking differently: Take 1 tablet by mouth daily ), Disp: 1 tablet, Rfl: 0      PHYSICAL EXAM:  Vitals:    03/03/22 1428   BP: 124/80   Pulse: 57   Resp: 16   Temp: 97.1 °F (36.2 °C)   SpO2: 91%       GENERAL: Looks better today, mildly cushingoid   HEENT:  No scleral icterus, no conjunctival irritation  NECK:  No thyromegaly, no bruits  LYMPH:  No cervical or supraclavicular adenopathy  HEART:  Regular rate and rhythm, no murmurs  LUNGS:  Slightly diminished, faint crackles in mid to lower lung fields   ABDOMEN:  No distention, no organomegaly  EXTREMITIES:  No edema, no digital clubbing  NEURO:  No localizing deficits, CN II-XII intact. Pulmonary Function Testing 11/2018  MY IMPRESSION  Overall Mild restriction, no bronchodilator response and moderate reduction in diffusing capacity     Chest imaging from 11/2019 is reviewed and compared to 9/2020. My interpretation chronic interstitial changes with reticular markings, ground glass, nodular opacities, mosaic attenuation and bronchiectasis. I don't appreciate emphysema to any significant degree like was mentioned on radiology report     Lung Biopsy from 10/2007         -     Chronic interstitial pneumonia with chronic bronchiolitis and focal  organizing pneumonia (see comment).     Comment(s)       The combination of chronic interstitial pneumonia, bronchiolitis, and  organizing pneumonia raises hypersensitivity pneumonia (extrinsic allergic  alveolitis) as the chief etiologic consideration.  In the absence of an  identifiable antigenic exposure, similar changes can be seen in patients with  various forms of drug-induced lung disease.  In the absence of any history to  incriminate either an antigenic or drug exposure, these findings are most  consistent with nonspecific interstitial pneumonia (NSIP). I reviewed above labs and studies pertinent to this visit and date    Bronchoscopy  12/2018  -  No malignant cells identified    -Increased CD4/CD8 ratio and decreased /CD4 ratio.  These  results may be present in pulmonary sarcoidosis, viral infection,  alveolitis, and interstitial syndromes    Differential:  Neutrophilic, lymphocytic    Cultures all negative    12/2018  VINCENT  negative  ANCA (myeloperioxidase/serine protease 3)  negative  RF (RA):   negative  Anti-Scl 70 (Scleroderma):  negative  Anti-DS DNA (SLE):  negative  Anti- SSA (Ro) (Sjogrens):  negative  Anti-SSB (La) (Sjogrens):  negative  Anti-Bhumika (polymyositis/dermatomyositis):  negative  Anti-Smith (SLE):  negative  Anti-RNP (MCTD):  negative  CK: (inflammatory myopathy) 145  Sed rate  18    Total IgE Level:  116  Significant allergies:  none    HP panel:  Negative    I reviewed the above labs and images     Assessment/Plan:  1. ILD (interstitial lung disease) ([address]  [place] to be HP vs NSIP. Regardless prednisone has helped. 10 mg seems to be the dose that best controls her. Lower doses result in flare ups and need for more oxygen. Continue with prednisone lifelong. Likely to get updated CT later on this year    2. Chronic respiratory failure with hypoxia (HCC)  Uses up to 3 liters of oxygen with exertion and sleep.   Does benefit     COVID vaccine up to date      Follow up in 3 months

## 2022-03-10 ENCOUNTER — OFFICE VISIT (OUTPATIENT)
Dept: INTERNAL MEDICINE CLINIC | Age: 77
End: 2022-03-10
Payer: MEDICARE

## 2022-03-10 ENCOUNTER — TELEPHONE (OUTPATIENT)
Dept: INTERNAL MEDICINE CLINIC | Age: 77
End: 2022-03-10

## 2022-03-10 VITALS
HEIGHT: 65 IN | BODY MASS INDEX: 21.92 KG/M2 | WEIGHT: 131.6 LBS | OXYGEN SATURATION: 95 % | DIASTOLIC BLOOD PRESSURE: 78 MMHG | HEART RATE: 78 BPM | SYSTOLIC BLOOD PRESSURE: 120 MMHG

## 2022-03-10 DIAGNOSIS — G30.1 LATE ONSET ALZHEIMER'S DEMENTIA WITH BEHAVIORAL DISTURBANCE (HCC): ICD-10-CM

## 2022-03-10 DIAGNOSIS — F02.818 LATE ONSET ALZHEIMER'S DEMENTIA WITH BEHAVIORAL DISTURBANCE (HCC): ICD-10-CM

## 2022-03-10 DIAGNOSIS — I10 ESSENTIAL HYPERTENSION: ICD-10-CM

## 2022-03-10 DIAGNOSIS — M81.0 AGE RELATED OSTEOPOROSIS, UNSPECIFIED PATHOLOGICAL FRACTURE PRESENCE: ICD-10-CM

## 2022-03-10 DIAGNOSIS — K21.9 GASTROESOPHAGEAL REFLUX DISEASE, UNSPECIFIED WHETHER ESOPHAGITIS PRESENT: ICD-10-CM

## 2022-03-10 DIAGNOSIS — E11.40 TYPE 2 DIABETES MELLITUS WITH DIABETIC NEUROPATHY, WITHOUT LONG-TERM CURRENT USE OF INSULIN (HCC): Primary | ICD-10-CM

## 2022-03-10 DIAGNOSIS — E11.40 TYPE 2 DIABETES MELLITUS WITH DIABETIC NEUROPATHY, WITHOUT LONG-TERM CURRENT USE OF INSULIN (HCC): ICD-10-CM

## 2022-03-10 PROCEDURE — 83036 HEMOGLOBIN GLYCOSYLATED A1C: CPT | Performed by: INTERNAL MEDICINE

## 2022-03-10 PROCEDURE — 99214 OFFICE O/P EST MOD 30 MIN: CPT | Performed by: INTERNAL MEDICINE

## 2022-03-10 RX ORDER — OMEPRAZOLE 40 MG/1
CAPSULE, DELAYED RELEASE ORAL
Qty: 30 CAPSULE | Refills: 4 | Status: SHIPPED | OUTPATIENT
Start: 2022-03-10

## 2022-03-10 RX ORDER — DONEPEZIL HYDROCHLORIDE 5 MG/1
5 TABLET, FILM COATED ORAL NIGHTLY
Qty: 30 TABLET | Refills: 3 | Status: SHIPPED | OUTPATIENT
Start: 2022-03-10 | End: 2022-08-03

## 2022-03-10 RX ORDER — ALENDRONATE SODIUM 70 MG/1
TABLET ORAL
Qty: 12 TABLET | Refills: 3 | Status: SHIPPED | OUTPATIENT
Start: 2022-03-10

## 2022-03-10 RX ORDER — AMLODIPINE BESYLATE 5 MG/1
TABLET ORAL
Qty: 30 TABLET | Refills: 5 | Status: SHIPPED | OUTPATIENT
Start: 2022-03-10

## 2022-03-10 RX ORDER — MEMANTINE HYDROCHLORIDE 5 MG/1
5 TABLET ORAL 2 TIMES DAILY
Qty: 60 TABLET | Refills: 3 | Status: SHIPPED | OUTPATIENT
Start: 2022-03-10 | End: 2022-08-29

## 2022-03-10 RX ORDER — LOSARTAN POTASSIUM 50 MG/1
TABLET ORAL
Qty: 90 TABLET | Refills: 1 | Status: SHIPPED | OUTPATIENT
Start: 2022-03-10 | End: 2022-05-23 | Stop reason: SDUPTHER

## 2022-03-10 RX ORDER — GABAPENTIN 300 MG/1
CAPSULE ORAL
Qty: 30 CAPSULE | Refills: 3 | Status: SHIPPED | OUTPATIENT
Start: 2022-03-10 | End: 2022-04-20 | Stop reason: SDUPTHER

## 2022-03-10 ASSESSMENT — ENCOUNTER SYMPTOMS
COUGH: 0
SHORTNESS OF BREATH: 0
VOMITING: 0
BLOOD IN STOOL: 0
SORE THROAT: 0
ABDOMINAL PAIN: 0
NAUSEA: 0

## 2022-03-10 NOTE — PROGRESS NOTES
Kenny Lyman (:  1945) is a 67 y.o. female, New patient, here for evaluation of the following chief complaint(s):  Established New Doctor, Diabetes, and Medication Refill (gabapentin )         ASSESSMENT/PLAN:  1. Type 2 diabetes mellitus with diabetic neuropathy, without long-term current use of insulin (HCC)  -     POCT glycosylated hemoglobin (Hb A1C)  - Stable   - Continue same management   -     gabapentin (NEURONTIN) 300 MG capsule; TAKE ONE CAPSULE BY MOUTH DAILY, Disp-30 capsule, R-3Normal  -     metFORMIN (GLUCOPHAGE) 1000 MG tablet; Take 0.5 tablets by mouth 2 times daily (with meals), Disp-1 tablet, R-0Normal  2. Essential hypertension  - Stable   - Continue same management   -     losartan (COZAAR) 50 MG tablet; TAKE 1 TABLET DAILY, Disp-90 tablet, R-1Normal  -     amLODIPine (NORVASC) 5 MG tablet; TAKE ONE TABLET BY MOUTH DAILY, Disp-30 tablet, R-5Normal  3. Late onset Alzheimer's dementia with behavioral disturbance (HCC)  - Stable   - Continue same management   -     memantine (NAMENDA) 5 MG tablet; Take 1 tablet by mouth 2 times daily, Disp-60 tablet, R-3Normal  -     donepezil (ARICEPT) 5 MG tablet; Take 1 tablet by mouth nightly, Disp-30 tablet, R-3Normal  4. Gastroesophageal reflux disease, unspecified whether esophagitis present  - Stable   - Continue same management   -     omeprazole (PRILOSEC) 40 MG delayed release capsule; TAKE ONE CAPSULE BY MOUTH EVERY MORNING BEFORE BREAKFAST, Disp-30 capsule, R-4Normal  5. Age related osteoporosis, unspecified pathological fracture presence  - Stable   - Continue same management   -     alendronate (FOSAMAX) 70 MG tablet; TAKE 1 TABLET BY MOUTH ONCE WEEKLY ON AN EMPTY STOMACH BEFORE BREAKFAST. REMAIN UPRIGHT FOR 30 MINUTES & TAKE WITH 8 OUNCES OF WATER, Disp-12 tablet, R-3Normal      Return in about 3 months (around 6/10/2022). Subjective   SUBJECTIVE/OBJECTIVE:  Diabetes  She presents for her follow-up diabetic visit.  She has type 2 diabetes mellitus. Her disease course has been stable. There are no hypoglycemic associated symptoms. Pertinent negatives for hypoglycemia include no dizziness. There are no diabetic associated symptoms. Pertinent negatives for diabetes include no chest pain, no fatigue and no weakness. There are no hypoglycemic complications. Diabetic complications include peripheral neuropathy. Risk factors for coronary artery disease include hypertension. She is compliant with treatment all of the time. She is following a diabetic diet. She participates in exercise intermittently. An ACE inhibitor/angiotensin II receptor blocker is being taken. Eye exam is current. Hypertension  This is a chronic problem. The current episode started more than 1 year ago. The problem is controlled. Pertinent negatives include no chest pain, palpitations or shortness of breath. Past treatments include angiotensin blockers and calcium channel blockers. The current treatment provides significant improvement. There are no compliance problems. Review of Systems   Constitutional: Negative for fatigue and fever. HENT: Negative for nosebleeds and sore throat. Respiratory: Negative for cough and shortness of breath. Cardiovascular: Negative for chest pain, palpitations and leg swelling. Gastrointestinal: Negative for abdominal pain, blood in stool, nausea and vomiting. Neurological: Negative for dizziness and weakness. Objective   Physical Exam  Constitutional:       Appearance: Normal appearance. HENT:      Head: Normocephalic and atraumatic. Eyes:      General: No scleral icterus. Conjunctiva/sclera: Conjunctivae normal.   Cardiovascular:      Rate and Rhythm: Normal rate and regular rhythm. Pulses: Normal pulses. Heart sounds: Normal heart sounds. Pulmonary:      Effort: Pulmonary effort is normal.      Breath sounds: Normal breath sounds. Musculoskeletal:         General: No swelling.    Skin: General: Skin is warm and dry. Neurological:      Mental Status: She is alert and oriented to person, place, and time. Mental status is at baseline. Psychiatric:         Mood and Affect: Mood normal.         Behavior: Behavior normal.              An electronic signature was used to authenticate this note.     --Jenny Canchola MD

## 2022-04-20 ENCOUNTER — TELEPHONE (OUTPATIENT)
Dept: INTERNAL MEDICINE CLINIC | Age: 77
End: 2022-04-20

## 2022-04-20 DIAGNOSIS — E11.40 TYPE 2 DIABETES MELLITUS WITH DIABETIC NEUROPATHY, WITHOUT LONG-TERM CURRENT USE OF INSULIN (HCC): ICD-10-CM

## 2022-04-20 RX ORDER — GABAPENTIN 300 MG/1
CAPSULE ORAL
Qty: 30 CAPSULE | Refills: 2 | Status: SHIPPED | OUTPATIENT
Start: 2022-04-20 | End: 2022-06-24

## 2022-04-20 NOTE — TELEPHONE ENCOUNTER
Pt is almost out of Gabapentin 300 mg, 1 po qd.     Kobi Guzman    Last oc 3-22-22, next appt 6-24-22

## 2022-05-13 RX ORDER — DICYCLOMINE HYDROCHLORIDE 10 MG/1
10 CAPSULE ORAL 3 TIMES DAILY PRN
Qty: 30 CAPSULE | Refills: 0 | Status: SHIPPED | OUTPATIENT
Start: 2022-05-13 | End: 2022-05-20

## 2022-05-20 RX ORDER — DICYCLOMINE HYDROCHLORIDE 10 MG/1
CAPSULE ORAL
Qty: 30 CAPSULE | Refills: 3 | Status: SHIPPED | OUTPATIENT
Start: 2022-05-20 | End: 2022-06-28

## 2022-05-20 NOTE — TELEPHONE ENCOUNTER
Last office visit :03/10/2022      Future Appointments   Date Time Provider Lila Arias   6/23/2022  1:40 PM [facility]   6/24/2022  1:00 PM Jamir Barrow MD [facility]

## 2022-05-23 DIAGNOSIS — I10 ESSENTIAL HYPERTENSION: ICD-10-CM

## 2022-05-23 RX ORDER — LOSARTAN POTASSIUM 50 MG/1
TABLET ORAL
Qty: 90 TABLET | Refills: 3 | Status: SHIPPED | OUTPATIENT
Start: 2022-05-23

## 2022-05-23 NOTE — TELEPHONE ENCOUNTER
Last office visit :03/10/2022    Future Appointments   Date Time Provider Lila Arias   6/23/2022  1:40 PM Marizol Alcaraz DO [address] [place] [address]   6/24/2022  1:00 PM Diane Junior MD [facility]

## 2022-06-23 ENCOUNTER — OFFICE VISIT (OUTPATIENT)
Dept: PULMONOLOGY | Age: 77
End: 2022-06-23
Payer: MEDICARE

## 2022-06-23 VITALS
SYSTOLIC BLOOD PRESSURE: 130 MMHG | OXYGEN SATURATION: 98 % | HEART RATE: 76 BPM | DIASTOLIC BLOOD PRESSURE: 90 MMHG | HEIGHT: 65 IN | RESPIRATION RATE: 21 BRPM | BODY MASS INDEX: 22.13 KG/M2 | WEIGHT: 132.8 LBS | TEMPERATURE: 97.1 F

## 2022-06-23 DIAGNOSIS — J84.9 ILD (INTERSTITIAL LUNG DISEASE) (HCC): Primary | ICD-10-CM

## 2022-06-23 DIAGNOSIS — J96.11 CHRONIC RESPIRATORY FAILURE WITH HYPOXIA (HCC): ICD-10-CM

## 2022-06-23 PROCEDURE — 99214 OFFICE O/P EST MOD 30 MIN: CPT | Performed by: INTERNAL MEDICINE

## 2022-06-23 PROCEDURE — 1123F ACP DISCUSS/DSCN MKR DOCD: CPT | Performed by: INTERNAL MEDICINE

## 2022-06-23 NOTE — PROGRESS NOTES
Chief complaint  This is a 67y.o. year old female  who comes to see me with a chief complaint of   Chief Complaint   Patient presents with    Follow-up     ILD 3 Months        HPI  Here with a cc of ILD    She is doing well. Tolerating 10 mg of prednisone without side effects. No longer using oxygen at all. No longer having low oxygen numbers as well. Seen with  today who is also a patient         Current Outpatient Medications:     losartan (COZAAR) 50 MG tablet, TAKE 1 TABLET DAILY, Disp: 90 tablet, Rfl: 3    dicyclomine (BENTYL) 10 MG capsule, TAKE 1 CAPSULE(S) BY MOUTH THREE TIMES A DAY AS NEEDED FOR ABDOMINAL CRAMPING, Disp: 30 capsule, Rfl: 3    memantine (NAMENDA) 5 MG tablet, Take 1 tablet by mouth 2 times daily, Disp: 60 tablet, Rfl: 3    donepezil (ARICEPT) 5 MG tablet, Take 1 tablet by mouth nightly, Disp: 30 tablet, Rfl: 3    omeprazole (PRILOSEC) 40 MG delayed release capsule, TAKE ONE CAPSULE BY MOUTH EVERY MORNING BEFORE BREAKFAST, Disp: 30 capsule, Rfl: 4    amLODIPine (NORVASC) 5 MG tablet, TAKE ONE TABLET BY MOUTH DAILY, Disp: 30 tablet, Rfl: 5    alendronate (FOSAMAX) 70 MG tablet, TAKE 1 TABLET BY MOUTH ONCE WEEKLY ON AN EMPTY STOMACH BEFORE BREAKFAST. REMAIN UPRIGHT FOR 30 MINUTES & TAKE WITH 8 OUNCES OF WATER, Disp: 12 tablet, Rfl: 3    montelukast (SINGULAIR) 10 MG tablet, TAKE ONE TABLET BY MOUTH DAILY, Disp: 90 tablet, Rfl: 1    predniSONE (DELTASONE) 10 MG tablet, Take 1 tablet by mouth daily, Disp: 90 tablet, Rfl: 3    Lancets (ONETOUCH DELICA PLUS [id number]) MISC, USE 1 LANCET TWO TIMES A DAY, Disp: 100 each, Rfl: 5    OXYGEN, Inhale 3 L into the lungs nightly, Disp: , Rfl:     blood glucose monitor strips, Test 2 times a day & as needed for symptoms of irregular blood glucose. Dispense sufficient amount for indicated testing frequency plus additional to accommodate PRN testing needs. , Disp: 100 strip, Rfl: 3    folic acid (FOLVITE) 1 MG tablet, Take 1 mg by mouth daily, Disp: , Rfl:     calcium-vitamin D (OSCAL 500/200 D-3) 500-200 MG-UNIT per tablet, Take 1 tablet by mouth 2 times daily (Patient taking differently: Take 1 tablet by mouth daily ), Disp: 1 tablet, Rfl: 0    gabapentin (NEURONTIN) 300 MG capsule, TAKE ONE CAPSULE BY MOUTH DAILY, Disp: 30 capsule, Rfl: 2    metFORMIN (GLUCOPHAGE) 1000 MG tablet, Take 0.5 tablets by mouth 2 times daily (with meals), Disp: 90 tablet, Rfl: 1      PHYSICAL EXAM:  Vitals:    06/23/22 1311   BP: (!) 130/90   Pulse: 76   Resp: 21   Temp: 97.1 °F (36.2 °C)   SpO2: 98%       GENERAL: Looks better today, mildly cushingoid   HEENT:  No scleral icterus, no conjunctival irritation  NECK:  No thyromegaly, no bruits  LYMPH:  No cervical or supraclavicular adenopathy  HEART:  Regular rate and rhythm, no murmurs  LUNGS:  Better aeration, no adventitious sounds   ABDOMEN:  No distention, no organomegaly  EXTREMITIES:  No edema, no digital clubbing  NEURO:  No localizing deficits, CN II-XII intact. Pulmonary Function Testing 11/2018  MY IMPRESSION  Overall Mild restriction, no bronchodilator response and moderate reduction in diffusing capacity     Chest imaging from 11/2019 is reviewed and compared to 9/2020. My interpretation chronic interstitial changes with reticular markings, ground glass, nodular opacities, mosaic attenuation and bronchiectasis. I don't appreciate emphysema to any significant degree like was mentioned on radiology report     Lung Biopsy from 10/2007        -     Chronic interstitial pneumonia with chronic bronchiolitis and focal  organizing pneumonia (see comment).     Comment(s)       The combination of chronic interstitial pneumonia, bronchiolitis, and  organizing pneumonia raises hypersensitivity pneumonia (extrinsic allergic  alveolitis) as the chief etiologic consideration.  In the absence of an  identifiable antigenic exposure, similar changes can be seen in patients with  various forms of drug-induced lung disease.  In the absence of any history to  incriminate either an antigenic or drug exposure, these findings are most  consistent with nonspecific interstitial pneumonia (NSIP). I reviewed above labs and studies pertinent to this visit and date    Bronchoscopy  12/2018  -  No malignant cells identified    -Increased CD4/CD8 ratio and decreased /CD4 ratio.  These  results may be present in pulmonary sarcoidosis, viral infection,  alveolitis, and interstitial syndromes      12/2018  VINCENT  negative  ANCA (myeloperioxidase/serine protease 3)  negative  RF (RA):   negative  Anti-Scl 70 (Scleroderma):  negative  Anti-DS DNA (SLE):  negative  Anti- SSA (Ro) (Sjogrens):  negative  Anti-SSB (La) (Sjogrens):  negative  Anti-Bhumika (polymyositis/dermatomyositis):  negative  Anti-Smith (SLE):  negative  Anti-RNP (MCTD):  negative  CK: (inflammatory myopathy) 145  Sed rate  18    Total IgE Level:  116  Significant allergies:  none    HP panel:  Negative    I reviewed the above labs and images     Assessment/Plan:  1. ILD (interstitial lung disease) (HCC)  HP vs OP. Has done vs well with prednisone 10 mg per day. In the past when she stopped it she declined. She has done much better with continued use of prednisone. Will need 10 mg lifelong. 2. Chronic respiratory failure with hypoxia (HCC)  Resolved. She no longer is using oxygen and saturations have continued to be in the 90's with and without exertion.   Will discontinue the oxygen     Follow up in 3 months

## 2022-06-24 ENCOUNTER — OFFICE VISIT (OUTPATIENT)
Dept: INTERNAL MEDICINE CLINIC | Age: 77
End: 2022-06-24
Payer: MEDICARE

## 2022-06-24 VITALS
OXYGEN SATURATION: 99 % | DIASTOLIC BLOOD PRESSURE: 84 MMHG | WEIGHT: 132 LBS | SYSTOLIC BLOOD PRESSURE: 126 MMHG | HEART RATE: 90 BPM | RESPIRATION RATE: 16 BRPM | TEMPERATURE: 97.8 F | BODY MASS INDEX: 21.97 KG/M2

## 2022-06-24 DIAGNOSIS — I10 ESSENTIAL HYPERTENSION: ICD-10-CM

## 2022-06-24 DIAGNOSIS — E11.40 TYPE 2 DIABETES MELLITUS WITH DIABETIC NEUROPATHY, WITHOUT LONG-TERM CURRENT USE OF INSULIN (HCC): Primary | ICD-10-CM

## 2022-06-24 DIAGNOSIS — M81.0 AGE RELATED OSTEOPOROSIS, UNSPECIFIED PATHOLOGICAL FRACTURE PRESENCE: ICD-10-CM

## 2022-06-24 DIAGNOSIS — F02.818 LATE ONSET ALZHEIMER'S DEMENTIA WITH BEHAVIORAL DISTURBANCE (HCC): ICD-10-CM

## 2022-06-24 DIAGNOSIS — K21.9 GASTROESOPHAGEAL REFLUX DISEASE, UNSPECIFIED WHETHER ESOPHAGITIS PRESENT: ICD-10-CM

## 2022-06-24 DIAGNOSIS — G30.1 LATE ONSET ALZHEIMER'S DEMENTIA WITH BEHAVIORAL DISTURBANCE (HCC): ICD-10-CM

## 2022-06-24 DIAGNOSIS — Z91.81 AT HIGH RISK FOR FALLS: ICD-10-CM

## 2022-06-24 LAB — HBA1C MFR BLD: 5.6 %

## 2022-06-24 PROCEDURE — 99214 OFFICE O/P EST MOD 30 MIN: CPT | Performed by: INTERNAL MEDICINE

## 2022-06-24 PROCEDURE — 83036 HEMOGLOBIN GLYCOSYLATED A1C: CPT | Performed by: INTERNAL MEDICINE

## 2022-06-24 PROCEDURE — 3044F HG A1C LEVEL LT 7.0%: CPT | Performed by: INTERNAL MEDICINE

## 2022-06-24 PROCEDURE — 1123F ACP DISCUSS/DSCN MKR DOCD: CPT | Performed by: INTERNAL MEDICINE

## 2022-06-24 SDOH — ECONOMIC STABILITY: FOOD INSECURITY: WITHIN THE PAST 12 MONTHS, YOU WORRIED THAT YOUR FOOD WOULD RUN OUT BEFORE YOU GOT MONEY TO BUY MORE.: NEVER TRUE

## 2022-06-24 SDOH — ECONOMIC STABILITY: FOOD INSECURITY: WITHIN THE PAST 12 MONTHS, THE FOOD YOU BOUGHT JUST DIDN'T LAST AND YOU DIDN'T HAVE MONEY TO GET MORE.: NEVER TRUE

## 2022-06-24 ASSESSMENT — PATIENT HEALTH QUESTIONNAIRE - PHQ9
SUM OF ALL RESPONSES TO PHQ QUESTIONS 1-9: 0
4. FEELING TIRED OR HAVING LITTLE ENERGY: 0
SUM OF ALL RESPONSES TO PHQ QUESTIONS 1-9: 0
9. THOUGHTS THAT YOU WOULD BE BETTER OFF DEAD, OR OF HURTING YOURSELF: 0
8. MOVING OR SPEAKING SO SLOWLY THAT OTHER PEOPLE COULD HAVE NOTICED. OR THE OPPOSITE, BEING SO FIGETY OR RESTLESS THAT YOU HAVE BEEN MOVING AROUND A LOT MORE THAN USUAL: 0
5. POOR APPETITE OR OVEREATING: 0
SUM OF ALL RESPONSES TO PHQ9 QUESTIONS 1 & 2: 0
10. IF YOU CHECKED OFF ANY PROBLEMS, HOW DIFFICULT HAVE THESE PROBLEMS MADE IT FOR YOU TO DO YOUR WORK, TAKE CARE OF THINGS AT HOME, OR GET ALONG WITH OTHER PEOPLE: 0
3. TROUBLE FALLING OR STAYING ASLEEP: 0
SUM OF ALL RESPONSES TO PHQ QUESTIONS 1-9: 0
7. TROUBLE CONCENTRATING ON THINGS, SUCH AS READING THE NEWSPAPER OR WATCHING TELEVISION: 0
1. LITTLE INTEREST OR PLEASURE IN DOING THINGS: 0
SUM OF ALL RESPONSES TO PHQ QUESTIONS 1-9: 0
2. FEELING DOWN, DEPRESSED OR HOPELESS: 0
6. FEELING BAD ABOUT YOURSELF - OR THAT YOU ARE A FAILURE OR HAVE LET YOURSELF OR YOUR FAMILY DOWN: 0

## 2022-06-24 ASSESSMENT — ENCOUNTER SYMPTOMS
SORE THROAT: 0
COUGH: 0
BLOOD IN STOOL: 0
VOMITING: 0
NAUSEA: 0
ABDOMINAL PAIN: 0
SHORTNESS OF BREATH: 0

## 2022-06-24 ASSESSMENT — SOCIAL DETERMINANTS OF HEALTH (SDOH): HOW HARD IS IT FOR YOU TO PAY FOR THE VERY BASICS LIKE FOOD, HOUSING, MEDICAL CARE, AND HEATING?: NOT HARD AT ALL

## 2022-06-28 RX ORDER — DICYCLOMINE HYDROCHLORIDE 10 MG/1
CAPSULE ORAL
Qty: 30 CAPSULE | Refills: 3 | Status: SHIPPED | OUTPATIENT
Start: 2022-06-28 | End: 2022-08-15

## 2022-06-28 NOTE — TELEPHONE ENCOUNTER
Last office visit :06/24/2022    Future Appointments   Date Time Provider Lila Arias   9/28/2022 11:15 AM Adilene Causey MD [address]   10/13/2022  1:20 PM Felicitas James [facility]

## 2022-08-02 DIAGNOSIS — F02.818 LATE ONSET ALZHEIMER'S DEMENTIA WITH BEHAVIORAL DISTURBANCE (HCC): ICD-10-CM

## 2022-08-02 DIAGNOSIS — G30.1 LATE ONSET ALZHEIMER'S DEMENTIA WITH BEHAVIORAL DISTURBANCE (HCC): ICD-10-CM

## 2022-08-02 NOTE — TELEPHONE ENCOUNTER
Last office visit : 06/24/2022    Future Appointments   Date Time Provider Lila Juana   9/28/2022 11:15 AM Kilo Uribe MD [facility] [employer]   10/13/2022  1:20 PM Sanchez Lora [facility]

## 2022-08-03 RX ORDER — DONEPEZIL HYDROCHLORIDE 5 MG/1
TABLET, FILM COATED ORAL
Qty: 30 TABLET | Refills: 3 | Status: SHIPPED | OUTPATIENT
Start: 2022-08-03

## 2022-08-15 RX ORDER — DICYCLOMINE HYDROCHLORIDE 10 MG/1
CAPSULE ORAL
Qty: 30 CAPSULE | Refills: 1 | Status: SHIPPED | OUTPATIENT
Start: 2022-08-15 | End: 2022-09-06

## 2022-08-15 NOTE — TELEPHONE ENCOUNTER
Last office visit 6/24/22      Future Appointments   Date Time Provider Lila Arias   9/28/2022 11:15 AM Graciela Lopez MD [email or web address]   10/13/2022  1:20 PM Abrahan Sam [facility]

## 2022-08-27 DIAGNOSIS — G30.1 LATE ONSET ALZHEIMER'S DEMENTIA WITH BEHAVIORAL DISTURBANCE (HCC): ICD-10-CM

## 2022-08-27 DIAGNOSIS — F02.818 LATE ONSET ALZHEIMER'S DEMENTIA WITH BEHAVIORAL DISTURBANCE (HCC): ICD-10-CM

## 2022-08-29 RX ORDER — MEMANTINE HYDROCHLORIDE 5 MG/1
TABLET ORAL
Qty: 180 TABLET | Refills: 0 | Status: SHIPPED | OUTPATIENT
Start: 2022-08-29

## 2022-09-06 RX ORDER — DICYCLOMINE HYDROCHLORIDE 10 MG/1
CAPSULE ORAL
Qty: 30 CAPSULE | Refills: 1 | Status: SHIPPED | OUTPATIENT
Start: 2022-09-06 | End: 2022-09-26 | Stop reason: SDUPTHER

## 2022-09-06 NOTE — TELEPHONE ENCOUNTER
Last office visit :06/24/2022    Future Appointments   Date Time Provider Lila Arias   9/28/2022 11:15 AM Alvaro Fong MD [employer] [facility]   10/13/2022  1:20 PM Madeleine Barreto [facility]

## 2022-09-22 DIAGNOSIS — E11.40 TYPE 2 DIABETES MELLITUS WITH DIABETIC NEUROPATHY, WITHOUT LONG-TERM CURRENT USE OF INSULIN (HCC): ICD-10-CM

## 2022-09-22 NOTE — TELEPHONE ENCOUNTER
Last office visit  6/24/22          Future Appointments   Date Time Provider Lila Arias   9/28/2022 11:15 AM Ashley Sifuentes MD [facility] [employer] [employer]   10/13/2022  1:20 PM Lexi Peña DO [address] [place] [address]

## 2022-09-26 RX ORDER — DICYCLOMINE HYDROCHLORIDE 10 MG/1
CAPSULE ORAL
Qty: 30 CAPSULE | Refills: 1 | Status: SHIPPED | OUTPATIENT
Start: 2022-09-26 | End: 2022-10-15

## 2022-09-28 ENCOUNTER — OFFICE VISIT (OUTPATIENT)
Dept: INTERNAL MEDICINE CLINIC | Age: 77
End: 2022-09-28
Payer: MEDICARE

## 2022-09-28 VITALS
OXYGEN SATURATION: 98 % | HEART RATE: 84 BPM | WEIGHT: 135.25 LBS | SYSTOLIC BLOOD PRESSURE: 128 MMHG | TEMPERATURE: 98.1 F | DIASTOLIC BLOOD PRESSURE: 76 MMHG | BODY MASS INDEX: 22.51 KG/M2

## 2022-09-28 DIAGNOSIS — E11.40 TYPE 2 DIABETES MELLITUS WITH DIABETIC NEUROPATHY, WITHOUT LONG-TERM CURRENT USE OF INSULIN (HCC): Primary | ICD-10-CM

## 2022-09-28 DIAGNOSIS — I10 ESSENTIAL HYPERTENSION: ICD-10-CM

## 2022-09-28 DIAGNOSIS — Z23 NEED FOR INFLUENZA VACCINATION: ICD-10-CM

## 2022-09-28 DIAGNOSIS — J44.9 COPD WITH ASTHMA (HCC): ICD-10-CM

## 2022-09-28 DIAGNOSIS — K21.9 GASTROESOPHAGEAL REFLUX DISEASE, UNSPECIFIED WHETHER ESOPHAGITIS PRESENT: ICD-10-CM

## 2022-09-28 DIAGNOSIS — G30.1 LATE ONSET ALZHEIMER'S DEMENTIA WITH BEHAVIORAL DISTURBANCE (HCC): ICD-10-CM

## 2022-09-28 DIAGNOSIS — F02.818 LATE ONSET ALZHEIMER'S DEMENTIA WITH BEHAVIORAL DISTURBANCE (HCC): ICD-10-CM

## 2022-09-28 DIAGNOSIS — M81.0 AGE RELATED OSTEOPOROSIS, UNSPECIFIED PATHOLOGICAL FRACTURE PRESENCE: ICD-10-CM

## 2022-09-28 LAB
A/G RATIO: 2 (ref 1.1–2.2)
ALBUMIN SERPL-MCNC: 4 G/DL (ref 3.4–5)
ALP BLD-CCNC: 46 U/L (ref 40–129)
ALT SERPL-CCNC: 17 U/L (ref 10–40)
ANION GAP SERPL CALCULATED.3IONS-SCNC: 11 MMOL/L (ref 3–16)
AST SERPL-CCNC: 15 U/L (ref 15–37)
BILIRUB SERPL-MCNC: 0.7 MG/DL (ref 0–1)
BUN BLDV-MCNC: 11 MG/DL (ref 7–20)
CALCIUM SERPL-MCNC: 9.5 MG/DL (ref 8.3–10.6)
CHLORIDE BLD-SCNC: 104 MMOL/L (ref 99–110)
CHOLESTEROL, TOTAL: 156 MG/DL (ref 0–199)
CO2: 27 MMOL/L (ref 21–32)
CREAT SERPL-MCNC: 0.7 MG/DL (ref 0.6–1.2)
GFR AFRICAN AMERICAN: >60
GFR NON-AFRICAN AMERICAN: >60
GLUCOSE BLD-MCNC: 81 MG/DL (ref 70–99)
HBA1C MFR BLD: 5.7 %
HDLC SERPL-MCNC: 62 MG/DL (ref 40–60)
LDL CHOLESTEROL CALCULATED: 69 MG/DL
POTASSIUM SERPL-SCNC: 4.4 MMOL/L (ref 3.5–5.1)
SODIUM BLD-SCNC: 142 MMOL/L (ref 136–145)
TOTAL PROTEIN: 6 G/DL (ref 6.4–8.2)
TRIGL SERPL-MCNC: 125 MG/DL (ref 0–150)
TSH REFLEX: 1.56 UIU/ML (ref 0.27–4.2)
VLDLC SERPL CALC-MCNC: 25 MG/DL

## 2022-09-28 PROCEDURE — 1123F ACP DISCUSS/DSCN MKR DOCD: CPT | Performed by: INTERNAL MEDICINE

## 2022-09-28 PROCEDURE — 90694 VACC AIIV4 NO PRSRV 0.5ML IM: CPT | Performed by: INTERNAL MEDICINE

## 2022-09-28 PROCEDURE — G0008 ADMIN INFLUENZA VIRUS VAC: HCPCS | Performed by: INTERNAL MEDICINE

## 2022-09-28 PROCEDURE — 3044F HG A1C LEVEL LT 7.0%: CPT | Performed by: INTERNAL MEDICINE

## 2022-09-28 PROCEDURE — 36415 COLL VENOUS BLD VENIPUNCTURE: CPT | Performed by: INTERNAL MEDICINE

## 2022-09-28 PROCEDURE — 99214 OFFICE O/P EST MOD 30 MIN: CPT | Performed by: INTERNAL MEDICINE

## 2022-09-28 PROCEDURE — 83036 HEMOGLOBIN GLYCOSYLATED A1C: CPT | Performed by: INTERNAL MEDICINE

## 2022-09-28 PROCEDURE — 3288F FALL RISK ASSESSMENT DOCD: CPT | Performed by: INTERNAL MEDICINE

## 2022-09-28 RX ORDER — MONTELUKAST SODIUM 10 MG/1
TABLET ORAL
Qty: 90 TABLET | Refills: 1 | Status: SHIPPED | OUTPATIENT
Start: 2022-09-28

## 2022-09-28 ASSESSMENT — ENCOUNTER SYMPTOMS
BLOOD IN STOOL: 0
SHORTNESS OF BREATH: 0
NAUSEA: 0
VOMITING: 0
ABDOMINAL PAIN: 0
COUGH: 0
SORE THROAT: 0

## 2022-09-28 NOTE — PROGRESS NOTES
Alysha Hi (:  1945) is a 65 y.o. female, New patient, here for evaluation of the following chief complaint(s):  Diabetes (3 month follow up) and Hypertension         ASSESSMENT/PLAN:  1. Type 2 diabetes mellitus with diabetic neuropathy, without long-term current use of insulin (HCC)  -     POCT glycosylated hemoglobin (Hb A1C)  -     Comprehensive Metabolic Panel; Future  - Stable   - Continue current dose of metformin 500 mg twice daily  Continue gabapentin for neuropathy. 2. Essential hypertension  - Stable   - Continue current dose of losartan and amlodipine  -     Lipid Panel; Future  -     TSH with Reflex; Future  3. Late onset Alzheimer's dementia with behavioral disturbance (HCC)  - Stable   - Continue current dose of  memantine and donepezil   4. Gastroesophageal reflux disease, unspecified whether esophagitis present  - Stable   - Continue current dose of  omeprazole   5. Age related osteoporosis, unspecified pathological fracture presence  - Stable   - Continue current dose of  Alendronate  Continue calcium vitamin D3  6. COPD with asthma ([facility] [address]  - Stable   - Continue current dose of  montelukast (SINGULAIR) 10 MG tablet; TAKE ONE TABLET BY MOUTH DAILY, Disp-90 tablet, R-1Normal  7. Need for influenza vaccination  -     Influenza, FLUAD, (age 72 y+), IM, Preservative Free, 0.5 mL      Return in about 3 months (around 2022) for Hypertension, Medicare annual wellness visit in 7 days. Subjective   SUBJECTIVE/OBJECTIVE:  Diabetes  She presents for her follow-up diabetic visit. She has type 2 diabetes mellitus. Her disease course has been stable. There are no hypoglycemic associated symptoms. Pertinent negatives for hypoglycemia include no dizziness. There are no diabetic associated symptoms. Pertinent negatives for diabetes include no chest pain, no fatigue and no weakness. There are no hypoglycemic complications. Diabetic complications include peripheral neuropathy.  Risk factors for coronary artery disease include hypertension. She is compliant with treatment all of the time. She is following a diabetic diet. She participates in exercise intermittently. An ACE inhibitor/angiotensin II receptor blocker is being taken. Eye exam is current. Hypertension  This is a chronic problem. The current episode started more than 1 year ago. The problem is controlled. Pertinent negatives include no chest pain, palpitations or shortness of breath. Past treatments include angiotensin blockers and calcium channel blockers. The current treatment provides significant improvement. There are no compliance problems. Review of Systems   Constitutional:  Negative for fatigue and fever. HENT:  Negative for nosebleeds and sore throat. Respiratory:  Negative for cough and shortness of breath. Cardiovascular:  Negative for chest pain, palpitations and leg swelling. Gastrointestinal:  Negative for abdominal pain, blood in stool, nausea and vomiting. Neurological:  Negative for dizziness and weakness. Objective   Physical Exam  Constitutional:       Appearance: Normal appearance. HENT:      Head: Normocephalic and atraumatic. Eyes:      General: No scleral icterus. Conjunctiva/sclera: Conjunctivae normal.   Cardiovascular:      Rate and Rhythm: Normal rate and regular rhythm. Pulses: Normal pulses. Heart sounds: Normal heart sounds. Pulmonary:      Effort: Pulmonary effort is normal.      Breath sounds: Normal breath sounds. Musculoskeletal:         General: No swelling. Skin:     General: Skin is warm and dry. Neurological:      Mental Status: She is alert and oriented to person, place, and time. Mental status is at baseline. Psychiatric:         Mood and Affect: Mood normal.         Behavior: Behavior normal.            An electronic signature was used to authenticate this note.     --Josie Tee MD

## 2022-10-15 RX ORDER — DICYCLOMINE HYDROCHLORIDE 10 MG/1
CAPSULE ORAL
Qty: 30 CAPSULE | Refills: 1 | Status: SHIPPED | OUTPATIENT
Start: 2022-10-15 | End: 2022-11-01

## 2022-11-01 RX ORDER — DICYCLOMINE HYDROCHLORIDE 10 MG/1
CAPSULE ORAL
Qty: 30 CAPSULE | Refills: 0 | Status: SHIPPED | OUTPATIENT
Start: 2022-11-01 | End: 2022-11-10

## 2022-11-01 NOTE — TELEPHONE ENCOUNTER
Last office visit 9/28/22        Future Appointments   Date Time Provider Lila Juana   12/28/2022  1:30 PM Ulysses Bowden MD [address]   1/5/2023 11:20 AM Clement Robles [facility]

## 2022-11-03 DIAGNOSIS — E11.40 TYPE 2 DIABETES MELLITUS WITH DIABETIC NEUROPATHY, WITHOUT LONG-TERM CURRENT USE OF INSULIN (HCC): ICD-10-CM

## 2022-11-03 NOTE — TELEPHONE ENCOUNTER
Last ov 09 28 22  Future Appointments   Date Time Provider Lila Arias   12/28/2022  1:30 PM Dixon Martin MD [address]   1/5/2023 11:20 AM Nayan Nick [facility]

## 2022-11-10 RX ORDER — DICYCLOMINE HYDROCHLORIDE 10 MG/1
CAPSULE ORAL
Qty: 30 CAPSULE | Refills: 0 | Status: SHIPPED | OUTPATIENT
Start: 2022-11-10 | End: 2022-11-21

## 2022-11-21 RX ORDER — DICYCLOMINE HYDROCHLORIDE 10 MG/1
CAPSULE ORAL
Qty: 30 CAPSULE | Refills: 0 | Status: SHIPPED | OUTPATIENT
Start: 2022-11-21

## 2022-11-24 DIAGNOSIS — F02.818 LATE ONSET ALZHEIMER'S DEMENTIA WITH BEHAVIORAL DISTURBANCE (HCC): ICD-10-CM

## 2022-11-24 DIAGNOSIS — G30.1 LATE ONSET ALZHEIMER'S DEMENTIA WITH BEHAVIORAL DISTURBANCE (HCC): ICD-10-CM

## 2022-11-25 RX ORDER — MEMANTINE HYDROCHLORIDE 5 MG/1
TABLET ORAL
Qty: 180 TABLET | Refills: 0 | Status: SHIPPED | OUTPATIENT
Start: 2022-11-25

## 2022-11-25 NOTE — TELEPHONE ENCOUNTER
Last office visit 9/28/22          Future Appointments   Date Time Provider Lila Arias   12/28/2022  1:30 PM Shannan Deng MD [address]   1/5/2023 11:20 AM Rin Carl [facility]

## 2022-12-03 DIAGNOSIS — F02.818 LATE ONSET ALZHEIMER'S DEMENTIA WITH BEHAVIORAL DISTURBANCE (HCC): ICD-10-CM

## 2022-12-03 DIAGNOSIS — G30.1 LATE ONSET ALZHEIMER'S DEMENTIA WITH BEHAVIORAL DISTURBANCE (HCC): ICD-10-CM

## 2022-12-05 RX ORDER — DICYCLOMINE HYDROCHLORIDE 10 MG/1
CAPSULE ORAL
Qty: 30 CAPSULE | Refills: 1 | Status: SHIPPED | OUTPATIENT
Start: 2022-12-05

## 2022-12-05 RX ORDER — DONEPEZIL HYDROCHLORIDE 5 MG/1
TABLET, FILM COATED ORAL
Qty: 30 TABLET | Refills: 3 | Status: SHIPPED | OUTPATIENT
Start: 2022-12-05

## 2022-12-05 NOTE — TELEPHONE ENCOUNTER
Future Appointments   Date Time Provider Lila Arias   12/28/2022  1:30 PM Adonis Garg MD [address]   1/5/2023 11:20 AM Mar Knight [facility]     Last appt on 9.28.2022

## 2022-12-05 NOTE — TELEPHONE ENCOUNTER
Last ov 09 28 22  Future Appointments   Date Time Provider Lila Arias   12/28/2022  1:30 PM Dane Duncan MD [address]   1/5/2023 11:20 AM Jose Cruz [facility]

## 2022-12-15 DIAGNOSIS — F02.818 LATE ONSET ALZHEIMER'S DEMENTIA WITH BEHAVIORAL DISTURBANCE (HCC): ICD-10-CM

## 2022-12-15 DIAGNOSIS — J44.9 COPD WITH ASTHMA (HCC): ICD-10-CM

## 2022-12-15 DIAGNOSIS — G30.1 LATE ONSET ALZHEIMER'S DEMENTIA WITH BEHAVIORAL DISTURBANCE (HCC): ICD-10-CM

## 2022-12-15 DIAGNOSIS — E11.40 TYPE 2 DIABETES MELLITUS WITH DIABETIC NEUROPATHY, WITHOUT LONG-TERM CURRENT USE OF INSULIN (HCC): ICD-10-CM

## 2022-12-15 RX ORDER — DONEPEZIL HYDROCHLORIDE 5 MG/1
TABLET, FILM COATED ORAL
Qty: 30 TABLET | Refills: 3 | Status: CANCELLED | OUTPATIENT
Start: 2022-12-15

## 2022-12-16 RX ORDER — MEMANTINE HYDROCHLORIDE 5 MG/1
5 TABLET ORAL 2 TIMES DAILY
Qty: 180 TABLET | Refills: 0 | Status: SHIPPED | OUTPATIENT
Start: 2022-12-16

## 2022-12-16 RX ORDER — MONTELUKAST SODIUM 10 MG/1
TABLET ORAL
Qty: 90 TABLET | Refills: 1 | Status: SHIPPED | OUTPATIENT
Start: 2022-12-16

## 2022-12-16 RX ORDER — GABAPENTIN 300 MG/1
CAPSULE ORAL
Qty: 30 CAPSULE | Refills: 2 | Status: SHIPPED | OUTPATIENT
Start: 2022-12-16 | End: 2023-02-18

## 2022-12-28 ENCOUNTER — OFFICE VISIT (OUTPATIENT)
Dept: INTERNAL MEDICINE CLINIC | Age: 77
End: 2022-12-28

## 2022-12-28 VITALS
WEIGHT: 131.6 LBS | SYSTOLIC BLOOD PRESSURE: 158 MMHG | OXYGEN SATURATION: 98 % | DIASTOLIC BLOOD PRESSURE: 100 MMHG | HEIGHT: 65 IN | HEART RATE: 93 BPM | BODY MASS INDEX: 21.92 KG/M2

## 2022-12-28 DIAGNOSIS — Z00.00 MEDICARE ANNUAL WELLNESS VISIT, SUBSEQUENT: Primary | ICD-10-CM

## 2022-12-28 DIAGNOSIS — I10 ESSENTIAL HYPERTENSION: ICD-10-CM

## 2022-12-28 DIAGNOSIS — F02.818 LATE ONSET ALZHEIMER'S DEMENTIA WITH BEHAVIORAL DISTURBANCE (HCC): ICD-10-CM

## 2022-12-28 DIAGNOSIS — K21.9 GASTROESOPHAGEAL REFLUX DISEASE, UNSPECIFIED WHETHER ESOPHAGITIS PRESENT: ICD-10-CM

## 2022-12-28 DIAGNOSIS — J44.9 COPD WITH ASTHMA (HCC): ICD-10-CM

## 2022-12-28 DIAGNOSIS — M81.0 AGE RELATED OSTEOPOROSIS, UNSPECIFIED PATHOLOGICAL FRACTURE PRESENCE: ICD-10-CM

## 2022-12-28 DIAGNOSIS — G30.1 LATE ONSET ALZHEIMER'S DEMENTIA WITH BEHAVIORAL DISTURBANCE (HCC): ICD-10-CM

## 2022-12-28 DIAGNOSIS — E11.40 TYPE 2 DIABETES MELLITUS WITH DIABETIC NEUROPATHY, WITHOUT LONG-TERM CURRENT USE OF INSULIN (HCC): ICD-10-CM

## 2022-12-28 LAB
FOLATE: 9.07 NG/ML (ref 4.78–24.2)
VITAMIN B-12: 451 PG/ML (ref 211–911)

## 2022-12-28 ASSESSMENT — LIFESTYLE VARIABLES
HOW MANY STANDARD DRINKS CONTAINING ALCOHOL DO YOU HAVE ON A TYPICAL DAY: PATIENT DOES NOT DRINK
HOW OFTEN DURING THE LAST YEAR HAVE YOU BEEN UNABLE TO REMEMBER WHAT HAPPENED THE NIGHT BEFORE BECAUSE YOU HAD BEEN DRINKING: 0
HOW OFTEN DURING THE LAST YEAR HAVE YOU NEEDED AN ALCOHOLIC DRINK FIRST THING IN THE MORNING TO GET YOURSELF GOING AFTER A NIGHT OF HEAVY DRINKING: 0
HOW OFTEN DURING THE LAST YEAR HAVE YOU FAILED TO DO WHAT WAS NORMALLY EXPECTED FROM YOU BECAUSE OF DRINKING: 0
HOW OFTEN DURING THE LAST YEAR HAVE YOU HAD A FEELING OF GUILT OR REMORSE AFTER DRINKING: 0
HOW OFTEN DO YOU HAVE A DRINK CONTAINING ALCOHOL: NEVER
HAS A RELATIVE, FRIEND, DOCTOR, OR ANOTHER HEALTH PROFESSIONAL EXPRESSED CONCERN ABOUT YOUR DRINKING OR SUGGESTED YOU CUT DOWN: 0
HOW OFTEN DURING THE LAST YEAR HAVE YOU FOUND THAT YOU WERE NOT ABLE TO STOP DRINKING ONCE YOU HAD STARTED: 0
HAVE YOU OR SOMEONE ELSE BEEN INJURED AS A RESULT OF YOUR DRINKING: 0

## 2022-12-28 ASSESSMENT — PATIENT HEALTH QUESTIONNAIRE - PHQ9
9. THOUGHTS THAT YOU WOULD BE BETTER OFF DEAD, OR OF HURTING YOURSELF: 0
5. POOR APPETITE OR OVEREATING: 0
3. TROUBLE FALLING OR STAYING ASLEEP: 0
7. TROUBLE CONCENTRATING ON THINGS, SUCH AS READING THE NEWSPAPER OR WATCHING TELEVISION: 0
SUM OF ALL RESPONSES TO PHQ QUESTIONS 1-9: 0
10. IF YOU CHECKED OFF ANY PROBLEMS, HOW DIFFICULT HAVE THESE PROBLEMS MADE IT FOR YOU TO DO YOUR WORK, TAKE CARE OF THINGS AT HOME, OR GET ALONG WITH OTHER PEOPLE: 0
SUM OF ALL RESPONSES TO PHQ9 QUESTIONS 1 & 2: 0
SUM OF ALL RESPONSES TO PHQ QUESTIONS 1-9: 0
2. FEELING DOWN, DEPRESSED OR HOPELESS: 0
SUM OF ALL RESPONSES TO PHQ QUESTIONS 1-9: 0
4. FEELING TIRED OR HAVING LITTLE ENERGY: 0
SUM OF ALL RESPONSES TO PHQ QUESTIONS 1-9: 0
6. FEELING BAD ABOUT YOURSELF - OR THAT YOU ARE A FAILURE OR HAVE LET YOURSELF OR YOUR FAMILY DOWN: 0
8. MOVING OR SPEAKING SO SLOWLY THAT OTHER PEOPLE COULD HAVE NOTICED. OR THE OPPOSITE, BEING SO FIGETY OR RESTLESS THAT YOU HAVE BEEN MOVING AROUND A LOT MORE THAN USUAL: 0
1. LITTLE INTEREST OR PLEASURE IN DOING THINGS: 0

## 2022-12-28 NOTE — PATIENT INSTRUCTIONS
Learning About Mild Cognitive Impairment (MCI)  What is mild cognitive impairment (MCI)? It's common to forget things sometimes as we get older. But some older people have memory loss that's more than normal aging. It's called mild cognitive impairment, or MCI. It is not the same as dementia. People with the condition often know that their memory or mental function has changed. Tests may show some loss. But their minds work well overall. They can carry out daily tasks that are normal for them. People with MCI have a higher chance of one day getting dementia. But not all people who have it will get dementia. Some people may stay the same over time. What are the symptoms? People with MCI have more memory loss than what occurs with normal aging. They may have increasing trouble with recalling words and keeping up with conversations. They may also have trouble remembering important events and making decisions. What puts you at risk? The risk of getting MCI increases with age. Having high blood pressure or having a family history of MCI may also increase your risk. How is it diagnosed? Your doctor will do a physical exam.  You may be asked questions to check your memory and other mental skills. Your doctor may also talk to close friends and family members. This can help the doctor figure out how your memory and other mental skills have changed. You may get blood tests and tests that look at your brain. These questions and tests can make sure you don't have other conditions that can cause symptoms like MCI. These include depression, sleep problems, and side effects from medicines. How is it treated? There are no medicines to treat MCI or to keep it from progressing to dementia. But treating conditions like high blood pressure and diabetes may help. A person with MCI needs routine follow-up visits with their doctor to check on changes in the person's mental skills.   How can you care for yourself at home?  Keeping your body active can help slow MCI. Exercises like walking can help. Try to stay active mentally too. Read or do things like crossword puzzles if you enjoy doing them. If you need help coping with MCI, you may want to get support from family, friends, a support group, or a counselor who works with people who have [address] Though the future isn't always clear, it can be good to plan ahead with instructions for your care. These are called advanced directives. Having a plan can help make sure that you get the care you want. Current as of: August 25, 2022               Content Version: 13.5  © 2006-2022 [employer]. Care instructions adapted under license by [facility] ([facility]). If you have questions about a medical condition or this instruction, always ask your healthcare professional. [place] [address] any warranty or liability for your use of this information. Learning About Dental Care for Older Adults  Dental care for older adults: Overview  Dental care for older people is much the same as for younger adults. But older adults do have concerns that younger adults do not. Older adults may have problems with gum disease and decay on the roots of their teeth. They may need missing teeth replaced or broken fillings fixed. Or they may have dentures that need to be cared for. Some older adults may have trouble holding a toothbrush. You can help remind the person you are caring for to brush and floss their teeth or to clean their dentures. In some cases, you may need to do the brushing and other dental care tasks. People who have trouble using their hands or who have dementia may need this extra help. How can you help with dental care? Normal dental care  To keep the teeth and gums healthy:  Brush the teeth with fluoride toothpaste twice a day--in the morning and at night--and floss at least once a day. Plaque can quickly build up on the teeth of older adults.   Watch for the signs of gum disease. These signs include gums that bleed after brushing or after eating hard foods, such as apples. See a dentist regularly. Many experts recommend checkups every 6 months. Keep the dentist up to date on any new medications the person is taking. Encourage a balanced diet that includes whole grains, vegetables, and fruits, and that is low in saturated fat and sodium. Encourage the person you're caring for not to use tobacco products. They can affect dental and general health. Many older adults have a fixed income and feel that they can't afford dental care. But most [facility] and [facility] have programs in which dentists help older adults by lowering fees. Contact your area's public health offices or  for information about dental care in your area. Using a toothbrush  Older adults with arthritis sometimes have trouble brushing their teeth because they can't easily hold the toothbrush. Their hands and fingers may be stiff, painful, or weak. If this is the case, you can: Offer an electric toothbrush. Enlarge the handle of a non-electric toothbrush by wrapping a sponge, an elastic bandage, or adhesive tape around it. Push the toothbrush handle through a ball made of rubber or soft foam.  Make the handle longer and thicker by taping Popsicle sticks or tongue depressors to it. You may also be able to buy special toothbrushes, toothpaste dispensers, and floss holders. Your doctor may recommend a soft-bristle toothbrush if the person you care for bleeds easily. Bleeding can happen because of a health problem or from certain medicines. A toothpaste for sensitive teeth may help if the person you care for has sensitive teeth. How do you brush and floss someone's teeth? If the person you are caring for has a hard time cleaning their teeth on their own, you may need to brush and floss their teeth for them.  It may be easiest to have the person sit and face away from you, and to sit or stand behind them. That way you can steady their head against your arm as you reach around to floss and brush their teeth. Choose a place that has good lighting and is comfortable for both of you. Before you begin, gather your supplies. You will need gloves, floss, a toothbrush, and a container to hold water if you are not near a sink. Wash and dry your hands well and put on gloves. Start by flossing:  Gently work a piece of floss between each of the teeth toward the gums. A plastic flossing tool may make this easier, and they are available at most [facility]. Curve the floss around each tooth into a U-shape and gently slide it under the gum line. Move the floss firmly up and down several times to scrape off the plaque. After you've finished flossing, throw away the used floss and begin brushing:  Wet the brush and apply toothpaste. Place the brush at a 45-degree angle where the teeth meet the gums. Press firmly, and move the brush in small circles over the surface of the teeth. Be careful not to brush too hard. Vigorous brushing can make the gums pull away from the teeth and can scratch the tooth enamel. Brush all surfaces of the teeth, on the tongue side and on the cheek side. Pay special attention to the front teeth and all surfaces of the back teeth. Brush chewing surfaces with short back-and-forth strokes. After you've finished, help the person rinse the remaining toothpaste from their mouth. Where can you learn more? Go to http://www.woods.com/ and enter F944 to learn more about \"Learning About Dental Care for Older Adults. \"  Current as of: June 16, 2022               Content Version: 13.5  © 0863-4700 [employer]. Care instructions adapted under license by [facility] ([facility]).  If you have questions about a medical condition or this instruction, always ask your healthcare professional. [address] any warranty or liability for your use of this information. Learning About Vision Tests  What are vision tests? The four most common vision tests are visual acuity tests, refraction, visual field tests, and color vision tests. Visual acuity (sharpness) tests  These tests are used: To see if you need glasses or contact lenses. To monitor an eye problem. To check an eye injury. Visual acuity tests are done as part of routine exams. You may also have this test when you get your 's license or apply for some types of jobs. Visual field tests  These tests are used: To check for vision loss in any area of your range of vision. To screen for certain eye diseases. To look for nerve damage after a stroke, head injury, or other problem that could reduce blood flow to the brain. Refraction and color tests  A refraction test is done to find the right prescription for glasses and contact lenses. A color vision test is done to check for color blindness. Color vision is often tested as part of a routine exam. You may also have this test when you apply for a job where recognizing different colors is important, such as , electronics, or the [employer]. How are vision tests done? Visual acuity test   You cover one eye at a time. You read aloud from a wall chart across the room. You read aloud from a small card that you hold in your hand. Refraction   You look into a special device. The device puts lenses of different strengths in front of each eye to see how strong your glasses or contact lenses need to be. Visual field tests   Your doctor may have you look through special machines. Or your doctor may simply have you stare straight ahead while they move a finger into and out of your field of vision. Color vision test   You look at pieces of printed test patterns in various colors. You say what number or symbol you see. Your doctor may have you trace the number or symbol using a pointer. How do these tests feel?   There is very little chance of having a problem from this test. If dilating drops are used for a vision test, they may make the eyes sting and cause a medicine taste in the mouth. Follow-up care is a key part of your treatment and safety. Be sure to make and go to all appointments, and call your doctor if you are having problems. It's also a good idea to know your test results and keep a list of the medicines you take. Where can you learn more? Go to http://www.murrell.com/ and enter G551 to learn more about \"Learning About Vision Tests. \"  Current as of: October 12, 2022               Content Version: 13.5  © 7909-1533 [employer]. Care instructions adapted under license by [facility] ([facility]). If you have questions about a medical condition or this instruction, always ask your healthcare professional. [place] [address] any warranty or liability for your use of this information. Advance Directives: Care Instructions  Overview  An advance directive is a legal way to state your wishes at the end of your life. It tells your family and your doctor what to do if you can't say what you want. There are two main types of advance directives. You can change them any time your wishes change. Living will. This form tells your family and your doctor your wishes about life support and other treatment. The form is also called a declaration. Medical power of . This form lets you name a person to make treatment decisions for you when you can't speak for yourself. This person is called a health care agent (health care proxy, health care surrogate). The form is also called a durable power of  for health care. If you do not have an advance directive, decisions about your medical care may be made by a family member, or by a doctor or a  who doesn't know you. It may help to think of an advance directive as a gift to the people who care for you.  If you have one, they won't have to make tough decisions by themselves. For more information, including forms for your state, see the [address] website (www.caringinfo.org/planning/advance-directives/). Follow-up care is a key part of your treatment and safety. Be sure to make and go to all appointments, and call your doctor if you are having problems. It's also a good idea to know your test results and keep a list of the medicines you take. What should you include in an advance directive? Many states have a unique advance directive form. (It may ask you to address specific issues.) Or you might use a universal form that's approved by many states. If your form doesn't tell you what to address, it may be hard to know what to include in your advance directive. Use the questions below to help you get started. Who do you want to make decisions about your medical care if you are not able to? What life-support measures do you want if you have a serious illness that gets worse over time or can't be cured? What are you most afraid of that might happen? (Maybe you're afraid of having pain, losing your independence, or being kept alive by machines.)  Where would you prefer to die? (Your home? A hospital? A nursing home?)  Do you want to donate your organs when you die? Do you want certain Adventism practices performed before you die? When should you call for help? Be sure to contact your doctor if you have any questions. Where can you learn more? Go to http://www.murrell.com/ and enter R264 to learn more about \"Advance Directives: Care Instructions. \"  Current as of: June 16, 2022               Content Version: 13.5  © 3981-1693 Healthwise, Incorporated. Care instructions adapted under license by [facility] ([facility]).  If you have questions about a medical condition or this instruction, always ask your healthcare professional. [address] any warranty or liability for your use of this information. Personalized Preventive Plan for Zahida Chavira - 12/28/2022  Medicare offers a range of preventive health benefits. Some of the tests and screenings are paid in full while other may be subject to a deductible, co-insurance, and/or copay. Some of these benefits include a comprehensive review of your medical history including lifestyle, illnesses that may run in your family, and various assessments and screenings as appropriate. After reviewing your medical record and screening and assessments performed today your provider may have ordered immunizations, labs, imaging, and/or referrals for you. A list of these orders (if applicable) as well as your Preventive Care list are included within your After Visit Summary for your review. Other Preventive Recommendations:    A preventive eye exam performed by an eye specialist is recommended every 1-2 years to screen for glaucoma; cataracts, macular degeneration, and other eye disorders. A preventive dental visit is recommended every 6 months. Try to get at least 150 minutes of exercise per week or 10,000 steps per day on a pedometer . Order or download the FREE \"Exercise & Physical Activity: Your Everyday Guide\" from [employer]. Call 4-796.674.9119 or search [employer] online. You need 9917-9282 mg of calcium and 0802-5597 IU of vitamin D per day. It is possible to meet your calcium requirement with diet alone, but a vitamin D supplement is usually necessary to meet this goal.  When exposed to the sun, use a sunscreen that protects against both UVA and UVB radiation with an SPF of 30 or greater. Reapply every 2 to 3 hours or after sweating, drying off with a towel, or swimming. Always wear a seat belt when traveling in a car. Always wear a helmet when riding a bicycle or motorcycle.

## 2022-12-28 NOTE — PROGRESS NOTES
Medicare Annual Wellness Visit    Chapo Robertson is here for Medicare AWV    Assessment & Plan   Medicare annual wellness visit, subsequent    Type 2 diabetes mellitus with diabetic neuropathy, without long-term current use of insulin (HCC)  -     Hemoglobin A1C  Continue current dose of metformin 500 mg twice daily. Adjust the dose of metformin based on A1c result  Continue gabapentin for neuropathy. Late onset Alzheimer's dementia with behavioral disturbance (HCC)  Chronic, memory loss is worsening  -     Vitamin B12 & Folate  Will treat based on results  - Continue current dose of  memantine and donepezil   Essential hypertension  BP elevated today   Advised to check BP daily at home and call us back with readings after 2 weeks. Continue current dose of losartan and amlodipine, will adjust the dose based on home BP readings. Gastroesophageal reflux disease, unspecified whether esophagitis present  Stable   Continue current dose of  omeprazole   Age related osteoporosis, unspecified pathological fracture presence  Stable   Continue current dose of  Alendronate  Continue calcium vitamin D3  COPD with asthma ([facility] [address]  - Stable   - Continue current dose of  montelukast (SINGULAIR) 10 MG tablet  Follow up with pulmonology       Recommendations for Preventive Services Due: see orders and patient instructions/AVS.  Recommended screening schedule for the next 5-10 years is provided to the patient in written form: see Patient Instructions/AVS.     Return in 3 months (on 3/28/2023) for Medicare Annual Wellness Visit in 1 year.      Subjective   The following acute and/or chronic problems were also addressed today:  Type 2 diabetes mellitus with diabetic neuropathy, without long-term current use of insulin ([facility] [address]    Late onset Alzheimer's dementia with behavioral disturbance (HCC)    Essential hypertension    Gastroesophageal reflux disease, unspecified whether esophagitis present    Age related osteoporosis, unspecified pathological fracture presence    COPD with asthma ([facility] [address]    Review of Systems   Constitutional:  Negative for fatigue and fever. HENT:  Negative for nosebleeds and sore throat. Respiratory:  Negative for cough and shortness of breath. Cardiovascular:  Negative for chest pain, palpitations and leg swelling. Gastrointestinal:  Negative for abdominal pain, blood in stool, nausea and vomiting. Neurological:  Negative for dizziness and weakness. Patient's complete Health Risk Assessment and screening values have been reviewed and are found in Flowsheets. The following problems were reviewed today and where indicated follow up appointments were made and/or referrals ordered. Positive Risk Factor Screenings with Interventions:       Cognitive: Words recalled: 0 Words Recalled   Clock Drawing Test (CDT): (!) Abnormal   Total Score: (!) 0   Total Score Interpretation: Abnormal Mini-Cog      Interventions:  See AVS for additional education material  See A/P for plan and any pertinent orders             Weight and Activity:  Physical Activity: Insufficiently Active    Days of Exercise per Week: 4 days    Minutes of Exercise per Session: 20 min     On average, how many days per week do you engage in moderate to strenuous exercise (like a brisk walk)?: 4 days  Have you lost any weight without trying in the past 3 months?: (!) Yes  Body mass index: 21.9      Unintentional Weight Loss Interventions:  Advised patient to eat healthy diet 3 times daily and take protein shakes. Dentist Screen:  Have you seen the dentist within the past year?: (!) No    Intervention:  Advised to schedule with their dentist     Vision Screen:  Do you have difficulty driving, watching TV, or doing any of your daily activities because of your eyesight?: No  Have you had an eye exam within the past year?: (!) No  No results found.     Interventions:   Patient encouraged to make appointment with their eye specialist    Safety:  Do you have any tripping hazards - loose or unsecured carpets or rugs?: (!) Yes  Interventions:  See AVS for additional education material     Advanced Directives:  Do you have a Living Will?: (!) No    Intervention:                         Objective   Vitals:    12/28/22 1335 12/28/22 1411   BP: (!) 142/90 (!) 158/100   Site: Left Upper Arm    Position: Sitting    Cuff Size: Medium Adult    Pulse: 93    SpO2: 98%    Weight: 131 lb 9.6 oz (59.7 kg)    Height: 5' 5\" (1.651 m)       Body mass index is 21.9 kg/m². Physical Exam  Constitutional:       Appearance: Normal appearance. HENT:      Head: Normocephalic and atraumatic. Eyes:      General: No scleral icterus. Conjunctiva/sclera: Conjunctivae normal.   Cardiovascular:      Rate and Rhythm: Normal rate and regular rhythm. Pulses: Normal pulses. Heart sounds: Normal heart sounds. Pulmonary:      Effort: Pulmonary effort is normal.      Breath sounds: Normal breath sounds. Musculoskeletal:         General: No swelling. Skin:     General: Skin is warm and dry. Neurological:      Mental Status: She is alert and oriented to person, place, and time. Mental status is at baseline. Psychiatric:         Mood and Affect: Mood normal.         Behavior: Behavior normal.             Allergies   Allergen Reactions    Latex Rash    [place] [Aspirin Effervescent] Hives     Tongue swelling,but advil does not bother her. Sulfa Antibiotics Hives     Prior to Visit Medications    Medication Sig Taking?  Authorizing Provider   gabapentin (NEURONTIN) 300 MG capsule TAKE ONE CAPSULE BY MOUTH DAILY Yes Ced Ayers MD   montelukast (SINGULAIR) 10 MG tablet TAKE ONE TABLET BY MOUTH DAILY Yes Ced Ayers MD   donepezil (ARICEPT) 5 MG tablet TAKE ONE TABLET BY MOUTH ONCE NIGHTLY Yes Ced Ayers MD   memantine (NAMENDA) 5 MG tablet Take 1 tablet by mouth 2 times daily Yes Ced Ayers MD   dicyclomine (BENTYL) 10 MG capsule TAKE ONE CAPSULE BY MOUTH THREE TIMES A DAY AS NEEDED FOR ABDOMINAL CRAMPING Yes Britney Aguillon MD   metFORMIN (GLUCOPHAGE) 500 MG tablet TAKE ONE TABLET BY MOUTH TWICE A DAY WITH MEALS Yes Britney Aguillon MD   losartan (COZAAR) 50 MG tablet TAKE 1 TABLET DAILY Yes Sachi Leong MD   omeprazole (PRILOSEC) 40 MG delayed release capsule TAKE ONE CAPSULE BY MOUTH EVERY MORNING BEFORE BREAKFAST Yes Britney Aguillon MD   amLODIPine (NORVASC) 5 MG tablet TAKE ONE TABLET BY MOUTH DAILY Yes Britney Aguillon MD   alendronate (FOSAMAX) 70 MG tablet TAKE 1 TABLET BY MOUTH ONCE WEEKLY ON AN EMPTY STOMACH BEFORE BREAKFAST. REMAIN UPRIGHT FOR 30 MINUTES & TAKE WITH 8 OUNCES OF WATER Yes Britney Aguillon MD   predniSONE (DELTASONE) 10 MG tablet Take 1 tablet by mouth daily Yes Shazia Rodrigues,    Lancets ([address]) [address] USE 1 Raenell Joanne Yes Ben Charles MD   blood glucose monitor strips Test 2 times a day & as needed for symptoms of irregular blood glucose. Dispense sufficient amount for indicated testing frequency plus additional to accommodate PRN testing needs.  Yes Ben Charles MD   folic acid (FOLVITE) 1 MG tablet Take 1 mg by mouth daily Yes Historical Provider, MD   calcium-vitamin D (OSCAL 500/200 D-3) 500-200 MG-UNIT per tablet Take 1 tablet by mouth 2 times daily  Patient taking differently: Take 1 tablet by mouth daily Yes Ben Charles MD       [facility] (Including outside providers/suppliers regularly involved in providing care):   Patient Care Team:  Britney Aguillon MD as PCP - General (Internal Medicine)  Britney Aguillon MD as PCP - [facility] Empaneled Provider     Reviewed and updated this visit:  Tobacco  Allergies  Meds  Med Hx  Surg Hx  Soc Hx  Fam Hx

## 2022-12-29 LAB
ESTIMATED AVERAGE GLUCOSE: 119.8 MG/DL
HBA1C MFR BLD: 5.8 %

## 2023-02-02 ENCOUNTER — OFFICE VISIT (OUTPATIENT)
Dept: PULMONOLOGY | Age: 78
End: 2023-02-02
Payer: MEDICARE

## 2023-02-02 VITALS
RESPIRATION RATE: 21 BRPM | BODY MASS INDEX: 21.86 KG/M2 | WEIGHT: 131.2 LBS | DIASTOLIC BLOOD PRESSURE: 80 MMHG | HEIGHT: 65 IN | SYSTOLIC BLOOD PRESSURE: 115 MMHG | HEART RATE: 85 BPM | TEMPERATURE: 96.9 F | OXYGEN SATURATION: 95 %

## 2023-02-02 DIAGNOSIS — J84.9 ILD (INTERSTITIAL LUNG DISEASE) (HCC): Primary | ICD-10-CM

## 2023-02-02 DIAGNOSIS — I49.9 IRREGULAR HEART RATE: ICD-10-CM

## 2023-02-02 PROCEDURE — 3074F SYST BP LT 130 MM HG: CPT | Performed by: INTERNAL MEDICINE

## 2023-02-02 PROCEDURE — 3079F DIAST BP 80-89 MM HG: CPT | Performed by: INTERNAL MEDICINE

## 2023-02-02 PROCEDURE — 99214 OFFICE O/P EST MOD 30 MIN: CPT | Performed by: INTERNAL MEDICINE

## 2023-02-02 PROCEDURE — 1123F ACP DISCUSS/DSCN MKR DOCD: CPT | Performed by: INTERNAL MEDICINE

## 2023-02-02 RX ORDER — PREDNISONE 10 MG/1
10 TABLET ORAL DAILY
Qty: 90 TABLET | Refills: 3 | Status: SHIPPED | OUTPATIENT
Start: 2023-02-02

## 2023-02-09 DIAGNOSIS — M81.0 AGE RELATED OSTEOPOROSIS, UNSPECIFIED PATHOLOGICAL FRACTURE PRESENCE: ICD-10-CM

## 2023-02-09 RX ORDER — ALENDRONATE SODIUM 70 MG/1
TABLET ORAL
Qty: 12 TABLET | Refills: 3 | Status: SHIPPED | OUTPATIENT
Start: 2023-02-09

## 2023-03-29 ENCOUNTER — OFFICE VISIT (OUTPATIENT)
Dept: INTERNAL MEDICINE CLINIC | Age: 78
End: 2023-03-29

## 2023-03-29 VITALS
DIASTOLIC BLOOD PRESSURE: 82 MMHG | HEIGHT: 65 IN | WEIGHT: 133.2 LBS | OXYGEN SATURATION: 96 % | SYSTOLIC BLOOD PRESSURE: 130 MMHG | BODY MASS INDEX: 22.19 KG/M2 | TEMPERATURE: 97.5 F | RESPIRATION RATE: 16 BRPM | HEART RATE: 78 BPM

## 2023-03-29 DIAGNOSIS — Z23 NEED FOR SHINGLES VACCINE: ICD-10-CM

## 2023-03-29 DIAGNOSIS — G30.1 LATE ONSET ALZHEIMER'S DEMENTIA WITH BEHAVIORAL DISTURBANCE (HCC): ICD-10-CM

## 2023-03-29 DIAGNOSIS — F02.818 LATE ONSET ALZHEIMER'S DEMENTIA WITH BEHAVIORAL DISTURBANCE (HCC): ICD-10-CM

## 2023-03-29 DIAGNOSIS — I10 ESSENTIAL HYPERTENSION: ICD-10-CM

## 2023-03-29 DIAGNOSIS — M81.0 AGE RELATED OSTEOPOROSIS, UNSPECIFIED PATHOLOGICAL FRACTURE PRESENCE: ICD-10-CM

## 2023-03-29 DIAGNOSIS — K21.9 GASTROESOPHAGEAL REFLUX DISEASE, UNSPECIFIED WHETHER ESOPHAGITIS PRESENT: ICD-10-CM

## 2023-03-29 DIAGNOSIS — E11.40 TYPE 2 DIABETES MELLITUS WITH DIABETIC NEUROPATHY, WITHOUT LONG-TERM CURRENT USE OF INSULIN (HCC): Primary | ICD-10-CM

## 2023-03-29 DIAGNOSIS — J44.9 COPD WITH ASTHMA (HCC): ICD-10-CM

## 2023-03-29 LAB — HBA1C MFR BLD: 5.8 %

## 2023-03-29 RX ORDER — ALENDRONATE SODIUM 70 MG/1
TABLET ORAL
Qty: 12 TABLET | Refills: 1 | Status: SHIPPED | OUTPATIENT
Start: 2023-03-29

## 2023-03-29 RX ORDER — MONTELUKAST SODIUM 10 MG/1
TABLET ORAL
Qty: 90 TABLET | Refills: 1 | Status: SHIPPED | OUTPATIENT
Start: 2023-03-29

## 2023-03-29 RX ORDER — GABAPENTIN 300 MG/1
CAPSULE ORAL
Qty: 90 CAPSULE | Refills: 1 | Status: SHIPPED | OUTPATIENT
Start: 2023-03-29 | End: 2023-06-28

## 2023-03-29 RX ORDER — AMLODIPINE BESYLATE 5 MG/1
TABLET ORAL
Qty: 90 TABLET | Refills: 1 | Status: SHIPPED | OUTPATIENT
Start: 2023-03-29

## 2023-03-29 RX ORDER — ZOSTER VACCINE RECOMBINANT, ADJUVANTED 50 MCG/0.5
0.5 KIT INTRAMUSCULAR SEE ADMIN INSTRUCTIONS
Qty: 0.5 ML | Refills: 0 | Status: SHIPPED | OUTPATIENT
Start: 2023-03-29 | End: 2023-09-25

## 2023-03-29 RX ORDER — OMEPRAZOLE 40 MG/1
CAPSULE, DELAYED RELEASE ORAL
Qty: 90 CAPSULE | Refills: 1 | Status: SHIPPED | OUTPATIENT
Start: 2023-03-29

## 2023-03-29 RX ORDER — DICYCLOMINE HYDROCHLORIDE 10 MG/1
10 CAPSULE ORAL 3 TIMES DAILY PRN
Qty: 90 CAPSULE | Refills: 1 | Status: SHIPPED | OUTPATIENT
Start: 2023-03-29

## 2023-03-29 RX ORDER — MEMANTINE HYDROCHLORIDE 5 MG/1
5 TABLET ORAL 2 TIMES DAILY
Qty: 180 TABLET | Refills: 1 | Status: SHIPPED | OUTPATIENT
Start: 2023-03-29

## 2023-03-29 RX ORDER — DONEPEZIL HYDROCHLORIDE 5 MG/1
TABLET, FILM COATED ORAL
Qty: 90 TABLET | Refills: 1 | Status: SHIPPED | OUTPATIENT
Start: 2023-03-29

## 2023-03-29 SDOH — ECONOMIC STABILITY: FOOD INSECURITY: WITHIN THE PAST 12 MONTHS, YOU WORRIED THAT YOUR FOOD WOULD RUN OUT BEFORE YOU GOT MONEY TO BUY MORE.: NEVER TRUE

## 2023-03-29 SDOH — ECONOMIC STABILITY: HOUSING INSECURITY
IN THE LAST 12 MONTHS, WAS THERE A TIME WHEN YOU DID NOT HAVE A STEADY PLACE TO SLEEP OR SLEPT IN A SHELTER (INCLUDING NOW)?: NO

## 2023-03-29 SDOH — ECONOMIC STABILITY: INCOME INSECURITY: HOW HARD IS IT FOR YOU TO PAY FOR THE VERY BASICS LIKE FOOD, HOUSING, MEDICAL CARE, AND HEATING?: NOT HARD AT ALL

## 2023-03-29 SDOH — ECONOMIC STABILITY: FOOD INSECURITY: WITHIN THE PAST 12 MONTHS, THE FOOD YOU BOUGHT JUST DIDN'T LAST AND YOU DIDN'T HAVE MONEY TO GET MORE.: NEVER TRUE

## 2023-03-29 ASSESSMENT — ENCOUNTER SYMPTOMS
SORE THROAT: 0
BLOOD IN STOOL: 0
NAUSEA: 0
COUGH: 0
VOMITING: 0
SHORTNESS OF BREATH: 0
ABDOMINAL PAIN: 0

## 2023-03-29 ASSESSMENT — PATIENT HEALTH QUESTIONNAIRE - PHQ9
9. THOUGHTS THAT YOU WOULD BE BETTER OFF DEAD, OR OF HURTING YOURSELF: 0
SUM OF ALL RESPONSES TO PHQ QUESTIONS 1-9: 0
SUM OF ALL RESPONSES TO PHQ QUESTIONS 1-9: 0
4. FEELING TIRED OR HAVING LITTLE ENERGY: 0
SUM OF ALL RESPONSES TO PHQ QUESTIONS 1-9: 0
2. FEELING DOWN, DEPRESSED OR HOPELESS: 0
7. TROUBLE CONCENTRATING ON THINGS, SUCH AS READING THE NEWSPAPER OR WATCHING TELEVISION: 0
10. IF YOU CHECKED OFF ANY PROBLEMS, HOW DIFFICULT HAVE THESE PROBLEMS MADE IT FOR YOU TO DO YOUR WORK, TAKE CARE OF THINGS AT HOME, OR GET ALONG WITH OTHER PEOPLE: 0
SUM OF ALL RESPONSES TO PHQ QUESTIONS 1-9: 0
5. POOR APPETITE OR OVEREATING: 0
SUM OF ALL RESPONSES TO PHQ9 QUESTIONS 1 & 2: 0
1. LITTLE INTEREST OR PLEASURE IN DOING THINGS: 0
8. MOVING OR SPEAKING SO SLOWLY THAT OTHER PEOPLE COULD HAVE NOTICED. OR THE OPPOSITE, BEING SO FIGETY OR RESTLESS THAT YOU HAVE BEEN MOVING AROUND A LOT MORE THAN USUAL: 0
6. FEELING BAD ABOUT YOURSELF - OR THAT YOU ARE A FAILURE OR HAVE LET YOURSELF OR YOUR FAMILY DOWN: 0
3. TROUBLE FALLING OR STAYING ASLEEP: 0

## 2023-03-29 NOTE — PROGRESS NOTES
recombinant adjuvanted vaccine [facility]) 50 MCG/0.5ML SUSR injection; Inject 0.5 mLs into the muscle See Admin Instructions 1 dose now and repeat in 2-6 months, Disp-0.5 mL, R-0Normal      Return in about 3 months (around 6/29/2023). Subjective   SUBJECTIVE/OBJECTIVE:  Diabetes  She presents for her follow-up diabetic visit. She has type 2 diabetes mellitus. Her disease course has been stable. There are no hypoglycemic associated symptoms. Pertinent negatives for hypoglycemia include no dizziness. There are no diabetic associated symptoms. Pertinent negatives for diabetes include no chest pain, no fatigue and no weakness. There are no hypoglycemic complications. Diabetic complications include peripheral neuropathy. Risk factors for coronary artery disease include hypertension. She is compliant with treatment all of the time. She is following a diabetic diet. She participates in exercise intermittently. An ACE inhibitor/angiotensin II receptor blocker is being taken. Eye exam is current. Hypertension  This is a chronic problem. The current episode started more than 1 year ago. The problem is controlled. Pertinent negatives include no chest pain, palpitations or shortness of breath. Past treatments include angiotensin blockers and calcium channel blockers. The current treatment provides significant improvement. There are no compliance problems. Review of Systems   Constitutional:  Negative for fatigue and fever. HENT:  Negative for nosebleeds and sore throat. Respiratory:  Negative for cough and shortness of breath. Cardiovascular:  Negative for chest pain, palpitations and leg swelling. Gastrointestinal:  Negative for abdominal pain, blood in stool, nausea and vomiting. Neurological:  Negative for dizziness and weakness. Objective   Physical Exam  Constitutional:       Appearance: Normal appearance. HENT:      Head: Normocephalic and atraumatic.    Eyes:      General: No scleral

## 2023-03-29 NOTE — PROGRESS NOTES
She is following a diabetic diet. She participates in exercise intermittently. An ACE inhibitor/angiotensin II receptor blocker is being taken. Eye exam is current. Hypertension  This is a chronic problem. The current episode started more than 1 year ago. The problem is controlled. Pertinent negatives include no chest pain, palpitations or shortness of breath. Past treatments include angiotensin blockers and calcium channel blockers. The current treatment provides significant improvement. There are no compliance problems. Review of Systems   Constitutional:  Negative for fatigue and fever. HENT:  Negative for nosebleeds and sore throat. Respiratory:  Negative for cough and shortness of breath. Cardiovascular:  Negative for chest pain, palpitations and leg swelling. Gastrointestinal:  Negative for abdominal pain, blood in stool, nausea and vomiting. Neurological:  Negative for dizziness and weakness. Objective   Physical Exam  Constitutional:       Appearance: Normal appearance. HENT:      Head: Normocephalic and atraumatic. Eyes:      General: No scleral icterus. Conjunctiva/sclera: Conjunctivae normal.   Cardiovascular:      Rate and Rhythm: Normal rate and regular rhythm. Pulses: Normal pulses. Heart sounds: Normal heart sounds. Pulmonary:      Effort: Pulmonary effort is normal.      Breath sounds: Normal breath sounds. Musculoskeletal:         General: No swelling. Skin:     General: Skin is warm and dry. Neurological:      Mental Status: She is alert and oriented to person, place, and time. Mental status is at baseline. Psychiatric:         Mood and Affect: Mood normal.         Behavior: Behavior normal.            An electronic signature was used to authenticate this note.     --Kim Damon MD

## 2023-04-29 DIAGNOSIS — F02.818 LATE ONSET ALZHEIMER'S DEMENTIA WITH BEHAVIORAL DISTURBANCE (HCC): ICD-10-CM

## 2023-04-29 DIAGNOSIS — G30.1 LATE ONSET ALZHEIMER'S DEMENTIA WITH BEHAVIORAL DISTURBANCE (HCC): ICD-10-CM

## 2023-05-01 RX ORDER — DONEPEZIL HYDROCHLORIDE 5 MG/1
TABLET, FILM COATED ORAL
Qty: 30 TABLET | Refills: 1 | Status: SHIPPED | OUTPATIENT
Start: 2023-05-01

## 2023-05-09 ENCOUNTER — OFFICE VISIT (OUTPATIENT)
Dept: PULMONOLOGY | Age: 78
End: 2023-05-09
Payer: MEDICARE

## 2023-05-09 VITALS
WEIGHT: 129.4 LBS | BODY MASS INDEX: 22.93 KG/M2 | HEART RATE: 92 BPM | OXYGEN SATURATION: 96 % | TEMPERATURE: 97.3 F | SYSTOLIC BLOOD PRESSURE: 120 MMHG | RESPIRATION RATE: 18 BRPM | DIASTOLIC BLOOD PRESSURE: 82 MMHG | HEIGHT: 63 IN

## 2023-05-09 DIAGNOSIS — I49.9 IRREGULAR HEART RATE: ICD-10-CM

## 2023-05-09 DIAGNOSIS — J84.9 ILD (INTERSTITIAL LUNG DISEASE) (HCC): Primary | ICD-10-CM

## 2023-05-09 PROCEDURE — 1123F ACP DISCUSS/DSCN MKR DOCD: CPT | Performed by: INTERNAL MEDICINE

## 2023-05-09 PROCEDURE — 3074F SYST BP LT 130 MM HG: CPT | Performed by: INTERNAL MEDICINE

## 2023-05-09 PROCEDURE — 99213 OFFICE O/P EST LOW 20 MIN: CPT | Performed by: INTERNAL MEDICINE

## 2023-05-09 PROCEDURE — 3078F DIAST BP <80 MM HG: CPT | Performed by: INTERNAL MEDICINE

## 2023-05-09 NOTE — PROGRESS NOTES
Chief complaint  This is a 65y.o. year old female  who comes to see me with a chief complaint of   Chief Complaint   Patient presents with    Follow-up     ILD       HPI  Here with a cc of ILD    No new issues. On prednisone 10 mg and has for some time. Tolerating it well without side effects. Feels breathing is controlled for the most part. No changes         Current Outpatient Medications:     donepezil (ARICEPT) 5 MG tablet, TAKE ONE TABLET BY MOUTH ONCE NIGHTLY, Disp: 30 tablet, Rfl: 1    alendronate (FOSAMAX) 70 MG tablet, TAKE 1 TABLET BY MOUTH ONCE WEEKLY ON AN EMPTY STOMACH BEFORE BREAKFAST.  REMAIN UPRIGHT FOR 30 MINUTES AND TAKE WITH 8 OUNCES OF WATER, Disp: 12 tablet, Rfl: 1    amLODIPine (NORVASC) 5 MG tablet, TAKE ONE TABLET BY MOUTH DAILY, Disp: 90 tablet, Rfl: 1    dicyclomine (BENTYL) 10 MG capsule, Take 1 capsule by mouth 3 times daily as needed (ABDOMINAL CRAMPING), Disp: 90 capsule, Rfl: 1    gabapentin (NEURONTIN) 300 MG capsule, TAKE ONE CAPSULE BY MOUTH DAILY, Disp: 90 capsule, Rfl: 1    memantine (NAMENDA) 5 MG tablet, Take 1 tablet by mouth 2 times daily, Disp: 180 tablet, Rfl: 1    montelukast (SINGULAIR) 10 MG tablet, TAKE ONE TABLET BY MOUTH DAILY, Disp: 90 tablet, Rfl: 1    omeprazole (PRILOSEC) 40 MG delayed release capsule, TAKE ONE CAPSULE BY MOUTH EVERY MORNING BEFORE BREAKFAST, Disp: 90 capsule, Rfl: 1    zoster recombinant adjuvanted vaccine (SHINGRIX) 50 MCG/0.5ML SUSR injection, Inject 0.5 mLs into the muscle See Admin Instructions 1 dose now and repeat in 2-6 months, Disp: 0.5 mL, Rfl: 0    predniSONE (DELTASONE) 10 MG tablet, Take 1 tablet by mouth daily, Disp: 90 tablet, Rfl: 3    metFORMIN (GLUCOPHAGE) 500 MG tablet, TAKE ONE TABLET BY MOUTH TWICE A DAY WITH MEALS, Disp: 90 tablet, Rfl: 1    losartan (COZAAR) 50 MG tablet, TAKE 1 TABLET DAILY, Disp: 90 tablet, Rfl: 3    predniSONE (DELTASONE) 10 MG tablet, Take 1 tablet by mouth daily, Disp: 90 tablet, Rfl: 3    Lancets

## 2023-05-15 DIAGNOSIS — I10 ESSENTIAL HYPERTENSION: ICD-10-CM

## 2023-05-16 RX ORDER — LOSARTAN POTASSIUM 50 MG/1
TABLET ORAL
Qty: 90 TABLET | Refills: 3 | Status: SHIPPED | OUTPATIENT
Start: 2023-05-16

## 2023-06-29 ENCOUNTER — OFFICE VISIT (OUTPATIENT)
Dept: INTERNAL MEDICINE CLINIC | Age: 78
End: 2023-06-29

## 2023-06-29 VITALS
WEIGHT: 133 LBS | SYSTOLIC BLOOD PRESSURE: 158 MMHG | TEMPERATURE: 97.5 F | DIASTOLIC BLOOD PRESSURE: 94 MMHG | HEART RATE: 96 BPM | BODY MASS INDEX: 23.56 KG/M2 | OXYGEN SATURATION: 96 %

## 2023-06-29 DIAGNOSIS — K21.9 GASTROESOPHAGEAL REFLUX DISEASE, UNSPECIFIED WHETHER ESOPHAGITIS PRESENT: ICD-10-CM

## 2023-06-29 DIAGNOSIS — M81.0 AGE RELATED OSTEOPOROSIS, UNSPECIFIED PATHOLOGICAL FRACTURE PRESENCE: ICD-10-CM

## 2023-06-29 DIAGNOSIS — E11.40 TYPE 2 DIABETES MELLITUS WITH DIABETIC NEUROPATHY, WITHOUT LONG-TERM CURRENT USE OF INSULIN (HCC): Primary | ICD-10-CM

## 2023-06-29 DIAGNOSIS — G30.1 LATE ONSET ALZHEIMER'S DEMENTIA WITH BEHAVIORAL DISTURBANCE (HCC): ICD-10-CM

## 2023-06-29 DIAGNOSIS — J44.9 COPD WITH ASTHMA (HCC): ICD-10-CM

## 2023-06-29 DIAGNOSIS — Z23 NEED FOR TDAP VACCINATION: ICD-10-CM

## 2023-06-29 DIAGNOSIS — I10 ESSENTIAL HYPERTENSION: ICD-10-CM

## 2023-06-29 DIAGNOSIS — F02.818 LATE ONSET ALZHEIMER'S DEMENTIA WITH BEHAVIORAL DISTURBANCE (HCC): ICD-10-CM

## 2023-06-29 LAB — HBA1C MFR BLD: 5.9 %

## 2023-06-29 ASSESSMENT — ENCOUNTER SYMPTOMS
BLOOD IN STOOL: 0
COUGH: 0
SORE THROAT: 0
NAUSEA: 0
ABDOMINAL PAIN: 0
SHORTNESS OF BREATH: 0
VOMITING: 0

## 2023-08-10 NOTE — PROGRESS NOTES
Chief complaint  This is a 65y.o. year old female  who comes to see me with a chief complaint of   Chief Complaint   Patient presents with    Follow-up       HPI  Here with a cc of ILD    She is well. Remains on prednisone of 10 mg and breathing is stable. No side effects noted. Seen with          Current Outpatient Medications:     predniSONE (DELTASONE) 10 MG tablet, Take 1 tablet by mouth daily, Disp: 90 tablet, Rfl: 3    gabapentin (NEURONTIN) 300 MG capsule, TAKE ONE CAPSULE BY MOUTH DAILY, Disp: 30 capsule, Rfl: 2    montelukast (SINGULAIR) 10 MG tablet, TAKE ONE TABLET BY MOUTH DAILY, Disp: 90 tablet, Rfl: 1    donepezil (ARICEPT) 5 MG tablet, TAKE ONE TABLET BY MOUTH ONCE NIGHTLY, Disp: 30 tablet, Rfl: 3    memantine (NAMENDA) 5 MG tablet, Take 1 tablet by mouth 2 times daily, Disp: 180 tablet, Rfl: 0    dicyclomine (BENTYL) 10 MG capsule, TAKE ONE CAPSULE BY MOUTH THREE TIMES A DAY AS NEEDED FOR ABDOMINAL CRAMPING, Disp: 30 capsule, Rfl: 1    metFORMIN (GLUCOPHAGE) 500 MG tablet, TAKE ONE TABLET BY MOUTH TWICE A DAY WITH MEALS, Disp: 90 tablet, Rfl: 1    losartan (COZAAR) 50 MG tablet, TAKE 1 TABLET DAILY, Disp: 90 tablet, Rfl: 3    omeprazole (PRILOSEC) 40 MG delayed release capsule, TAKE ONE CAPSULE BY MOUTH EVERY MORNING BEFORE BREAKFAST, Disp: 30 capsule, Rfl: 4    amLODIPine (NORVASC) 5 MG tablet, TAKE ONE TABLET BY MOUTH DAILY, Disp: 30 tablet, Rfl: 5    alendronate (FOSAMAX) 70 MG tablet, TAKE 1 TABLET BY MOUTH ONCE WEEKLY ON AN EMPTY STOMACH BEFORE BREAKFAST. REMAIN UPRIGHT FOR 30 MINUTES & TAKE WITH 8 OUNCES OF WATER, Disp: 12 tablet, Rfl: 3    predniSONE (DELTASONE) 10 MG tablet, Take 1 tablet by mouth daily, Disp: 90 tablet, Rfl: 3    Lancets (ONETOUCH DELICA PLUS [id number]) MISC, USE 1 LANCET TWO TIMES A DAY, Disp: 100 each, Rfl: 5    blood glucose monitor strips, Test 2 times a day & as needed for symptoms of irregular blood glucose.  Dispense sufficient amount for indicated Preventive Health Recommendations  Male Ages 26 - 39    Yearly exam:             See your health care provider every year in order to  o   Review health changes.   o   Discuss preventive care.    o   Review your medicines if your doctor has prescribed any.  You should be tested each year for STDs (sexually transmitted diseases), if you re at risk.   After age 35, talk to your provider about cholesterol testing. If you are at risk for heart disease, have your cholesterol tested at least every 5 years.   If you are at risk for diabetes, you should have a diabetes test (fasting glucose).  Shots: Get a flu shot each year. Get a tetanus shot every 10 years.     Nutrition:  Eat at least 5 servings of fruits and vegetables daily.   Eat whole-grain bread, whole-wheat pasta and brown rice instead of white grains and rice.   Get adequate Calcium and Vitamin D.     Lifestyle  Exercise for at least 150 minutes a week (30 minutes a day, 5 days a week). This will help you control your weight and prevent disease.   Limit alcohol to one drink per day.   No smoking.   Wear sunscreen to prevent skin cancer.   See your dentist every six months for an exam and cleaning.      testing frequency plus additional to accommodate PRN testing needs. , Disp: 100 strip, Rfl: 3    folic acid (FOLVITE) 1 MG tablet, Take 1 mg by mouth daily, Disp: , Rfl:     calcium-vitamin D (OSCAL 500/200 D-3) 500-200 MG-UNIT per tablet, Take 1 tablet by mouth 2 times daily (Patient taking differently: Take 1 tablet by mouth daily), Disp: 1 tablet, Rfl: 0      PHYSICAL EXAM:  Vitals:    02/02/23 1117   BP: 115/80   Pulse: 85   Resp: 21   Temp: 96.9 °F (36.1 °C)   SpO2: 95%       GENERAL: Looks better today, mildly cushingoid   HEENT:  No scleral icterus, no conjunctival irritation  NECK:  No thyromegaly, no bruits  LYMPH:  No cervical or supraclavicular adenopathy  HEART:  Irregular, no murmurs  LUNGS: Slight rhonchi but clear otherwise   ABDOMEN:  No distention, no organomegaly  EXTREMITIES:  No edema, no digital clubbing  NEURO:  No localizing deficits, CN II-XII intact. Pulmonary Function Testing 11/2018  MY IMPRESSION  Overall Mild restriction, no bronchodilator response and moderate reduction in diffusing capacity     Chest imaging from 11/2019 is reviewed and compared to 9/2020. My interpretation chronic interstitial changes with reticular markings, ground glass, nodular opacities, mosaic attenuation and bronchiectasis. I don't appreciate emphysema to any significant degree like was mentioned on radiology report     Lung Biopsy from 10/2007        -     Chronic interstitial pneumonia with chronic bronchiolitis and focal  organizing pneumonia (see comment). Comment(s)       The combination of chronic interstitial pneumonia, bronchiolitis, and  organizing pneumonia raises hypersensitivity pneumonia (extrinsic allergic  alveolitis) as the chief etiologic consideration. In the absence of an  identifiable antigenic exposure, similar changes can be seen in patients with  various forms of drug-induced lung disease.   In the absence of any history to  incriminate either an antigenic or drug exposure, these findings are most  consistent with nonspecific interstitial pneumonia (NSIP). I reviewed above labs and studies pertinent to this visit and date    Bronchoscopy  12/2018  -  No malignant cells identified    -Increased CD4/CD8 ratio and decreased /CD4 ratio. These  results may be present in pulmonary sarcoidosis, viral infection,  alveolitis, and interstitial syndromes      12/2018  VINCENT  negative  ANCA (myeloperioxidase/serine protease 3)  negative  RF (RA):   negative  Anti-Scl 70 (Scleroderma):  negative  Anti-DS DNA (SLE):  negative  Anti- SSA (Ro) (Sjogrens):  negative  Anti-SSB (La) (Sjogrens):  negative  Anti-Bhumika (polymyositis/dermatomyositis):  negative  Anti-Smith (SLE):  negative  Anti-RNP (MCTD):  negative  CK: (inflammatory myopathy) 145  Sed rate  18    Total IgE Level:  116  Significant allergies:  none    HP panel:  Negative    I reviewed the above labs and images     Assessment/Plan:  1. ILD (interstitial lung disease) ([address]  HP and/or organizing pneumonia. Disease has been stable on 10 mg of prednisone. Disease did flare up when she stopped prednisone so the plan would be lifelong 10 mg per day. Refill sent    2. Irregular heart rate  Noted on exam.  Last EKG noted PVC with sinus rhythm.  says she follows with Durga Gonzalez. This has been known for years but first time I heard it irregular.     Ensure close follow up with Cardiology     Follow up in 3 months

## 2023-08-29 DIAGNOSIS — M81.0 AGE RELATED OSTEOPOROSIS, UNSPECIFIED PATHOLOGICAL FRACTURE PRESENCE: ICD-10-CM

## 2023-08-29 RX ORDER — ALENDRONATE SODIUM 70 MG/1
TABLET ORAL
Qty: 12 TABLET | Refills: 0 | Status: SHIPPED | OUTPATIENT
Start: 2023-08-29

## 2023-08-29 NOTE — TELEPHONE ENCOUNTER
Future Appointments   Date Time Provider [address]   9/12/2023  1:20 PM Maykel Campos [facility]   10/25/2023  1:00 PM Melani Haji MD [facility]     Last appt on [phone]

## 2023-08-30 DIAGNOSIS — E11.40 TYPE 2 DIABETES MELLITUS WITH DIABETIC NEUROPATHY, WITHOUT LONG-TERM CURRENT USE OF INSULIN (HCC): ICD-10-CM

## 2023-08-30 RX ORDER — GABAPENTIN 300 MG/1
CAPSULE ORAL
Qty: 90 CAPSULE | Refills: 1 | Status: SHIPPED | OUTPATIENT
Start: 2023-08-30 | End: 2023-11-29

## 2023-09-11 DIAGNOSIS — G30.1 LATE ONSET ALZHEIMER'S DEMENTIA WITH BEHAVIORAL DISTURBANCE (HCC): ICD-10-CM

## 2023-09-11 DIAGNOSIS — K21.9 GASTROESOPHAGEAL REFLUX DISEASE, UNSPECIFIED WHETHER ESOPHAGITIS PRESENT: ICD-10-CM

## 2023-09-11 DIAGNOSIS — F02.818 LATE ONSET ALZHEIMER'S DEMENTIA WITH BEHAVIORAL DISTURBANCE (HCC): ICD-10-CM

## 2023-09-11 DIAGNOSIS — J44.89 COPD WITH ASTHMA: ICD-10-CM

## 2023-09-11 DIAGNOSIS — I10 ESSENTIAL HYPERTENSION: ICD-10-CM

## 2023-09-11 RX ORDER — MEMANTINE HYDROCHLORIDE 5 MG/1
5 TABLET ORAL 2 TIMES DAILY
Qty: 180 TABLET | Refills: 0 | Status: SHIPPED | OUTPATIENT
Start: 2023-09-11

## 2023-09-11 RX ORDER — AMLODIPINE BESYLATE 5 MG/1
TABLET ORAL
Qty: 90 TABLET | Refills: 0 | Status: SHIPPED | OUTPATIENT
Start: 2023-09-11

## 2023-09-11 RX ORDER — OMEPRAZOLE 40 MG/1
CAPSULE, DELAYED RELEASE ORAL
Qty: 90 CAPSULE | Refills: 0 | Status: SHIPPED | OUTPATIENT
Start: 2023-09-11

## 2023-09-11 RX ORDER — DONEPEZIL HYDROCHLORIDE 5 MG/1
TABLET, FILM COATED ORAL
Qty: 90 TABLET | Refills: 0 | Status: SHIPPED | OUTPATIENT
Start: 2023-09-11

## 2023-09-11 RX ORDER — MONTELUKAST SODIUM 10 MG/1
TABLET ORAL
Qty: 90 TABLET | Refills: 0 | Status: SHIPPED | OUTPATIENT
Start: 2023-09-11

## 2023-09-11 NOTE — TELEPHONE ENCOUNTER
Future Appointments   Date Time Provider [address]   9/12/2023  1:20 PM Chhaya Mejia [facility]   10/25/2023  1:00 PM Subhash Solorio MD [facility]         Last appt 6/29/23

## 2023-09-12 ENCOUNTER — OFFICE VISIT (OUTPATIENT)
Dept: PULMONOLOGY | Age: 78
End: 2023-09-12
Payer: MEDICARE

## 2023-09-12 VITALS
TEMPERATURE: 97.8 F | OXYGEN SATURATION: 90 % | DIASTOLIC BLOOD PRESSURE: 70 MMHG | HEART RATE: 98 BPM | BODY MASS INDEX: 22.84 KG/M2 | WEIGHT: 133.8 LBS | RESPIRATION RATE: 18 BRPM | HEIGHT: 64 IN | SYSTOLIC BLOOD PRESSURE: 110 MMHG

## 2023-09-12 DIAGNOSIS — J84.9 ILD (INTERSTITIAL LUNG DISEASE) (HCC): Primary | ICD-10-CM

## 2023-09-12 PROCEDURE — 3078F DIAST BP <80 MM HG: CPT | Performed by: INTERNAL MEDICINE

## 2023-09-12 PROCEDURE — 99213 OFFICE O/P EST LOW 20 MIN: CPT | Performed by: INTERNAL MEDICINE

## 2023-09-12 PROCEDURE — 3074F SYST BP LT 130 MM HG: CPT | Performed by: INTERNAL MEDICINE

## 2023-09-12 PROCEDURE — 1123F ACP DISCUSS/DSCN MKR DOCD: CPT | Performed by: INTERNAL MEDICINE

## 2023-09-12 NOTE — PROGRESS NOTES
Chief complaint  This is a 77y.o. year old female  who comes to see me with a chief complaint of   Chief Complaint   Patient presents with    Follow-up     ILD       HPI  Here with a cc of ILD    Breathing seems to be ok. Remains on prednisone 10 mg per day. No side effects. NO changes.   Seen with          Current Outpatient Medications:     omeprazole (PRILOSEC) 40 MG delayed release capsule, TAKE 1 CAPSULE EVERY MORNING BEFORE BREAKFAST, Disp: 90 capsule, Rfl: 0    amLODIPine (NORVASC) 5 MG tablet, TAKE 1 TABLET DAILY, Disp: 90 tablet, Rfl: 0    donepezil (ARICEPT) 5 MG tablet, TAKE 1 TABLET NIGHTLY, Disp: 90 tablet, Rfl: 0    memantine (NAMENDA) 5 MG tablet, TAKE 1 TABLET TWICE A DAY, Disp: 180 tablet, Rfl: 0    montelukast (SINGULAIR) 10 MG tablet, TAKE 1 TABLET DAILY, Disp: 90 tablet, Rfl: 0    gabapentin (NEURONTIN) 300 MG capsule, TAKE ONE CAPSULE BY MOUTH DAILY, Disp: 90 capsule, Rfl: 1    alendronate (FOSAMAX) 70 MG tablet, TAKE 1 TABLET ONCE WEEKLY ON AN EMPTY STOMACH BEFORE BREAKFAST REMAIN UPRIGHT FOR 30 MINUTES AND TAKE WITH 8 OUNCES OF WATER, Disp: 12 tablet, Rfl: 0    losartan (COZAAR) 50 MG tablet, TAKE 1 TABLET DAILY, Disp: 90 tablet, Rfl: 3    dicyclomine (BENTYL) 10 MG capsule, Take 1 capsule by mouth 3 times daily as needed (ABDOMINAL CRAMPING), Disp: 90 capsule, Rfl: 1    zoster recombinant adjuvanted vaccine (SHINGRIX) 50 MCG/0.5ML SUSR injection, Inject 0.5 mLs into the muscle See Admin Instructions 1 dose now and repeat in 2-6 months, Disp: 0.5 mL, Rfl: 0    predniSONE (DELTASONE) 10 MG tablet, Take 1 tablet by mouth daily, Disp: 90 tablet, Rfl: 3    metFORMIN (GLUCOPHAGE) 500 MG tablet, TAKE ONE TABLET BY MOUTH TWICE A DAY WITH MEALS, Disp: 90 tablet, Rfl: 1    predniSONE (DELTASONE) 10 MG tablet, Take 1 tablet by mouth daily, Disp: 90 tablet, Rfl: 3    Lancets (ONETOUCH DELICA PLUS [id number]) MISC, USE 1 LANCET TWO TIMES A DAY, Disp: 100 each, Rfl: 5    blood glucose monitor

## 2023-10-25 ENCOUNTER — OFFICE VISIT (OUTPATIENT)
Dept: INTERNAL MEDICINE CLINIC | Age: 78
End: 2023-10-25

## 2023-10-25 VITALS
TEMPERATURE: 98.1 F | OXYGEN SATURATION: 97 % | DIASTOLIC BLOOD PRESSURE: 82 MMHG | SYSTOLIC BLOOD PRESSURE: 126 MMHG | BODY MASS INDEX: 22.72 KG/M2 | HEART RATE: 81 BPM | WEIGHT: 132.38 LBS

## 2023-10-25 DIAGNOSIS — G30.1 LATE ONSET ALZHEIMER'S DEMENTIA WITH BEHAVIORAL DISTURBANCE (HCC): ICD-10-CM

## 2023-10-25 DIAGNOSIS — Z23 NEED FOR SHINGLES VACCINE: ICD-10-CM

## 2023-10-25 DIAGNOSIS — F02.818 LATE ONSET ALZHEIMER'S DEMENTIA WITH BEHAVIORAL DISTURBANCE (HCC): ICD-10-CM

## 2023-10-25 DIAGNOSIS — I10 ESSENTIAL HYPERTENSION: ICD-10-CM

## 2023-10-25 DIAGNOSIS — E11.40 TYPE 2 DIABETES MELLITUS WITH DIABETIC NEUROPATHY, WITHOUT LONG-TERM CURRENT USE OF INSULIN (HCC): Primary | ICD-10-CM

## 2023-10-25 DIAGNOSIS — M81.0 AGE RELATED OSTEOPOROSIS, UNSPECIFIED PATHOLOGICAL FRACTURE PRESENCE: ICD-10-CM

## 2023-10-25 DIAGNOSIS — J44.89 COPD WITH ASTHMA: ICD-10-CM

## 2023-10-25 DIAGNOSIS — K21.9 GASTROESOPHAGEAL REFLUX DISEASE, UNSPECIFIED WHETHER ESOPHAGITIS PRESENT: ICD-10-CM

## 2023-10-25 RX ORDER — ZOSTER VACCINE RECOMBINANT, ADJUVANTED 50 MCG/0.5
0.5 KIT INTRAMUSCULAR SEE ADMIN INSTRUCTIONS
Qty: 0.5 ML | Refills: 0 | Status: SHIPPED | OUTPATIENT
Start: 2023-10-25 | End: 2024-04-22

## 2023-10-25 ASSESSMENT — ENCOUNTER SYMPTOMS
ABDOMINAL PAIN: 0
SHORTNESS OF BREATH: 0
VOMITING: 0
SORE THROAT: 0
BLOOD IN STOOL: 0
COUGH: 0
NAUSEA: 0

## 2023-10-25 NOTE — PROGRESS NOTES
artery disease include hypertension. She is compliant with treatment all of the time. She is following a diabetic diet. She participates in exercise intermittently. An ACE inhibitor/angiotensin II receptor blocker is being taken. Eye exam is current. Hypertension  This is a chronic problem. The current episode started more than 1 year ago. The problem is controlled. Pertinent negatives include no chest pain, palpitations or shortness of breath. Past treatments include angiotensin blockers and calcium channel blockers. The current treatment provides significant improvement. There are no compliance problems. Review of Systems   Constitutional:  Negative for fatigue and fever. HENT:  Negative for nosebleeds and sore throat. Respiratory:  Negative for cough and shortness of breath. Cardiovascular:  Negative for chest pain, palpitations and leg swelling. Gastrointestinal:  Negative for abdominal pain, blood in stool, nausea and vomiting. Neurological:  Negative for dizziness and weakness. Objective   Physical Exam  Constitutional:       Appearance: Normal appearance. HENT:      Head: Normocephalic and atraumatic. Eyes:      General: No scleral icterus. Conjunctiva/sclera: Conjunctivae normal.   Cardiovascular:      Rate and Rhythm: Normal rate and regular rhythm. Pulses: Normal pulses. Heart sounds: Normal heart sounds. Pulmonary:      Effort: Pulmonary effort is normal.      Breath sounds: Normal breath sounds. Musculoskeletal:         General: No swelling. Skin:     General: Skin is warm and dry. Neurological:      Mental Status: She is alert and oriented to person, place, and time. Mental status is at baseline. Psychiatric:         Mood and Affect: Mood normal.         Behavior: Behavior normal.              An electronic signature was used to authenticate this note.     --Subhash Solorio MD

## 2023-10-26 ENCOUNTER — NURSE ONLY (OUTPATIENT)
Dept: INTERNAL MEDICINE CLINIC | Age: 78
End: 2023-10-26
Payer: MEDICARE

## 2023-10-26 DIAGNOSIS — I10 ESSENTIAL HYPERTENSION: ICD-10-CM

## 2023-10-26 DIAGNOSIS — E11.40 TYPE 2 DIABETES MELLITUS WITH DIABETIC NEUROPATHY, WITHOUT LONG-TERM CURRENT USE OF INSULIN (HCC): ICD-10-CM

## 2023-10-26 LAB
ALBUMIN SERPL-MCNC: 3.8 G/DL (ref 3.4–5)
ALBUMIN/GLOB SERPL: 1.8 {RATIO} (ref 1.1–2.2)
ALP SERPL-CCNC: 53 U/L (ref 40–129)
ALT SERPL-CCNC: 12 U/L (ref 10–40)
ANION GAP SERPL CALCULATED.3IONS-SCNC: 8 MMOL/L (ref 3–16)
AST SERPL-CCNC: 17 U/L (ref 15–37)
BILIRUB SERPL-MCNC: 0.6 MG/DL (ref 0–1)
BUN SERPL-MCNC: 13 MG/DL (ref 7–20)
CALCIUM SERPL-MCNC: 9.1 MG/DL (ref 8.3–10.6)
CHLORIDE SERPL-SCNC: 106 MMOL/L (ref 99–110)
CHOLEST SERPL-MCNC: 152 MG/DL (ref 0–199)
CO2 SERPL-SCNC: 29 MMOL/L (ref 21–32)
CREAT SERPL-MCNC: 0.9 MG/DL (ref 0.6–1.2)
GFR SERPLBLD CREATININE-BSD FMLA CKD-EPI: >60 ML/MIN/{1.73_M2}
GLUCOSE SERPL-MCNC: 155 MG/DL (ref 70–99)
HDLC SERPL-MCNC: 46 MG/DL (ref 40–60)
LDLC SERPL CALC-MCNC: 84 MG/DL
POTASSIUM SERPL-SCNC: 4.3 MMOL/L (ref 3.5–5.1)
PROT SERPL-MCNC: 5.9 G/DL (ref 6.4–8.2)
SODIUM SERPL-SCNC: 143 MMOL/L (ref 136–145)
TRIGL SERPL-MCNC: 108 MG/DL (ref 0–150)
TSH SERPL DL<=0.005 MIU/L-ACNC: 1.98 UIU/ML (ref 0.27–4.2)
VLDLC SERPL CALC-MCNC: 22 MG/DL

## 2023-10-26 PROCEDURE — 36415 COLL VENOUS BLD VENIPUNCTURE: CPT | Performed by: INTERNAL MEDICINE

## 2023-10-26 PROCEDURE — 99999 PR OFFICE/OUTPT VISIT,PROCEDURE ONLY: CPT | Performed by: INTERNAL MEDICINE

## 2023-10-27 LAB
EST. AVERAGE GLUCOSE BLD GHB EST-MCNC: 122.6 MG/DL
HBA1C MFR BLD: 5.9 %

## 2023-11-21 DIAGNOSIS — M81.0 AGE RELATED OSTEOPOROSIS, UNSPECIFIED PATHOLOGICAL FRACTURE PRESENCE: ICD-10-CM

## 2023-11-21 RX ORDER — ALENDRONATE SODIUM 70 MG/1
TABLET ORAL
Qty: 12 TABLET | Refills: 3 | Status: SHIPPED | OUTPATIENT
Start: 2023-11-21

## 2023-12-11 DIAGNOSIS — I10 ESSENTIAL HYPERTENSION: ICD-10-CM

## 2023-12-11 DIAGNOSIS — K21.9 GASTROESOPHAGEAL REFLUX DISEASE, UNSPECIFIED WHETHER ESOPHAGITIS PRESENT: ICD-10-CM

## 2023-12-11 DIAGNOSIS — J44.89 COPD WITH ASTHMA: ICD-10-CM

## 2023-12-11 DIAGNOSIS — G30.1 LATE ONSET ALZHEIMER'S DEMENTIA WITH BEHAVIORAL DISTURBANCE (HCC): ICD-10-CM

## 2023-12-11 DIAGNOSIS — F02.818 LATE ONSET ALZHEIMER'S DEMENTIA WITH BEHAVIORAL DISTURBANCE (HCC): ICD-10-CM

## 2023-12-11 RX ORDER — DONEPEZIL HYDROCHLORIDE 5 MG/1
TABLET, FILM COATED ORAL
Qty: 90 TABLET | Refills: 1 | Status: SHIPPED | OUTPATIENT
Start: 2023-12-11

## 2023-12-11 RX ORDER — MEMANTINE HYDROCHLORIDE 5 MG/1
5 TABLET ORAL 2 TIMES DAILY
Qty: 180 TABLET | Refills: 1 | Status: SHIPPED | OUTPATIENT
Start: 2023-12-11

## 2023-12-11 RX ORDER — OMEPRAZOLE 40 MG/1
CAPSULE, DELAYED RELEASE ORAL
Qty: 90 CAPSULE | Refills: 1 | Status: SHIPPED | OUTPATIENT
Start: 2023-12-11

## 2023-12-11 RX ORDER — AMLODIPINE BESYLATE 5 MG/1
TABLET ORAL
Qty: 90 TABLET | Refills: 1 | Status: SHIPPED | OUTPATIENT
Start: 2023-12-11

## 2023-12-11 RX ORDER — MONTELUKAST SODIUM 10 MG/1
TABLET ORAL
Qty: 90 TABLET | Refills: 1 | Status: SHIPPED | OUTPATIENT
Start: 2023-12-11

## 2023-12-11 NOTE — TELEPHONE ENCOUNTER
Future Appointments   Date Time Provider [address]   1/4/2024 11:00 AM [facility]   1/31/2024  1:00 PM Melani Haji MD [facility] appt 10/25/23

## 2024-01-04 ENCOUNTER — OFFICE VISIT (OUTPATIENT)
Dept: PULMONOLOGY | Age: 79
End: 2024-01-04
Payer: MEDICARE

## 2024-01-04 VITALS
OXYGEN SATURATION: 94 % | SYSTOLIC BLOOD PRESSURE: 115 MMHG | HEART RATE: 92 BPM | DIASTOLIC BLOOD PRESSURE: 75 MMHG | WEIGHT: 132.9 LBS | HEIGHT: 63 IN | TEMPERATURE: 97.4 F | RESPIRATION RATE: 18 BRPM | BODY MASS INDEX: 23.55 KG/M2

## 2024-01-04 DIAGNOSIS — J84.9 ILD (INTERSTITIAL LUNG DISEASE) (HCC): Primary | ICD-10-CM

## 2024-01-04 PROBLEM — J96.11 CHRONIC RESPIRATORY FAILURE WITH HYPOXIA (HCC): Status: RESOLVED | Noted: 2020-09-24 | Resolved: 2024-01-04

## 2024-01-04 PROCEDURE — 3074F SYST BP LT 130 MM HG: CPT | Performed by: INTERNAL MEDICINE

## 2024-01-04 PROCEDURE — 99214 OFFICE O/P EST MOD 30 MIN: CPT | Performed by: INTERNAL MEDICINE

## 2024-01-04 PROCEDURE — 3078F DIAST BP <80 MM HG: CPT | Performed by: INTERNAL MEDICINE

## 2024-01-04 PROCEDURE — 1123F ACP DISCUSS/DSCN MKR DOCD: CPT | Performed by: INTERNAL MEDICINE

## 2024-01-04 NOTE — PROGRESS NOTES
Chief complaint  This is a 78 y.o. year old female  who comes to see me with a chief complaint of   Chief Complaint   Patient presents with    Follow-up     ILD       HPI  Here with a cc of ILD    She is unchanged.  Remains on prednisone 10 mg per day.  No changes.  No side effects.  Tries to stay active.  Seen with          Current Outpatient Medications:     memantine (NAMENDA) 5 MG tablet, TAKE 1 TABLET TWICE A DAY, Disp: 180 tablet, Rfl: 1    montelukast (SINGULAIR) 10 MG tablet, TAKE 1 TABLET DAILY, Disp: 90 tablet, Rfl: 1    donepezil (ARICEPT) 5 MG tablet, TAKE 1 TABLET NIGHTLY, Disp: 90 tablet, Rfl: 1    amLODIPine (NORVASC) 5 MG tablet, TAKE 1 TABLET DAILY, Disp: 90 tablet, Rfl: 1    omeprazole (PRILOSEC) 40 MG delayed release capsule, TAKE 1 CAPSULE EVERY MORNING BEFORE BREAKFAST, Disp: 90 capsule, Rfl: 1    alendronate (FOSAMAX) 70 MG tablet, TAKE 1 TABLET ONCE WEEKLY ON AN EMPTY STOMACH BEFORE BREAKFAST REMAIN UPRIGHT FOR 30 MINUTES AND TAKE WITH 8 OUNCES OF WATER, Disp: 12 tablet, Rfl: 3    zoster recombinant adjuvanted vaccine (SHINGRIX) 50 MCG/0.5ML SUSR injection, Inject 0.5 mLs into the muscle See Admin Instructions 1 dose now and repeat in 2-6 months, Disp: 0.5 mL, Rfl: 0    gabapentin (NEURONTIN) 300 MG capsule, TAKE ONE CAPSULE BY MOUTH DAILY, Disp: 90 capsule, Rfl: 1    losartan (COZAAR) 50 MG tablet, TAKE 1 TABLET DAILY, Disp: 90 tablet, Rfl: 3    dicyclomine (BENTYL) 10 MG capsule, Take 1 capsule by mouth 3 times daily as needed (ABDOMINAL CRAMPING), Disp: 90 capsule, Rfl: 1    predniSONE (DELTASONE) 10 MG tablet, Take 1 tablet by mouth daily, Disp: 90 tablet, Rfl: 3    metFORMIN (GLUCOPHAGE) 500 MG tablet, TAKE ONE TABLET BY MOUTH TWICE A DAY WITH MEALS, Disp: 90 tablet, Rfl: 1    predniSONE (DELTASONE) 10 MG tablet, Take 1 tablet by mouth daily, Disp: 90 tablet, Rfl: 3    Lancets (ONETOUCH DELICA PLUS [id number]) MISC, USE 1 LANCET TWO TIMES A DAY, Disp: 100 each, Rfl: 5    blood

## 2024-01-05 NOTE — TELEPHONE ENCOUNTER
HISTORY & PHYSICAL    Date / Time of Admission:  1/5/2024  7:34 AM  Admitting Diagnosis: Left foot pain [M79.672]    Chief Complaint:   Hip pain  Left foot pain    Subjective   Subjective:   HPI:     This is a 60-year-old male with history of chronic alcoholism and recent diagnosis of DVT presented with left foot pain and hip pain.  Patient is a chronic alcohol abuser and is having withdrawal symptoms.        Principal Problem:    Left foot pain      PMHx:  He  has a past medical history of Anxiety, Blood clot associated with vein wall inflammation, and Seizures (CMD).     PSHx:  His  has no past surgical history on file.     SHx:  He  reports that he has been smoking cigarettes. He has never used smokeless tobacco. He reports current alcohol use. He reports that he does not use drugs.      FHx:  His family history is not on file.    Allergies: ALLERGIES:  Penicillins     MEDS:  [unfilled]    Review of Systems:  A 14 point comprehensive review of systems was negative, except as documented in HPI.       Objective   Objective:   VITALS: Visit Vitals  /65   Pulse 97   Temp 99 °F (37.2 °C) (Oral)   Resp 18   SpO2 92%     EXAM:    General:  Alert, confused, no distress, appears stated age.     HEENT:   Normocephalic, without obvious abnormality,Conjunctivae/corneas clear. PERRL, EOMs intact. Septum midline.      Neck: Supple, symmetrical, trachea midline, no adenopathy, thyroid: no carotid bruit and no JVD.     Back:   Symmetric, no curvature. ROM normal. No CVA tenderness.     Lungs:   Clear to auscultation bilaterally.     Chest wall:  No tenderness or deformity.     Heart:  Regular rate and rhythm, S1, S2 normal, no murmur, click, rub or gallop.     Abdomen:   Soft, non-tender. Bowel sounds normal. No masses,  No organomegaly.         Extremities: Extremities normal, no cyanosis or edema.   Pulses: 2+ and symmetric all extremities.     Skin: Skin color, texture, turgor normal. No rashes or lesions      She saw pt today and patient's hearth rhythm was irregular. BP was 141/87. She takes Cozzar 50 mg qd.   Neurologic: CNII-XII intact. Normal strength, coarse tremor present in both hands       Data Review:    Imaging: X-rays reviewed      Labs Reviewed   COMPREHENSIVE METABOLIC PANEL - Abnormal; Notable for the following components:       Result Value    Sodium 132 (*)     BUN 5 (*)     Creatinine 0.60 (*)     GOT/AST 48 (*)     Albumin 3.4 (*)     Globulin 4.3 (*)     A/G Ratio 0.8 (*)     All other components within normal limits   CBC WITH AUTOMATED DIFFERENTIAL (PERFORMABLE ONLY) - Abnormal; Notable for the following components:    RBC 3.79 (*)     .2 (*)     MCH 36.4 (*)     RDW-CV 15.1 (*)     RDW-SD 57.3 (*)     All other components within normal limits    Narrative:     This is an appended report.  These results have been appended to a previously verified report.   D DIMER, QUANTITATIVE - Abnormal; Notable for the following components:    D Dimer, Quantitative 2.34 (*)     All other components within normal limits    Narrative:     Values < 0.50 mg/L(FEU) may be useful to help exclude DVT or PE in patients at low clinical risk for these disorders.  For patients above the age of 50, the [employer] recommends using age adjusted cutoff values. AGE X 0.01 calculates the age adjusted cutoff value in mg/L for these patient populations.   MAGNESIUM - Abnormal; Notable for the following components:    Magnesium 1.6 (*)     All other components within normal limits   TROPONIN I, HIGH SENSITIVITY - Normal   TROPONIN I, HIGH SENSITIVITY - Normal   CBC WITH DIFFERENTIAL    Narrative:     The following orders were created for panel order CBC with Automated Differential.  Procedure                               Abnormality         Status                     ---------                               -----------         ------                     CBC with Automated Dif...[[id number]]  Abnormal            Final result                 Please view results for these tests on the individual orders.        CBC:   Lab Results   Component Value Date    WBC 7.3 01/05/2024    WBC 6.6 08/16/2018    RBC 3.79 (L) 01/05/2024    RBC 3.81 (L) 08/16/2018     BMP:   Lab Results   Component Value Date    GLUCOSE 74 01/05/2024    GLUCOSE 86 08/11/2018    SODIUM 132 (L) 01/05/2024    SODIUM 140 08/16/2018    POTASSIUM 3.9 01/05/2024    POTASSIUM 4.8 08/16/2018    CHLORIDE 100 01/05/2024    CHLORIDE 68 (L) 06/01/2018    BUN 5 (L) 01/05/2024    BUN 19 08/11/2018    CREATININE 0.60 (L) 01/05/2024    CREATININE 0.91 08/11/2018    CALCIUM 9.0 01/05/2024    CALCIUM 8.6 06/01/2018     CMP:  Lab Results   Component Value Date    SODIUM 132 (L) 01/05/2024    SODIUM 140 08/16/2018    POTASSIUM 3.9 01/05/2024    POTASSIUM 4.8 08/16/2018    CHLORIDE 100 01/05/2024    CHLORIDE 68 (L) 06/01/2018    ANIONGAP 11 01/05/2024    ANIONGAP 10 08/16/2018    GLUCOSE 74 01/05/2024    GLUCOSE 86 08/11/2018    BUN 5 (L) 01/05/2024    BUN 19 08/11/2018    CREATININE 0.60 (L) 01/05/2024    CREATININE 0.91 08/11/2018    ALBUMIN 3.4 (L) 01/05/2024    ALBUMIN 2.9 (L) 08/10/2018    CALCIUM 9.0 01/05/2024    CALCIUM 8.6 06/01/2018    AST 48 (H) 01/05/2024    AST 41 (H) 08/10/2018    GFRNA >90 08/11/2018    GFRA >90 08/11/2018     Coagulation:   Lab Results   Component Value Date    INR 1.0 05/18/2023    INR 1.1 08/05/2018    PTT 29 05/18/2023    PTT 26 06/01/2018    PTT NOT APPLICABLE 06/01/2018     Cardiac markers:   Lab Results   Component Value Date     11/18/2016     ABGs: No results found for: \"PHARTERIAL\"  Mg: No results found for: \"MAGNESIUM\"  BNP:   Lab Results   Component Value Date    BNP 13 06/02/2018     Thyroid:   Lab Results   Component Value Date    TSH 3.032 10/28/2023    TSH 3.860 04/17/2023    TSH 3.404 08/06/2018    T4FREE 0.9 03/14/2015       Outside reports reviewed: ER records.         Assessment   Assessment:   Recent DVT of lower extremity  Left foot pain  Hip pain due to osteoarthritis  Alcohol withdrawal symptoms  Moderate  protein calorie malnutrition      [unfilled]    Plan   PLAN:   Continue home medications  Patient is on DTs protocol           By:  Crista Muhammad MD, 1/5/2024, 3:57 PM    Primary Care Physician:  Baldev Avina MD

## 2024-01-23 ENCOUNTER — HOSPITAL ENCOUNTER (OUTPATIENT)
Dept: CT IMAGING | Age: 79
Discharge: HOME | End: 2024-01-23
Attending: INTERNAL MEDICINE
Payer: MEDICARE

## 2024-01-23 DIAGNOSIS — J84.9 ILD (INTERSTITIAL LUNG DISEASE) (HCC): ICD-10-CM

## 2024-01-23 PROCEDURE — 71250 CT THORAX DX C-: CPT

## 2024-01-31 ENCOUNTER — OFFICE VISIT (OUTPATIENT)
Dept: INTERNAL MEDICINE CLINIC | Age: 79
End: 2024-01-31
Payer: MEDICARE

## 2024-01-31 VITALS
DIASTOLIC BLOOD PRESSURE: 90 MMHG | OXYGEN SATURATION: 96 % | HEIGHT: 63 IN | WEIGHT: 139 LBS | SYSTOLIC BLOOD PRESSURE: 130 MMHG | HEART RATE: 86 BPM | BODY MASS INDEX: 24.63 KG/M2

## 2024-01-31 DIAGNOSIS — M81.0 AGE RELATED OSTEOPOROSIS, UNSPECIFIED PATHOLOGICAL FRACTURE PRESENCE: ICD-10-CM

## 2024-01-31 DIAGNOSIS — Z91.81 AT HIGH RISK FOR FALLS: ICD-10-CM

## 2024-01-31 DIAGNOSIS — F02.818 LATE ONSET ALZHEIMER'S DEMENTIA WITH BEHAVIORAL DISTURBANCE (HCC): ICD-10-CM

## 2024-01-31 DIAGNOSIS — J44.89 COPD WITH ASTHMA: ICD-10-CM

## 2024-01-31 DIAGNOSIS — G30.1 LATE ONSET ALZHEIMER'S DEMENTIA WITH BEHAVIORAL DISTURBANCE (HCC): ICD-10-CM

## 2024-01-31 DIAGNOSIS — E11.40 TYPE 2 DIABETES MELLITUS WITH DIABETIC NEUROPATHY, WITHOUT LONG-TERM CURRENT USE OF INSULIN (HCC): Primary | ICD-10-CM

## 2024-01-31 DIAGNOSIS — I10 ESSENTIAL HYPERTENSION: ICD-10-CM

## 2024-01-31 DIAGNOSIS — K21.9 GASTROESOPHAGEAL REFLUX DISEASE, UNSPECIFIED WHETHER ESOPHAGITIS PRESENT: ICD-10-CM

## 2024-01-31 PROCEDURE — 3075F SYST BP GE 130 - 139MM HG: CPT | Performed by: INTERNAL MEDICINE

## 2024-01-31 PROCEDURE — 99214 OFFICE O/P EST MOD 30 MIN: CPT | Performed by: INTERNAL MEDICINE

## 2024-01-31 PROCEDURE — 3080F DIAST BP >= 90 MM HG: CPT | Performed by: INTERNAL MEDICINE

## 2024-01-31 PROCEDURE — 1123F ACP DISCUSS/DSCN MKR DOCD: CPT | Performed by: INTERNAL MEDICINE

## 2024-01-31 ASSESSMENT — ENCOUNTER SYMPTOMS
SORE THROAT: 0
BLOOD IN STOOL: 0
VOMITING: 0
SHORTNESS OF BREATH: 0
NAUSEA: 0
COUGH: 0
ABDOMINAL PAIN: 0

## 2024-01-31 NOTE — PROGRESS NOTES
Traci Aiken (:  1945) is a 78 y.o. female, New patient, here for evaluation of the following chief complaint(s):  3 Month Follow-Up and Diabetes         ASSESSMENT/PLAN:  1. Type 2 diabetes mellitus with diabetic neuropathy, without long-term current use of insulin (HCC)  - Stable   - Continue current dose of  metformin 500 mg twice daily  Continue gabapentin for neuropathy.    2. Essential hypertension  -Blood pressure elevated, patient did not take her medications today  Advised patient to take her medications regularly as prescribed.  - Continue current dose of losartan and amlodipine    3. Late onset Alzheimer's dementia with behavioral disturbance (HCC)  Chronic, worsening  - Continue current dose of  memantine and donepezil   Referring to neurology for further management  -     Edson Kruse MD, Neurology, [facility]    4. Gastroesophageal reflux disease, unspecified whether esophagitis present  - Stable   - Continue current dose of  omeprazole     5. Age related osteoporosis, unspecified pathological fracture presence  - Stable   - Continue current dose of  Alendronate  Continue calcium vitamin D3    6. COPD with asthma (HCC)  - Stable   - Continue current dose of  montelukast     7. At high risk for falls  On the basis of positive falls risk screening, assessment and plan is as follows: home safety tips provided.    Return in about 3 months (around 2024).         Subjective   SUBJECTIVE/OBJECTIVE:  Diabetes  She presents for her follow-up diabetic visit. She has type 2 diabetes mellitus. Her disease course has been stable. There are no hypoglycemic associated symptoms. Pertinent negatives for hypoglycemia include no dizziness. There are no diabetic associated symptoms. Pertinent negatives for diabetes include no chest pain, no fatigue and no weakness. There are no hypoglycemic complications. Diabetic complications include peripheral neuropathy. Risk factors for coronary

## 2024-02-07 RX ORDER — PREDNISONE 10 MG/1
10 TABLET ORAL DAILY
Qty: 90 TABLET | Refills: 3 | Status: SHIPPED | OUTPATIENT
Start: 2024-02-07

## 2024-03-28 ENCOUNTER — HOSPITAL ENCOUNTER (OUTPATIENT)
Age: 79
Discharge: HOME | End: 2024-03-28
Attending: PSYCHIATRY & NEUROLOGY
Payer: MEDICARE

## 2024-03-28 ENCOUNTER — HOSPITAL ENCOUNTER (OUTPATIENT)
Dept: CT IMAGING | Age: 79
Discharge: HOME | End: 2024-03-28
Attending: PSYCHIATRY & NEUROLOGY
Payer: MEDICARE

## 2024-03-28 DIAGNOSIS — F05 SUNDOWNING: ICD-10-CM

## 2024-03-28 DIAGNOSIS — F03.90 DEMENTIA WITHOUT BEHAVIORAL DISTURBANCE, PSYCHOTIC DISTURBANCE, MOOD DISTURBANCE, OR ANXIETY, UNSPECIFIED DEMENTIA SEVERITY, UNSPECIFIED DEMENTIA TYPE (HCC): ICD-10-CM

## 2024-03-28 LAB
FOLATE SERPL-MCNC: 9.58 NG/ML (ref 4.78–24.2)
T4 FREE SERPL-MCNC: 1.6 NG/DL (ref 0.9–1.8)
TSH SERPL DL<=0.005 MIU/L-ACNC: 1.71 UIU/ML (ref 0.27–4.2)
VIT B12 SERPL-MCNC: 385 PG/ML (ref 211–911)

## 2024-03-28 PROCEDURE — 84443 ASSAY THYROID STIM HORMONE: CPT

## 2024-03-28 PROCEDURE — 36415 COLL VENOUS BLD VENIPUNCTURE: CPT

## 2024-03-28 PROCEDURE — 83921 ORGANIC ACID SINGLE QUANT: CPT

## 2024-03-28 PROCEDURE — 84439 ASSAY OF FREE THYROXINE: CPT

## 2024-03-28 PROCEDURE — 82607 VITAMIN B-12: CPT

## 2024-03-28 PROCEDURE — 82746 ASSAY OF FOLIC ACID SERUM: CPT

## 2024-03-28 PROCEDURE — 70450 CT HEAD/BRAIN W/O DYE: CPT

## 2024-03-31 LAB — METHYLMALONATE SERPL-SCNC: 0.36 UMOL/L (ref 0–0.4)

## 2024-04-18 ENCOUNTER — OFFICE VISIT (OUTPATIENT)
Dept: PULMONOLOGY | Age: 79
End: 2024-04-18
Payer: MEDICARE

## 2024-04-18 VITALS
SYSTOLIC BLOOD PRESSURE: 158 MMHG | HEART RATE: 85 BPM | OXYGEN SATURATION: 96 % | WEIGHT: 134.2 LBS | HEIGHT: 61 IN | DIASTOLIC BLOOD PRESSURE: 101 MMHG | RESPIRATION RATE: 18 BRPM | BODY MASS INDEX: 25.34 KG/M2 | TEMPERATURE: 97.3 F

## 2024-04-18 DIAGNOSIS — J84.9 ILD (INTERSTITIAL LUNG DISEASE) (HCC): Primary | ICD-10-CM

## 2024-04-18 PROCEDURE — 3080F DIAST BP >= 90 MM HG: CPT | Performed by: INTERNAL MEDICINE

## 2024-04-18 PROCEDURE — 3077F SYST BP >= 140 MM HG: CPT | Performed by: INTERNAL MEDICINE

## 2024-04-18 PROCEDURE — 99213 OFFICE O/P EST LOW 20 MIN: CPT | Performed by: INTERNAL MEDICINE

## 2024-04-18 PROCEDURE — 1123F ACP DISCUSS/DSCN MKR DOCD: CPT | Performed by: INTERNAL MEDICINE

## 2024-04-18 NOTE — PROGRESS NOTES
Chief complaint  This is a 78 y.o. year old female  who comes to see me with a chief complaint of   Chief Complaint   Patient presents with    Follow-up     ILD       HPI  Here with a cc of ILD    She recently did CT and findings stable when compared to 4 years ago.  Remains on prednisone 10 mg per day.  No side effects.  says she is dealing with memory issues.  Seen with  today         Current Outpatient Medications:     predniSONE (DELTASONE) 10 MG tablet, TAKE 1 TABLET DAILY, Disp: 90 tablet, Rfl: 3    memantine (NAMENDA) 5 MG tablet, TAKE 1 TABLET TWICE A DAY, Disp: 180 tablet, Rfl: 1    montelukast (SINGULAIR) 10 MG tablet, TAKE 1 TABLET DAILY, Disp: 90 tablet, Rfl: 1    donepezil (ARICEPT) 5 MG tablet, TAKE 1 TABLET NIGHTLY, Disp: 90 tablet, Rfl: 1    amLODIPine (NORVASC) 5 MG tablet, TAKE 1 TABLET DAILY, Disp: 90 tablet, Rfl: 1    omeprazole (PRILOSEC) 40 MG delayed release capsule, TAKE 1 CAPSULE EVERY MORNING BEFORE BREAKFAST, Disp: 90 capsule, Rfl: 1    alendronate (FOSAMAX) 70 MG tablet, TAKE 1 TABLET ONCE WEEKLY ON AN EMPTY STOMACH BEFORE BREAKFAST REMAIN UPRIGHT FOR 30 MINUTES AND TAKE WITH 8 OUNCES OF WATER, Disp: 12 tablet, Rfl: 3    zoster recombinant adjuvanted vaccine (SHINGRIX) 50 MCG/0.5ML SUSR injection, Inject 0.5 mLs into the muscle See Admin Instructions 1 dose now and repeat in 2-6 months, Disp: 0.5 mL, Rfl: 0    losartan (COZAAR) 50 MG tablet, TAKE 1 TABLET DAILY, Disp: 90 tablet, Rfl: 3    dicyclomine (BENTYL) 10 MG capsule, Take 1 capsule by mouth 3 times daily as needed (ABDOMINAL CRAMPING), Disp: 90 capsule, Rfl: 1    metFORMIN (GLUCOPHAGE) 500 MG tablet, TAKE ONE TABLET BY MOUTH TWICE A DAY WITH MEALS, Disp: 90 tablet, Rfl: 1    predniSONE (DELTASONE) 10 MG tablet, Take 1 tablet by mouth daily, Disp: 90 tablet, Rfl: 3    Lancets (ONETOUCH DELICA PLUS [id number]) MISC, USE 1 LANCET TWO TIMES A DAY, Disp: 100 each, Rfl: 5    blood glucose monitor strips, Test 2 times a

## 2024-05-01 ENCOUNTER — OFFICE VISIT (OUTPATIENT)
Dept: INTERNAL MEDICINE CLINIC | Age: 79
End: 2024-05-01

## 2024-05-01 VITALS
HEART RATE: 94 BPM | WEIGHT: 133.8 LBS | DIASTOLIC BLOOD PRESSURE: 64 MMHG | BODY MASS INDEX: 25.26 KG/M2 | SYSTOLIC BLOOD PRESSURE: 122 MMHG | HEIGHT: 61 IN | OXYGEN SATURATION: 97 %

## 2024-05-01 DIAGNOSIS — E11.40 TYPE 2 DIABETES MELLITUS WITH DIABETIC NEUROPATHY, WITHOUT LONG-TERM CURRENT USE OF INSULIN (HCC): Primary | ICD-10-CM

## 2024-05-01 DIAGNOSIS — M81.0 AGE RELATED OSTEOPOROSIS, UNSPECIFIED PATHOLOGICAL FRACTURE PRESENCE: ICD-10-CM

## 2024-05-01 DIAGNOSIS — J44.89 COPD WITH ASTHMA (HCC): ICD-10-CM

## 2024-05-01 DIAGNOSIS — K21.9 GASTROESOPHAGEAL REFLUX DISEASE, UNSPECIFIED WHETHER ESOPHAGITIS PRESENT: ICD-10-CM

## 2024-05-01 DIAGNOSIS — I10 ESSENTIAL HYPERTENSION: ICD-10-CM

## 2024-05-01 DIAGNOSIS — F02.818 LATE ONSET ALZHEIMER'S DEMENTIA WITH BEHAVIORAL DISTURBANCE (HCC): ICD-10-CM

## 2024-05-01 DIAGNOSIS — G30.1 LATE ONSET ALZHEIMER'S DEMENTIA WITH BEHAVIORAL DISTURBANCE (HCC): ICD-10-CM

## 2024-05-01 LAB — HBA1C MFR BLD: 6 %

## 2024-05-01 ASSESSMENT — PATIENT HEALTH QUESTIONNAIRE - PHQ9
10. IF YOU CHECKED OFF ANY PROBLEMS, HOW DIFFICULT HAVE THESE PROBLEMS MADE IT FOR YOU TO DO YOUR WORK, TAKE CARE OF THINGS AT HOME, OR GET ALONG WITH OTHER PEOPLE: NOT DIFFICULT AT ALL
SUM OF ALL RESPONSES TO PHQ9 QUESTIONS 1 & 2: 0
SUM OF ALL RESPONSES TO PHQ QUESTIONS 1-9: 0
9. THOUGHTS THAT YOU WOULD BE BETTER OFF DEAD, OR OF HURTING YOURSELF: NOT AT ALL
4. FEELING TIRED OR HAVING LITTLE ENERGY: NOT AT ALL
2. FEELING DOWN, DEPRESSED OR HOPELESS: NOT AT ALL
3. TROUBLE FALLING OR STAYING ASLEEP: NOT AT ALL
5. POOR APPETITE OR OVEREATING: NOT AT ALL
SUM OF ALL RESPONSES TO PHQ QUESTIONS 1-9: 0
6. FEELING BAD ABOUT YOURSELF - OR THAT YOU ARE A FAILURE OR HAVE LET YOURSELF OR YOUR FAMILY DOWN: NOT AT ALL
1. LITTLE INTEREST OR PLEASURE IN DOING THINGS: NOT AT ALL
SUM OF ALL RESPONSES TO PHQ QUESTIONS 1-9: 0
SUM OF ALL RESPONSES TO PHQ QUESTIONS 1-9: 0
7. TROUBLE CONCENTRATING ON THINGS, SUCH AS READING THE NEWSPAPER OR WATCHING TELEVISION: NOT AT ALL
8. MOVING OR SPEAKING SO SLOWLY THAT OTHER PEOPLE COULD HAVE NOTICED. OR THE OPPOSITE, BEING SO FIGETY OR RESTLESS THAT YOU HAVE BEEN MOVING AROUND A LOT MORE THAN USUAL: NOT AT ALL

## 2024-05-01 ASSESSMENT — ENCOUNTER SYMPTOMS
BLOOD IN STOOL: 0
SORE THROAT: 0
ABDOMINAL PAIN: 0
SHORTNESS OF BREATH: 0
VOMITING: 0
COUGH: 0
NAUSEA: 0

## 2024-05-01 NOTE — PROGRESS NOTES
in exercise intermittently. An ACE inhibitor/angiotensin II receptor blocker is being taken. Eye exam is current.   Hypertension  This is a chronic problem. The current episode started more than 1 year ago. The problem is controlled. Pertinent negatives include no chest pain, palpitations or shortness of breath. Past treatments include angiotensin blockers and calcium channel blockers. The current treatment provides significant improvement. There are no compliance problems.        Review of Systems   Constitutional:  Negative for fatigue and fever.   HENT:  Negative for nosebleeds and sore throat.    Respiratory:  Negative for cough and shortness of breath.    Cardiovascular:  Negative for chest pain, palpitations and leg swelling.   Gastrointestinal:  Negative for abdominal pain, blood in stool, nausea and vomiting.   Neurological:  Negative for dizziness and weakness.          Objective   Physical Exam  Constitutional:       Appearance: Normal appearance.   HENT:      Head: Normocephalic and atraumatic.   Eyes:      General: No scleral icterus.     Conjunctiva/sclera: Conjunctivae normal.   Cardiovascular:      Rate and Rhythm: Normal rate and regular rhythm.      Pulses: Normal pulses.      Heart sounds: Normal heart sounds.   Pulmonary:      Effort: Pulmonary effort is normal.      Breath sounds: Normal breath sounds.   Musculoskeletal:         General: No swelling.   Skin:     General: Skin is warm and dry.   Neurological:      Mental Status: She is alert and oriented to person, place, and time. Mental status is at baseline.   Psychiatric:         Mood and Affect: Mood normal.         Behavior: Behavior normal.              An electronic signature was used to authenticate this note.    --WILLI LEE MD

## 2024-05-07 DIAGNOSIS — I10 ESSENTIAL HYPERTENSION: ICD-10-CM

## 2024-05-07 RX ORDER — LOSARTAN POTASSIUM 50 MG/1
TABLET ORAL
Qty: 90 TABLET | Refills: 3 | Status: SHIPPED | OUTPATIENT
Start: 2024-05-07

## 2024-05-07 NOTE — TELEPHONE ENCOUNTER
Future Appointments   Date Time Provider Department Center   8/8/2024 11:00 AM Vijay Jensen DO [facility]   8/14/2024  1:15 PM Ev Collado MD [facility]       Last appt 5/1/24

## 2024-06-07 DIAGNOSIS — I10 ESSENTIAL HYPERTENSION: ICD-10-CM

## 2024-06-07 DIAGNOSIS — J44.89 COPD WITH ASTHMA (HCC): ICD-10-CM

## 2024-06-07 DIAGNOSIS — K21.9 GASTROESOPHAGEAL REFLUX DISEASE, UNSPECIFIED WHETHER ESOPHAGITIS PRESENT: ICD-10-CM

## 2024-06-07 RX ORDER — AMLODIPINE BESYLATE 5 MG/1
TABLET ORAL
Qty: 90 TABLET | Refills: 3 | Status: SHIPPED | OUTPATIENT
Start: 2024-06-07

## 2024-06-07 RX ORDER — OMEPRAZOLE 40 MG/1
CAPSULE, DELAYED RELEASE ORAL
Qty: 90 CAPSULE | Refills: 3 | Status: SHIPPED | OUTPATIENT
Start: 2024-06-07

## 2024-06-07 RX ORDER — MONTELUKAST SODIUM 10 MG/1
TABLET ORAL
Qty: 90 TABLET | Refills: 3 | Status: SHIPPED | OUTPATIENT
Start: 2024-06-07

## 2024-06-07 NOTE — TELEPHONE ENCOUNTER
Future Appointments   Date Time Provider Department Center   8/8/2024 11:00 AM Vijay Jensen DO [facility]   8/14/2024  1:15 PM Ev Collado MD [facility]       Last appt 5/1/24

## 2024-08-08 ENCOUNTER — OFFICE VISIT (OUTPATIENT)
Dept: PULMONOLOGY | Age: 79
End: 2024-08-08
Payer: MEDICARE

## 2024-08-08 VITALS
OXYGEN SATURATION: 90 % | DIASTOLIC BLOOD PRESSURE: 90 MMHG | RESPIRATION RATE: 18 BRPM | SYSTOLIC BLOOD PRESSURE: 135 MMHG | TEMPERATURE: 97.4 F | WEIGHT: 128.8 LBS | BODY MASS INDEX: 22.82 KG/M2 | HEART RATE: 92 BPM | HEIGHT: 63 IN

## 2024-08-08 DIAGNOSIS — J84.9 ILD (INTERSTITIAL LUNG DISEASE) (HCC): Primary | ICD-10-CM

## 2024-08-08 PROCEDURE — 3080F DIAST BP >= 90 MM HG: CPT | Performed by: INTERNAL MEDICINE

## 2024-08-08 PROCEDURE — 3075F SYST BP GE 130 - 139MM HG: CPT | Performed by: INTERNAL MEDICINE

## 2024-08-08 PROCEDURE — 1123F ACP DISCUSS/DSCN MKR DOCD: CPT | Performed by: INTERNAL MEDICINE

## 2024-08-08 PROCEDURE — 99213 OFFICE O/P EST LOW 20 MIN: CPT | Performed by: INTERNAL MEDICINE

## 2024-08-08 NOTE — PROGRESS NOTES
Chief complaint  This is a 78 y.o. year old female  who comes to see me with a chief complaint of   Chief Complaint   Patient presents with    Follow-up     ILD       HPI  Here with a cc of ILD    She continues on 10 mg of prednisone without side effects and good control of her breathing. No new issues.  Now on meds for memory loss        Current Outpatient Medications:     omeprazole (PRILOSEC) 40 MG delayed release capsule, TAKE 1 CAPSULE EVERY MORNING BEFORE BREAKFAST, Disp: 90 capsule, Rfl: 3    amLODIPine (NORVASC) 5 MG tablet, TAKE 1 TABLET DAILY, Disp: 90 tablet, Rfl: 3    montelukast (SINGULAIR) 10 MG tablet, TAKE 1 TABLET DAILY, Disp: 90 tablet, Rfl: 3    losartan (COZAAR) 50 MG tablet, TAKE 1 TABLET DAILY, Disp: 90 tablet, Rfl: 3    Handicap Placard MISC, by Does not apply route Duration - 5 years, Disp: 1 each, Rfl: 0    predniSONE (DELTASONE) 10 MG tablet, TAKE 1 TABLET DAILY, Disp: 90 tablet, Rfl: 3    memantine (NAMENDA) 5 MG tablet, TAKE 1 TABLET TWICE A DAY, Disp: 180 tablet, Rfl: 1    donepezil (ARICEPT) 5 MG tablet, TAKE 1 TABLET NIGHTLY, Disp: 90 tablet, Rfl: 1    alendronate (FOSAMAX) 70 MG tablet, TAKE 1 TABLET ONCE WEEKLY ON AN EMPTY STOMACH BEFORE BREAKFAST REMAIN UPRIGHT FOR 30 MINUTES AND TAKE WITH 8 OUNCES OF WATER, Disp: 12 tablet, Rfl: 3    dicyclomine (BENTYL) 10 MG capsule, Take 1 capsule by mouth 3 times daily as needed (ABDOMINAL CRAMPING), Disp: 90 capsule, Rfl: 1    metFORMIN (GLUCOPHAGE) 500 MG tablet, TAKE ONE TABLET BY MOUTH TWICE A DAY WITH MEALS, Disp: 90 tablet, Rfl: 1    predniSONE (DELTASONE) 10 MG tablet, Take 1 tablet by mouth daily, Disp: 90 tablet, Rfl: 3    Lancets (ONETOUCH DELICA PLUS [id number]) MISC, USE 1 LANCET TWO TIMES A DAY, Disp: 100 each, Rfl: 5    blood glucose monitor strips, Test 2 times a day & as needed for symptoms of irregular blood glucose. Dispense sufficient amount for indicated testing frequency plus additional to accommodate PRN testing needs.,

## 2024-08-13 NOTE — PROGRESS NOTES
left    POLYPECTOMY         Office Visit on 05/01/2024   Component Date Value Ref Range Status    Hemoglobin A1C 05/01/2024 6.0  % Final       Family History   Problem Relation Age of Onset    Heart Disease Mother     Stroke Mother     Osteoporosis Mother     Cirrhosis Father         Liver    Osteoporosis Sister     Osteoporosis Maternal Grandmother        Current Outpatient Medications   Medication Sig Dispense Refill    omeprazole (PRILOSEC) 40 MG delayed release capsule TAKE 1 CAPSULE EVERY MORNING BEFORE BREAKFAST 90 capsule 3    amLODIPine (NORVASC) 5 MG tablet TAKE 1 TABLET DAILY 90 tablet 3    montelukast (SINGULAIR) 10 MG tablet TAKE 1 TABLET DAILY 90 tablet 3    losartan (COZAAR) 50 MG tablet TAKE 1 TABLET DAILY 90 tablet 3    Handicap Placard MISC by Does not apply route Duration - 5 years 1 each 0    predniSONE (DELTASONE) 10 MG tablet TAKE 1 TABLET DAILY 90 tablet 3    memantine (NAMENDA) 5 MG tablet TAKE 1 TABLET TWICE A  tablet 1    donepezil (ARICEPT) 5 MG tablet TAKE 1 TABLET NIGHTLY 90 tablet 1    alendronate (FOSAMAX) 70 MG tablet TAKE 1 TABLET ONCE WEEKLY ON AN EMPTY STOMACH BEFORE BREAKFAST REMAIN UPRIGHT FOR 30 MINUTES AND TAKE WITH 8 OUNCES OF WATER 12 tablet 3    dicyclomine (BENTYL) 10 MG capsule Take 1 capsule by mouth 3 times daily as needed (ABDOMINAL CRAMPING) 90 capsule 1    metFORMIN (GLUCOPHAGE) 500 MG tablet TAKE ONE TABLET BY MOUTH TWICE A DAY WITH MEALS 90 tablet 1    predniSONE (DELTASONE) 10 MG tablet Take 1 tablet by mouth daily 90 tablet 3    Lancets (ONETOUCH DELICA PLUS [id number]) MISC USE 1 LANCET TWO TIMES A  each 5    blood glucose monitor strips Test 2 times a day & as needed for symptoms of irregular blood glucose. Dispense sufficient amount for indicated testing frequency plus additional to accommodate PRN testing needs. 100 strip 3    folic acid (FOLVITE) 1 MG tablet Take 1 tablet by mouth daily      calcium-vitamin D (OSCAL 500/200 D-3) 500-200 MG-UNIT

## 2024-08-15 ENCOUNTER — OFFICE VISIT (OUTPATIENT)
Dept: INTERNAL MEDICINE CLINIC | Age: 79
End: 2024-08-15
Payer: MEDICARE

## 2024-08-15 VITALS
OXYGEN SATURATION: 96 % | HEART RATE: 77 BPM | SYSTOLIC BLOOD PRESSURE: 126 MMHG | BODY MASS INDEX: 22.67 KG/M2 | DIASTOLIC BLOOD PRESSURE: 88 MMHG | WEIGHT: 128 LBS

## 2024-08-15 DIAGNOSIS — I10 ESSENTIAL HYPERTENSION: ICD-10-CM

## 2024-08-15 DIAGNOSIS — J84.9 ILD (INTERSTITIAL LUNG DISEASE) (HCC): ICD-10-CM

## 2024-08-15 DIAGNOSIS — M81.0 AGE RELATED OSTEOPOROSIS, UNSPECIFIED PATHOLOGICAL FRACTURE PRESENCE: ICD-10-CM

## 2024-08-15 DIAGNOSIS — Z78.0 POST-MENOPAUSAL: ICD-10-CM

## 2024-08-15 DIAGNOSIS — G30.1 LATE ONSET ALZHEIMER'S DEMENTIA WITH BEHAVIORAL DISTURBANCE (HCC): ICD-10-CM

## 2024-08-15 DIAGNOSIS — F02.818 LATE ONSET ALZHEIMER'S DEMENTIA WITH BEHAVIORAL DISTURBANCE (HCC): ICD-10-CM

## 2024-08-15 DIAGNOSIS — E11.40 TYPE 2 DIABETES MELLITUS WITH DIABETIC NEUROPATHY, WITHOUT LONG-TERM CURRENT USE OF INSULIN (HCC): Primary | ICD-10-CM

## 2024-08-15 PROCEDURE — 3074F SYST BP LT 130 MM HG: CPT | Performed by: NURSE PRACTITIONER

## 2024-08-15 PROCEDURE — 99204 OFFICE O/P NEW MOD 45 MIN: CPT | Performed by: NURSE PRACTITIONER

## 2024-08-15 PROCEDURE — 3044F HG A1C LEVEL LT 7.0%: CPT | Performed by: NURSE PRACTITIONER

## 2024-08-15 PROCEDURE — G2211 COMPLEX E/M VISIT ADD ON: HCPCS | Performed by: NURSE PRACTITIONER

## 2024-08-15 PROCEDURE — 3079F DIAST BP 80-89 MM HG: CPT | Performed by: NURSE PRACTITIONER

## 2024-08-15 PROCEDURE — 1123F ACP DISCUSS/DSCN MKR DOCD: CPT | Performed by: NURSE PRACTITIONER

## 2024-08-15 SDOH — ECONOMIC STABILITY: FOOD INSECURITY: WITHIN THE PAST 12 MONTHS, THE FOOD YOU BOUGHT JUST DIDN'T LAST AND YOU DIDN'T HAVE MONEY TO GET MORE.: NEVER TRUE

## 2024-08-15 SDOH — ECONOMIC STABILITY: FOOD INSECURITY: WITHIN THE PAST 12 MONTHS, YOU WORRIED THAT YOUR FOOD WOULD RUN OUT BEFORE YOU GOT MONEY TO BUY MORE.: NEVER TRUE

## 2024-08-15 SDOH — ECONOMIC STABILITY: INCOME INSECURITY: HOW HARD IS IT FOR YOU TO PAY FOR THE VERY BASICS LIKE FOOD, HOUSING, MEDICAL CARE, AND HEATING?: NOT HARD AT ALL

## 2024-08-15 ASSESSMENT — ENCOUNTER SYMPTOMS: SHORTNESS OF BREATH: 0

## 2024-08-16 LAB
ANION GAP SERPL CALCULATED.3IONS-SCNC: 11 MMOL/L (ref 3–16)
BUN SERPL-MCNC: 12 MG/DL (ref 7–20)
CALCIUM SERPL-MCNC: 10.1 MG/DL (ref 8.3–10.6)
CHLORIDE SERPL-SCNC: 98 MMOL/L (ref 99–110)
CO2 SERPL-SCNC: 27 MMOL/L (ref 21–32)
CREAT SERPL-MCNC: 0.9 MG/DL (ref 0.6–1.2)
EST. AVERAGE GLUCOSE BLD GHB EST-MCNC: 114 MG/DL
GFR SERPLBLD CREATININE-BSD FMLA CKD-EPI: 65 ML/MIN/{1.73_M2}
GLUCOSE SERPL-MCNC: 97 MG/DL (ref 70–99)
HBA1C MFR BLD: 5.6 %
POTASSIUM SERPL-SCNC: 4.1 MMOL/L (ref 3.5–5.1)
SODIUM SERPL-SCNC: 136 MMOL/L (ref 136–145)

## 2024-09-11 NOTE — ED PROVIDER NOTES
eMERGENCY dEPARTMENT eNCOUnter      Pt Name: Brittany Matias  MRN: [id number]  [place] 1945  Date of evaluation: 1/23/2020  Provider: Katelin De Souza MD     [address] [place] [address]       Chief Complaint   Patient presents with    Fall     x1.5 weeks ago    Back Pain         HISTORY OF PRESENT ILLNESS   (Location/Symptom, Timing/Onset,Context/Setting, Quality, Duration, Modifying Factors, Severity) Note limiting factors. 75year old female that presents today for c/o of midthoracic back pain that started 2 weeks ago after falling. She had been on the toilet and felt \"lightheaded\" when she stood up. She states that she fell back on the floor and on to the bathroom scale. Denies hitting her head but her  thinks that she hit her head on the bathroom floor. She states that she is also concerned for some abdominal distention that started when she fell two weeks ago. She has taken Tylenol off and on over the past two weeks with some relief. Her  has been rubbing Bengay and she has also used some lidocaine patches also. She has c/o SOB today and denies being anxious. Indicates that she has a history of lung issues that she uses an inhaler for but hasn't used it today. REVIEW OFSYSTEMS    (2+ for level 4; 10+ for level 5)   Review of Systems   Constitutional: Positive for appetite change. Hasn't eaten much over the past two weeks since her fall. HENT: Negative. Eyes: Negative. Respiratory: Positive for shortness of breath. Negative for apnea, cough, choking, chest tightness and wheezing. Cardiovascular: Negative. Gastrointestinal: Positive for abdominal distention and abdominal pain. Negative for anal bleeding, blood in stool, constipation, diarrhea, nausea, rectal pain and vomiting. Endocrine: Negative. Genitourinary: Negative. Musculoskeletal: Positive for back pain. Negative for gait problem, joint swelling, myalgias, neck pain and neck stiffness. Midthoracic back pain   Allergic/Immunologic: Negative. Neurological: Positive for light-headedness. Negative for tremors, seizures, syncope, facial asymmetry, speech difficulty, weakness, numbness and headaches. Hematological: Negative. Psychiatric/Behavioral: Negative. All other systems reviewed and are negative. PAST MEDICAL HISTORY     Past Medical History:   Diagnosis Date    Asthma     Depression 6/28/2013    Diverticulosis of colon (without mention of hemorrhage)     Enthesopathy of hip region     left - mild    Esophageal reflux     Irritable bowel syndrome     Nocturia     Osteoporosis, unspecified     Other and unspecified hyperlipidemia     Postinflammatory pulmonary fibrosis (HCC)     Sialoadenitis     left    Synovial cyst of popliteal space     left knee    Thoracic or lumbosacral neuritis or radiculitis, unspecified     left - L5 - S1    Type II or unspecified type diabetes mellitus without mention of complication, not stated as uncontrolled     Unspecified essential hypertension        SURGICAL HISTORY       Past Surgical History:   Procedure Laterality Date    BRONCHOSCOPY N/A 12/21/2018    BRONCHOSCOPY WITH BAL performed by Fanta Blue DO at [address] [place] [address]  2009    DR. Jean-no polyps    COLONOSCOPY  02/2016    normal-DR. Ramsay    HYSTERECTOMY      partial    LIPOMA RESECTION      abdominal wall    OVARY REMOVAL      left    POLYPECTOMY         CURRENT MEDICATIONS       Previous Medications    ACEBUTOLOL (SECTRAL) 400 MG CAPSULE    TAKE 1 CAPSULE TWICE A DAY    ALBUTEROL SULFATE  (90 BASE) MCG/ACT INHALER    Inhale 2 puffs into the lungs every 4 hours (while awake) And as needed every 4 hours at nights    BUDESONIDE-FORMOTEROL (SYMBICORT) 160-4.5 MCG/ACT AERO    Inhale 2 puffs into the lungs 2 times daily    CETIRIZINE (ZYRTEC ALLERGY) 10 MG TABLET    Take 1 tablet by mouth daily    DAPSONE 100 MG TABLET    TAKE ONE TABLET BY MOUTH DAILY    DICYCLOMINE (BENTYL) 10 MG CAPSULE    TAKE 1 CAPSULE DAILY    ESCITALOPRAM (LEXAPRO) 10 MG TABLET    TAKE ONE TABLET BY MOUTH DAILY    FENOFIBRATE 160 MG TABLET    TAKE 1 TABLET DAILY    GABAPENTIN (NEURONTIN) 300 MG CAPSULE    TAKE ONE CAPSULE BY MOUTH THREE TIMES A DAY    LOSARTAN (COZAAR) 100 MG TABLET    TAKE 1 TABLET DAILY    METFORMIN (GLUCOPHAGE) 1000 MG TABLET    TAKE ONE TABLET BY MOUTH TWICE A DAY WITH MEALS    MONTELUKAST (SINGULAIR) 10 MG TABLET    TAKE ONE TABLET BY MOUTH DAILY    PREDNISONE (DELTASONE) 10 MG TABLET    Take 1 tablet by mouth daily       ALLERGIES     Latex; Neda-seltzer [aspirin effervescent]; and Sulfa antibiotics    FAMILY HISTORY       Family History   Problem Relation Age of Onset    Heart Disease Mother     Stroke Mother     Osteoporosis Mother     Cirrhosis Father         Liver    Osteoporosis Sister     Osteoporosis Maternal Grandmother         SOCIAL HISTORY       Social History     Socioeconomic History    Marital status:      Spouse name: Hortencia Bustillos Number of children: 3    Years of education: 15    Highest education level: High school graduate   Occupational History    Occupation: retired   Social Needs    Financial resource strain: Not hard at all   [address] insecurity:     Worry: Never true     Inability: Never true    Transportation needs:     Medical: No     Non-medical: No   Tobacco Use    Smoking status: Never Smoker    Smokeless tobacco: Never Used   Substance and Sexual Activity    Alcohol use:  Yes    Drug use: No    Sexual activity: None   Lifestyle    Physical activity:     Days per week: None     Minutes per session: None    Stress: None   Relationships    Social connections:     Talks on phone: None     Gets together: None     Attends Islam service: None     Active member of club or organization: None     Attends meetings of clubs or organizations: None     Relationship status: None    Intimate partner violence:     Fear of current or ex partner: None     Emotionally abused: None     Physically abused: None     Forced sexual activity: None   Other Topics Concern    None   Social History Narrative    None       SCREENINGS    Veronique Coma Scale  Eye Opening: Spontaneous  Best Verbal Response: Oriented  Best Motor Response: Obeys commands  [place] Coma Scale Score: 15      PHYSICAL EXAM    (up to 7 for level 4, 8 or more for level 5)     ED Triage Vitals [01/23/20 0939]   BP Temp Temp Source Pulse Resp SpO2 Height Weight   (!) 192/94 97.9 °F (36.6 °C) Oral 81 16 96 % 5' 3\" (1.6 m) 148 lb 2.4 oz (67.2 kg)       Physical Exam  Constitutional:       Appearance: Normal appearance. She is normal weight. Cardiovascular:      Rate and Rhythm: Normal rate and regular rhythm. Pulses: Normal pulses. Heart sounds: Normal heart sounds. Pulmonary:      Breath sounds: Normal breath sounds. Comments: Labored breathing 20-24 bpm  Abdominal:      General: There is no distension. Palpations: Abdomen is soft. There is no mass. Tenderness: There is tenderness. There is no right CVA tenderness, guarding or rebound. Hernia: No hernia is present. Musculoskeletal:         General: Tenderness: midthoracic. Skin:     General: Skin is warm and dry. Neurological:      Mental Status: She is alert and oriented to person, place, and time. Psychiatric:         Mood and Affect: Mood normal.         Behavior: Behavior normal.         Thought Content: Thought content normal.         Judgment: Judgment normal.             DIAGNOSTIC RESULTS     EKG (Per Emergency Physician):       RADIOLOGY (Per Emergency Physician):        Interpretation per the Radiologist below, if available at the time of this note:  Ct Thoracic Spine Wo Contrast    Result Date: 1/23/2020  EXAMINATION: CT OF THE THORACIC SPINE WITHOUT CONTRAST; CT OF THE LUMBAR SPINE WITHOUT CONTRAST 1/23/2020 TECHNIQUE: CT of the thoracic spine was performed without the administration of intravenous contrast. Multiplanar reformatted images are provided for review. Dose modulation, iterative reconstruction, and/or weight based adjustment of the mA/kV was utilized to reduce the radiation dose to as low as reasonably achievable.; CT of the lumbar spine was performed without the administration of intravenous contrast. Multiplanar reformatted images are provided for review. Dose modulation, iterative reconstruction, and/or weight based adjustment of the mA/kV was utilized to reduce the radiation dose to as low as reasonably achievable. COMPARISON: None HISTORY: ORDERING SYSTEM PROVIDED HISTORY: injury TECHNOLOGIST PROVIDED HISTORY: Reason for exam:->injury Reason for Exam: Fall (x1.5 weeks ago), back pain Acuity: Acute Type of Exam: Initial; ORDERING SYSTEM PROVIDED HISTORY: BACK INJURY TECHNOLOGIST PROVIDED HISTORY: Reason for exam:->injury Reason for Exam: Fall (x1.5 weeks ago), fell Acuity: Acute Type of Exam: Initial FINDINGS: BONES/ALIGNMENT: At the level of T11 there is some loss of vertebral body height especially affecting the inferior endplate. In the context of trauma this may represent a compression injury however, point tenderness in this area would be helpful. In the region of the lumbar spine no evidence of acute compression injury. Transverse processes and spinous processes appear intact. The sacrum appears intact. DEGENERATIVE CHANGES: Moderate to severe multilevel degenerative disc disease. SOFT TISSUES: No paraspinal mass is seen. 1. Possible T11 acute compression injury in the region of the inferior endplate. Point tenderness in this area may be helpful in determining acuity. 2. No evidence of lumbar fractures or malalignment. 3. Severe multilevel degenerative disc disease in the thoracolumbar area. RECOMMENDATIONS: If suspected acute fracture thoracic MRI with STIR sequence would be helpful.      Ct Lumbar Spine Wo probability of clinically significant/life threatening deterioration in the patient's condition which required my urgent intervention. CONSULTS:  None    PROCEDURES:  Unless otherwise noted below, none     [unfilled]    FINAL IMPRESSION      1. Compression fracture of T11 vertebra, initial encounter [facility])          DISPOSITION/PLAN   DISPOSITION        [facility]  [facility] [address]  [phone]    Schedule an appointment as soon as possible for a visit in 1 week  If symptoms worsen      DISCHARGE MEDICATIONS:  New Prescriptions    HYDROCODONE-ACETAMINOPHEN (NORCO) 5-325 MG PER TABLET    Take 1 tablet by mouth every 4 hours as needed for Pain for up to 3 days. Intended supply: 3 days. Take lowest dose possible to manage pain          (Please note:  Portions of this note were completed with a voice recognition program.Efforts were made to edit the dictations but occasionally words and phrases are mis-transcribed.)  Form v2016. J.[id number]    Bailey VILLAREAL MD (electronically signed)  Emergency Medicine Provider       Jesse Howe MD  01/23/20 [id number] 56 yo male w PMHx of schizoaffective disorder (bipolar type with multiple [facility] admissions), HTN, HLD, hypothyroidism, CVID/hypogammaglobulinemia (monthly IVIG, last on 7/23 or 9/9), hx of frequent skin infection (MRSA, abscess/cellulitis w MRSA bacteremia s/p debridement 6/2023) presented to the ED with sisters after appearing unwell admitted for concern for suicide attempt, transferred to [facility] from  for further evaluation. On exam, endorses chronic anxiety, depressed mood secondary to prior paranoid delusions, denies current SI/SP, reports that recent overdose was impulsive, now is future oriented. c/w medication regimen was per NP assessment.

## 2024-10-22 DIAGNOSIS — M81.0 AGE RELATED OSTEOPOROSIS, UNSPECIFIED PATHOLOGICAL FRACTURE PRESENCE: ICD-10-CM

## 2024-10-22 RX ORDER — ALENDRONATE SODIUM 70 MG/1
TABLET ORAL
Qty: 12 TABLET | Refills: 0 | Status: SHIPPED | OUTPATIENT
Start: 2024-10-22

## 2024-10-22 NOTE — TELEPHONE ENCOUNTER
Future Appointments   Date Time Provider Department Center   11/15/2024 11:00 AM Pierre Sebastian MD [facility] ECC DEP   11/19/2024 11:20 AM [facility]       Last appt 8/15/24

## 2024-11-15 ENCOUNTER — OFFICE VISIT (OUTPATIENT)
Dept: INTERNAL MEDICINE CLINIC | Age: 79
End: 2024-11-15

## 2024-11-15 VITALS
SYSTOLIC BLOOD PRESSURE: 120 MMHG | OXYGEN SATURATION: 95 % | HEIGHT: 63 IN | BODY MASS INDEX: 22.32 KG/M2 | DIASTOLIC BLOOD PRESSURE: 68 MMHG | HEART RATE: 97 BPM | WEIGHT: 126 LBS

## 2024-11-15 DIAGNOSIS — I10 ESSENTIAL HYPERTENSION: ICD-10-CM

## 2024-11-15 DIAGNOSIS — J84.9 ILD (INTERSTITIAL LUNG DISEASE) (HCC): ICD-10-CM

## 2024-11-15 DIAGNOSIS — J44.89 COPD WITH ASTHMA (HCC): ICD-10-CM

## 2024-11-15 DIAGNOSIS — F02.818 LATE ONSET ALZHEIMER'S DEMENTIA WITH BEHAVIORAL DISTURBANCE (HCC): ICD-10-CM

## 2024-11-15 DIAGNOSIS — G30.1 LATE ONSET ALZHEIMER'S DEMENTIA WITH BEHAVIORAL DISTURBANCE (HCC): ICD-10-CM

## 2024-11-15 DIAGNOSIS — E11.40 TYPE 2 DIABETES MELLITUS WITH DIABETIC NEUROPATHY, WITHOUT LONG-TERM CURRENT USE OF INSULIN (HCC): Primary | ICD-10-CM

## 2024-11-15 DIAGNOSIS — K21.9 GASTROESOPHAGEAL REFLUX DISEASE, UNSPECIFIED WHETHER ESOPHAGITIS PRESENT: ICD-10-CM

## 2024-11-15 DIAGNOSIS — M81.0 AGE RELATED OSTEOPOROSIS, UNSPECIFIED PATHOLOGICAL FRACTURE PRESENCE: ICD-10-CM

## 2024-11-15 LAB — HBA1C MFR BLD: 5.7 %

## 2024-11-15 NOTE — ASSESSMENT & PLAN NOTE
Chronic, at goal (stable), continue current treatment plan  Continue with the current dose of Namenda and Aricept.

## 2024-11-15 NOTE — ASSESSMENT & PLAN NOTE
Chronic, at goal (stable), continue current treatment plan  Takes metformin 500 mg twice daily.  Orders:    POCT glycosylated hemoglobin (Hb A1C)

## 2024-11-15 NOTE — ASSESSMENT & PLAN NOTE
Monitored by specialist- no acute findings meriting change in the plan continue with prednisone 10 mg daily.

## 2024-11-15 NOTE — ASSESSMENT & PLAN NOTE
Chronic, at goal (stable), continue current treatment plan, takes amlodipine and losartan.

## 2024-11-15 NOTE — ASSESSMENT & PLAN NOTE
Chronic, at goal (stable), continue current treatment plan  Continue with Fosamax, vitamin D.

## 2024-11-15 NOTE — PROGRESS NOTES
Traci Aiken (:  1945) is a 79 y.o. female,Established patient, here for evaluation of the following chief complaint(s):  3 Month Follow-Up    Past medical history significant for diabetes, interstitial lung disease, hypertension, osteoporosis, Alzheimer disease.     Assessment & Plan  Type 2 diabetes mellitus with diabetic neuropathy, without long-term current use of insulin (HCC)   Chronic, at goal (stable), continue current treatment plan  Takes metformin 500 mg twice daily.  Orders:    POCT glycosylated hemoglobin (Hb A1C)    Essential hypertension   Chronic, at goal (stable), continue current treatment plan, takes amlodipine and losartan.         ILD (interstitial lung disease) ([facility])   Monitored by specialist- no acute findings meriting change in the plan continue with prednisone 10 mg daily.         Gastroesophageal reflux disease, unspecified whether esophagitis present   Chronic, at goal (stable), continue current treatment plan         COPD with asthma ([facility])   Monitored by specialist- no acute findings meriting change in the plan         Late onset Alzheimer's dementia with behavioral disturbance ([facility])   Chronic, at goal (stable), continue current treatment plan  Continue with the current dose of Namenda and Aricept.       Age related osteoporosis, unspecified pathological fracture presence   Chronic, at goal (stable), continue current treatment plan  Continue with Fosamax, vitamin D.         Return in about 3 months (around 2/15/2025).       Subjective   HPI    Review of Systems   Constitutional: Negative.  Negative for chills and fever.   HENT: Negative.     Eyes: Negative.  Negative for discharge.   Respiratory: Negative.  Negative for shortness of breath.    Cardiovascular: Negative.  Negative for chest pain and palpitations.   Gastrointestinal: Negative.  Negative for abdominal pain.   Endocrine: Negative.    Genitourinary: Negative.    Musculoskeletal: Negative.    Skin: Negative.

## 2024-11-19 ENCOUNTER — OFFICE VISIT (OUTPATIENT)
Dept: PULMONOLOGY | Age: 79
End: 2024-11-19
Payer: MEDICARE

## 2024-11-19 VITALS
SYSTOLIC BLOOD PRESSURE: 127 MMHG | DIASTOLIC BLOOD PRESSURE: 94 MMHG | HEART RATE: 101 BPM | TEMPERATURE: 97 F | RESPIRATION RATE: 18 BRPM | OXYGEN SATURATION: 97 % | BODY MASS INDEX: 23.22 KG/M2 | HEIGHT: 62 IN | WEIGHT: 126.2 LBS

## 2024-11-19 DIAGNOSIS — J84.9 ILD (INTERSTITIAL LUNG DISEASE) (HCC): Primary | ICD-10-CM

## 2024-11-19 PROCEDURE — 1123F ACP DISCUSS/DSCN MKR DOCD: CPT | Performed by: INTERNAL MEDICINE

## 2024-11-19 PROCEDURE — 3080F DIAST BP >= 90 MM HG: CPT | Performed by: INTERNAL MEDICINE

## 2024-11-19 PROCEDURE — 1159F MED LIST DOCD IN RCRD: CPT | Performed by: INTERNAL MEDICINE

## 2024-11-19 PROCEDURE — 99213 OFFICE O/P EST LOW 20 MIN: CPT | Performed by: INTERNAL MEDICINE

## 2024-11-19 PROCEDURE — 3074F SYST BP LT 130 MM HG: CPT | Performed by: INTERNAL MEDICINE

## 2024-11-19 NOTE — PROGRESS NOTES
Chief complaint  This is a 79 y.o. year old female  who comes to see me with a chief complaint of   Chief Complaint   Patient presents with    Follow-up    ILD       HPI  Here with a cc of ILD    She continues on 10 mg of prednisone.  She seems to be ok.  Breathing is not an issue. Memory issues are bigger issue according to          Current Outpatient Medications:     alendronate (FOSAMAX) 70 MG tablet, TAKE 1 TABLET ONCE WEEKLY ON AN EMPTY STOMACH BEFORE BREAKFAST REMAIN UPRIGHT FOR 30 MINUTES AND TAKE WITH 8 OUNCES OF WATER, Disp: 12 tablet, Rfl: 0    omeprazole (PRILOSEC) 40 MG delayed release capsule, TAKE 1 CAPSULE EVERY MORNING BEFORE BREAKFAST, Disp: 90 capsule, Rfl: 3    amLODIPine (NORVASC) 5 MG tablet, TAKE 1 TABLET DAILY, Disp: 90 tablet, Rfl: 3    montelukast (SINGULAIR) 10 MG tablet, TAKE 1 TABLET DAILY, Disp: 90 tablet, Rfl: 3    losartan (COZAAR) 50 MG tablet, TAKE 1 TABLET DAILY, Disp: 90 tablet, Rfl: 3    Handicap Placard MISC, by Does not apply route Duration - 5 years, Disp: 1 each, Rfl: 0    predniSONE (DELTASONE) 10 MG tablet, TAKE 1 TABLET DAILY, Disp: 90 tablet, Rfl: 3    memantine (NAMENDA) 5 MG tablet, TAKE 1 TABLET TWICE A DAY, Disp: 180 tablet, Rfl: 1    donepezil (ARICEPT) 5 MG tablet, TAKE 1 TABLET NIGHTLY, Disp: 90 tablet, Rfl: 1    dicyclomine (BENTYL) 10 MG capsule, Take 1 capsule by mouth 3 times daily as needed (ABDOMINAL CRAMPING), Disp: 90 capsule, Rfl: 1    metFORMIN (GLUCOPHAGE) 500 MG tablet, TAKE ONE TABLET BY MOUTH TWICE A DAY WITH MEALS, Disp: 90 tablet, Rfl: 1    predniSONE (DELTASONE) 10 MG tablet, Take 1 tablet by mouth daily, Disp: 90 tablet, Rfl: 3    Lancets (ONETOUCH DELICA PLUS [id number]) MISC, USE 1 LANCET TWO TIMES A DAY, Disp: 100 each, Rfl: 5    blood glucose monitor strips, Test 2 times a day & as needed for symptoms of irregular blood glucose. Dispense sufficient amount for indicated testing frequency plus additional to accommodate PRN testing

## 2025-01-14 DIAGNOSIS — M81.0 AGE RELATED OSTEOPOROSIS, UNSPECIFIED PATHOLOGICAL FRACTURE PRESENCE: ICD-10-CM

## 2025-01-15 RX ORDER — ALENDRONATE SODIUM 70 MG/1
TABLET ORAL
Qty: 12 TABLET | Refills: 3 | Status: SHIPPED | OUTPATIENT
Start: 2025-01-15

## 2025-02-03 RX ORDER — PREDNISONE 10 MG/1
10 TABLET ORAL DAILY
Qty: 90 TABLET | Refills: 3 | Status: ON HOLD | OUTPATIENT
Start: 2025-02-03 | End: 2025-02-05

## 2025-02-04 ENCOUNTER — APPOINTMENT (OUTPATIENT)
Dept: CT IMAGING | Age: 80
DRG: 871 | End: 2025-02-04
Payer: MEDICARE

## 2025-02-04 ENCOUNTER — HOSPITAL ENCOUNTER (INPATIENT)
Age: 80
LOS: 7 days | Discharge: NURSING, REHAB OR LONG-TERM CARE | DRG: 871 | End: 2025-02-12
Attending: STUDENT IN AN ORGANIZED HEALTH CARE EDUCATION/TRAINING PROGRAM | Admitting: STUDENT IN AN ORGANIZED HEALTH CARE EDUCATION/TRAINING PROGRAM
Payer: MEDICARE

## 2025-02-04 ENCOUNTER — APPOINTMENT (OUTPATIENT)
Dept: GENERAL RADIOLOGY | Age: 80
DRG: 871 | End: 2025-02-04
Payer: MEDICARE

## 2025-02-04 DIAGNOSIS — R53.1 GENERAL WEAKNESS: ICD-10-CM

## 2025-02-04 DIAGNOSIS — R41.82 ALTERED MENTAL STATUS, UNSPECIFIED ALTERED MENTAL STATUS TYPE: ICD-10-CM

## 2025-02-04 DIAGNOSIS — R79.89 ELEVATED TROPONIN: ICD-10-CM

## 2025-02-04 DIAGNOSIS — R06.02 SHORTNESS OF BREATH: ICD-10-CM

## 2025-02-04 DIAGNOSIS — J81.0 ACUTE PULMONARY EDEMA: ICD-10-CM

## 2025-02-04 DIAGNOSIS — I47.10 SVT (SUPRAVENTRICULAR TACHYCARDIA) (HCC): Primary | ICD-10-CM

## 2025-02-04 DIAGNOSIS — I50.21 ACUTE SYSTOLIC CONGESTIVE HEART FAILURE (HCC): ICD-10-CM

## 2025-02-04 LAB
ALBUMIN SERPL-MCNC: 3.2 G/DL (ref 3.4–5)
ALBUMIN/GLOB SERPL: 1.8 {RATIO} (ref 1.1–2.2)
ALP SERPL-CCNC: 58 U/L (ref 40–129)
ALT SERPL-CCNC: 384 U/L (ref 10–40)
ANION GAP SERPL CALCULATED.3IONS-SCNC: 11 MMOL/L (ref 3–16)
AST SERPL-CCNC: 181 U/L (ref 15–37)
BASOPHILS # BLD: 0 K/UL (ref 0–0.2)
BASOPHILS NFR BLD: 0.2 %
BILIRUB SERPL-MCNC: 4 MG/DL (ref 0–1)
BUN SERPL-MCNC: 42 MG/DL (ref 7–20)
CALCIUM SERPL-MCNC: 8.1 MG/DL (ref 8.3–10.6)
CHLORIDE SERPL-SCNC: 107 MMOL/L (ref 99–110)
CO2 SERPL-SCNC: 21 MMOL/L (ref 21–32)
CREAT SERPL-MCNC: 0.9 MG/DL (ref 0.6–1.2)
DEPRECATED RDW RBC AUTO: 15.4 % (ref 12.4–15.4)
EOSINOPHIL # BLD: 0 K/UL (ref 0–0.6)
EOSINOPHIL NFR BLD: 0.1 %
GFR SERPLBLD CREATININE-BSD FMLA CKD-EPI: 64 ML/MIN/{1.73_M2}
GLUCOSE SERPL-MCNC: 137 MG/DL (ref 70–99)
HCT VFR BLD AUTO: 35.7 % (ref 36–48)
HGB BLD-MCNC: 11.9 G/DL (ref 12–16)
LYMPHOCYTES # BLD: 0.5 K/UL (ref 1–5.1)
LYMPHOCYTES NFR BLD: 7.4 %
MAGNESIUM SERPL-MCNC: 1.59 MG/DL (ref 1.8–2.4)
MCH RBC QN AUTO: 31.3 PG (ref 26–34)
MCHC RBC AUTO-ENTMCNC: 33.2 G/DL (ref 31–36)
MCV RBC AUTO: 94.1 FL (ref 80–100)
MONOCYTES # BLD: 0.3 K/UL (ref 0–1.3)
MONOCYTES NFR BLD: 4.5 %
NEUTROPHILS # BLD: 6.3 K/UL (ref 1.7–7.7)
NEUTROPHILS NFR BLD: 87.8 %
PLATELET # BLD AUTO: 131 K/UL (ref 135–450)
PMV BLD AUTO: 10.6 FL (ref 5–10.5)
POTASSIUM SERPL-SCNC: 3.7 MMOL/L (ref 3.5–5.1)
PROT SERPL-MCNC: 5 G/DL (ref 6.4–8.2)
RBC # BLD AUTO: 3.8 M/UL (ref 4–5.2)
SARS-COV-2 RDRP RESP QL NAA+PROBE: NOT DETECTED
SODIUM SERPL-SCNC: 139 MMOL/L (ref 136–145)
TROPONIN, HIGH SENSITIVITY: 42 NG/L (ref 0–14)
WBC # BLD AUTO: 7.2 K/UL (ref 4–11)

## 2025-02-04 PROCEDURE — 96366 THER/PROPH/DIAG IV INF ADDON: CPT

## 2025-02-04 PROCEDURE — 74177 CT ABD & PELVIS W/CONTRAST: CPT

## 2025-02-04 PROCEDURE — 2580000003 HC RX 258: Performed by: PHYSICIAN ASSISTANT

## 2025-02-04 PROCEDURE — 99285 EMERGENCY DEPT VISIT HI MDM: CPT

## 2025-02-04 PROCEDURE — 87502 INFLUENZA DNA AMP PROBE: CPT

## 2025-02-04 PROCEDURE — 93005 ELECTROCARDIOGRAM TRACING: CPT | Performed by: PHYSICIAN ASSISTANT

## 2025-02-04 PROCEDURE — 71045 X-RAY EXAM CHEST 1 VIEW: CPT

## 2025-02-04 PROCEDURE — 2580000003 HC RX 258: Performed by: STUDENT IN AN ORGANIZED HEALTH CARE EDUCATION/TRAINING PROGRAM

## 2025-02-04 PROCEDURE — 6360000002 HC RX W HCPCS: Performed by: PHYSICIAN ASSISTANT

## 2025-02-04 PROCEDURE — 83735 ASSAY OF MAGNESIUM: CPT

## 2025-02-04 PROCEDURE — 84484 ASSAY OF TROPONIN QUANT: CPT

## 2025-02-04 PROCEDURE — 93005 ELECTROCARDIOGRAM TRACING: CPT | Performed by: STUDENT IN AN ORGANIZED HEALTH CARE EDUCATION/TRAINING PROGRAM

## 2025-02-04 PROCEDURE — 71260 CT THORAX DX C+: CPT

## 2025-02-04 PROCEDURE — 87635 SARS-COV-2 COVID-19 AMP PRB: CPT

## 2025-02-04 PROCEDURE — 70450 CT HEAD/BRAIN W/O DYE: CPT

## 2025-02-04 PROCEDURE — 93005 ELECTROCARDIOGRAM TRACING: CPT | Performed by: NURSE PRACTITIONER

## 2025-02-04 PROCEDURE — 2500000003 HC RX 250 WO HCPCS: Performed by: STUDENT IN AN ORGANIZED HEALTH CARE EDUCATION/TRAINING PROGRAM

## 2025-02-04 PROCEDURE — 80053 COMPREHEN METABOLIC PANEL: CPT

## 2025-02-04 PROCEDURE — 85025 COMPLETE CBC W/AUTO DIFF WBC: CPT

## 2025-02-04 PROCEDURE — 96365 THER/PROPH/DIAG IV INF INIT: CPT

## 2025-02-04 RX ORDER — 0.9 % SODIUM CHLORIDE 0.9 %
1000 INTRAVENOUS SOLUTION INTRAVENOUS ONCE
Status: COMPLETED | OUTPATIENT
Start: 2025-02-04 | End: 2025-02-04

## 2025-02-04 RX ORDER — 0.9 % SODIUM CHLORIDE 0.9 %
500 INTRAVENOUS SOLUTION INTRAVENOUS ONCE
Status: COMPLETED | OUTPATIENT
Start: 2025-02-04 | End: 2025-02-05

## 2025-02-04 RX ORDER — MAGNESIUM SULFATE 1 G/100ML
1000 INJECTION INTRAVENOUS ONCE
Status: COMPLETED | OUTPATIENT
Start: 2025-02-04 | End: 2025-02-05

## 2025-02-04 RX ORDER — DILTIAZEM HYDROCHLORIDE 5 MG/ML
10 INJECTION INTRAVENOUS ONCE
Status: DISCONTINUED | OUTPATIENT
Start: 2025-02-04 | End: 2025-02-06

## 2025-02-04 RX ORDER — IOPAMIDOL 755 MG/ML
75 INJECTION, SOLUTION INTRAVASCULAR
Status: COMPLETED | OUTPATIENT
Start: 2025-02-04 | End: 2025-02-05

## 2025-02-04 RX ADMIN — SODIUM CHLORIDE 1000 ML: 9 INJECTION, SOLUTION INTRAVENOUS at 22:27

## 2025-02-04 RX ADMIN — MAGNESIUM SULFATE HEPTAHYDRATE 1000 MG: 1 INJECTION, SOLUTION INTRAVENOUS at 22:35

## 2025-02-04 RX ADMIN — DILTIAZEM HYDROCHLORIDE 2.5 MG/HR: 5 INJECTION, SOLUTION INTRAVENOUS at 23:12

## 2025-02-04 ASSESSMENT — PAIN DESCRIPTION - DESCRIPTORS: DESCRIPTORS: ACHING

## 2025-02-04 ASSESSMENT — PAIN DESCRIPTION - LOCATION: LOCATION: BACK

## 2025-02-04 ASSESSMENT — PAIN - FUNCTIONAL ASSESSMENT: PAIN_FUNCTIONAL_ASSESSMENT: 0-10

## 2025-02-04 ASSESSMENT — PAIN SCALES - GENERAL: PAINLEVEL_OUTOF10: 8

## 2025-02-05 ENCOUNTER — APPOINTMENT (OUTPATIENT)
Dept: ULTRASOUND IMAGING | Age: 80
DRG: 871 | End: 2025-02-05
Payer: MEDICARE

## 2025-02-05 PROBLEM — I47.10 SVT (SUPRAVENTRICULAR TACHYCARDIA) (HCC): Status: ACTIVE | Noted: 2025-02-05

## 2025-02-05 PROBLEM — E11.9 DMII (DIABETES MELLITUS, TYPE 2) (HCC): Status: ACTIVE | Noted: 2025-02-05

## 2025-02-05 PROBLEM — N30.01 ACUTE CYSTITIS WITH HEMATURIA: Status: ACTIVE | Noted: 2020-09-19

## 2025-02-05 LAB
ALBUMIN SERPL-MCNC: 4.1 G/DL (ref 3.4–5)
ALP SERPL-CCNC: 76 U/L (ref 40–129)
ALT SERPL-CCNC: 503 U/L (ref 10–40)
ANION GAP SERPL CALCULATED.3IONS-SCNC: 17 MMOL/L (ref 3–16)
AST SERPL-CCNC: 244 U/L (ref 15–37)
BACTERIA URNS QL MICRO: ABNORMAL /HPF
BILIRUB DIRECT SERPL-MCNC: 2 MG/DL (ref 0–0.3)
BILIRUB INDIRECT SERPL-MCNC: 2.4 MG/DL (ref 0–1)
BILIRUB SERPL-MCNC: 4.4 MG/DL (ref 0–1)
BILIRUB UR QL STRIP.AUTO: NEGATIVE
BUN SERPL-MCNC: 41 MG/DL (ref 7–20)
CALCIUM SERPL-MCNC: 9.4 MG/DL (ref 8.3–10.6)
CHLORIDE SERPL-SCNC: 102 MMOL/L (ref 99–110)
CLARITY UR: ABNORMAL
CO2 SERPL-SCNC: 21 MMOL/L (ref 21–32)
COLOR UR: ABNORMAL
CREAT SERPL-MCNC: 0.9 MG/DL (ref 0.6–1.2)
EKG ATRIAL RATE: 102 BPM
EKG ATRIAL RATE: 113 BPM
EKG ATRIAL RATE: 113 BPM
EKG ATRIAL RATE: 192 BPM
EKG DIAGNOSIS: NORMAL
EKG P AXIS: -2 DEGREES
EKG P AXIS: 10 DEGREES
EKG P AXIS: 21 DEGREES
EKG P-R INTERVAL: 112 MS
EKG P-R INTERVAL: 124 MS
EKG P-R INTERVAL: 134 MS
EKG P-R INTERVAL: 140 MS
EKG Q-T INTERVAL: 238 MS
EKG Q-T INTERVAL: 346 MS
EKG Q-T INTERVAL: 364 MS
EKG Q-T INTERVAL: 368 MS
EKG QRS DURATION: 80 MS
EKG QRS DURATION: 82 MS
EKG QRS DURATION: 82 MS
EKG QRS DURATION: 84 MS
EKG QTC CALCULATION (BAZETT): 420 MS
EKG QTC CALCULATION (BAZETT): 474 MS
EKG QTC CALCULATION (BAZETT): 474 MS
EKG QTC CALCULATION (BAZETT): 483 MS
EKG R AXIS: -17 DEGREES
EKG R AXIS: -18 DEGREES
EKG R AXIS: -21 DEGREES
EKG R AXIS: -29 DEGREES
EKG T AXIS: 179 DEGREES
EKG T AXIS: 202 DEGREES
EKG T AXIS: 208 DEGREES
EKG T AXIS: 235 DEGREES
EKG VENTRICULAR RATE: 102 BPM
EKG VENTRICULAR RATE: 104 BPM
EKG VENTRICULAR RATE: 113 BPM
EKG VENTRICULAR RATE: 187 BPM
EPI CELLS #/AREA URNS AUTO: 7 /HPF (ref 0–5)
FLUAV + FLUBV AG NOSE IA.RAPID: NOT DETECTED
FLUAV + FLUBV AG NOSE IA.RAPID: NOT DETECTED
GFR SERPLBLD CREATININE-BSD FMLA CKD-EPI: 64 ML/MIN/{1.73_M2}
GLUCOSE SERPL-MCNC: 117 MG/DL (ref 70–99)
GLUCOSE UR STRIP.AUTO-MCNC: NEGATIVE MG/DL
HAV IGM SERPL QL IA: NORMAL
HBV CORE IGM SERPL QL IA: NORMAL
HBV SURFACE AG SERPL QL IA: NORMAL
HCV AB SERPL QL IA: NORMAL
HGB UR QL STRIP.AUTO: ABNORMAL
HYALINE CASTS #/AREA URNS AUTO: 4 /LPF (ref 0–8)
KETONES UR STRIP.AUTO-MCNC: ABNORMAL MG/DL
LACTATE BLDV-SCNC: 2.3 MMOL/L (ref 0.4–1.9)
LACTATE BLDV-SCNC: 2.5 MMOL/L (ref 0.4–2)
LACTATE BLDV-SCNC: 2.8 MMOL/L (ref 0.4–1.9)
LACTATE BLDV-SCNC: 2.8 MMOL/L (ref 0.4–2)
LEUKOCYTE ESTERASE UR QL STRIP.AUTO: ABNORMAL
NITRITE UR QL STRIP.AUTO: POSITIVE
NT-PROBNP SERPL-MCNC: ABNORMAL PG/ML (ref 0–449)
PH UR STRIP.AUTO: 5.5 [PH] (ref 5–8)
POTASSIUM SERPL-SCNC: 4 MMOL/L (ref 3.5–5.1)
PROCALCITONIN SERPL IA-MCNC: 0.09 NG/ML (ref 0–0.15)
PROT SERPL-MCNC: 6.5 G/DL (ref 6.4–8.2)
PROT UR STRIP.AUTO-MCNC: 30 MG/DL
RBC #/AREA URNS HPF: ABNORMAL /HPF (ref 0–4)
SODIUM SERPL-SCNC: 140 MMOL/L (ref 136–145)
SP GR UR STRIP.AUTO: 1.04 (ref 1–1.03)
TROPONIN, HIGH SENSITIVITY: 41 NG/L (ref 0–14)
UA COMPLETE W REFLEX CULTURE PNL UR: YES
UA DIPSTICK W REFLEX MICRO PNL UR: YES
URN SPEC COLLECT METH UR: ABNORMAL
UROBILINOGEN UR STRIP-ACNC: 1 E.U./DL
WBC #/AREA URNS HPF: ABNORMAL /HPF (ref 0–5)

## 2025-02-05 PROCEDURE — 83880 ASSAY OF NATRIURETIC PEPTIDE: CPT

## 2025-02-05 PROCEDURE — 93005 ELECTROCARDIOGRAM TRACING: CPT | Performed by: STUDENT IN AN ORGANIZED HEALTH CARE EDUCATION/TRAINING PROGRAM

## 2025-02-05 PROCEDURE — 6370000000 HC RX 637 (ALT 250 FOR IP): Performed by: STUDENT IN AN ORGANIZED HEALTH CARE EDUCATION/TRAINING PROGRAM

## 2025-02-05 PROCEDURE — 2580000003 HC RX 258: Performed by: STUDENT IN AN ORGANIZED HEALTH CARE EDUCATION/TRAINING PROGRAM

## 2025-02-05 PROCEDURE — 93010 ELECTROCARDIOGRAM REPORT: CPT | Performed by: INTERNAL MEDICINE

## 2025-02-05 PROCEDURE — 96361 HYDRATE IV INFUSION ADD-ON: CPT

## 2025-02-05 PROCEDURE — 6360000004 HC RX CONTRAST MEDICATION: Performed by: PHYSICIAN ASSISTANT

## 2025-02-05 PROCEDURE — 87086 URINE CULTURE/COLONY COUNT: CPT

## 2025-02-05 PROCEDURE — 96368 THER/DIAG CONCURRENT INF: CPT

## 2025-02-05 PROCEDURE — 83605 ASSAY OF LACTIC ACID: CPT

## 2025-02-05 PROCEDURE — 2060000000 HC ICU INTERMEDIATE R&B

## 2025-02-05 PROCEDURE — 80076 HEPATIC FUNCTION PANEL: CPT

## 2025-02-05 PROCEDURE — 80074 ACUTE HEPATITIS PANEL: CPT

## 2025-02-05 PROCEDURE — 80048 BASIC METABOLIC PNL TOTAL CA: CPT

## 2025-02-05 PROCEDURE — 6360000002 HC RX W HCPCS: Performed by: STUDENT IN AN ORGANIZED HEALTH CARE EDUCATION/TRAINING PROGRAM

## 2025-02-05 PROCEDURE — 2500000003 HC RX 250 WO HCPCS: Performed by: STUDENT IN AN ORGANIZED HEALTH CARE EDUCATION/TRAINING PROGRAM

## 2025-02-05 PROCEDURE — 81001 URINALYSIS AUTO W/SCOPE: CPT

## 2025-02-05 PROCEDURE — 36415 COLL VENOUS BLD VENIPUNCTURE: CPT

## 2025-02-05 PROCEDURE — 76705 ECHO EXAM OF ABDOMEN: CPT

## 2025-02-05 PROCEDURE — 99222 1ST HOSP IP/OBS MODERATE 55: CPT | Performed by: INTERNAL MEDICINE

## 2025-02-05 PROCEDURE — 84145 PROCALCITONIN (PCT): CPT

## 2025-02-05 PROCEDURE — 84484 ASSAY OF TROPONIN QUANT: CPT

## 2025-02-05 PROCEDURE — 6360000002 HC RX W HCPCS: Performed by: PHYSICIAN ASSISTANT

## 2025-02-05 PROCEDURE — 96365 THER/PROPH/DIAG IV INF INIT: CPT

## 2025-02-05 PROCEDURE — 94760 N-INVAS EAR/PLS OXIMETRY 1: CPT

## 2025-02-05 RX ORDER — MAGNESIUM SULFATE IN WATER 40 MG/ML
2000 INJECTION, SOLUTION INTRAVENOUS PRN
Status: DISCONTINUED | OUTPATIENT
Start: 2025-02-05 | End: 2025-02-12 | Stop reason: HOSPADM

## 2025-02-05 RX ORDER — SODIUM CHLORIDE 0.9 % (FLUSH) 0.9 %
5-40 SYRINGE (ML) INJECTION PRN
Status: DISCONTINUED | OUTPATIENT
Start: 2025-02-05 | End: 2025-02-12 | Stop reason: HOSPADM

## 2025-02-05 RX ORDER — FUROSEMIDE 10 MG/ML
40 INJECTION INTRAMUSCULAR; INTRAVENOUS ONCE
Status: COMPLETED | OUTPATIENT
Start: 2025-02-05 | End: 2025-02-05

## 2025-02-05 RX ORDER — LORAZEPAM 2 MG/ML
1 INJECTION INTRAMUSCULAR ONCE
Status: DISCONTINUED | OUTPATIENT
Start: 2025-02-05 | End: 2025-02-05

## 2025-02-05 RX ORDER — DONEPEZIL HYDROCHLORIDE 10 MG/1
10 TABLET, FILM COATED ORAL NIGHTLY
Status: DISCONTINUED | OUTPATIENT
Start: 2025-02-05 | End: 2025-02-12 | Stop reason: HOSPADM

## 2025-02-05 RX ORDER — ONDANSETRON 2 MG/ML
4 INJECTION INTRAMUSCULAR; INTRAVENOUS EVERY 6 HOURS PRN
Status: DISCONTINUED | OUTPATIENT
Start: 2025-02-05 | End: 2025-02-12 | Stop reason: HOSPADM

## 2025-02-05 RX ORDER — ACETAMINOPHEN 325 MG/1
650 TABLET ORAL EVERY 6 HOURS PRN
Status: DISCONTINUED | OUTPATIENT
Start: 2025-02-05 | End: 2025-02-12 | Stop reason: HOSPADM

## 2025-02-05 RX ORDER — ENOXAPARIN SODIUM 100 MG/ML
40 INJECTION SUBCUTANEOUS DAILY
Status: DISCONTINUED | OUTPATIENT
Start: 2025-02-05 | End: 2025-02-12 | Stop reason: HOSPADM

## 2025-02-05 RX ORDER — SODIUM CHLORIDE 0.9 % (FLUSH) 0.9 %
5-40 SYRINGE (ML) INJECTION EVERY 12 HOURS SCHEDULED
Status: DISCONTINUED | OUTPATIENT
Start: 2025-02-05 | End: 2025-02-12 | Stop reason: HOSPADM

## 2025-02-05 RX ORDER — ONDANSETRON 4 MG/1
4 TABLET, ORALLY DISINTEGRATING ORAL EVERY 8 HOURS PRN
Status: DISCONTINUED | OUTPATIENT
Start: 2025-02-05 | End: 2025-02-12 | Stop reason: HOSPADM

## 2025-02-05 RX ORDER — HALOPERIDOL 5 MG/ML
5 INJECTION INTRAMUSCULAR ONCE
Status: COMPLETED | OUTPATIENT
Start: 2025-02-05 | End: 2025-02-05

## 2025-02-05 RX ORDER — PREDNISONE 10 MG/1
10 TABLET ORAL DAILY
Status: DISCONTINUED | OUTPATIENT
Start: 2025-02-05 | End: 2025-02-12 | Stop reason: HOSPADM

## 2025-02-05 RX ORDER — POTASSIUM CHLORIDE 1500 MG/1
40 TABLET, EXTENDED RELEASE ORAL PRN
Status: DISCONTINUED | OUTPATIENT
Start: 2025-02-05 | End: 2025-02-12 | Stop reason: HOSPADM

## 2025-02-05 RX ORDER — POLYETHYLENE GLYCOL 3350 17 G/17G
17 POWDER, FOR SOLUTION ORAL DAILY PRN
Status: DISCONTINUED | OUTPATIENT
Start: 2025-02-05 | End: 2025-02-12 | Stop reason: HOSPADM

## 2025-02-05 RX ORDER — AMLODIPINE BESYLATE 5 MG/1
5 TABLET ORAL DAILY
Status: DISCONTINUED | OUTPATIENT
Start: 2025-02-05 | End: 2025-02-06

## 2025-02-05 RX ORDER — SODIUM CHLORIDE 9 MG/ML
INJECTION, SOLUTION INTRAVENOUS PRN
Status: DISCONTINUED | OUTPATIENT
Start: 2025-02-05 | End: 2025-02-12 | Stop reason: HOSPADM

## 2025-02-05 RX ORDER — MEMANTINE HYDROCHLORIDE 5 MG/1
5 TABLET ORAL 2 TIMES DAILY
Status: DISCONTINUED | OUTPATIENT
Start: 2025-02-05 | End: 2025-02-12 | Stop reason: HOSPADM

## 2025-02-05 RX ORDER — LOSARTAN POTASSIUM 50 MG/1
50 TABLET ORAL DAILY
Status: DISCONTINUED | OUTPATIENT
Start: 2025-02-05 | End: 2025-02-10

## 2025-02-05 RX ORDER — POTASSIUM CHLORIDE 7.45 MG/ML
10 INJECTION INTRAVENOUS PRN
Status: DISCONTINUED | OUTPATIENT
Start: 2025-02-05 | End: 2025-02-12 | Stop reason: HOSPADM

## 2025-02-05 RX ORDER — MONTELUKAST SODIUM 10 MG/1
10 TABLET ORAL NIGHTLY
Status: DISCONTINUED | OUTPATIENT
Start: 2025-02-05 | End: 2025-02-12 | Stop reason: HOSPADM

## 2025-02-05 RX ORDER — PANTOPRAZOLE SODIUM 40 MG/1
40 TABLET, DELAYED RELEASE ORAL
Status: DISCONTINUED | OUTPATIENT
Start: 2025-02-05 | End: 2025-02-12 | Stop reason: HOSPADM

## 2025-02-05 RX ORDER — ACETAMINOPHEN 650 MG/1
650 SUPPOSITORY RECTAL EVERY 6 HOURS PRN
Status: DISCONTINUED | OUTPATIENT
Start: 2025-02-05 | End: 2025-02-12 | Stop reason: HOSPADM

## 2025-02-05 RX ADMIN — AMLODIPINE BESYLATE 5 MG: 5 TABLET ORAL at 08:56

## 2025-02-05 RX ADMIN — LOSARTAN POTASSIUM 50 MG: 50 TABLET, FILM COATED ORAL at 08:56

## 2025-02-05 RX ADMIN — SODIUM CHLORIDE, PRESERVATIVE FREE 10 ML: 5 INJECTION INTRAVENOUS at 20:09

## 2025-02-05 RX ADMIN — WATER 1000 MG: 1 INJECTION INTRAMUSCULAR; INTRAVENOUS; SUBCUTANEOUS at 08:56

## 2025-02-05 RX ADMIN — PREDNISONE 10 MG: 10 TABLET ORAL at 08:55

## 2025-02-05 RX ADMIN — ENOXAPARIN SODIUM 40 MG: 100 INJECTION SUBCUTANEOUS at 08:56

## 2025-02-05 RX ADMIN — SODIUM CHLORIDE 500 ML: 9 INJECTION, SOLUTION INTRAVENOUS at 01:04

## 2025-02-05 RX ADMIN — CEFEPIME 2000 MG: 2 INJECTION, POWDER, FOR SOLUTION INTRAVENOUS at 00:22

## 2025-02-05 RX ADMIN — DILTIAZEM HYDROCHLORIDE 2.5 MG/HR: 5 INJECTION, SOLUTION INTRAVENOUS at 03:52

## 2025-02-05 RX ADMIN — MONTELUKAST 10 MG: 10 TABLET, FILM COATED ORAL at 20:09

## 2025-02-05 RX ADMIN — HALOPERIDOL LACTATE 5 MG: 5 INJECTION, SOLUTION INTRAMUSCULAR at 02:06

## 2025-02-05 RX ADMIN — MEMANTINE 5 MG: 5 TABLET ORAL at 20:09

## 2025-02-05 RX ADMIN — SODIUM CHLORIDE, PRESERVATIVE FREE 10 ML: 5 INJECTION INTRAVENOUS at 08:58

## 2025-02-05 RX ADMIN — VANCOMYCIN HYDROCHLORIDE 1000 MG: 1 INJECTION, POWDER, LYOPHILIZED, FOR SOLUTION INTRAVENOUS at 01:02

## 2025-02-05 RX ADMIN — FUROSEMIDE 40 MG: 10 INJECTION, SOLUTION INTRAMUSCULAR; INTRAVENOUS at 08:56

## 2025-02-05 RX ADMIN — MEMANTINE 5 MG: 5 TABLET ORAL at 08:56

## 2025-02-05 RX ADMIN — DONEPEZIL HYDROCHLORIDE 10 MG: 10 TABLET, FILM COATED ORAL at 20:09

## 2025-02-05 RX ADMIN — IOPAMIDOL 55 ML: 755 INJECTION, SOLUTION INTRAVENOUS at 00:04

## 2025-02-05 RX ADMIN — AZITHROMYCIN MONOHYDRATE 500 MG: 500 INJECTION, POWDER, LYOPHILIZED, FOR SOLUTION INTRAVENOUS at 01:04

## 2025-02-05 ASSESSMENT — PAIN SCALES - GENERAL: PAINLEVEL_OUTOF10: 0

## 2025-02-05 NOTE — ED PROVIDER NOTES
ED Attending Staffing Note  Patient was seen with the JOSE Krause. I was present for the significant portions of the H&P as well as performance and interpretation of procedures and EKGs.     I have personally performed a face to face evaluation on this patient. I have reviewed and agree with history and physical examination patient management and disposition.    CHIEF COMPLAINT    Fatigue (Pt into Ed for fatigue.  of pt states pt has had fatigue, slurred speech for the past 2 days as well as chest pain, and back pain. Pt has hx of dementia. )      PAST MEDICAL HISTORY    Past Medical History:   Diagnosis Date   • Alzheimer disease (HCC)    • Arthritis    • Asthma    • Depression 06/28/2013   • Diverticulosis of colon (without mention of hemorrhage)    • Enthesopathy of hip region     left - mild   • Esophageal reflux    • Full dentures    • Irritable bowel syndrome    • Nocturia    • Osteoporosis, unspecified    • Other and unspecified hyperlipidemia    • Postinflammatory pulmonary fibrosis (HCC)    • Sialoadenitis     left   • Synovial cyst of popliteal space     left knee   • Thoracic or lumbosacral neuritis or radiculitis, unspecified     left - L5 - S1   • Type II or unspecified type diabetes mellitus without mention of complication, not stated as uncontrolled    • Unspecified essential hypertension    • Wears glasses        Vitals  BP (!) 157/122   Pulse (!) 122   Temp 98.6 °F (37 °C) (Rectal)   Resp 19   Ht 1.575 m (5' 2\")   Wt 58.6 kg (129 lb 3 oz)   SpO2 99%   BMI 23.63 kg/m²     HPI:  Traci Aiken is a 79 y.o. female with history of Alzheimer's, asthma who presents with fatigue and malaise and slurred speech. Briefly the salient points of the case are as follows:  Last known well more than 72 hours ago patient with progressive decline poor p.o. intake for the last 3 days, there is also some slurred speech that began 3 days ago no acute changes in the last 24 hours from neurologic 
heart rate 113. She had some elevation LFTs. She status post cholecystectomy. CT scans as above. Did have an elevated troponin.  and son updated agreeable to admission. She had an echo in 2020 with an EF of 55% it appeared possibly dry she has not eat or drink initially she was given IV fluids CT scan is consistent with pulmonary edema so fluids were stopped will not give additional fluids. patient did became more agitated throughout her stay and is ripping out IVs, kicking, hitting and spitting at staff and family and required sedation with Ativan and Haldol.    I am the Primary Clinician of Record.    FINAL IMPRESSION      1. SVT (supraventricular tachycardia) (HCC)    2. Elevated troponin    3. Altered mental status, unspecified altered mental status type    4. General weakness    5. Acute pulmonary edema          DISPOSITION/PLAN     DISPOSITION Admitted 02/05/2025 01:43:22 AM               PATIENT REFERRED TO:  No follow-up provider specified.    DISCHARGE MEDICATIONS:  New Prescriptions    No medications on file       DISCONTINUED MEDICATIONS:  Discontinued Medications    No medications on file              (Please note that portions of this note were completed with a voice recognition program.  Efforts were made to edit the dictations but occasionally words are mis-transcribed.)    JOSE Parnell (electronically signed)           Nery Krause PA  02/05/25 [id number]

## 2025-02-05 NOTE — ED NOTES
This RN went into pts room to document vitals per Nery GARCIA request. When entering room this RN found that the pts triage had not been complete yet and vitals had not been documented. This Rn proceeded to triage the pt and document vitals. Nery GARCIA was notified of pt vitals signs at this time.

## 2025-02-05 NOTE — H&P
IVC. 4. Moderate coronary artery atherosclerosis.     XR CHEST 1 VIEW    Result Date: 2/4/2025  EXAMINATION: ONE XRAY VIEW OF THE CHEST 2/4/2025 8:11 pm COMPARISON: 07/02/2021 HISTORY: ORDERING SYSTEM PROVIDED HISTORY: tracy sanches TECHNOLOGIST PROVIDED HISTORY: Reason for exam:->ams, sob Reason for Exam: ams FINDINGS: The cardiomediastinal silhouette is within normal limits.  There is atherosclerotic calcification of the aortic arch.  There are diffuse interstitial opacities throughout both lungs.  There may be a small right pleural effusion.  No pneumothorax.  The bones are osteopenic.     1. Diffuse interstitial opacities throughout both lungs, which may be due to pulmonary edema or atypical pneumonia. 2. Possible small right pleural effusion.     CT HEAD WO CONTRAST    Result Date: 2/4/2025  EXAMINATION: CT OF THE HEAD WITHOUT CONTRAST  2/4/2025 8:15 pm TECHNIQUE: CT of the head was performed without the administration of intravenous contrast. Automated exposure control, iterative reconstruction, and/or weight based adjustment of the mA/kV was utilized to reduce the radiation dose to as low as reasonably achievable. COMPARISON: 03/28/2024 HISTORY: ORDERING SYSTEM PROVIDED HISTORY: ams TECHNOLOGIST PROVIDED HISTORY: Has a \"code stroke\" or \"stroke alert\" been called?->No Reason for exam:->ams FINDINGS: BRAIN/VENTRICLES: There is no acute intracranial hemorrhage, mass effect or midline shift.  No abnormal extra-axial fluid collection.  The gray-white differentiation is maintained without evidence of an acute infarct.  There is no evidence of hydrocephalus. Age related volume loss.  Bilateral periventricular white matter microvascular ischemic changes. ORBITS: The visualized portion of the orbits demonstrate no acute abnormality. SINUSES: The visualized paranasal sinuses and mastoid air cells demonstrate no acute abnormality. SOFT TISSUES/SKULL:  No acute abnormality of the visualized skull or soft tissues.     No acute
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
## 2025-02-05 NOTE — ED NOTES
Provider at bedside when patient brought to room, unable to triage at this moment. Primary RN notified patient in room and will need triaged once provider steps out.

## 2025-02-05 NOTE — DISCHARGE INSTR - COC
Influenza Whole 10/22/2010    Influenza, FLUAD, (age 65 y+), IM, Quadv, 0.5mL 10/05/2020, 09/28/2022, 09/20/2023, 09/19/2024    Influenza, FLUAD, (age 65 y+), IM, Trivalent PF, 0.5mL 11/08/2019    Influenza, FLUZONE High Dose (age 65 y+), IM, Quadv, 0.7mL 11/02/2021    Influenza, FLUZONE High Dose, (age 65 y+), IM, Trivalent PF, 0.5mL 11/04/2011, 11/16/2012, 11/08/2016, 12/04/2017, 10/15/2018    Pneumococcal, PCV-13, PREVNAR 13, (age 6w+), IM, 0.5mL 08/05/2016    Pneumococcal, PPSV23, PNEUMOVAX 23, (age 2y+), SC/IM, 0.5mL 09/01/2007, 09/06/2013, 11/08/2019    RSV, AREXVY, (age 60y+), PF, IM, 0.5mL 09/20/2023    TDaP, ADACEL (age 10y-64y), BOOSTRIX (age 10y+), IM, 0.5mL 07/01/2023    Td, unspecified formulation 06/18/2014    Zoster Recombinant (Shingrix) 10/29/2023       Active Problems:  Patient Active Problem List   Diagnosis Code    Diverticulosis of large intestine K57.30    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 M77.9    Depression F32.A    Allergic dermatitis L23.9    Trigger finger, left ring finger M65.342    Rib pain on right side R07.81    Trochanteric bursitis of left hip M70.62    Osteopenia of multiple sites M85.89    Abnormal CT of the chest R93.89    ILD (interstitial lung disease) ([facility]) J84.9    Weight loss R63.4    Type 2 diabetes mellitus without complication, without long-term current use of insulin ([facility]) E11.9    compression fracture -T11 S22.080A, S32.010A

## 2025-02-05 NOTE — DISCHARGE INSTRUCTIONS
Extra Heart Failure Education/ Tools/ Resources:     https://Today Tix.com/publication/?l=460041   --- this is American Heart Association interactive Healthier Living with Heart Failure guidebook.  Please click hyperlink or copy / paste link into search bar. The QR Code is also available below. Use your mouse to scroll through the pages.  Lots of information about weight monitoring, diet tips, activity, meds, etc    Heart Failure Tools and Resources QR Code is below. It includes multiple resources to include symptom tracker, med tracker, further HF info, and access to a HF Support Network online Community    HF [place] Isai  -- this is a free smart phone isai available for iPhone and Android download.  Use your phone to track sodium / fluid intake, zone tool symptom tracking, weights, medications, etc. Click on this hyperlink  HF [place] Isai   for QR code for easy download or the link is also found in the below HF Tools and Resources.      DASH (Dietary Approach to Stop Hypertension) diet --  https://www.nhlbi.nih.gov/education/dash-eating-plan -- this diet is a flexible eating plan that promotes heart healthy eating style.  Click on hyperlink or copy / paste link into search bar.  Lots of low sodium recipes and tips.    https://www.Tiragiu.Sonogenix/recipes  -- more free recipes

## 2025-02-06 ENCOUNTER — APPOINTMENT (OUTPATIENT)
Age: 80
DRG: 871 | End: 2025-02-06
Attending: STUDENT IN AN ORGANIZED HEALTH CARE EDUCATION/TRAINING PROGRAM
Payer: MEDICARE

## 2025-02-06 LAB
ALBUMIN SERPL-MCNC: 3.6 G/DL (ref 3.4–5)
ALP SERPL-CCNC: 65 U/L (ref 40–129)
ALT SERPL-CCNC: 399 U/L (ref 10–40)
ANION GAP SERPL CALCULATED.3IONS-SCNC: 13 MMOL/L (ref 3–16)
AST SERPL-CCNC: 183 U/L (ref 15–37)
BACTERIA UR CULT: ABNORMAL
BASOPHILS # BLD: 0 K/UL (ref 0–0.2)
BASOPHILS NFR BLD: 0.4 %
BILIRUB DIRECT SERPL-MCNC: 1.1 MG/DL (ref 0–0.3)
BILIRUB INDIRECT SERPL-MCNC: 1.5 MG/DL (ref 0–1)
BILIRUB SERPL-MCNC: 2.6 MG/DL (ref 0–1)
BUN SERPL-MCNC: 37 MG/DL (ref 7–20)
CALCIUM SERPL-MCNC: 9.1 MG/DL (ref 8.3–10.6)
CHLORIDE SERPL-SCNC: 99 MMOL/L (ref 99–110)
CO2 SERPL-SCNC: 27 MMOL/L (ref 21–32)
CREAT SERPL-MCNC: 0.9 MG/DL (ref 0.6–1.2)
DEPRECATED RDW RBC AUTO: 15.6 % (ref 12.4–15.4)
ECHO AO ASC DIAM: 3.5 CM
ECHO AO ASCENDING AORTA INDEX: 2.2 CM/M2
ECHO AO ROOT DIAM: 3.1 CM
ECHO AO ROOT INDEX: 1.95 CM/M2
ECHO BSA: 1.6 M2
ECHO EST RA PRESSURE: 3 MMHG
ECHO IVC EXP: 1.9 CM
ECHO IVC INSP: 0.7 CM
ECHO LA AREA 2C: 23.1 CM2
ECHO LA AREA 4C: 21 CM2
ECHO LA MAJOR AXIS: 5.9 CM
ECHO LA MINOR AXIS: 6.1 CM
ECHO LA VOL BP: 64 ML (ref 22–52)
ECHO LA VOL MOD A2C: 70 ML (ref 22–52)
ECHO LA VOL MOD A4C: 58 ML (ref 22–52)
ECHO LA VOL/BSA BIPLANE: 40 ML/M2 (ref 16–34)
ECHO LA VOLUME INDEX MOD A2C: 44 ML/M2 (ref 16–34)
ECHO LA VOLUME INDEX MOD A4C: 36 ML/M2 (ref 16–34)
ECHO LV E' LATERAL VELOCITY: 6.9 CM/S
ECHO LV E' SEPTAL VELOCITY: 3.92 CM/S
ECHO LV EDV A2C: 114 ML
ECHO LV EDV A4C: 85 ML
ECHO LV EDV INDEX A4C: 53 ML/M2
ECHO LV EDV NDEX A2C: 72 ML/M2
ECHO LV EF PHYSICIAN: 33 %
ECHO LV EJECTION FRACTION A2C: 32 %
ECHO LV EJECTION FRACTION A4C: 33 %
ECHO LV EJECTION FRACTION BIPLANE: 33 % (ref 55–100)
ECHO LV ESV A2C: 77 ML
ECHO LV ESV A4C: 57 ML
ECHO LV ESV INDEX A2C: 48 ML/M2
ECHO LV ESV INDEX A4C: 36 ML/M2
ECHO LV FRACTIONAL SHORTENING: 15 % (ref 28–44)
ECHO LV INTERNAL DIMENSION DIASTOLE INDEX: 3.33 CM/M2
ECHO LV INTERNAL DIMENSION DIASTOLIC: 5.3 CM (ref 3.9–5.3)
ECHO LV INTERNAL DIMENSION SYSTOLIC INDEX: 2.83 CM/M2
ECHO LV INTERNAL DIMENSION SYSTOLIC: 4.5 CM
ECHO LV IVSD: 0.7 CM (ref 0.6–0.9)
ECHO LV MASS 2D: 150.1 G (ref 67–162)
ECHO LV MASS INDEX 2D: 94.4 G/M2 (ref 43–95)
ECHO LV POSTERIOR WALL DIASTOLIC: 0.9 CM (ref 0.6–0.9)
ECHO LV RELATIVE WALL THICKNESS RATIO: 0.34
ECHO LVOT AREA: 2.8 CM2
ECHO LVOT DIAM: 1.9 CM
ECHO MV A VELOCITY: 0.44 M/S
ECHO MV E DECELERATION TIME (DT): 119 MS
ECHO MV E VELOCITY: 0.71 M/S
ECHO MV E/A RATIO: 1.61
ECHO MV E/E' LATERAL: 10.29
ECHO MV E/E' RATIO (AVERAGED): 14.2
ECHO MV E/E' SEPTAL: 18.11
ECHO RA AREA 4C: 15 CM2
ECHO RA END SYSTOLIC VOLUME APICAL 4 CHAMBER INDEX BSA: 23 ML/M2
ECHO RA VOLUME: 37 ML
ECHO RIGHT VENTRICULAR SYSTOLIC PRESSURE (RVSP): 24 MMHG
ECHO RV BASAL DIMENSION: 3 CM
ECHO RV FREE WALL PEAK S': 7.6 CM/S
ECHO RV LONGITUDINAL DIMENSION: 6.7 CM
ECHO RV MID DIMENSION: 2.7 CM
ECHO RV TAPSE: 1.4 CM (ref 1.7–?)
ECHO TV REGURGITANT MAX VELOCITY: 2.3 M/S
ECHO TV REGURGITANT PEAK GRADIENT: 21 MMHG
EOSINOPHIL # BLD: 0 K/UL (ref 0–0.6)
EOSINOPHIL NFR BLD: 0.6 %
EST. AVERAGE GLUCOSE BLD GHB EST-MCNC: 108.3 MG/DL
GFR SERPLBLD CREATININE-BSD FMLA CKD-EPI: 64 ML/MIN/{1.73_M2}
GLUCOSE BLD-MCNC: 108 MG/DL (ref 70–99)
GLUCOSE SERPL-MCNC: 69 MG/DL (ref 70–99)
HBA1C MFR BLD: 5.4 %
HCT VFR BLD AUTO: 40.4 % (ref 36–48)
HGB BLD-MCNC: 13.4 G/DL (ref 12–16)
LACTATE BLDV-SCNC: 2.3 MMOL/L (ref 0.4–2)
LACTATE BLDV-SCNC: 2.8 MMOL/L (ref 0.4–2)
LACTATE BLDV-SCNC: 2.8 MMOL/L (ref 0.4–2)
LACTATE BLDV-SCNC: 4.3 MMOL/L (ref 0.4–2)
LACTATE BLDV-SCNC: 4.5 MMOL/L (ref 0.4–2)
LYMPHOCYTES # BLD: 1.3 K/UL (ref 1–5.1)
LYMPHOCYTES NFR BLD: 15.7 %
MCH RBC QN AUTO: 31.1 PG (ref 26–34)
MCHC RBC AUTO-ENTMCNC: 33.1 G/DL (ref 31–36)
MCV RBC AUTO: 94.1 FL (ref 80–100)
MONOCYTES # BLD: 0.9 K/UL (ref 0–1.3)
MONOCYTES NFR BLD: 11 %
NEUTROPHILS # BLD: 6.1 K/UL (ref 1.7–7.7)
NEUTROPHILS NFR BLD: 72.3 %
ORGANISM: ABNORMAL
PERFORMED ON: ABNORMAL
PLATELET # BLD AUTO: 128 K/UL (ref 135–450)
PMV BLD AUTO: 10.6 FL (ref 5–10.5)
POTASSIUM SERPL-SCNC: 3.8 MMOL/L (ref 3.5–5.1)
PROT SERPL-MCNC: 5.8 G/DL (ref 6.4–8.2)
RBC # BLD AUTO: 4.29 M/UL (ref 4–5.2)
SODIUM SERPL-SCNC: 139 MMOL/L (ref 136–145)
WBC # BLD AUTO: 8.4 K/UL (ref 4–11)

## 2025-02-06 PROCEDURE — 80076 HEPATIC FUNCTION PANEL: CPT

## 2025-02-06 PROCEDURE — 2060000000 HC ICU INTERMEDIATE R&B

## 2025-02-06 PROCEDURE — 93321 DOPPLER ECHO F-UP/LMTD STD: CPT

## 2025-02-06 PROCEDURE — 9990000010 HC NO CHARGE VISIT

## 2025-02-06 PROCEDURE — 6360000002 HC RX W HCPCS: Performed by: HOSPITALIST

## 2025-02-06 PROCEDURE — 93325 DOPPLER ECHO COLOR FLOW MAPG: CPT | Performed by: INTERNAL MEDICINE

## 2025-02-06 PROCEDURE — 80048 BASIC METABOLIC PNL TOTAL CA: CPT

## 2025-02-06 PROCEDURE — 93321 DOPPLER ECHO F-UP/LMTD STD: CPT | Performed by: INTERNAL MEDICINE

## 2025-02-06 PROCEDURE — 99223 1ST HOSP IP/OBS HIGH 75: CPT | Performed by: INTERNAL MEDICINE

## 2025-02-06 PROCEDURE — 97530 THERAPEUTIC ACTIVITIES: CPT

## 2025-02-06 PROCEDURE — 6360000002 HC RX W HCPCS: Performed by: STUDENT IN AN ORGANIZED HEALTH CARE EDUCATION/TRAINING PROGRAM

## 2025-02-06 PROCEDURE — 36415 COLL VENOUS BLD VENIPUNCTURE: CPT

## 2025-02-06 PROCEDURE — 97535 SELF CARE MNGMENT TRAINING: CPT

## 2025-02-06 PROCEDURE — 93005 ELECTROCARDIOGRAM TRACING: CPT | Performed by: HOSPITALIST

## 2025-02-06 PROCEDURE — 83605 ASSAY OF LACTIC ACID: CPT

## 2025-02-06 PROCEDURE — 93308 TTE F-UP OR LMTD: CPT | Performed by: INTERNAL MEDICINE

## 2025-02-06 PROCEDURE — 2500000003 HC RX 250 WO HCPCS: Performed by: STUDENT IN AN ORGANIZED HEALTH CARE EDUCATION/TRAINING PROGRAM

## 2025-02-06 PROCEDURE — 94760 N-INVAS EAR/PLS OXIMETRY 1: CPT

## 2025-02-06 PROCEDURE — 85025 COMPLETE CBC W/AUTO DIFF WBC: CPT

## 2025-02-06 PROCEDURE — 83036 HEMOGLOBIN GLYCOSYLATED A1C: CPT

## 2025-02-06 PROCEDURE — 6370000000 HC RX 637 (ALT 250 FOR IP): Performed by: INTERNAL MEDICINE

## 2025-02-06 PROCEDURE — 6370000000 HC RX 637 (ALT 250 FOR IP): Performed by: STUDENT IN AN ORGANIZED HEALTH CARE EDUCATION/TRAINING PROGRAM

## 2025-02-06 PROCEDURE — 97165 OT EVAL LOW COMPLEX 30 MIN: CPT

## 2025-02-06 PROCEDURE — 97161 PT EVAL LOW COMPLEX 20 MIN: CPT

## 2025-02-06 RX ORDER — FUROSEMIDE 10 MG/ML
40 INJECTION INTRAMUSCULAR; INTRAVENOUS DAILY
Status: DISCONTINUED | OUTPATIENT
Start: 2025-02-06 | End: 2025-02-09

## 2025-02-06 RX ORDER — FUROSEMIDE 10 MG/ML
40 INJECTION INTRAMUSCULAR; INTRAVENOUS 2 TIMES DAILY
Status: DISCONTINUED | OUTPATIENT
Start: 2025-02-06 | End: 2025-02-06

## 2025-02-06 RX ORDER — SPIRONOLACTONE 25 MG/1
25 TABLET ORAL DAILY
Status: DISCONTINUED | OUTPATIENT
Start: 2025-02-06 | End: 2025-02-10

## 2025-02-06 RX ORDER — METOPROLOL SUCCINATE 50 MG/1
50 TABLET, EXTENDED RELEASE ORAL DAILY
Status: DISCONTINUED | OUTPATIENT
Start: 2025-02-06 | End: 2025-02-08

## 2025-02-06 RX ORDER — DEXTROSE MONOHYDRATE 100 MG/ML
INJECTION, SOLUTION INTRAVENOUS CONTINUOUS PRN
Status: DISCONTINUED | OUTPATIENT
Start: 2025-02-06 | End: 2025-02-12 | Stop reason: HOSPADM

## 2025-02-06 RX ORDER — GLUCAGON 1 MG/ML
1 KIT INJECTION PRN
Status: DISCONTINUED | OUTPATIENT
Start: 2025-02-06 | End: 2025-02-12 | Stop reason: HOSPADM

## 2025-02-06 RX ADMIN — PREDNISONE 10 MG: 10 TABLET ORAL at 08:49

## 2025-02-06 RX ADMIN — WATER 1000 MG: 1 INJECTION INTRAMUSCULAR; INTRAVENOUS; SUBCUTANEOUS at 08:32

## 2025-02-06 RX ADMIN — FUROSEMIDE 40 MG: 10 INJECTION, SOLUTION INTRAMUSCULAR; INTRAVENOUS at 09:05

## 2025-02-06 RX ADMIN — MEMANTINE 5 MG: 5 TABLET ORAL at 20:08

## 2025-02-06 RX ADMIN — DONEPEZIL HYDROCHLORIDE 10 MG: 10 TABLET, FILM COATED ORAL at 20:08

## 2025-02-06 RX ADMIN — SODIUM CHLORIDE, PRESERVATIVE FREE 10 ML: 5 INJECTION INTRAVENOUS at 20:15

## 2025-02-06 RX ADMIN — FUROSEMIDE 40 MG: 10 INJECTION, SOLUTION INTRAMUSCULAR; INTRAVENOUS at 17:33

## 2025-02-06 RX ADMIN — LOSARTAN POTASSIUM 50 MG: 50 TABLET, FILM COATED ORAL at 08:49

## 2025-02-06 RX ADMIN — MONTELUKAST 10 MG: 10 TABLET, FILM COATED ORAL at 20:08

## 2025-02-06 RX ADMIN — METOPROLOL SUCCINATE 50 MG: 50 TABLET, EXTENDED RELEASE ORAL at 13:28

## 2025-02-06 RX ADMIN — PANTOPRAZOLE SODIUM 40 MG: 40 TABLET, DELAYED RELEASE ORAL at 05:44

## 2025-02-06 RX ADMIN — ENOXAPARIN SODIUM 40 MG: 100 INJECTION SUBCUTANEOUS at 08:49

## 2025-02-06 RX ADMIN — SODIUM CHLORIDE, PRESERVATIVE FREE 10 ML: 5 INJECTION INTRAVENOUS at 08:33

## 2025-02-06 RX ADMIN — AMLODIPINE BESYLATE 5 MG: 5 TABLET ORAL at 08:49

## 2025-02-06 RX ADMIN — MEMANTINE 5 MG: 5 TABLET ORAL at 08:49

## 2025-02-06 NOTE — ACP (ADVANCE CARE PLANNING)
Advance Care Planning   Healthcare Decision Maker:    Primary Decision Maker: Jesus Aiken - Spouse - [phone]     advised by patient that she has three adult children, two sons and one daughter.     Carlton Prince and Jesus    Respectfully submitted,    Ирина BAUTISTA, BRITTNEY  [facility]   [phone]    Electronically signed by LILY Bansal, BENITA on 2/6/2025 at 12:00 PM

## 2025-02-07 LAB
ALBUMIN SERPL-MCNC: 3.4 G/DL (ref 3.4–5)
ALP SERPL-CCNC: 57 U/L (ref 40–129)
ALT SERPL-CCNC: 325 U/L (ref 10–40)
ANION GAP SERPL CALCULATED.3IONS-SCNC: 10 MMOL/L (ref 3–16)
AST SERPL-CCNC: 116 U/L (ref 15–37)
BASOPHILS # BLD: 0 K/UL (ref 0–0.2)
BASOPHILS NFR BLD: 0.2 %
BILIRUB DIRECT SERPL-MCNC: 0.9 MG/DL (ref 0–0.3)
BILIRUB INDIRECT SERPL-MCNC: 0.9 MG/DL (ref 0–1)
BILIRUB SERPL-MCNC: 1.8 MG/DL (ref 0–1)
BUN SERPL-MCNC: 28 MG/DL (ref 7–20)
CALCIUM SERPL-MCNC: 8.5 MG/DL (ref 8.3–10.6)
CHLORIDE SERPL-SCNC: 96 MMOL/L (ref 99–110)
CO2 SERPL-SCNC: 32 MMOL/L (ref 21–32)
CREAT SERPL-MCNC: 0.9 MG/DL (ref 0.6–1.2)
DEPRECATED RDW RBC AUTO: 15.4 % (ref 12.4–15.4)
EKG ATRIAL RATE: 82 BPM
EKG DIAGNOSIS: NORMAL
EKG DIAGNOSIS: NORMAL
EKG P AXIS: -4 DEGREES
EKG P-R INTERVAL: 150 MS
EKG Q-T INTERVAL: 314 MS
EKG Q-T INTERVAL: 442 MS
EKG QRS DURATION: 88 MS
EKG QRS DURATION: 88 MS
EKG QTC CALCULATION (BAZETT): 516 MS
EKG QTC CALCULATION (BAZETT): 517 MS
EKG R AXIS: -22 DEGREES
EKG R AXIS: -34 DEGREES
EKG T AXIS: 176 DEGREES
EKG T AXIS: 204 DEGREES
EKG VENTRICULAR RATE: 163 BPM
EKG VENTRICULAR RATE: 82 BPM
EOSINOPHIL # BLD: 0.1 K/UL (ref 0–0.6)
EOSINOPHIL NFR BLD: 0.8 %
GFR SERPLBLD CREATININE-BSD FMLA CKD-EPI: 64 ML/MIN/{1.73_M2}
GLUCOSE SERPL-MCNC: 114 MG/DL (ref 70–99)
HCT VFR BLD AUTO: 42.1 % (ref 36–48)
HGB BLD-MCNC: 13.8 G/DL (ref 12–16)
LACTATE BLDV-SCNC: 3.2 MMOL/L (ref 0.4–2)
LYMPHOCYTES # BLD: 1.8 K/UL (ref 1–5.1)
LYMPHOCYTES NFR BLD: 22.9 %
MAGNESIUM SERPL-MCNC: 1.52 MG/DL (ref 1.8–2.4)
MCH RBC QN AUTO: 30.6 PG (ref 26–34)
MCHC RBC AUTO-ENTMCNC: 32.7 G/DL (ref 31–36)
MCV RBC AUTO: 93.6 FL (ref 80–100)
MONOCYTES # BLD: 0.9 K/UL (ref 0–1.3)
MONOCYTES NFR BLD: 11.6 %
NEUTROPHILS # BLD: 5.2 K/UL (ref 1.7–7.7)
NEUTROPHILS NFR BLD: 64.5 %
PLATELET # BLD AUTO: 150 K/UL (ref 135–450)
PMV BLD AUTO: 10.5 FL (ref 5–10.5)
POTASSIUM SERPL-SCNC: 3.1 MMOL/L (ref 3.5–5.1)
PROT SERPL-MCNC: 5.4 G/DL (ref 6.4–8.2)
RBC # BLD AUTO: 4.5 M/UL (ref 4–5.2)
SODIUM SERPL-SCNC: 138 MMOL/L (ref 136–145)
WBC # BLD AUTO: 8 K/UL (ref 4–11)

## 2025-02-07 PROCEDURE — 83735 ASSAY OF MAGNESIUM: CPT

## 2025-02-07 PROCEDURE — 83605 ASSAY OF LACTIC ACID: CPT

## 2025-02-07 PROCEDURE — 97530 THERAPEUTIC ACTIVITIES: CPT

## 2025-02-07 PROCEDURE — 6360000002 HC RX W HCPCS: Performed by: STUDENT IN AN ORGANIZED HEALTH CARE EDUCATION/TRAINING PROGRAM

## 2025-02-07 PROCEDURE — 6370000000 HC RX 637 (ALT 250 FOR IP): Performed by: INTERNAL MEDICINE

## 2025-02-07 PROCEDURE — 6360000002 HC RX W HCPCS: Performed by: INTERNAL MEDICINE

## 2025-02-07 PROCEDURE — 6370000000 HC RX 637 (ALT 250 FOR IP): Performed by: STUDENT IN AN ORGANIZED HEALTH CARE EDUCATION/TRAINING PROGRAM

## 2025-02-07 PROCEDURE — 36415 COLL VENOUS BLD VENIPUNCTURE: CPT

## 2025-02-07 PROCEDURE — 93010 ELECTROCARDIOGRAM REPORT: CPT | Performed by: INTERNAL MEDICINE

## 2025-02-07 PROCEDURE — 97116 GAIT TRAINING THERAPY: CPT

## 2025-02-07 PROCEDURE — 80048 BASIC METABOLIC PNL TOTAL CA: CPT

## 2025-02-07 PROCEDURE — 2500000003 HC RX 250 WO HCPCS: Performed by: STUDENT IN AN ORGANIZED HEALTH CARE EDUCATION/TRAINING PROGRAM

## 2025-02-07 PROCEDURE — 80076 HEPATIC FUNCTION PANEL: CPT

## 2025-02-07 PROCEDURE — 2060000000 HC ICU INTERMEDIATE R&B

## 2025-02-07 PROCEDURE — 97535 SELF CARE MNGMENT TRAINING: CPT

## 2025-02-07 PROCEDURE — 93005 ELECTROCARDIOGRAM TRACING: CPT | Performed by: HOSPITALIST

## 2025-02-07 PROCEDURE — 85025 COMPLETE CBC W/AUTO DIFF WBC: CPT

## 2025-02-07 PROCEDURE — 99233 SBSQ HOSP IP/OBS HIGH 50: CPT | Performed by: INTERNAL MEDICINE

## 2025-02-07 PROCEDURE — 94760 N-INVAS EAR/PLS OXIMETRY 1: CPT

## 2025-02-07 RX ORDER — AMIODARONE HYDROCHLORIDE 200 MG/1
200 TABLET ORAL 2 TIMES DAILY
Status: DISCONTINUED | OUTPATIENT
Start: 2025-02-07 | End: 2025-02-10

## 2025-02-07 RX ORDER — DIGOXIN 0.25 MG/ML
250 INJECTION INTRAMUSCULAR; INTRAVENOUS ONCE
Status: DISCONTINUED | OUTPATIENT
Start: 2025-02-07 | End: 2025-02-07

## 2025-02-07 RX ORDER — FUROSEMIDE 10 MG/ML
40 INJECTION INTRAMUSCULAR; INTRAVENOUS DAILY
Status: CANCELLED | OUTPATIENT
Start: 2025-02-08

## 2025-02-07 RX ORDER — SPIRONOLACTONE 25 MG/1
25 TABLET ORAL DAILY
Status: CANCELLED | OUTPATIENT
Start: 2025-02-08

## 2025-02-07 RX ORDER — LOSARTAN POTASSIUM 50 MG/1
50 TABLET ORAL DAILY
Status: CANCELLED | OUTPATIENT
Start: 2025-02-08

## 2025-02-07 RX ADMIN — LOSARTAN POTASSIUM 50 MG: 50 TABLET, FILM COATED ORAL at 09:13

## 2025-02-07 RX ADMIN — METOPROLOL SUCCINATE 50 MG: 50 TABLET, EXTENDED RELEASE ORAL at 09:14

## 2025-02-07 RX ADMIN — POTASSIUM CHLORIDE 40 MEQ: 1500 TABLET, EXTENDED RELEASE ORAL at 09:14

## 2025-02-07 RX ADMIN — MEMANTINE 5 MG: 5 TABLET ORAL at 09:13

## 2025-02-07 RX ADMIN — SODIUM CHLORIDE, PRESERVATIVE FREE 10 ML: 5 INJECTION INTRAVENOUS at 09:15

## 2025-02-07 RX ADMIN — PREDNISONE 10 MG: 10 TABLET ORAL at 09:13

## 2025-02-07 RX ADMIN — AMIODARONE HYDROCHLORIDE 200 MG: 200 TABLET ORAL at 20:48

## 2025-02-07 RX ADMIN — SODIUM CHLORIDE, PRESERVATIVE FREE 10 ML: 5 INJECTION INTRAVENOUS at 20:49

## 2025-02-07 RX ADMIN — WATER 1000 MG: 1 INJECTION INTRAMUSCULAR; INTRAVENOUS; SUBCUTANEOUS at 09:14

## 2025-02-07 RX ADMIN — SPIRONOLACTONE 25 MG: 25 TABLET ORAL at 09:14

## 2025-02-07 RX ADMIN — MAGNESIUM SULFATE HEPTAHYDRATE 2000 MG: 40 INJECTION, SOLUTION INTRAVENOUS at 09:13

## 2025-02-07 RX ADMIN — DONEPEZIL HYDROCHLORIDE 10 MG: 10 TABLET, FILM COATED ORAL at 20:48

## 2025-02-07 RX ADMIN — PANTOPRAZOLE SODIUM 40 MG: 40 TABLET, DELAYED RELEASE ORAL at 05:28

## 2025-02-07 RX ADMIN — MEMANTINE 5 MG: 5 TABLET ORAL at 20:48

## 2025-02-07 RX ADMIN — MONTELUKAST 10 MG: 10 TABLET, FILM COATED ORAL at 20:48

## 2025-02-07 RX ADMIN — EMPAGLIFLOZIN 10 MG: 10 TABLET, FILM COATED ORAL at 09:14

## 2025-02-07 RX ADMIN — FUROSEMIDE 40 MG: 10 INJECTION, SOLUTION INTRAMUSCULAR; INTRAVENOUS at 09:14

## 2025-02-07 RX ADMIN — ENOXAPARIN SODIUM 40 MG: 100 INJECTION SUBCUTANEOUS at 09:14

## 2025-02-07 ASSESSMENT — PAIN SCALES - GENERAL: PAINLEVEL_OUTOF10: 0

## 2025-02-07 NOTE — SIGNIFICANT EVENT
This patient was seen and examined at bedside.  Per report patient is having episodes of tachycardia intermittent with heart rate jumping up to 160s.  Patient is not sustaining stat EKG was ordered showing SVT.  Blood pressure is on the softer side 90s over 50s.  Per chart review she has a history of heart failure chest x-ray with pleural effusion.  Patient was given Cardizem this morning which was discontinued seen by cardiology.  Digoxin was initially ordered though this was not given as patient is not sustaining.  Will continue to monitor    EZIO Diego CNP  2/7/2025  1:06 AM

## 2025-02-08 LAB
ALBUMIN SERPL-MCNC: 3.2 G/DL (ref 3.4–5)
ALP SERPL-CCNC: 58 U/L (ref 40–129)
ALT SERPL-CCNC: 243 U/L (ref 10–40)
ANION GAP SERPL CALCULATED.3IONS-SCNC: 8 MMOL/L (ref 3–16)
AST SERPL-CCNC: 72 U/L (ref 15–37)
BASOPHILS # BLD: 0 K/UL (ref 0–0.2)
BASOPHILS NFR BLD: 0.3 %
BILIRUB DIRECT SERPL-MCNC: 0.7 MG/DL (ref 0–0.3)
BILIRUB INDIRECT SERPL-MCNC: 0.7 MG/DL (ref 0–1)
BILIRUB SERPL-MCNC: 1.4 MG/DL (ref 0–1)
BUN SERPL-MCNC: 21 MG/DL (ref 7–20)
CALCIUM SERPL-MCNC: 8.7 MG/DL (ref 8.3–10.6)
CHLORIDE SERPL-SCNC: 97 MMOL/L (ref 99–110)
CO2 SERPL-SCNC: 32 MMOL/L (ref 21–32)
CREAT SERPL-MCNC: 0.8 MG/DL (ref 0.6–1.2)
DEPRECATED RDW RBC AUTO: 15.5 % (ref 12.4–15.4)
EOSINOPHIL # BLD: 0.1 K/UL (ref 0–0.6)
EOSINOPHIL NFR BLD: 1.4 %
GFR SERPLBLD CREATININE-BSD FMLA CKD-EPI: 74 ML/MIN/{1.73_M2}
GLUCOSE BLD-MCNC: 139 MG/DL (ref 70–99)
GLUCOSE SERPL-MCNC: 111 MG/DL (ref 70–99)
HCT VFR BLD AUTO: 40.3 % (ref 36–48)
HGB BLD-MCNC: 13.4 G/DL (ref 12–16)
LYMPHOCYTES # BLD: 1.9 K/UL (ref 1–5.1)
LYMPHOCYTES NFR BLD: 22.2 %
MCH RBC QN AUTO: 30.7 PG (ref 26–34)
MCHC RBC AUTO-ENTMCNC: 33.2 G/DL (ref 31–36)
MCV RBC AUTO: 92.5 FL (ref 80–100)
MONOCYTES # BLD: 0.9 K/UL (ref 0–1.3)
MONOCYTES NFR BLD: 10.7 %
NEUTROPHILS # BLD: 5.6 K/UL (ref 1.7–7.7)
NEUTROPHILS NFR BLD: 65.4 %
NT-PROBNP SERPL-MCNC: 4406 PG/ML (ref 0–449)
PERFORMED ON: ABNORMAL
PLATELET # BLD AUTO: 139 K/UL (ref 135–450)
PMV BLD AUTO: 10.1 FL (ref 5–10.5)
POTASSIUM SERPL-SCNC: 3.9 MMOL/L (ref 3.5–5.1)
PROT SERPL-MCNC: 5.4 G/DL (ref 6.4–8.2)
RBC # BLD AUTO: 4.36 M/UL (ref 4–5.2)
SODIUM SERPL-SCNC: 137 MMOL/L (ref 136–145)
WBC # BLD AUTO: 8.6 K/UL (ref 4–11)

## 2025-02-08 PROCEDURE — 83880 ASSAY OF NATRIURETIC PEPTIDE: CPT

## 2025-02-08 PROCEDURE — 80048 BASIC METABOLIC PNL TOTAL CA: CPT

## 2025-02-08 PROCEDURE — 6370000000 HC RX 637 (ALT 250 FOR IP): Performed by: STUDENT IN AN ORGANIZED HEALTH CARE EDUCATION/TRAINING PROGRAM

## 2025-02-08 PROCEDURE — 2500000003 HC RX 250 WO HCPCS: Performed by: STUDENT IN AN ORGANIZED HEALTH CARE EDUCATION/TRAINING PROGRAM

## 2025-02-08 PROCEDURE — 6370000000 HC RX 637 (ALT 250 FOR IP): Performed by: INTERNAL MEDICINE

## 2025-02-08 PROCEDURE — 2060000000 HC ICU INTERMEDIATE R&B

## 2025-02-08 PROCEDURE — 36415 COLL VENOUS BLD VENIPUNCTURE: CPT

## 2025-02-08 PROCEDURE — 80076 HEPATIC FUNCTION PANEL: CPT

## 2025-02-08 PROCEDURE — 6360000002 HC RX W HCPCS: Performed by: STUDENT IN AN ORGANIZED HEALTH CARE EDUCATION/TRAINING PROGRAM

## 2025-02-08 PROCEDURE — 85025 COMPLETE CBC W/AUTO DIFF WBC: CPT

## 2025-02-08 PROCEDURE — 94760 N-INVAS EAR/PLS OXIMETRY 1: CPT

## 2025-02-08 PROCEDURE — 99232 SBSQ HOSP IP/OBS MODERATE 35: CPT | Performed by: INTERNAL MEDICINE

## 2025-02-08 RX ORDER — METOPROLOL SUCCINATE 25 MG/1
25 TABLET, EXTENDED RELEASE ORAL DAILY
Status: DISCONTINUED | OUTPATIENT
Start: 2025-02-09 | End: 2025-02-11

## 2025-02-08 RX ADMIN — DONEPEZIL HYDROCHLORIDE 10 MG: 10 TABLET, FILM COATED ORAL at 21:59

## 2025-02-08 RX ADMIN — METOPROLOL SUCCINATE 50 MG: 50 TABLET, EXTENDED RELEASE ORAL at 09:29

## 2025-02-08 RX ADMIN — EMPAGLIFLOZIN 10 MG: 10 TABLET, FILM COATED ORAL at 09:29

## 2025-02-08 RX ADMIN — SODIUM CHLORIDE, PRESERVATIVE FREE 10 ML: 5 INJECTION INTRAVENOUS at 21:58

## 2025-02-08 RX ADMIN — MEMANTINE 5 MG: 5 TABLET ORAL at 21:59

## 2025-02-08 RX ADMIN — MEMANTINE 5 MG: 5 TABLET ORAL at 09:27

## 2025-02-08 RX ADMIN — PREDNISONE 10 MG: 10 TABLET ORAL at 09:29

## 2025-02-08 RX ADMIN — AMIODARONE HYDROCHLORIDE 200 MG: 200 TABLET ORAL at 09:27

## 2025-02-08 RX ADMIN — SODIUM CHLORIDE, PRESERVATIVE FREE 10 ML: 5 INJECTION INTRAVENOUS at 09:30

## 2025-02-08 RX ADMIN — AMIODARONE HYDROCHLORIDE 200 MG: 200 TABLET ORAL at 21:59

## 2025-02-08 RX ADMIN — ENOXAPARIN SODIUM 40 MG: 100 INJECTION SUBCUTANEOUS at 09:29

## 2025-02-08 RX ADMIN — MONTELUKAST 10 MG: 10 TABLET, FILM COATED ORAL at 21:59

## 2025-02-08 RX ADMIN — PANTOPRAZOLE SODIUM 40 MG: 40 TABLET, DELAYED RELEASE ORAL at 07:52

## 2025-02-09 ENCOUNTER — APPOINTMENT (OUTPATIENT)
Dept: GENERAL RADIOLOGY | Age: 80
DRG: 871 | End: 2025-02-09
Payer: MEDICARE

## 2025-02-09 LAB
ALBUMIN SERPL-MCNC: 3.3 G/DL (ref 3.4–5)
ALP SERPL-CCNC: 53 U/L (ref 40–129)
ALT SERPL-CCNC: 170 U/L (ref 10–40)
ANION GAP SERPL CALCULATED.3IONS-SCNC: 8 MMOL/L (ref 3–16)
AST SERPL-CCNC: 45 U/L (ref 15–37)
BASOPHILS # BLD: 0 K/UL (ref 0–0.2)
BASOPHILS NFR BLD: 0.3 %
BILIRUB DIRECT SERPL-MCNC: 0.6 MG/DL (ref 0–0.3)
BILIRUB INDIRECT SERPL-MCNC: 0.9 MG/DL (ref 0–1)
BILIRUB SERPL-MCNC: 1.5 MG/DL (ref 0–1)
BUN SERPL-MCNC: 26 MG/DL (ref 7–20)
CALCIUM SERPL-MCNC: 8.8 MG/DL (ref 8.3–10.6)
CHLORIDE SERPL-SCNC: 97 MMOL/L (ref 99–110)
CO2 SERPL-SCNC: 30 MMOL/L (ref 21–32)
CREAT SERPL-MCNC: 0.9 MG/DL (ref 0.6–1.2)
DEPRECATED RDW RBC AUTO: 15.2 % (ref 12.4–15.4)
EOSINOPHIL # BLD: 0.2 K/UL (ref 0–0.6)
EOSINOPHIL NFR BLD: 1.7 %
GFR SERPLBLD CREATININE-BSD FMLA CKD-EPI: 64 ML/MIN/{1.73_M2}
GLUCOSE BLD-MCNC: 102 MG/DL (ref 70–99)
GLUCOSE BLD-MCNC: 139 MG/DL (ref 70–99)
GLUCOSE SERPL-MCNC: 78 MG/DL (ref 70–99)
HCT VFR BLD AUTO: 40.4 % (ref 36–48)
HGB BLD-MCNC: 13.3 G/DL (ref 12–16)
LYMPHOCYTES # BLD: 2.1 K/UL (ref 1–5.1)
LYMPHOCYTES NFR BLD: 17.5 %
MAGNESIUM SERPL-MCNC: 1.83 MG/DL (ref 1.8–2.4)
MCH RBC QN AUTO: 30.5 PG (ref 26–34)
MCHC RBC AUTO-ENTMCNC: 32.9 G/DL (ref 31–36)
MCV RBC AUTO: 92.9 FL (ref 80–100)
MONOCYTES # BLD: 1.1 K/UL (ref 0–1.3)
MONOCYTES NFR BLD: 9 %
NEUTROPHILS # BLD: 8.4 K/UL (ref 1.7–7.7)
NEUTROPHILS NFR BLD: 71.5 %
PERFORMED ON: ABNORMAL
PERFORMED ON: ABNORMAL
PLATELET # BLD AUTO: 136 K/UL (ref 135–450)
PMV BLD AUTO: 10.2 FL (ref 5–10.5)
POTASSIUM SERPL-SCNC: 3.5 MMOL/L (ref 3.5–5.1)
PROT SERPL-MCNC: 5.3 G/DL (ref 6.4–8.2)
RBC # BLD AUTO: 4.35 M/UL (ref 4–5.2)
SODIUM SERPL-SCNC: 135 MMOL/L (ref 136–145)
WBC # BLD AUTO: 11.8 K/UL (ref 4–11)

## 2025-02-09 PROCEDURE — 2060000000 HC ICU INTERMEDIATE R&B

## 2025-02-09 PROCEDURE — 6360000002 HC RX W HCPCS: Performed by: STUDENT IN AN ORGANIZED HEALTH CARE EDUCATION/TRAINING PROGRAM

## 2025-02-09 PROCEDURE — 85025 COMPLETE CBC W/AUTO DIFF WBC: CPT

## 2025-02-09 PROCEDURE — 80048 BASIC METABOLIC PNL TOTAL CA: CPT

## 2025-02-09 PROCEDURE — 71045 X-RAY EXAM CHEST 1 VIEW: CPT

## 2025-02-09 PROCEDURE — 36415 COLL VENOUS BLD VENIPUNCTURE: CPT

## 2025-02-09 PROCEDURE — 6370000000 HC RX 637 (ALT 250 FOR IP): Performed by: STUDENT IN AN ORGANIZED HEALTH CARE EDUCATION/TRAINING PROGRAM

## 2025-02-09 PROCEDURE — 83735 ASSAY OF MAGNESIUM: CPT

## 2025-02-09 PROCEDURE — 6370000000 HC RX 637 (ALT 250 FOR IP): Performed by: INTERNAL MEDICINE

## 2025-02-09 PROCEDURE — 99232 SBSQ HOSP IP/OBS MODERATE 35: CPT | Performed by: NURSE PRACTITIONER

## 2025-02-09 PROCEDURE — 6370000000 HC RX 637 (ALT 250 FOR IP): Performed by: HOSPITALIST

## 2025-02-09 PROCEDURE — 80076 HEPATIC FUNCTION PANEL: CPT

## 2025-02-09 PROCEDURE — 2500000003 HC RX 250 WO HCPCS: Performed by: STUDENT IN AN ORGANIZED HEALTH CARE EDUCATION/TRAINING PROGRAM

## 2025-02-09 RX ORDER — OLANZAPINE 10 MG/2ML
5 INJECTION, POWDER, FOR SOLUTION INTRAMUSCULAR ONCE
Status: DISCONTINUED | OUTPATIENT
Start: 2025-02-09 | End: 2025-02-12 | Stop reason: HOSPADM

## 2025-02-09 RX ADMIN — SODIUM CHLORIDE, PRESERVATIVE FREE 10 ML: 5 INJECTION INTRAVENOUS at 08:30

## 2025-02-09 RX ADMIN — METOPROLOL SUCCINATE 25 MG: 25 TABLET, EXTENDED RELEASE ORAL at 08:29

## 2025-02-09 RX ADMIN — MEMANTINE 5 MG: 5 TABLET ORAL at 08:29

## 2025-02-09 RX ADMIN — PREDNISONE 10 MG: 10 TABLET ORAL at 08:29

## 2025-02-09 RX ADMIN — DONEPEZIL HYDROCHLORIDE 10 MG: 10 TABLET, FILM COATED ORAL at 19:54

## 2025-02-09 RX ADMIN — MONTELUKAST 10 MG: 10 TABLET, FILM COATED ORAL at 19:54

## 2025-02-09 RX ADMIN — AMIODARONE HYDROCHLORIDE 200 MG: 200 TABLET ORAL at 08:29

## 2025-02-09 RX ADMIN — SODIUM CHLORIDE, PRESERVATIVE FREE 10 ML: 5 INJECTION INTRAVENOUS at 19:54

## 2025-02-09 RX ADMIN — AMIODARONE HYDROCHLORIDE 200 MG: 200 TABLET ORAL at 22:14

## 2025-02-09 RX ADMIN — MEMANTINE 5 MG: 5 TABLET ORAL at 19:54

## 2025-02-09 RX ADMIN — PANTOPRAZOLE SODIUM 40 MG: 40 TABLET, DELAYED RELEASE ORAL at 08:29

## 2025-02-09 RX ADMIN — ENOXAPARIN SODIUM 40 MG: 100 INJECTION SUBCUTANEOUS at 08:29

## 2025-02-09 RX ADMIN — EMPAGLIFLOZIN 10 MG: 10 TABLET, FILM COATED ORAL at 08:29

## 2025-02-09 NOTE — NURSE NAVIGATOR
Pt very agitated  ..  Climbing out of bed ,  verbally  physically aggressive , to staff ...called   dion  will come in .jatin

## 2025-02-10 ENCOUNTER — APPOINTMENT (OUTPATIENT)
Age: 80
DRG: 871 | End: 2025-02-10
Payer: MEDICARE

## 2025-02-10 LAB
ANION GAP SERPL CALCULATED.3IONS-SCNC: 10 MMOL/L (ref 3–16)
BASOPHILS # BLD: 0 K/UL (ref 0–0.2)
BASOPHILS NFR BLD: 0.3 %
BUN SERPL-MCNC: 23 MG/DL (ref 7–20)
CALCIUM SERPL-MCNC: 9.1 MG/DL (ref 8.3–10.6)
CHLORIDE SERPL-SCNC: 101 MMOL/L (ref 99–110)
CO2 SERPL-SCNC: 28 MMOL/L (ref 21–32)
CREAT SERPL-MCNC: 0.7 MG/DL (ref 0.6–1.2)
DEPRECATED RDW RBC AUTO: 15.2 % (ref 12.4–15.4)
EOSINOPHIL # BLD: 0.1 K/UL (ref 0–0.6)
EOSINOPHIL NFR BLD: 0.8 %
GFR SERPLBLD CREATININE-BSD FMLA CKD-EPI: 87 ML/MIN/{1.73_M2}
GLUCOSE SERPL-MCNC: 84 MG/DL (ref 70–99)
HCT VFR BLD AUTO: 39.2 % (ref 36–48)
HGB BLD-MCNC: 12.8 G/DL (ref 12–16)
LYMPHOCYTES # BLD: 1.5 K/UL (ref 1–5.1)
LYMPHOCYTES NFR BLD: 12.3 %
MAGNESIUM SERPL-MCNC: 1.71 MG/DL (ref 1.8–2.4)
MCH RBC QN AUTO: 30.6 PG (ref 26–34)
MCHC RBC AUTO-ENTMCNC: 32.7 G/DL (ref 31–36)
MCV RBC AUTO: 93.5 FL (ref 80–100)
MONOCYTES # BLD: 1.1 K/UL (ref 0–1.3)
MONOCYTES NFR BLD: 9.5 %
NEUTROPHILS # BLD: 9.3 K/UL (ref 1.7–7.7)
NEUTROPHILS NFR BLD: 77.1 %
PLATELET # BLD AUTO: 120 K/UL (ref 135–450)
PMV BLD AUTO: 9.8 FL (ref 5–10.5)
POTASSIUM SERPL-SCNC: 3.5 MMOL/L (ref 3.5–5.1)
RBC # BLD AUTO: 4.19 M/UL (ref 4–5.2)
SODIUM SERPL-SCNC: 139 MMOL/L (ref 136–145)
WBC # BLD AUTO: 12 K/UL (ref 4–11)

## 2025-02-10 PROCEDURE — 6370000000 HC RX 637 (ALT 250 FOR IP): Performed by: STUDENT IN AN ORGANIZED HEALTH CARE EDUCATION/TRAINING PROGRAM

## 2025-02-10 PROCEDURE — 83735 ASSAY OF MAGNESIUM: CPT

## 2025-02-10 PROCEDURE — 6370000000 HC RX 637 (ALT 250 FOR IP): Performed by: INTERNAL MEDICINE

## 2025-02-10 PROCEDURE — 36415 COLL VENOUS BLD VENIPUNCTURE: CPT

## 2025-02-10 PROCEDURE — 99232 SBSQ HOSP IP/OBS MODERATE 35: CPT | Performed by: NURSE PRACTITIONER

## 2025-02-10 PROCEDURE — 85025 COMPLETE CBC W/AUTO DIFF WBC: CPT

## 2025-02-10 PROCEDURE — 6370000000 HC RX 637 (ALT 250 FOR IP): Performed by: HOSPITALIST

## 2025-02-10 PROCEDURE — 80048 BASIC METABOLIC PNL TOTAL CA: CPT

## 2025-02-10 PROCEDURE — 2500000003 HC RX 250 WO HCPCS: Performed by: STUDENT IN AN ORGANIZED HEALTH CARE EDUCATION/TRAINING PROGRAM

## 2025-02-10 PROCEDURE — 2060000000 HC ICU INTERMEDIATE R&B

## 2025-02-10 PROCEDURE — 6360000002 HC RX W HCPCS: Performed by: STUDENT IN AN ORGANIZED HEALTH CARE EDUCATION/TRAINING PROGRAM

## 2025-02-10 PROCEDURE — 9990000010 HC NO CHARGE VISIT

## 2025-02-10 PROCEDURE — 94760 N-INVAS EAR/PLS OXIMETRY 1: CPT

## 2025-02-10 RX ORDER — AMIODARONE HYDROCHLORIDE 200 MG/1
200 TABLET ORAL DAILY
DISCHARGE
Start: 2025-02-11

## 2025-02-10 RX ORDER — AMIODARONE HYDROCHLORIDE 200 MG/1
200 TABLET ORAL DAILY
Status: DISCONTINUED | OUTPATIENT
Start: 2025-02-11 | End: 2025-02-12 | Stop reason: HOSPADM

## 2025-02-10 RX ORDER — METOPROLOL SUCCINATE 25 MG/1
25 TABLET, EXTENDED RELEASE ORAL DAILY
DISCHARGE
Start: 2025-02-11 | End: 2025-02-11 | Stop reason: HOSPADM

## 2025-02-10 RX ADMIN — PREDNISONE 10 MG: 10 TABLET ORAL at 09:47

## 2025-02-10 RX ADMIN — MONTELUKAST 10 MG: 10 TABLET, FILM COATED ORAL at 19:58

## 2025-02-10 RX ADMIN — ENOXAPARIN SODIUM 40 MG: 100 INJECTION SUBCUTANEOUS at 09:47

## 2025-02-10 RX ADMIN — SODIUM CHLORIDE, PRESERVATIVE FREE 10 ML: 5 INJECTION INTRAVENOUS at 09:56

## 2025-02-10 RX ADMIN — SODIUM CHLORIDE, PRESERVATIVE FREE 10 ML: 5 INJECTION INTRAVENOUS at 19:59

## 2025-02-10 RX ADMIN — DONEPEZIL HYDROCHLORIDE 10 MG: 10 TABLET, FILM COATED ORAL at 19:58

## 2025-02-10 RX ADMIN — MEMANTINE 5 MG: 5 TABLET ORAL at 09:47

## 2025-02-10 RX ADMIN — PANTOPRAZOLE SODIUM 40 MG: 40 TABLET, DELAYED RELEASE ORAL at 05:44

## 2025-02-10 RX ADMIN — MEMANTINE 5 MG: 5 TABLET ORAL at 19:58

## 2025-02-10 RX ADMIN — METOPROLOL SUCCINATE 25 MG: 25 TABLET, EXTENDED RELEASE ORAL at 09:55

## 2025-02-10 RX ADMIN — EMPAGLIFLOZIN 10 MG: 10 TABLET, FILM COATED ORAL at 09:47

## 2025-02-10 NOTE — DISCHARGE SUMMARY
AM    CLARITYU CLOUDY 02/05/2025 02:03 AM    SPECGRAV 1.025 11/02/2018 09:57 AM    LEUKOCYTESUR MODERATE 02/05/2025 02:03 AM    UROBILINOGEN 1.0 02/05/2025 02:03 AM    BILIRUBINUR Negative 02/05/2025 02:03 AM    BLOODU MODERATE 02/05/2025 02:03 AM    GLUCOSEU Negative 02/05/2025 02:03 AM    KETUA TRACE 02/05/2025 02:03 AM     Urine Cultures:   Lab Results   Component Value Date/Time    LABURIN <10,000 CFU/ml  No further workup   02/05/2025 02:03 AM     Blood Cultures: No results found for: \"BC\"  No results found for: \"BLOODCULT2\"  Organism:   Lab Results   Component Value Date/Time    ORG Gram negative carlos 02/05/2025 02:03 AM       Time Spent Discharging patient 50 minutes    Electronically signed by Raf Epstein MD on 2/10/2025 at 4:06 PM

## 2025-02-10 NOTE — NURSE NAVIGATOR
DC order noted. Pt treated for sepsis, SVT, and mild systolic HF exacerbation. HF dc instructions are on AVS as well as on PEDRO as pt going home with [facility] (see by date 2/13). Pt has a cardiology follow up appt scheduled for 2/14 at 11:00 with Betsy Valdovinos NP.    Dc wt 113 lbs

## 2025-02-10 NOTE — CONSULTS
Clinical Pharmacy Note  Vancomycin Consult    Pharmacy consult received for one-time dose of vancomycin in the Emergency Department per Dr. Hazel.    Ht Readings from Last 1 Encounters:   02/04/25 1.575 m (5' 2\")        Wt Readings from Last 1 Encounters:   02/04/25 58.6 kg (129 lb 3 oz)         Assessment/Plan:  Vancomycin 1g x 1 in ED.  If vancomycin is to continue on admission and pharmacy is to manage dosing, please re-consult with admission orders.      Ynes Orr, DmitriD      
[facility]  Cardiology Consult Note        CC:      Congestive heart failure             HPI:   Mrs. Aiken is a 79-year-old female with advanced dementia was brought by her family for altered mental status.  She was getting progressively weak with shortness of breath.    In the ER she was found to have an SVT with a rate of 180 which was treated with Cardizem.  She was seen in consultation by Dr. Guerrero who recommended AV lázaro blocking drugs.    I have been asked to see the patient because of probable CHF with proBNP greater than 23,000 and a normal creatinine of 0.9.    In addition the patient has a history of diabetes interstitial lung disease IBS.  Pertinent cardiac Home medications include amlodipine 5 Aricept 5 gabapentin 300, losartan 50, Namenda 5, metformin 500 and prednisone 10    Past Medical History:   Diagnosis Date    Acute delirium 09/18/2020    Alzheimer disease (HCC)     Arthritis     Asthma     Depression 06/28/2013    Diverticulosis of colon (without mention of hemorrhage)     Enthesopathy of hip region     left - mild    Esophageal reflux     Essential hypertension     Full dentures     Hyperlipidemia     Irritable bowel syndrome     Late onset Alzheimer's dementia with behavioral disturbance (HCC) 12/08/2021    Lumbar disc herniation with radiculopathy 08/29/2011    Nocturia     Osteoporosis, unspecified     Other and unspecified hyperlipidemia     Postinflammatory pulmonary fibrosis (HCC)     Rectal bleeding 09/17/2020    Sialoadenitis     left    Synovial cyst of popliteal space     left knee    Thoracic or lumbosacral neuritis or radiculitis, unspecified     left - L5 - S1    Trochanteric bursitis of left hip 04/04/2018    Type II or unspecified type diabetes mellitus without mention of complication, not stated as uncontrolled     Unspecified essential hypertension     Wears glasses       Past Surgical History:   Procedure Laterality Date    BRONCHOSCOPY N/A 12/21/2018    
Spoke with RN, reviewed chart. GOC appear established, discharge order noted. Will defer consult at this time, please reach out to reinitiate if needed.   
02/05/2025 08:28 AM    GLUCOSE 117 02/05/2025 08:28 AM    GLUCOSE 191 08/03/2011 11:20 AM    CALCIUM 9.4 02/05/2025 08:28 AM    LABGLOM 64 02/05/2025 08:28 AM    LABGLOM >60 10/26/2023 10:05 AM      Lab Results   Component Value Date/Time    ALKPHOS 76 02/05/2025 08:28 AM     02/05/2025 08:28 AM     02/05/2025 08:28 AM    BILITOT 4.4 02/05/2025 08:28 AM    BILIDIR 2.0 02/05/2025 08:28 AM     Lab Results   Component Value Date    TSH 1.71 03/28/2024     Lab Results   Component Value Date    CKTOTAL 145 12/10/2018    TROPONINI <0.01 07/02/2021    TROPHS 41 (H) 02/05/2025       EKG 2/4/2025  Supraventricular tachycardia    ECHO 9/17/2020  Summary   Left ventricular cavity size is normal.   Normal left ventricular wall thickness.   Ejection fraction is visually estimated to be 50-55%.   Mitral valve leaflets appear mildly thickened.   Trivial mitral regurgitation.   Trivial tricuspid regurgitation.   Trivial pulmonic regurgitation present.    Assessment/Plan:     Supraventricular tachycardia  Premature atrial and ventricular complexes  Acute cystitis/sepsis  Transaminitis  Hypertension  Diabetes    - Here with acute cystitis/sepsis, SVT on admission, review of EKG and telemetry appears to be a long RP tachycardia with variable CL, suspect atrial tachycardia in the setting acute illness, she has had no recurrence since presentation  - Will continue to monitor for recurrence, dicussed medical management with family to include AV lázaro agents or possibly AADs  - OK to discontinue diltiazem drip  - Electrophysiology will follow peripherally for recurrence, recommend 2-week monitor on discharge      NOTE: This report was transcribed using voice recognition software. Every effort was made to ensure accuracy, however, inadvertent computerized transcription errors may be present.     Vijay Guerrero MD  [facility]   Office: [phone]  Fax: [phone]

## 2025-02-11 LAB
ANION GAP SERPL CALCULATED.3IONS-SCNC: 9 MMOL/L (ref 3–16)
BASOPHILS # BLD: 0 K/UL (ref 0–0.2)
BASOPHILS NFR BLD: 0.2 %
BUN SERPL-MCNC: 21 MG/DL (ref 7–20)
CALCIUM SERPL-MCNC: 9 MG/DL (ref 8.3–10.6)
CHLORIDE SERPL-SCNC: 102 MMOL/L (ref 99–110)
CO2 SERPL-SCNC: 26 MMOL/L (ref 21–32)
CREAT SERPL-MCNC: 0.8 MG/DL (ref 0.6–1.2)
DEPRECATED RDW RBC AUTO: 15.4 % (ref 12.4–15.4)
EOSINOPHIL # BLD: 0.1 K/UL (ref 0–0.6)
EOSINOPHIL NFR BLD: 0.7 %
GFR SERPLBLD CREATININE-BSD FMLA CKD-EPI: 74 ML/MIN/{1.73_M2}
GLUCOSE SERPL-MCNC: 79 MG/DL (ref 70–99)
HCT VFR BLD AUTO: 39.7 % (ref 36–48)
HGB BLD-MCNC: 13.1 G/DL (ref 12–16)
LYMPHOCYTES # BLD: 1.6 K/UL (ref 1–5.1)
LYMPHOCYTES NFR BLD: 14.9 %
MCH RBC QN AUTO: 30.7 PG (ref 26–34)
MCHC RBC AUTO-ENTMCNC: 33 G/DL (ref 31–36)
MCV RBC AUTO: 92.8 FL (ref 80–100)
MONOCYTES # BLD: 0.9 K/UL (ref 0–1.3)
MONOCYTES NFR BLD: 8.5 %
NEUTROPHILS # BLD: 8.2 K/UL (ref 1.7–7.7)
NEUTROPHILS NFR BLD: 75.7 %
PLATELET # BLD AUTO: 112 K/UL (ref 135–450)
PMV BLD AUTO: 9.9 FL (ref 5–10.5)
POTASSIUM SERPL-SCNC: 4.1 MMOL/L (ref 3.5–5.1)
RBC # BLD AUTO: 4.28 M/UL (ref 4–5.2)
SODIUM SERPL-SCNC: 137 MMOL/L (ref 136–145)
WBC # BLD AUTO: 10.8 K/UL (ref 4–11)

## 2025-02-11 PROCEDURE — 6370000000 HC RX 637 (ALT 250 FOR IP): Performed by: STUDENT IN AN ORGANIZED HEALTH CARE EDUCATION/TRAINING PROGRAM

## 2025-02-11 PROCEDURE — 36415 COLL VENOUS BLD VENIPUNCTURE: CPT

## 2025-02-11 PROCEDURE — 2500000003 HC RX 250 WO HCPCS: Performed by: STUDENT IN AN ORGANIZED HEALTH CARE EDUCATION/TRAINING PROGRAM

## 2025-02-11 PROCEDURE — 80048 BASIC METABOLIC PNL TOTAL CA: CPT

## 2025-02-11 PROCEDURE — 97530 THERAPEUTIC ACTIVITIES: CPT

## 2025-02-11 PROCEDURE — 85025 COMPLETE CBC W/AUTO DIFF WBC: CPT

## 2025-02-11 PROCEDURE — 2060000000 HC ICU INTERMEDIATE R&B

## 2025-02-11 PROCEDURE — 6360000002 HC RX W HCPCS: Performed by: STUDENT IN AN ORGANIZED HEALTH CARE EDUCATION/TRAINING PROGRAM

## 2025-02-11 PROCEDURE — 97116 GAIT TRAINING THERAPY: CPT

## 2025-02-11 PROCEDURE — 97535 SELF CARE MNGMENT TRAINING: CPT

## 2025-02-11 PROCEDURE — 6370000000 HC RX 637 (ALT 250 FOR IP): Performed by: INTERNAL MEDICINE

## 2025-02-11 RX ORDER — METOPROLOL SUCCINATE 25 MG/1
12.5 TABLET, EXTENDED RELEASE ORAL DAILY
DISCHARGE
Start: 2025-02-12

## 2025-02-11 RX ORDER — METOPROLOL SUCCINATE 25 MG/1
12.5 TABLET, EXTENDED RELEASE ORAL DAILY
Status: DISCONTINUED | OUTPATIENT
Start: 2025-02-12 | End: 2025-02-12 | Stop reason: HOSPADM

## 2025-02-11 RX ADMIN — MONTELUKAST 10 MG: 10 TABLET, FILM COATED ORAL at 20:27

## 2025-02-11 RX ADMIN — PANTOPRAZOLE SODIUM 40 MG: 40 TABLET, DELAYED RELEASE ORAL at 06:13

## 2025-02-11 RX ADMIN — MEMANTINE 5 MG: 5 TABLET ORAL at 20:27

## 2025-02-11 RX ADMIN — ENOXAPARIN SODIUM 40 MG: 100 INJECTION SUBCUTANEOUS at 10:31

## 2025-02-11 RX ADMIN — DONEPEZIL HYDROCHLORIDE 10 MG: 10 TABLET, FILM COATED ORAL at 20:27

## 2025-02-11 RX ADMIN — SODIUM CHLORIDE, PRESERVATIVE FREE 10 ML: 5 INJECTION INTRAVENOUS at 10:30

## 2025-02-11 RX ADMIN — MEMANTINE 5 MG: 5 TABLET ORAL at 10:25

## 2025-02-11 RX ADMIN — SODIUM CHLORIDE, PRESERVATIVE FREE 10 ML: 5 INJECTION INTRAVENOUS at 20:30

## 2025-02-11 RX ADMIN — EMPAGLIFLOZIN 10 MG: 10 TABLET, FILM COATED ORAL at 10:25

## 2025-02-11 RX ADMIN — PREDNISONE 10 MG: 10 TABLET ORAL at 10:25

## 2025-02-12 VITALS
HEART RATE: 86 BPM | DIASTOLIC BLOOD PRESSURE: 75 MMHG | TEMPERATURE: 97.9 F | RESPIRATION RATE: 16 BRPM | WEIGHT: 117.95 LBS | SYSTOLIC BLOOD PRESSURE: 126 MMHG | HEIGHT: 62 IN | BODY MASS INDEX: 21.7 KG/M2 | OXYGEN SATURATION: 96 %

## 2025-02-12 LAB
ANION GAP SERPL CALCULATED.3IONS-SCNC: 8 MMOL/L (ref 3–16)
BASOPHILS # BLD: 0 K/UL (ref 0–0.2)
BASOPHILS NFR BLD: 0.2 %
BUN SERPL-MCNC: 20 MG/DL (ref 7–20)
CALCIUM SERPL-MCNC: 9.2 MG/DL (ref 8.3–10.6)
CHLORIDE SERPL-SCNC: 102 MMOL/L (ref 99–110)
CO2 SERPL-SCNC: 25 MMOL/L (ref 21–32)
CREAT SERPL-MCNC: 0.7 MG/DL (ref 0.6–1.2)
DEPRECATED RDW RBC AUTO: 15.4 % (ref 12.4–15.4)
EOSINOPHIL # BLD: 0.1 K/UL (ref 0–0.6)
EOSINOPHIL NFR BLD: 0.7 %
GFR SERPLBLD CREATININE-BSD FMLA CKD-EPI: 87 ML/MIN/{1.73_M2}
GLUCOSE SERPL-MCNC: 79 MG/DL (ref 70–99)
HCT VFR BLD AUTO: 40 % (ref 36–48)
HGB BLD-MCNC: 13.3 G/DL (ref 12–16)
LYMPHOCYTES # BLD: 1.8 K/UL (ref 1–5.1)
LYMPHOCYTES NFR BLD: 18.9 %
MCH RBC QN AUTO: 30.8 PG (ref 26–34)
MCHC RBC AUTO-ENTMCNC: 33.3 G/DL (ref 31–36)
MCV RBC AUTO: 92.4 FL (ref 80–100)
MONOCYTES # BLD: 0.7 K/UL (ref 0–1.3)
MONOCYTES NFR BLD: 7.7 %
NEUTROPHILS # BLD: 7 K/UL (ref 1.7–7.7)
NEUTROPHILS NFR BLD: 72.5 %
PLATELET # BLD AUTO: 107 K/UL (ref 135–450)
PMV BLD AUTO: 9.8 FL (ref 5–10.5)
POTASSIUM SERPL-SCNC: 4 MMOL/L (ref 3.5–5.1)
RBC # BLD AUTO: 4.33 M/UL (ref 4–5.2)
SODIUM SERPL-SCNC: 135 MMOL/L (ref 136–145)
WBC # BLD AUTO: 9.7 K/UL (ref 4–11)

## 2025-02-12 PROCEDURE — 97535 SELF CARE MNGMENT TRAINING: CPT

## 2025-02-12 PROCEDURE — 80048 BASIC METABOLIC PNL TOTAL CA: CPT

## 2025-02-12 PROCEDURE — 36415 COLL VENOUS BLD VENIPUNCTURE: CPT

## 2025-02-12 PROCEDURE — 85025 COMPLETE CBC W/AUTO DIFF WBC: CPT

## 2025-02-12 PROCEDURE — 97530 THERAPEUTIC ACTIVITIES: CPT

## 2025-02-12 PROCEDURE — 6370000000 HC RX 637 (ALT 250 FOR IP): Performed by: STUDENT IN AN ORGANIZED HEALTH CARE EDUCATION/TRAINING PROGRAM

## 2025-02-12 PROCEDURE — 6370000000 HC RX 637 (ALT 250 FOR IP): Performed by: NURSE PRACTITIONER

## 2025-02-12 PROCEDURE — 6370000000 HC RX 637 (ALT 250 FOR IP): Performed by: INTERNAL MEDICINE

## 2025-02-12 PROCEDURE — 6360000002 HC RX W HCPCS: Performed by: STUDENT IN AN ORGANIZED HEALTH CARE EDUCATION/TRAINING PROGRAM

## 2025-02-12 PROCEDURE — 2500000003 HC RX 250 WO HCPCS: Performed by: STUDENT IN AN ORGANIZED HEALTH CARE EDUCATION/TRAINING PROGRAM

## 2025-02-12 RX ADMIN — PREDNISONE 10 MG: 10 TABLET ORAL at 09:58

## 2025-02-12 RX ADMIN — AMIODARONE HYDROCHLORIDE 200 MG: 200 TABLET ORAL at 09:58

## 2025-02-12 RX ADMIN — METOPROLOL SUCCINATE 12.5 MG: 25 TABLET, EXTENDED RELEASE ORAL at 09:58

## 2025-02-12 RX ADMIN — PANTOPRAZOLE SODIUM 40 MG: 40 TABLET, DELAYED RELEASE ORAL at 06:17

## 2025-02-12 RX ADMIN — SODIUM CHLORIDE, PRESERVATIVE FREE 10 ML: 5 INJECTION INTRAVENOUS at 10:01

## 2025-02-12 RX ADMIN — ENOXAPARIN SODIUM 40 MG: 100 INJECTION SUBCUTANEOUS at 10:01

## 2025-02-12 RX ADMIN — MEMANTINE 5 MG: 5 TABLET ORAL at 09:58

## 2025-02-12 RX ADMIN — EMPAGLIFLOZIN 10 MG: 10 TABLET, FILM COATED ORAL at 09:57

## 2025-02-12 NOTE — PLAN OF CARE
Problem: Chronic Conditions and Co-morbidities  Goal: Patient's chronic conditions and co-morbidity symptoms are monitored and maintained or improved  2/5/2025 2204 by Shanta Bourgeois RN  Outcome: Progressing     Problem: Discharge Planning  Goal: Discharge to home or other facility with appropriate resources  2/5/2025 2204 by Shanta Bourgeois RN  Outcome: Progressing  Problem: Pain  Goal: Verbalizes/displays adequate comfort level or baseline comfort level  2/5/2025 2204 by Shanta Bourgeois RN  Outcome: Progressing     Problem: Confusion  Goal: Confusion, delirium, dementia, or psychosis is improved or at baseline  Description: INTERVENTIONS:  1. Assess for possible contributors to thought disturbance, including medications, impaired vision or hearing, underlying metabolic abnormalities, dehydration, psychiatric diagnoses, and notify attending LIP  2. [place] high risk fall precautions, as indicated  3. Provide frequent short contacts to provide reality reorientation, refocusing and direction  4. Decrease environmental stimuli, including noise as appropriate  5. Monitor and intervene to maintain adequate nutrition, hydration, elimination, sleep and activity  6. If unable to ensure safety without constant attention obtain sitter and review sitter guidelines with assigned personnel  7. Initiate Psychosocial CNS and Spiritual Care consult, as indicated  2/5/2025 2204 by Shanta Bourgeois RN  Outcome: Progressing     Problem: Safety - Medical Restraint  Goal: Remains free of injury from restraints (Restraint for Interference with Medical Device)  Description: INTERVENTIONS:  1. Determine that other, less restrictive measures have been tried or would not be effective before applying the restraint  2. Evaluate the patient's condition at the time of restraint application  3. Inform patient/family regarding the reason for restraint  4. Q2H: Monitor safety, psychosocial status, comfort, nutrition and hydration  2/5/2025 2204 by 
  Problem: Chronic Conditions and Co-morbidities  Goal: Patient's chronic conditions and co-morbidity symptoms are monitored and maintained or improved  2/7/2025 0035 by Shanta Bourgeois RN  Outcome: Progressing     Problem: Discharge Planning  Goal: Discharge to home or other facility with appropriate resources  2/7/2025 0035 by Shanta Bourgeois RN  Outcome: Progressing     Problem: Pain  Goal: Verbalizes/displays adequate comfort level or baseline comfort level  2/7/2025 0035 by Shanta Bourgeois RN  Outcome: Progressing     Problem: Confusion  Goal: Confusion, delirium, dementia, or psychosis is improved or at baseline  Description: INTERVENTIONS:  1. Assess for possible contributors to thought disturbance, including medications, impaired vision or hearing, underlying metabolic abnormalities, dehydration, psychiatric diagnoses, and notify attending LIP  2. [place] high risk fall precautions, as indicated  3. Provide frequent short contacts to provide reality reorientation, refocusing and direction  4. Decrease environmental stimuli, including noise as appropriate  5. Monitor and intervene to maintain adequate nutrition, hydration, elimination, sleep and activity  6. If unable to ensure safety without constant attention obtain sitter and review sitter guidelines with assigned personnel  7. Initiate Psychosocial CNS and Spiritual Care consult, as indicated  2/7/2025 0035 by Shanta Bourgeois RN  Outcome: Progressing     Problem: Safety - Medical Restraint  Goal: Remains free of injury from restraints (Restraint for Interference with Medical Device)  Description: INTERVENTIONS:  1. Determine that other, less restrictive measures have been tried or would not be effective before applying the restraint  2. Evaluate the patient's condition at the time of restraint application  3. Inform patient/family regarding the reason for restraint  4. Q2H: Monitor safety, psychosocial status, comfort, nutrition and hydration  2/7/2025 0035 
  Problem: Chronic Conditions and Co-morbidities  Goal: Patient's chronic conditions and co-morbidity symptoms are monitored and maintained or improved  2/9/2025 0859 by Alfonso Neumann RN  Outcome: Progressing     Problem: Discharge Planning  Goal: Discharge to home or other facility with appropriate resources  2/9/2025 0859 by Alfonso Neumann RN  Outcome: Progressing     Problem: Pain  Goal: Verbalizes/displays adequate comfort level or baseline comfort level  2/9/2025 0859 by Alfonso Neumann RN  Outcome: Progressing     Problem: Confusion  Goal: Confusion, delirium, dementia, or psychosis is improved or at baseline  Description: INTERVENTIONS:  1. Assess for possible contributors to thought disturbance, including medications, impaired vision or hearing, underlying metabolic abnormalities, dehydration, psychiatric diagnoses, and notify attending LIP  2. [place] high risk fall precautions, as indicated  3. Provide frequent short contacts to provide reality reorientation, refocusing and direction  4. Decrease environmental stimuli, including noise as appropriate  5. Monitor and intervene to maintain adequate nutrition, hydration, elimination, sleep and activity  6. If unable to ensure safety without constant attention obtain sitter and review sitter guidelines with assigned personnel  7. Initiate Psychosocial CNS and Spiritual Care consult, as indicated  2/9/2025 0859 by Alfonso Neumann RN  Outcome: Progressing     Problem: Safety - Medical Restraint  Goal: Remains free of injury from restraints (Restraint for Interference with Medical Device)  Description: INTERVENTIONS:  1. Determine that other, less restrictive measures have been tried or would not be effective before applying the restraint  2. Evaluate the patient's condition at the time of restraint application  3. Inform patient/family regarding the reason for restraint  4. Q2H: Monitor safety, psychosocial status, comfort, nutrition and 
  Problem: Chronic Conditions and Co-morbidities  Goal: Patient's chronic conditions and co-morbidity symptoms are monitored and maintained or improved  Outcome: Progressing     Problem: Discharge Planning  Goal: Discharge to home or other facility with appropriate resources  Outcome: Progressing     Problem: Pain  Goal: Verbalizes/displays adequate comfort level or baseline comfort level  Outcome: Progressing     Problem: Confusion  Goal: Confusion, delirium, dementia, or psychosis is improved or at baseline  Description: INTERVENTIONS:  1. Assess for possible contributors to thought disturbance, including medications, impaired vision or hearing, underlying metabolic abnormalities, dehydration, psychiatric diagnoses, and notify attending LIP  2. [place] high risk fall precautions, as indicated  3. Provide frequent short contacts to provide reality reorientation, refocusing and direction  4. Decrease environmental stimuli, including noise as appropriate  5. Monitor and intervene to maintain adequate nutrition, hydration, elimination, sleep and activity  6. If unable to ensure safety without constant attention obtain sitter and review sitter guidelines with assigned personnel  7. Initiate Psychosocial CNS and Spiritual Care consult, as indicated  Outcome: Progressing     
  Problem: Chronic Conditions and Co-morbidities  Goal: Patient's chronic conditions and co-morbidity symptoms are monitored and maintained or improved  Outcome: Progressing     Problem: Discharge Planning  Goal: Discharge to home or other facility with appropriate resources  Outcome: Progressing     Problem: Pain  Goal: Verbalizes/displays adequate comfort level or baseline comfort level  Outcome: Progressing     Problem: Confusion  Goal: Confusion, delirium, dementia, or psychosis is improved or at baseline  Description: INTERVENTIONS:  1. Assess for possible contributors to thought disturbance, including medications, impaired vision or hearing, underlying metabolic abnormalities, dehydration, psychiatric diagnoses, and notify attending LIP  2. [place] high risk fall precautions, as indicated  3. Provide frequent short contacts to provide reality reorientation, refocusing and direction  4. Decrease environmental stimuli, including noise as appropriate  5. Monitor and intervene to maintain adequate nutrition, hydration, elimination, sleep and activity  6. If unable to ensure safety without constant attention obtain sitter and review sitter guidelines with assigned personnel  7. Initiate Psychosocial CNS and Spiritual Care consult, as indicated  Outcome: Progressing     
  Problem: Confusion  Goal: Confusion, delirium, dementia, or psychosis is improved or at baseline  Description: INTERVENTIONS:  1. Assess for possible contributors to thought disturbance, including medications, impaired vision or hearing, underlying metabolic abnormalities, dehydration, psychiatric diagnoses, and notify attending LIP  2. [place] high risk fall precautions, as indicated  3. Provide frequent short contacts to provide reality reorientation, refocusing and direction  4. Decrease environmental stimuli, including noise as appropriate  5. Monitor and intervene to maintain adequate nutrition, hydration, elimination, sleep and activity  6. If unable to ensure safety without constant attention obtain sitter and review sitter guidelines with assigned personnel  7. Initiate Psychosocial CNS and Spiritual Care consult, as indicated  Outcome: Progressing     Problem: Safety - Adult  Goal: Free from fall injury  Outcome: Progressing     Problem: Respiratory - Adult  Goal: Achieves optimal ventilation and oxygenation  Outcome: Progressing  Flowsheets (Taken 2/12/2025 0152)  Achieves optimal ventilation and oxygenation:   Assess for changes in mentation and behavior   Assess for changes in respiratory status     
dysrhythmias or at baseline  Outcome: Progressing     Problem: Musculoskeletal - Adult  Goal: Return mobility to safest level of function  Outcome: Progressing     Problem: Metabolic/Fluid and Electrolytes - Adult  Goal: Electrolytes maintained within normal limits  Outcome: Progressing     Problem: Hematologic - Adult  Goal: Maintains hematologic stability  Outcome: Progressing     Problem: Neurosensory - Adult  Goal: Achieves stable or improved neurological status  Outcome: Progressing     Problem: Skin/Tissue Integrity - Adult  Goal: Skin integrity remains intact  Description: 1.  Monitor for areas of redness and/or skin breakdown  2.  Assess vascular access sites hourly  3.  Every 4-6 hours minimum:  Change oxygen saturation probe site  4.  Every 4-6 hours:  If on nasal continuous positive airway pressure, respiratory therapy assess nares and determine need for appliance change or resting period  Outcome: Progressing     Problem: Infection - Adult  Goal: Absence of infection at discharge  Outcome: Progressing     
pressure, respiratory therapy assess nares and determine need for appliance change or resting period  Outcome: Progressing     
to electrolyte replacements, including repeat lab results as appropriate     Problem: Metabolic/Fluid and Electrolytes - Adult  Goal: Hemodynamic stability and optimal renal function maintained  2/7/2025 1317 by Maricruz Reddy RN  Outcome: Progressing  Flowsheets (Taken 2/7/2025 1005)  Hemodynamic stability and optimal renal function maintained: Monitor labs and assess for signs and symptoms of volume excess or deficit     Problem: Metabolic/Fluid and Electrolytes - Adult  Goal: Glucose maintained within prescribed range  2/7/2025 1317 by Maricruz Reddy RN  Outcome: Progressing  Flowsheets (Taken 2/7/2025 1005)  Glucose maintained within prescribed range: Monitor blood glucose as ordered     Problem: Hematologic - Adult  Goal: Maintains hematologic stability  2/7/2025 1317 by Maricruz Reddy RN  Outcome: Progressing  Flowsheets (Taken 2/7/2025 1005)  Maintains hematologic stability:   Assess for signs and symptoms of bleeding or hemorrhage   Monitor labs for bleeding or clotting disorders     Problem: Neurosensory - Adult  Goal: Achieves stable or improved neurological status  Outcome: Progressing  Flowsheets (Taken 2/7/2025 1005)  Achieves stable or improved neurological status: Assess for and report changes in neurological status     Problem: Skin/Tissue Integrity - Adult  Goal: Skin integrity remains intact  Description: 1.  Monitor for areas of redness and/or skin breakdown  2.  Assess vascular access sites hourly  3.  Every 4-6 hours minimum:  Change oxygen saturation probe site  4.  Every 4-6 hours:  If on nasal continuous positive airway pressure, respiratory therapy assess nares and determine need for appliance change or resting period  2/7/2025 1317 by Maricruz Reddy RN  Outcome: Progressing  Flowsheets (Taken 2/7/2025 1005)  Skin Integrity Remains Intact: Monitor for areas of redness and/or skin breakdown     Problem: Skin/Tissue Integrity - Adult  Goal: Oral mucous membranes remain intact  Outcome:

## 2025-02-12 NOTE — PROGRESS NOTES
[facility] [place]   Daily Progress Note      Admit Date:  2/4/2025    CC: SOB    HPI:   Traci Aiken is a 79 y.o. female with PMH dementia, DM, ISLD, HTN, HLP    Adm with AMS, weakness and SOB.   ED work up revealed SVT  treated with cardizem.   Seen by DR. Guerrero and added AV lázaro blocking meds.     Gen cards consulted for HF.     Sitting up in chair, uses walker with assistance  HR 60s overnight with some low Bps.   Denies SOB or CP   at bedside.     Says she was uncooperative overnight D/T her alzheimer's disease.      Review of Systems:   General: Denies fever, chills  RESP: Denies cough, sputum, dyspnea  CARD: Denies palpitations,  murmur  GI:Denies nausea, vomiting,  VASC: Denies claudication, leg cramps  NEURO: Denies numbness, tingling, weakness,change in mood or memory  HEME: Denies abn bruising, bleeding, anemia    Objective:   /86   Pulse 73   Temp 97.5 °F (36.4 °C) (Axillary)   Resp 16   Ht 1.575 m (5' 2\")   Wt 51.6 kg (113 lb 12.1 oz)   SpO2 95%   BMI 20.81 kg/m²       No intake or output data in the 24 hours ending 02/10/25 0824    I/O since adm: Neg 1.2L    WEIGHT:Admit Weight - Scale: 57.4 kg (126 lb 8.7 oz)         Today  Weight - Scale: 51.6 kg (113 lb 12.1 oz)   DRY WEIGHT:  Wt Readings from Last 3 Encounters:   02/10/25 51.6 kg (113 lb 12.1 oz)   11/19/24 57.2 kg (126 lb 3.2 oz)   11/15/24 57.2 kg (126 lb)       Physical Exam:  GEN: Appears frail  no acute distress  SKIN: Pink, warm, dry.   LUNG: AP diameter normal.Decreased bialteral bases. No wheeze, rales, or ronchi. Respiratory effort normal.  HEART: S1S2 A/R. No JVD. No carotid bruit. No murmur, rub or gallop.  ABD: Soft, nontender. +BS X 4 quads. No hepatomegaly.   EXT: Radial and pedal pulses 2+ and symmetric. Without varicosities. No edema.  MUSCSKEL: Good ROM X4 extremities. No deformity.   NEURO: A/O X3. Calm and cooperative.     Telemetry: NSR    Medications:    OLANZapine  5 mg IntraMUSCular 
      [facility] [place]   Daily Progress Note      Admit Date:  2/4/2025    CC: SOB    HPI:   Traci Aiken is a 79 y.o. female with PMH dementia, DM, ISLD, HTN, HLP    Adm with AMS, weakness and SOB.   ED work up revealed SVT  treated with cardizem.   Seen by DR. Guerrero and added AV lázaro blocking meds.     Gen cards consulted for HF.     Today she is laying flat in bed on RA.   Denies SOB or CP   at bedside.   Says she was uncooperative overnight D/T her alzheimer's disease.      Review of Systems:   General: Denies fever, chills  RESP: Denies cough, sputum, dyspnea  CARD: Denies palpitations,  murmur  GI:Denies nausea, vomiting,  VASC: Denies claudication, leg cramps  NEURO: Denies numbness, tingling, weakness,change in mood or memory  HEME: Denies abn bruising, bleeding, anemia    Objective:   /65   Pulse 71   Temp 97.5 °F (36.4 °C) (Oral)   Resp 22   Ht 1.575 m (5' 2\")   Wt 53.8 kg (118 lb 9.7 oz)   SpO2 95%   BMI 21.69 kg/m²         Intake/Output Summary (Last 24 hours) at 2/9/2025 1302  Last data filed at 2/9/2025 0017  Gross per 24 hour   Intake --   Output 200 ml   Net -200 ml     I/O since adm: Neg 1.2L    WEIGHT:Admit Weight - Scale: 57.4 kg (126 lb 8.7 oz)         Today  Weight - Scale: 53.8 kg (118 lb 9.7 oz)   DRY WEIGHT:  Wt Readings from Last 3 Encounters:   02/09/25 53.8 kg (118 lb 9.7 oz)   11/19/24 57.2 kg (126 lb 3.2 oz)   11/15/24 57.2 kg (126 lb)       Physical Exam:  GEN: Appears frail  no acute distress  SKIN: Pink, warm, dry.   LUNG: AP diameter normal.Decreased bialteral bases. No wheeze, rales, or ronchi. Respiratory effort normal.  HEART: S1S2 A/R. No JVD. No carotid bruit. No murmur, rub or gallop.  ABD: Soft, nontender. +BS X 4 quads. No hepatomegaly.   EXT: Radial and pedal pulses 2+ and symmetric. Without varicosities. No edema.  MUSCSKEL: Good ROM X4 extremities. No deformity.   NEURO: A/O X3. Calm and cooperative.     Telemetry: NSR    Medications: 
    V2.0    [facility] Progress Note      Name:  Traci Aiken /Age/Sex: 1945  (79 y.o. female)   MRN & CSN:  [id number] & [id number] Encounter Date/Time: 2/10/2025 8:57 AM EDT   Location:  [id number]/5281-01 PCP: Chapis Ford APRN     Attending:Raf Epstein MD       Hospital Day: 7      HPI :         Subjective:     Patient is currently asleep, had episode of agitation last night but apparently got better once  came to the room, did not require any medications for sedation  No shortness of breath today, blood pressures are.      Review of Systems:      Pertinent positives and negatives discussed in HPI    Objective:     Intake/Output Summary (Last 24 hours) at 2/10/2025 1326  Last data filed at 2/10/2025 1006  Gross per 24 hour   Intake 180 ml   Output --   Net 180 ml      Vitals:   Vitals:    02/10/25 0459 02/10/25 0500 02/10/25 0751 02/10/25 1145   BP: 130/78  139/86 (!) 104/59   Pulse: 78  73 64   Resp: 18  16 17   Temp: 98.7 °F (37.1 °C)  97.5 °F (36.4 °C) 97.9 °F (36.6 °C)   TempSrc: Axillary  Axillary Oral   SpO2: 95%  95% 95%   Weight:  51.6 kg (113 lb 12.1 oz)     Height:             Physical Exam:      Physical Exam Performed:    BP (!) 104/59   Pulse 64   Temp 97.9 °F (36.6 °C) (Oral)   Resp 17   Ht 1.575 m (5' 2\")   Wt 51.6 kg (113 lb 12.1 oz)   SpO2 95%   BMI 20.81 kg/m²     General appearance: Asleep, no acute distress  Respiratory:  Normal respiratory effort.  Clear to auscultation  Cardiovascular: Regular rate and rhythm with normal S1/S2 without murmurs, rubs or gallops.  Abdomen: Soft,  non-distended with normal bowel sounds.  Musculoskeletal: No clubbing, cyanosis or edema bilaterally.            Medications:   Medications:    [START ON 2025] amiodarone  200 mg Oral Daily    OLANZapine  5 mg IntraMUSCular Once    metoprolol succinate  25 mg Oral Daily    empagliflozin  10 mg Oral Daily    [Held by provider] spironolactone  25 mg Oral Daily    donepezil  10 mg Oral 
    V2.0    [facility] Progress Note      Name:  Traci Aiken /Age/Sex: 1945  (79 y.o. female)   MRN & CSN:  [id number] & [id number] Encounter Date/Time: 2025 8:57 AM EDT   Location:  [id number]-[id number]/5281-01 PCP: Chapis Ford APRN     Attending:Raf Epstein MD       Hospital Day: 5      HPI :         Subjective:     Patient is currently asleep, no reported shortness of breath, blood pressures-soft, no fevers      Review of Systems:      Pertinent positives and negatives discussed in HPI    Objective:     Intake/Output Summary (Last 24 hours) at 2025 0901  Last data filed at 2025 0758  Gross per 24 hour   Intake 1140 ml   Output 1425 ml   Net -285 ml      Vitals:   Vitals:    25 0500 25 0549 25 0552 25 0801   BP:  119/82  100/79   Pulse: 57 78     Resp: 15 13  19   Temp:  97.5 °F (36.4 °C)  97.4 °F (36.3 °C)   TempSrc:  Oral  Oral   SpO2:  97%  98%   Weight:   54.3 kg (119 lb 11.4 oz)    Height:             Physical Exam:      Physical Exam Performed:    /79   Pulse 78   Temp 97.4 °F (36.3 °C) (Oral)   Resp 19   Ht 1.575 m (5' 2\")   Wt 54.3 kg (119 lb 11.4 oz)   SpO2 98%   BMI 21.90 kg/m²     General appearance: Asleep, no acute distress  Respiratory:  Normal respiratory effort.  Clear to auscultation  Cardiovascular: Regular rate and rhythm with normal S1/S2 without murmurs, rubs or gallops.  Abdomen: Soft,  non-distended with normal bowel sounds.  Musculoskeletal: No clubbing, cyanosis or edema bilaterally.            Medications:   Medications:    amiodarone  200 mg Oral BID    empagliflozin  10 mg Oral Daily    [Held by provider] spironolactone  25 mg Oral Daily    metoprolol succinate  50 mg Oral Daily    [Held by provider] furosemide  40 mg IntraVENous Daily    donepezil  10 mg Oral Nightly    [Held by provider] losartan  50 mg Oral Daily    memantine  5 mg Oral BID    montelukast  10 mg Oral Nightly    predniSONE  10 mg Oral Daily    pantoprazole  40 
    V2.0    [facility] Progress Note      Name:  Traci Aiken /Age/Sex: 1945  (79 y.o. female)   MRN & CSN:  [id number] & [id number] Encounter Date/Time: 2025 8:57 AM EDT   Location:  [id number] PCP: Chapis Ford APRN     Attending:Raf Epstein MD       Hospital Day: 9      HPI :         Subjective:     Patient feels well this morning, she denies any chest pain or shortness of breath,  at bedside reports no new concerning issues      Review of Systems:      Pertinent positives and negatives discussed in HPI    Objective:     Intake/Output Summary (Last 24 hours) at 2025 1416  Last data filed at 2025 1111  Gross per 24 hour   Intake 540 ml   Output --   Net 540 ml        Vitals:   Vitals:    25 1948 25 0504 25 0533 25 0712   BP: 137/87 (!) 163/113  126/75   Pulse: 77 94  86   Resp: 18 16  16   Temp: 97.7 °F (36.5 °C) 97.8 °F (36.6 °C)  97.9 °F (36.6 °C)   TempSrc: Oral Oral  Oral   SpO2: 95% 97%  96%   Weight:   53.5 kg (117 lb 15.1 oz)    Height:             Physical Exam:      Physical Exam Performed:    /75   Pulse 86   Temp 97.9 °F (36.6 °C) (Oral)   Resp 16   Ht 1.575 m (5' 2\")   Wt 53.5 kg (117 lb 15.1 oz)   SpO2 96%   BMI 21.57 kg/m²     General appearance: Asleep, no acute distress  Respiratory:  Normal respiratory effort.  Clear to auscultation  Cardiovascular: Regular rate and rhythm with normal S1/S2 without murmurs, rubs or gallops.  Abdomen: Soft,  non-distended with normal bowel sounds.  Musculoskeletal: No clubbing, cyanosis or edema bilaterally.            Medications:   Medications:    metoprolol succinate  12.5 mg Oral Daily    amiodarone  200 mg Oral Daily    OLANZapine  5 mg IntraMUSCular Once    empagliflozin  10 mg Oral Daily    donepezil  10 mg Oral Nightly    memantine  5 mg Oral BID    montelukast  10 mg Oral Nightly    predniSONE  10 mg Oral Daily    pantoprazole  40 mg Oral QAM AC    sodium chloride flush  
    V2.0    [facility] Progress Note      Name:  Traci Aiken /Age/Sex: 1945  (79 y.o. female)   MRN & CSN:  [id number] & [id number] Encounter Date/Time: 2025 8:57 AM EDT   Location:  [id number] PCP: Chapis Ford APRN     Attending:Raf Epstein MD       Hospital Day: 8      HPI :         Subjective:     Continues to have generalized weakness requiring assistance with ambulation, reports no chest pain or shortness of breath      Review of Systems:      Pertinent positives and negatives discussed in HPI    Objective:   No intake or output data in the 24 hours ending 25 1356     Vitals:   Vitals:    02/10/25 1901 02/10/25 2339 25 0437 25 0936   BP: 132/78 122/84 (!) 143/90 102/79   Pulse: 66 77 81 68   Resp: 16 15 16 16   Temp: 97.9 °F (36.6 °C) 98 °F (36.7 °C) 97.8 °F (36.6 °C) 97.6 °F (36.4 °C)   TempSrc: Oral Oral Oral Oral   SpO2: 93% 95% 99% 96%   Weight:   53.2 kg (117 lb 4.6 oz)    Height:             Physical Exam:      Physical Exam Performed:    /79   Pulse 68   Temp 97.6 °F (36.4 °C) (Oral)   Resp 16   Ht 1.575 m (5' 2\")   Wt 53.2 kg (117 lb 4.6 oz)   SpO2 96%   BMI 21.45 kg/m²     General appearance: Asleep, no acute distress  Respiratory:  Normal respiratory effort.  Clear to auscultation  Cardiovascular: Regular rate and rhythm with normal S1/S2 without murmurs, rubs or gallops.  Abdomen: Soft,  non-distended with normal bowel sounds.  Musculoskeletal: No clubbing, cyanosis or edema bilaterally.            Medications:   Medications:    [START ON 2025] metoprolol succinate  12.5 mg Oral Daily    amiodarone  200 mg Oral Daily    OLANZapine  5 mg IntraMUSCular Once    empagliflozin  10 mg Oral Daily    donepezil  10 mg Oral Nightly    memantine  5 mg Oral BID    montelukast  10 mg Oral Nightly    predniSONE  10 mg Oral Daily    pantoprazole  40 mg Oral QAM AC    sodium chloride flush  5-40 mL IntraVENous 2 times per day    enoxaparin  40 mg 
   02/10/25 1506   Encounter Summary   Encounter Overview/Reason Advance Care Planning   Service Provided For Patient   Referral/Consult From Nurse   Last Encounter  02/10/25  (unable to complete acp; not oriented x4 at baseline. PN)   Complexity of Encounter Low   Begin Time 1400   End Time  1405   Total Time Calculated 5 min       
2246: patient experiencing runs of SVT, rates in the 160's-170's. Secure message sent to NP, orders for STAT EKG, confirmed SVT. Patient flipping in and out of NSR and SVT. NP ordered digoxin, patient HR currently in 70's. Instructed to hold digoxin unless sustaining SVT. Electronically signed by Shanta Bourgeois RN on 2/7/2025 at 12:24 AM     2317 patient lactic came back at 4.5, NP to bedside.     0021: No orders for fluids for lactic due to patient having pleural effusions, verbal orders to repeat lactic once more and reach out with results. Electronically signed by Shanta Bourgeois RN on 2/7/2025 at 12:26 AM        
4 Eyes Skin Assessment     NAME:  Traci Aiken  YOB: 1945  MEDICAL RECORD NUMBER:  [id number]    The patient is being assessed for  Admission    I agree that at least one RN has performed a thorough Head to Toe Skin Assessment on the patient. ALL assessment sites listed below have been assessed.      Areas assessed by both nurses:    Head, Face, Ears, Shoulders, Back, Chest, Arms, Elbows, Hands, Sacrum. Buttock, Coccyx, Ischium, Legs. Feet and Heels, and Under Medical Devices         Does the Patient have a Wound? Redness under abdominal and to bilat heals.       Bharat Prevention initiated by RN: No  Wound Care Orders initiated by RN: No    Pressure Injury (Stage 3,4, Unstageable, DTI, NWPT, and Complex wounds) if present, place Wound referral order by RN under : No    New Ostomies, if present place, Ostomy referral order under : No     Nurse 1 eSignature: Electronically signed by Jd Dwyer RN on 2/5/25 at 4:16 AM EST    **SHARE this note so that the co-signing nurse can place an eSignature**    Nurse 2 eSignature: Electronically signed by RONEL HAQ RN on 2/5/25 at 4:16 AM EST    
BG 69 on labs this morning. Pt given apple juice. BG up to 108.  
Discharge orders acknowledged by RN . Discharge teaching completed with pt and family. AVS reviewed and all questions answered. Follow up appointments also reviewed with spouse and patient. IV removed. 60+ minutes of education completed. Required core measures completed. Pt vitals WDL. Pt discharged with all belongings to [facility] and [facility] via ambulance. Pt transported off of unit via stretcher. No complications. Call placed to rehab center and report given, all questions answered.    
Hospitalist   Progress Note    Patient Name: Traci Aiken  PCP: Chapis Ford APRN  Date of Admission: 2/4/2025    Chief Complaint on Admission: Fatigue. Pt into Ed for fatigue.  of pt states pt has had fatigue, slurred speech for the past 2 days as well as chest pain, and back pain. Pt has hx of dementia.   Chief diagnosis after evaluation: Acute cystitis with hematuria    Brief Synopsis: Patient is a 79 y.o. woman who has a past medical history of Acute delirium, Alzheimer disease (HCC), Arthritis, Asthma, Depression, Diverticulosis of colon (without mention of hemorrhage), Enthesopathy of hip region, Esophageal reflux, Essential hypertension, Full dentures, Hyperlipidemia, Irritable bowel syndrome, Late onset Alzheimer's dementia with behavioral disturbance (HCC), Lumbar disc herniation with radiculopathy, Nocturia, Osteoporosis, unspecified, Other and unspecified hyperlipidemia, Postinflammatory pulmonary fibrosis (HCC), Rectal bleeding, Sialoadenitis, Synovial cyst of popliteal space, Thoracic or lumbosacral neuritis or radiculitis, unspecified, Trochanteric bursitis of left hip, Type II or unspecified type diabetes mellitus without mention of complication, not stated as uncontrolled, Unspecified essential hypertension, and Wears glasses. who was admitted on 2/4/2025 for evaluation and treatment of acute cystitis with hematuria    Pt Seen/Examined and Chart Reviewed.     Subjective: Pt no new complaints when seen this morning. She is somewhat confused    Objective:  Allergies  Latex, Neda-seltzer [aspirin effervescent], and Sulfa antibiotics    Medications    Scheduled Meds:   amLODIPine  5 mg Oral Daily    donepezil  10 mg Oral Nightly    losartan  50 mg Oral Daily    memantine  5 mg Oral BID    montelukast  10 mg Oral Nightly    predniSONE  10 mg Oral Daily    pantoprazole  40 mg Oral QAM AC    sodium chloride flush  5-40 mL IntraVENous 2 times per day    enoxaparin  40 mg SubCUTAneous Daily 
[facility]  Cardiology progress note        CC:      Congestive heart failure             HPI:   Mrs. Aiken is a 79-year-old female with advanced dementia was brought by her family for altered mental status.  She was getting progressively weak with shortness of breath.    In the ER she was found to have an SVT with a rate of 180 which was treated with Cardizem.  She was seen in consultation by Dr. Guerrero who recommended AV lázaro blocking drugs.    I have been asked to see the patient because of probable CHF with proBNP greater than 23,000 and a normal creatinine of 0.9.    In addition the patient has a history of diabetes interstitial lung disease IBS.  Pertinent cardiac Home medications include amlodipine 5 Aricept 5 gabapentin 300, losartan 50, Namenda 5, metformin 500 and prednisone 10      Interval history  The patient is laying in bed flat.  No shortness of breath    Past Medical History:   Diagnosis Date    Acute delirium 09/18/2020    Alzheimer disease (HCC)     Arthritis     Asthma     Depression 06/28/2013    Diverticulosis of colon (without mention of hemorrhage)     Enthesopathy of hip region     left - mild    Esophageal reflux     Essential hypertension     Full dentures     Hyperlipidemia     Irritable bowel syndrome     Late onset Alzheimer's dementia with behavioral disturbance (HCC) 12/08/2021    Lumbar disc herniation with radiculopathy 08/29/2011    Nocturia     Osteoporosis, unspecified     Other and unspecified hyperlipidemia     Postinflammatory pulmonary fibrosis (HCC)     Rectal bleeding 09/17/2020    Sialoadenitis     left    Synovial cyst of popliteal space     left knee    Thoracic or lumbosacral neuritis or radiculitis, unspecified     left - L5 - S1    Trochanteric bursitis of left hip 04/04/2018    Type II or unspecified type diabetes mellitus without mention of complication, not stated as uncontrolled     Unspecified essential hypertension     Wears glasses       Past 
Lab called this RN lactic acid 4.3. Secure message sent to MD, came to floor, verbal orders for lab to draw lactic acid every 2 hours and reevaluate if lactic increases. Electronically signed by Shanta Bourgeois RN on 2/6/2025 at 8:33 PM    
MD King at bedside. New order for 12 lead EKG in place.   
Minutes following administration of scheduled AM medications, patient's HR increased to 150-160 BPM. BP stable at 114/90. Patient asymptomatic. STAT EKG ordered - by the time EKG was completed, patient had converted back to NSR. HR now 68 BPM. RN notified MD Lucian via [employer]. MD aware, no new orders. RN to notify Cardiology. Electronically signed by Maricruz Reddy RN on 2/7/2025 at 10:16 AM     Magnesium and Potassium replaced per PRN orders. Electronically signed by Maricruz Reddy RN on 2/7/2025 at 1:16 PM     Patient's automatic BP 82/64, manual 80/60. Patient asymptomatic. RN messaged MD Lucian via [employer] to notify. RN also brought the MD's attention to several BP medications that are ordered to give in the AM that may be causing patient's BP to become lower than what is ideal. MD agreed, instructed RN to notify Cardiology. RN to notify. No new orders. Electronically signed by Maricruz Reddy RN on 2/7/2025 at 2:45 PM     Patient's son and  expressed to RN that they are concerned with patient's medication compliance at home, stating that she often refuses. Son also verbalized that he believes patient would benefit from placement, whereas patient's  would like patient to remain at home. RN notified MD Lucian of this. Electronically signed by Maricruz Reddy RN on 2/7/2025 at 3:47 PM     MD aware, will place Palliative Care consult. MD also advised RN to not give any more BP medications or diuretics today. RN to pass along. Electronically signed by Maricruz Reddy RN on 2/7/2025 at 4:26 PM   
Occupational Therapy  Facility/Department: [address] PROGRESSIVE CARE  Occupational Therapy Daily Treatment    Name: Traci Aiken  : 1945  MRN: [id number]  Date of Service: 2025    Discharge Recommendations:  Patient would benefit from continued therapy after discharge, 3-5 sessions per week  OT Equipment Recommendations  Other: defer to d/c facility  Traci Aiken scored a 15/24 on the AM-PAC ADL Inpatient form. Current research shows that an AM-PAC score of 17 or less is typically not associated with a discharge to the patient's home setting. Based on the patient's AM-PAC score and their current ADL deficits, it is recommended that the patient have 3-5 sessions per week of Occupational Therapy at d/c to increase the patient's independence.  Please see assessment section for further patient specific details.    If patient discharges prior to next session this note will serve as a discharge summary.  Please see below for the latest assessment towards goals.       Patient Diagnosis(es): The primary encounter diagnosis was SVT (supraventricular tachycardia) (HCC). Diagnoses of Elevated troponin, Altered mental status, unspecified altered mental status type, General weakness, Acute pulmonary edema, Shortness of breath, and Acute systolic congestive heart failure (HCC) were also pertinent to this visit.  Past Medical History:  has a past medical history of Acute delirium, Alzheimer disease (HCC), Arthritis, Asthma, Depression, Diverticulosis of colon (without mention of hemorrhage), Enthesopathy of hip region, Esophageal reflux, Essential hypertension, Full dentures, Hyperlipidemia, Irritable bowel syndrome, Late onset Alzheimer's dementia with behavioral disturbance (HCC), Lumbar disc herniation with radiculopathy, Nocturia, Osteoporosis, unspecified, Other and unspecified hyperlipidemia, Postinflammatory pulmonary fibrosis (HCC), Rectal bleeding, Sialoadenitis, Synovial cyst of popliteal space, 
Occupational Therapy  Facility/Department: [address] PROGRESSIVE CARE  Occupational Therapy Daily Treatment    Name: Traci Aiken  : 1945  MRN: [id number]  Date of Service: 2025    Discharge Recommendations:  24 hour supervision or assist  OT Equipment Recommendations  Other: RW  Traci Aiken scored a 18/24 on the -[facility] ADL Inpatient form.  At this time, no further OT is recommended upon discharge due to good support at home.  Recommend patient returns to prior setting with prior services.  '    Patient Diagnosis(es): The primary encounter diagnosis was SVT (supraventricular tachycardia) (HCC). Diagnoses of Elevated troponin, Altered mental status, unspecified altered mental status type, General weakness, Acute pulmonary edema, and Shortness of breath were also pertinent to this visit.  Past Medical History:  has a past medical history of Acute delirium, Alzheimer disease (HCC), Arthritis, Asthma, Depression, Diverticulosis of colon (without mention of hemorrhage), Enthesopathy of hip region, Esophageal reflux, Essential hypertension, Full dentures, Hyperlipidemia, Irritable bowel syndrome, Late onset Alzheimer's dementia with behavioral disturbance (HCC), Lumbar disc herniation with radiculopathy, Nocturia, Osteoporosis, unspecified, Other and unspecified hyperlipidemia, Postinflammatory pulmonary fibrosis (HCC), Rectal bleeding, Sialoadenitis, Synovial cyst of popliteal space, Thoracic or lumbosacral neuritis or radiculitis, unspecified, Trochanteric bursitis of left hip, Type II or unspecified type diabetes mellitus without mention of complication, not stated as uncontrolled, Unspecified essential hypertension, and Wears glasses.  Past Surgical History:  has a past surgical history that includes Cholecystectomy; polypectomy; Hysterectomy; Ovary removal; lipoma resection; Colonoscopy (); Colonoscopy (2016); bronchoscopy (N/A, 2018); CT BIOPSY BONE MARROW (2020); and Colonoscopy 
Occupational Therapy Attempt  Traci Aiken    OT order noted and attempted to see pt for OT eval. Pt sleeping soundly and unable to wake despite best efforts. Will re-attempt next date.    Electronically signed by Ella Alfaro OT on 2/5/25 at 2:55 PM EST    
Occupational Therapy Attempt  Traci Aiken  2/6/2025  [id number]    OT orders noted, pt chart reviewed. Pt attempted, but is currently receiving nursing care. Will follow up later as therapy schedule allows.     Electronically signed by Noble Lynch OTR/L [id number] on 2/6/25 at 8:39 AM EST    
Occupational Therapy Attempt  Traci MCDOWELL Nelli  2/10/2025  [id number]    Per RN, pt back in bed in preparation for ECHO. Will cont to follow while hospitalized.    Electronically signed by KENYON Cartagena/L [id number] on 2/10/25 at 2:02 PM EST    
Patient /79 and heart rate in the 60's prior to dose of metoprolol and amiodarone. Cardiology and hospitalist aware. Metoprolol decreased to 12.5 and states to continue amiodarone as ordered   
Patient lactic acid came back at 3.2. Per NP d/c every 2 hour lactic lab draws Electronically signed by Shanta Bourgeois RN on 2/7/2025 at 3:09 AM    
Physical Therapy  Attempt    Pt sleeping soundly.  Family present.  I spoke with them regarding pt's PLOF.  They noted pt lives at home with  and is normally ambulatory, but has struggled over the past few days. I attempted to awaken pt for evaluation, but she continued to sleep.  Pt's  states pt normally sleeps a lot during the day, and was up a lot last night.  RN notified.     Electronically signed by Vijay Olivares, PT [id number] on 2/5/2025 at 3:07 PM    
Physical Therapy  Facility/Department: [address] PROGRESSIVE CARE  Physical Therapy Treatment    Name: Traci Aiken  : 1945  MRN: [id number]  Date of Service: 2025    Discharge Recommendations:  Home with Home health PT, Patient would benefit from continued therapy after discharge, 24 hour supervision or assist   PT Equipment Recommendations  Equipment Needed: Yes  Mobility Devices: Walker  Walker: Rolling    HOME HEALTH CARE: LEVEL 1 STANDARD    - Initial home health evaluation to occur within 24-48 hours, in patient home   - Therapy to evaluate with goal of regaining prior level of functioning   - Therapy to evaluate if patient has Home Health Aide needs for personal care        Patient Diagnosis(es): The primary encounter diagnosis was SVT (supraventricular tachycardia) ([facility]). Diagnoses of Elevated troponin, Altered mental status, unspecified altered mental status type, General weakness, Acute pulmonary edema, and Shortness of breath were also pertinent to this visit.  Past Medical History:  has a past medical history of Acute delirium, Alzheimer disease (HCC), Arthritis, Asthma, Depression, Diverticulosis of colon (without mention of hemorrhage), Enthesopathy of hip region, Esophageal reflux, Essential hypertension, Full dentures, Hyperlipidemia, Irritable bowel syndrome, Late onset Alzheimer's dementia with behavioral disturbance (HCC), Lumbar disc herniation with radiculopathy, Nocturia, Osteoporosis, unspecified, Other and unspecified hyperlipidemia, Postinflammatory pulmonary fibrosis (HCC), Rectal bleeding, Sialoadenitis, Synovial cyst of popliteal space, Thoracic or lumbosacral neuritis or radiculitis, unspecified, Trochanteric bursitis of left hip, Type II or unspecified type diabetes mellitus without mention of complication, not stated as uncontrolled, Unspecified essential hypertension, and Wears glasses.  Past Surgical History:  has a past surgical history that includes Cholecystectomy; 
Pt arrived via stretcher from ED. Pt arrived yelling and cursing at staff. Pt allowed staff to do short amounts of care before hitting and kicking them. Pt placed on tele and CMU notified. New IV placed with no issues. Pt oriented to room and call light.      
Pt pulled off gown and tele leads. When attempting to put back on patient pinched and hit staff. Pt educated on the importance of not hitting staff, pt states \"Ill do what ever I want you fucking Bitch\" , and proceeded to hit staff. MD notified and restraints put in place.  made aware.    Electronically signed by Jd Dwyer RN on 2/5/2025 at 5:44 AM   
Pt started on new BP medications this afternoon. BP dropped to 85/67 after metoprolol. [facility] notified to advise regarding her other BP medications and doses. Pt denies any dizziness or lightheadedness at this time.  
Restraints removed. Virtual  in place. 12 lead EKG completed this morning. Son updated over phone.  
The patient's  and son want to discuss the discharge plan more with SW they mentioned considering SNF for few weeks before transition   home with home care.   Alfonso PARMAR   
Unable to get oral or axillary temp reading. Rectal temp 96.7. Attending notified.  
40 mg IntraVENous Daily    donepezil  10 mg Oral Nightly    [Held by provider] losartan  50 mg Oral Daily    memantine  5 mg Oral BID    montelukast  10 mg Oral Nightly    predniSONE  10 mg Oral Daily    pantoprazole  40 mg Oral QAM AC    sodium chloride flush  5-40 mL IntraVENous 2 times per day    enoxaparin  40 mg SubCUTAneous Daily      Infusions:    dextrose      sodium chloride       PRN Meds: glucose, 4 tablet, PRN  dextrose bolus, 125 mL, PRN   Or  dextrose bolus, 250 mL, PRN  glucagon (rDNA), 1 mg, PRN  dextrose, , Continuous PRN  sodium chloride flush, 5-40 mL, PRN  sodium chloride, , PRN  potassium chloride, 40 mEq, PRN   Or  potassium alternative oral replacement, 40 mEq, PRN   Or  potassium chloride, 10 mEq, PRN  magnesium sulfate, 2,000 mg, PRN  ondansetron, 4 mg, Q8H PRN   Or  ondansetron, 4 mg, Q6H PRN  polyethylene glycol, 17 g, Daily PRN  acetaminophen, 650 mg, Q6H PRN   Or  acetaminophen, 650 mg, Q6H PRN          Labs and Imaging   CT ABDOMEN PELVIS W IV CONTRAST Additional Contrast? None    Result Date: 6/8/2023  EXAMINATION: CT OF THE ABDOMEN AND PELVIS WITH CONTRAST 6/8/2023 7:52 pm TECHNIQUE: CT of the abdomen and pelvis was performed with the administration of intravenous contrast. Multiplanar reformatted images are provided for review. Automated exposure control, iterative reconstruction, and/or weight based adjustment of the mA/kV was utilized to reduce the radiation dose to as low as reasonably achievable. COMPARISON: 09/11/2022 HISTORY: ORDERING SYSTEM PROVIDED HISTORY: abd pain, diarrhea TECHNOLOGIST PROVIDED HISTORY: Reason for exam:->abd pain, diarrhea Additional Contrast?->None Decision Support Exception - unselect if not a suspected or confirmed emergency medical condition->Emergency Medical Condition (MA) Reason for Exam: abd pain, diarrhea FINDINGS: Lower Chest: Coarse interstitial findings in the lung bases are again demonstrated compatible with fibrotic lung process.  Overall 
 because she was not eating and drinking much for last 3 days, she does have Alzheimer's dementia but usually independent in the house but for last 3 days she has been more lethargic that is why she was brought to the emergency department during my assessment patient is awake alert to herself does not know that she is in the hospital she denies any active complaints she thinks that she is at her home and wants to get out of the bed she is in soft restraints, discussed the need of hospitalization and patient agreeable to stay patient\"  Assistance / Modification: CGA w/ and w/o RW  REQUIRES OT FOLLOW-UP: Yes  Activity Tolerance  Activity Tolerance: Treatment limited secondary to decreased cognition;Patient Tolerated treatment well  Activity Tolerance Comments: Pt did not require seated rest break this date     Plan  Occupational Therapy Plan  Times Per Week: 2-3  Current Treatment Recommendations: Strengthening, Balance training, Functional mobility training, Safety education & training, Equipment evaluation, education, & procurement, Self-Care / ADL, Home management training    Restrictions  Restrictions/Precautions  Restrictions/Precautions: Fall Risk (high fall risk)  Position Activity Restriction  Other Position/Activity Restrictions: up as tolerated, 2000ml daily fluid restriction    Subjective  General  Chart Reviewed: Yes  Patient assessed for rehabilitation services?: Yes  Additional Pertinent Hx: Per H&P \"Traci Aiken is a 79 y.o. female with pmh as above who presents with altered mental status.  Patient was brought in by  because she was not eating and drinking much for last 3 days, she does have Alzheimer's dementia but usually independent in the house but for last 3 days she has been more lethargic that is why she was brought to the emergency department during my assessment patient is awake alert to herself does not know that she is in the hospital she denies any active complaints she 
-465 ml       Physical Examination:    BP (!) 91/55   Pulse 67   Temp 97.4 °F (36.3 °C) (Oral)   Resp 17   Ht 1.575 m (5' 2\")   Wt 54.3 kg (119 lb 11.4 oz)   SpO2 94%   BMI 21.90 kg/m²    HEENT:  Face: Atraumatic, Conjunctiva: Pink; non icteric,  Mucous Memb:  Moist, No thyromegaly or Lymphadenopathy  Respiratory:  Resp Assessment: normal, Resp Auscultation: clear   Cardiovascular:  Auscultation: nl S1 & S2, Palpation:  Nl PMI; No heaves or thrills, JVP:  normal  Abdomen: Soft, non-tender, Normal bowel sounds,  No organomegaly  Extremities: No Cyanosis or Clubbing; Edema none  Neurological: Confused  Psychiatric:   Skin: Warm and dry,  No rash seen          Labs:   Recent Labs     25  0554 25  0608   WBC 8.0 8.6   HGB 13.8 13.4   HCT 42.1 40.3    139     Recent Labs     25  0554 25  0608    137   K 3.1* 3.9   CO2 32 32   BUN 28* 21*   CREATININE 0.9 0.8   GLUCOSE 114* 111*       Recent Labs     25  0554 25  0608   * 72*   * 243*     Telemetry: Sinus rhythm    [id number]  Sinus rhythm with occasional PVC and PAC  Left axis deviation  LVH  Prolonged QT    Chest X-Ray:  1. Diffuse interstitial opacities throughout both lungs, which may be due to pulmonary edema or atypical pneumonia.  2. Possible small right pleural effusion.    ECHO 2025    Left Ventricle: Moderately reduced left ventricular systolic function. EF by visual approximation is 33%. Left ventricle size is normal. Normal wall thickness. Moderate global hypokinesis present. Grade II diastolic dysfunction with increased LAP.    Right Ventricle: Right ventricle size is normal. Reduced systolic function. TAPSE is 1.4 cm. RV Peak S' is 7.6 cm/s.    Aortic Valve: Trace regurgitation.    Mitral Valve: Mildly thickened leaflets. Mild regurgitation.    Tricuspid Valve: Mild regurgitation. Normal RVSP. The estimated RVSP is 24 mmHg.    Left Atrium: Left atrium is mildly dilated. Left atrial 
  Social/Functional History  Social/Functional History  Lives With: Spouse  Type of Home: House  Home Layout: Multi-level, 1/2 bath on main level, Bed/Bath upstairs  Home Access: Stairs to enter with rails, Ramped entrance  Entrance Stairs - Number of Steps: Number of Steps: 2 GASTON with a post and a swing to hold onto or ramp onto back patio to enter house; Flight of stairs upstairs up to bed/bath  Bathroom Shower/Tub: Tub/Shower unit, Walk-in shower  Bathroom Toilet: Standard  Bathroom Equipment: Grab bars in shower, Shower chair, Grab bars around toilet  Bathroom Accessibility: Accessible  Home Equipment: Cane, Oxygen (3L)  Has the patient had two or more falls in the past year or any fall with injury in the past year?: No  Prior Level of Assist for ADLs: Independent  Prior Level of Assist for Homemaking: Independent  Prior Level of Assist for Ambulation: Independent household ambulator, with or without device  Prior Level of Assist for Transfers: Independent  Active : No  Patient's  Info: family transports.  Mode of Transportation: Car  Occupation: Retired  Type of Occupation: Aide at [employer]  Additional Comments: Information gathered from previous admission in 2020, will need to confirm w/ family    Vision/Hearing  Vision  Vision: Within Functional Limits  Hearing  Hearing: Exceptions to WFL  Hearing Exceptions: Hard of hearing/hearing concerns      Cognition   Orientation  Orientation Level: Oriented to person;Disoriented to time;Disoriented to situation;Oriented to place    Objective    AROM RLE (degrees)  RLE AROM: WFL  RLE General AROM: HIp Flex, Knee Flex/Ext, Ankle PF/DF WFL  AROM LLE (degrees)  LLE AROM : WFL  LLE General AROM: HIp Flex, Knee Flex/Ext, Ankle PF/DF WFL    Strength RLE  Strength RLE: WFL  Comment: HIp Flex, Knee Flex/Ext, Ankle PF/DF WFL  Strength LLE  Strength LLE: WFL  Comment: HIp Flex, Knee Flex/Ext, Ankle PF/DF WFL     Transfers  Sit to Stand: Contact guard 
TRIG 108 10/26/2023 10:05 AM     Hemoglobin A1C:   Lab Results   Component Value Date/Time    LABA1C 5.4 02/06/2025 09:58 AM     TSH:   Lab Results   Component Value Date/Time    TSH 1.71 03/28/2024 02:42 PM    TSH 1.98 10/26/2023 10:05 AM     Troponin: No results found for: \"TROPONINT\"  Lactic Acid:   Recent Labs     02/05/25  1941 02/06/25  0412 02/06/25  0958   LACTA 2.5* 2.3* 2.8*     BNP:   Recent Labs     02/05/25  0021   PROBNP 23,284*     UA:  Lab Results   Component Value Date/Time    NITRU POSITIVE 02/05/2025 02:03 AM    COLORU DARK YELLOW 02/05/2025 02:03 AM    PHUR 5.5 02/05/2025 02:03 AM    PHUR 7.0 07/02/2021 01:14 PM    WBCUA  02/05/2025 02:03 AM    RBCUA 21-50 02/05/2025 02:03 AM    BACTERIA 4+ 02/05/2025 02:03 AM    CLARITYU CLOUDY 02/05/2025 02:03 AM    SPECGRAV 1.025 11/02/2018 09:57 AM    LEUKOCYTESUR MODERATE 02/05/2025 02:03 AM    UROBILINOGEN 1.0 02/05/2025 02:03 AM    BILIRUBINUR Negative 02/05/2025 02:03 AM    BLOODU MODERATE 02/05/2025 02:03 AM    GLUCOSEU Negative 02/05/2025 02:03 AM    KETUA TRACE 02/05/2025 02:03 AM     Urine Cultures:   Lab Results   Component Value Date/Time    LABURIN <10,000 CFU/ml  No further workup   02/05/2025 02:03 AM     Blood Cultures: No results found for: \"BC\"  No results found for: \"BLOODCULT2\"  Organism:   Lab Results   Component Value Date/Time    ORG Gram negative carlos 02/05/2025 02:03 AM         Assessment and Recommendations   Traci Aiken is a 79 y.o. female who presents with         -Acute combined systolic/diastolic heart failure  2D echo shows EF 33%, grade 2 diastolic dysfunction  CT chest showed evidence of pulmonary edema, pleural effusions, cardiomegaly  proBNP 23k  IV Lasix, maintain strict I's and O's, fluid restriction  Consult cardiology  GDMT per cardiology    -Paroxysmal SVT--resolved-EP consult appreciated    -Acute metabolic encephalopathy likely related to CHF, suspected UTI--presented with increased confusion from 
Clinical Factors: pt completed oral care in stance at the sink  Putting On/Taking Off Footwear: Stand by assistance  Putting On/Taking Off Footwear Skilled Clinical Factors: to don footwear while seated. SBA for cues w/ balance  Toileting: Contact guard assistance  Toileting Skilled Clinical Factors: pt able to urinate at toilet, CGA for standing balance while completing pericare and clothing management  Functional Mobility: Contact guard assistance  Functional Mobility Skilled Clinical Factors: No device. Pt completed fxl mob for ~10ft+25ftx3. RW for ~20ftx2, all w/ CGA, improved balance w/ RW, but overall no overt LOB. Slight unsteadiness w/o RW  Additional Comments: Pt is anticipated to require up to CGA-Min A for full ADLs based on her strength, endurance, balanace, and cognition  Product Used : Soap and water        Bed mobility  Supine to Sit: Stand by assistance (HOB elevated)  Sit to Supine: Unable to assess (pt in recliner)  Transfers  Sit to stand: Contact guard assistance  Stand to sit: Contact guard assistance  Transfer Comments: to/from EOB and recliner w/ and w/o device     Cognition  Overall Cognitive Status: Exceptions  Following Commands: Inconsistently follows commands  Memory: Impaired  Safety Judgement: Decreased awareness of need for assistance;Decreased awareness of need for safety  Problem Solving: Assistance required to correct errors made  Initiation: Requires cues for some  Sequencing: Requires cues for some  Cognition Comment: Hx of Dementia  Orientation  Overall Orientation Status: Impaired  Orientation Level: Oriented to place;Disoriented to time;Disoriented to situation;Oriented to person (partially oriented to person, did not knew her birthdate)               Static Standing Balance Exercises: SBA seated EOB in preparation for ADLs  Education Given To: Patient  Education Provided: Role of Therapy;Plan of Care;Transfer Training;Fall Prevention Strategies  Education Method: 
baseline-currently improved    -transaminitis with elevated bilirubin-likely due to congestive hepatopathy  Right upper quadrant ultrasound shows fatty liver without other acute abnormalities  CT abdomen showed reflux of contrast is seen in the hepatic veins and IVC.  Acute hepatitis panel is negative  LFTs improving, continue to monitor with CHF treatment    -Suspected UTI..  No significant growth on culture-treated empirically with Rocephin x 3 days    -Dementia... Continue donepezil, Aricept    -Chronic ILD--on chronic prednisone 10 mg daily..  Follows with pulmonologist    -Diabetes mellitus type 2, controlled--recent hemoglobin A1c 5.4-remains euglycemic, metformin held admission----has been started on Jardiance for CHF    Diet ADULT DIET; Regular; No Added Salt (3-4 gm); 2000 ml     DVT Prophylaxis [x] Lovenox, []  Heparin, [] SCDs, [] Ambulation,  [] Eliquis, [] Xarelto  [] Coumadin   Code Status Full Code   Disposition From:   Expected Disposition:   Estimated Date of Discharge:   Patient requires continued admission due to    Surrogate Decision Maker/ POA       Personally reviewed Lab Studies and Imaging        Medical Decision Making:  The following items were considered in medical decision making:  Discussion of patient care with other providers  Reviewed clinical lab tests  Reviewed radiology tests  Reviewed other diagnostic tests/interventions  Independent review of radiologic images  Microbiology cultures and other micro tests reviewed        Electronically signed by Raf Epstein MD on 2/7/2025 at 7:53 AM  Comment: Please note this report has been produced using speech recognition software and may contain errors related to that system including errors in grammar, punctuation, and spelling, as well as words and phrases that may be inappropriate. If there are any questions or concerns, please feel free to contact the dictating provider for clarification.   
Outcome: Continued education needed      Therapy Time   Individual Concurrent Group Co-treatment   Time In 1038         Time Out 1103         Minutes 25         Timed Code Treatment Minutes: 25 Minutes       Electronically signed by Shanta Fernandez, PT [id number] on 2/11/2025 at 11:08 AM

## 2025-02-12 NOTE — CARE COORDINATION
Case Management Discharge Note          Date / Time of Note: 2/12/2025 12:20 PM                  Patient Name: Traci Aiken   YOB: 1945  Diagnosis: SVT (supraventricular tachycardia) (HCC) [I47.10]  Acute pulmonary edema [J81.0]  General weakness [R53.1]  Elevated troponin [R79.89]  Altered mental status, unspecified altered mental status type [R41.82]   Date / Time: 2/4/2025  7:56 PM    Financial:  Payor: AETNA MEDICARE / Plan: AETNA MEDICARE-ADVANTAGE PPO / Product Type: Medicare /      Pharmacy:    [facility] [place] - [address] - [place] [phone] - F [phone]  [address] [address]  Phone: [phone] Fax: [phone]    [facility] - [place], [place] - [address] [place] [address] -  [phone] - F [phone]  [address] [place] [address]  Phone: [phone] Fax: [phone]    [id number] - [place], [place] - [address]  - P [phone] - F [phone]  [address] [facility] [address]  Phone: [phone] Fax: [phone]      Assistance purchasing medications?: Potential Assistance Purchasing Medications: No  Assistance provided by Case Management: None at this time    DISCHARGE Disposition: Skilled Nursing Facility (SNF)    Nursing Facility:   Discharging to Facility/ Agency   Name: [facility] [place] [facility]  Address:  [address] [place] [address]   Phone:  [phone] (ask for 2 West Nurse)   Fax:  [phone]      LOC at discharge: Skilled Nursing  PEDRO Completed: Yes             NURSING REPORT NUMBER: [phone] (ask for 2 West Nurse)                NURSING FAX NUMBER: [phone]    Notification completed in HENS/PAS?: Yes        Transportation:  Transportation PLAN for discharge: EMS transportation   Mode of Transport: Ambulance stretcher - BLS  Reason for medical transport: Bed confined: Meets the following criteria 
   02/10/25 1623   IMM Letter   IMM Letter given to Patient/Family/Significant other/Guardian/POA/by: second IMM explained to pt  and copy provided per GUY Brito RN   IMM Letter date given: 02/10/25   IMM Letter time given: 1600     Electronically signed by Darlene Brito on 2/10/2025 at 4:24 PM  #[phone]  
AVAILITY/AETNA AUTHORIZATION INFORMATION      LOCATION:  [facility]    EFFECTIVE DATE:  02/12 thru 02/18/2025    NEXT REVIEW DATE:  02/18/2025    AUTH#:  [id number]     Electronically signed by Christy Miranda, Case Management Assistant Christy Miranda on 2/12/2025 at 10:00 AM  ]             
Aetna pre-cert still pending at this time.    Electronically signed by Christy Miranda, Case Management Assistant Christy Miranda on 2/12/2025 at 9:22 AM      
[facility] - Aetna PRE-CERT REQUEST SUBMITTED VIA [facility] PORTAL    REQUESTED FOR FACILITY:  [facility]    ANTICIPATED ADMIT DATE TO [facility]:  02/11/2025      [facility]ail[facility] #:  [id number]     Electronically signed by Christy Miranda, Case Management Assistant Christy Miranda on 2/11/2025 at 3:33 PM              
DC order noted - spoke w/  bedside and son Bret was on the phone for discussion on DC needs  They are interested in a skilled facility  Reviewed SNF list  Referrals made to the following per [facility] :  [facility]  [facility]  [facility]  SNF will need Aetna precert     Will follow.  Electronically signed by Darlene Brito on 2/10/2025 at 4:32 PM  #[phone]  
Discharge Planning:  SW met with Patient,  and son, discussed home care needs.  Family agreed to [facility].  Following for discharge needs.  
[facility] and [facility] available [facility]  Spoke w/ son Bret # [phone]- [facility]  We will request Aetna Medicare precert  Electronically signed by Darlene Brito on 2/11/2025 at 2:58 PM  #[phone]  
SW spoke with liaison from [facility] and she stated that they should be able to accept when patient is ready for discharge.     Will need orders for PT/OT/RN and a signed PEDRO when ready for discharge.     Respectfully submitted,    Ирина BAUTISTA, BRITTNEY  [facility]  [phone]    Electronically signed by LILY Bansal, BENITA on 2/6/2025 at 2:01 PM    
The Plan for Transition of Care is related to the following treatment goals:     Home with home care 2-3 times per week.    The Patient and/or patient representative was provided with a choice of provider and agrees   with the discharge plan. [x] Yes [] No    Freedom of choice list was provided with basic dialogue that supports the patient's individualized plan of care/goals, treatment preferences and shares the quality data associated with the providers. [x] Yes [] No    SW met with  and son Bret today. They are in agreement with home care and would like us to reach out to PCP to find out who they signed orders from last time.     SW also left a fact sheet for [facility] fast track home program. SW will make a referral today.    Respectfully submitted,    Ирина BAUTISTA, BRITTNEY  [facility] [place]   [phone]  Electronically signed by LILY Bansal, SÁNCHEZW on 2/6/2025 at 1:49 PM    
AM-PAC:   /24    Family can provide assistance at DC: Yes  Would you like Case Management to discuss the discharge plan with any other family members/significant others, and if so, who? Yes (call  first please)  Plans to Return to Present Housing: Unknown at present  Other Identified Issues/Barriers to RETURNING to current housing: Needs 24/7 supervision. Son Bret reports that  struggles to meet her needs.   Potential Assistance needed at discharge: Home Care, Skilled Nursing Facility, CARISSA/Passport (Will follow up with family later today.)            Potential DME:  None note.d   Patient expects to discharge to: House  Plan for transportation at discharge: Family    Financial: TBD    Does insurance require precert for SNF: No    Potential assistance Purchasing Medications: No  Meds-to-Beds request:        [facility] [place] - [address] - P [phone] - F [phone]  [address] [address]  Phone: [phone] Fax: [phone]    [facility] [place] [address] [facility] - P [phone] - F [phone]  [address] [facility] [address]  Phone: [phone] Fax: [phone]    [facility] - [place], [place] - [address]  - P [phone] - F [phone]  [address] [facility] [address]  Phone: [phone] Fax: [phone]      Notes:    Factors facilitating achievement of predicted outcomes: Family support, Motivated, Cooperative, Pleasant, and Has needed Durable Medical Equipment at home    Barriers to discharge: Needs 24/7 supervision. Strong likelihood that there is caregiver strain.     Additional Case Management Notes:   1) Waiting on cardiology clearance and final IVAB recommendations.   2) Home with home care vs SNF? If home will offer [place] [facility] as a resource.     The Plan for Transition of Care is related to the following treatment goals of SVT (supraventricular

## 2025-02-14 ENCOUNTER — TELEPHONE (OUTPATIENT)
Dept: CARDIOLOGY CLINIC | Age: 80
End: 2025-02-14

## 2025-02-14 DIAGNOSIS — I47.10 SVT (SUPRAVENTRICULAR TACHYCARDIA): Primary | ICD-10-CM

## 2025-02-14 NOTE — TELEPHONE ENCOUNTER
I do not see that a 2 week monitor was placed at discharge, is this still recommended?     I see patient's code status was switched to DNR after recommendation for monitor.

## 2025-02-17 NOTE — TELEPHONE ENCOUNTER
Called [facility] [place] nurse they stated that Traci, is not on there floor to call 1st floor nurse. Called the first floor nurse was told to fax the ordered....   Order was faxed...

## 2025-02-17 NOTE — TELEPHONE ENCOUNTER
Name: [facility]  Address:  [address] [place] [id number]   Phone:  [phone] (ask for 2 West Nurse)   Fax:  [phone]

## 2025-02-17 NOTE — TELEPHONE ENCOUNTER
Tried to contact Traci, she was in the hospital and when she was done there they said she got sent to [facility]   I asked for a phone number but, he did not have one on hand. Please advise thanks

## 2025-02-18 NOTE — PROGRESS NOTES
present.     Results for orders placed during the hospital encounter of 02/04/25     XR CHEST 1 VIEW  FINDINGS:  The cardiomediastinal silhouette is within normal limits.  There is  atherosclerotic calcification of the aortic arch.  There are diffuse  interstitial opacities throughout both lungs.  There may be a small right  pleural effusion.  No pneumothorax.  The bones are osteopenic.     Impression  1. Diffuse interstitial opacities throughout both lungs, which may be due to  pulmonary edema or atypical pneumonia.  2. Possible small right pleural effusion.     Results for orders placed during the hospital encounter of 02/04/25     CT CHEST PULMONARY EMBOLISM W CONTRAST     FINDINGS:  Pulmonary Arteries: Pulmonary arteries are adequately opacified for  evaluation.  No evidence of intraluminal filling defect to suggest pulmonary  embolism.  Main pulmonary artery is normal in caliber.     Mediastinum: The heart is mildly enlarged.  No pericardial effusion.  Moderate coronary artery atherosclerosis.  The aorta and arch branches are  normal in course and caliber.  No pathologically enlarged mediastinal or  hilar nodes.     Lungs/pleura: The lungs demonstrate moderate bilateral pleural effusions,  right greater than left.  Interlobular septal thickening is also seen.  Patchy ground-glass opacities are also noted.  No pneumothorax.  The central  airways are patent.  No bronchial wall thickening or bronchiectasis.     Upper Abdomen: Status post cholecystectomy.  Reflux of contrast is seen in  the hepatic veins and IVC.     Soft Tissues/Bones: No acute or aggressive osseous lesion.  Chronic deformity  of T11 is seen.     Impression  1. No pulmonary embolism.  2. Moderate bilateral pleural effusions, right greater than left.  Interlobular septal thickening and patchy ground-glass opacities are seen in  the lungs. Findings are most consistent with pulmonary edema.  3. Cardiomegaly with reflux of contrast into the hepatic

## 2025-02-21 ENCOUNTER — OFFICE VISIT (OUTPATIENT)
Dept: CARDIOLOGY CLINIC | Age: 80
End: 2025-02-21

## 2025-02-21 VITALS
OXYGEN SATURATION: 99 % | DIASTOLIC BLOOD PRESSURE: 70 MMHG | HEART RATE: 60 BPM | HEIGHT: 62 IN | BODY MASS INDEX: 21.57 KG/M2 | SYSTOLIC BLOOD PRESSURE: 118 MMHG

## 2025-02-21 DIAGNOSIS — I10 ESSENTIAL HYPERTENSION: Primary | ICD-10-CM

## 2025-02-21 DIAGNOSIS — E11.9 TYPE 2 DIABETES MELLITUS WITHOUT COMPLICATION, WITHOUT LONG-TERM CURRENT USE OF INSULIN (HCC): ICD-10-CM

## 2025-02-21 DIAGNOSIS — E78.5 HYPERLIPIDEMIA, UNSPECIFIED HYPERLIPIDEMIA TYPE: ICD-10-CM

## 2025-02-21 DIAGNOSIS — I47.10 SVT (SUPRAVENTRICULAR TACHYCARDIA): ICD-10-CM

## 2025-02-22 LAB
ALBUMIN SERPL-MCNC: 3.6 G/DL (ref 3.4–5)
ALBUMIN/GLOB SERPL: 1.8 {RATIO} (ref 1.1–2.2)
ALP SERPL-CCNC: 53 U/L (ref 40–129)
ALT SERPL-CCNC: 82 U/L (ref 10–40)
ANION GAP SERPL CALCULATED.3IONS-SCNC: 10 MMOL/L (ref 3–16)
AST SERPL-CCNC: 37 U/L (ref 15–37)
BILIRUB SERPL-MCNC: 0.6 MG/DL (ref 0–1)
BUN SERPL-MCNC: 17 MG/DL (ref 7–20)
CALCIUM SERPL-MCNC: 9.8 MG/DL (ref 8.3–10.6)
CHLORIDE SERPL-SCNC: 103 MMOL/L (ref 99–110)
CHOLEST SERPL-MCNC: 153 MG/DL (ref 0–199)
CO2 SERPL-SCNC: 28 MMOL/L (ref 21–32)
CREAT SERPL-MCNC: 1 MG/DL (ref 0.6–1.2)
DEPRECATED RDW RBC AUTO: 16.2 % (ref 12.4–15.4)
GFR SERPLBLD CREATININE-BSD FMLA CKD-EPI: 57 ML/MIN/{1.73_M2}
GLUCOSE SERPL-MCNC: 81 MG/DL (ref 70–99)
HCT VFR BLD AUTO: 42.6 % (ref 36–48)
HDLC SERPL-MCNC: 56 MG/DL (ref 40–60)
HGB BLD-MCNC: 13.6 G/DL (ref 12–16)
LDLC SERPL CALC-MCNC: 71 MG/DL
MCH RBC QN AUTO: 30.1 PG (ref 26–34)
MCHC RBC AUTO-ENTMCNC: 32 G/DL (ref 31–36)
MCV RBC AUTO: 94.2 FL (ref 80–100)
PLATELET # BLD AUTO: 146 K/UL (ref 135–450)
PMV BLD AUTO: 9.6 FL (ref 5–10.5)
POTASSIUM SERPL-SCNC: 4.3 MMOL/L (ref 3.5–5.1)
PROT SERPL-MCNC: 5.6 G/DL (ref 6.4–8.2)
RBC # BLD AUTO: 4.53 M/UL (ref 4–5.2)
SODIUM SERPL-SCNC: 141 MMOL/L (ref 136–145)
TRIGL SERPL-MCNC: 131 MG/DL (ref 0–150)
TSH SERPL DL<=0.005 MIU/L-ACNC: 2.49 UIU/ML (ref 0.27–4.2)
VLDLC SERPL CALC-MCNC: 26 MG/DL
WBC # BLD AUTO: 7.5 K/UL (ref 4–11)

## 2025-03-03 ENCOUNTER — CARE COORDINATION (OUTPATIENT)
Dept: CASE MANAGEMENT | Age: 80
End: 2025-03-03

## 2025-03-03 DIAGNOSIS — I50.9 ACUTE CONGESTIVE HEART FAILURE, UNSPECIFIED HEART FAILURE TYPE (HCC): Primary | ICD-10-CM

## 2025-03-03 PROCEDURE — 1111F DSCHRG MED/CURRENT MED MERGE: CPT | Performed by: NURSE PRACTITIONER

## 2025-03-03 NOTE — CARE COORDINATION
contact information.     Post Acute Care Manager reviewed red flags with spouse/partner. The spouse/partner was given an opportunity to ask questions; all questions answered at this time.. The spouse/partner verbalized understanding.   Were discharge instructions available to patient? Yes.   Reviewed appropriate site of care based on symptoms and resources available to patient including: PCP  Specialist. The spouse/partner agrees to contact the primary care provider and/or specialist office for questions related to their healthcare.      Advance Care Planning:   Does patient have an Advance Directive: reviewed and current.    Medication Reconciliation:  Medication reconciliation was performed with spouse/partner,1111F entered: yes.     Remote Patient Monitoring:  Offered patient enrollment in the Remote Patient Monitoring (RPM) program for in-home monitoring: Deferred at this time because ACM; will discuss at next outreach. May be difficult due to pt's dementia    Assessments:  Discharge Assessment    Follow Up Appointment:   Discussed follow up appointments. Patient has hospital follow up appointment scheduled within 7 days of discharge.   Future Appointments         Provider Specialty Dept Phone    3/5/2025 2:20 PM Vijay Jensen DO Pulmonology [phone]    3/7/2025 2:00 PM Chapis Ford APRN Internal Medicine [phone]    3/27/2025 11:30 AM Vijay Guerrero MD Cardiology [phone]            Post Acute Care Manager provided contact information.   Manager to determine  based on severity of symptoms and risk factors.  Plan for next call:  Manager to determine      DEBRA VargasN, RN   Care Transition Nurse  [facility]   [phone]

## 2025-03-04 ENCOUNTER — CARE COORDINATION (OUTPATIENT)
Dept: CARE COORDINATION | Age: 80
End: 2025-03-04

## 2025-03-04 DIAGNOSIS — I10 ESSENTIAL HYPERTENSION: Primary | ICD-10-CM

## 2025-03-04 DIAGNOSIS — E11.40 TYPE 2 DIABETES MELLITUS WITH DIABETIC NEUROPATHY, WITHOUT LONG-TERM CURRENT USE OF INSULIN (HCC): ICD-10-CM

## 2025-03-04 DIAGNOSIS — I47.10 SVT (SUPRAVENTRICULAR TACHYCARDIA): ICD-10-CM

## 2025-03-04 RX ORDER — AMIODARONE HYDROCHLORIDE 200 MG/1
200 TABLET ORAL DAILY
Qty: 90 TABLET | Refills: 0 | Status: SHIPPED | OUTPATIENT
Start: 2025-03-04 | End: 2025-03-07 | Stop reason: SDUPTHER

## 2025-03-04 RX ORDER — METOPROLOL SUCCINATE 25 MG/1
12.5 TABLET, EXTENDED RELEASE ORAL DAILY
Qty: 45 TABLET | Refills: 0 | Status: SHIPPED | OUTPATIENT
Start: 2025-03-04

## 2025-03-04 NOTE — CARE COORDINATION
DO John Pulmonology [phone]    3/7/2025 2:00 PM Chapis Ford APRN Internal Medicine [phone]    3/27/2025 11:30 AM Vijay Guerrero MD Cardiology [phone]            Follow Up:   Plan for next ACM outreach in approximately 1-2 days  to complete:  - disease specific assessments  - medication review   - follow up appointment with PCP/ DR Jensen .   Patient  is agreeable to this plan.            Call to Vicki to check on dose from DC paperwork  Metoprolol Tartrate 25 mg hs at the facility

## 2025-03-05 ENCOUNTER — OFFICE VISIT (OUTPATIENT)
Dept: PULMONOLOGY | Age: 80
End: 2025-03-05
Payer: MEDICARE

## 2025-03-05 VITALS
DIASTOLIC BLOOD PRESSURE: 68 MMHG | SYSTOLIC BLOOD PRESSURE: 118 MMHG | OXYGEN SATURATION: 98 % | TEMPERATURE: 97.7 F | HEIGHT: 62 IN | WEIGHT: 114 LBS | BODY MASS INDEX: 20.98 KG/M2 | HEART RATE: 63 BPM | RESPIRATION RATE: 18 BRPM

## 2025-03-05 DIAGNOSIS — J84.9 ILD (INTERSTITIAL LUNG DISEASE) (HCC): Primary | ICD-10-CM

## 2025-03-05 PROCEDURE — 1123F ACP DISCUSS/DSCN MKR DOCD: CPT | Performed by: INTERNAL MEDICINE

## 2025-03-05 PROCEDURE — 1159F MED LIST DOCD IN RCRD: CPT | Performed by: INTERNAL MEDICINE

## 2025-03-05 PROCEDURE — G2211 COMPLEX E/M VISIT ADD ON: HCPCS | Performed by: INTERNAL MEDICINE

## 2025-03-05 PROCEDURE — 99213 OFFICE O/P EST LOW 20 MIN: CPT | Performed by: INTERNAL MEDICINE

## 2025-03-05 PROCEDURE — 3078F DIAST BP <80 MM HG: CPT | Performed by: INTERNAL MEDICINE

## 2025-03-05 PROCEDURE — 3074F SYST BP LT 130 MM HG: CPT | Performed by: INTERNAL MEDICINE

## 2025-03-05 NOTE — PROGRESS NOTES
studies pertinent to this visit and date    Bronchoscopy  12/2018  -  No malignant cells identified    -Increased CD4/CD8 ratio and decreased /CD4 ratio.  These  results may be present in pulmonary sarcoidosis, viral infection,  alveolitis, and interstitial syndromes      12/2018  VINCENT  negative  ANCA (myeloperioxidase/serine protease 3)  negative  RF (RA):   negative  Anti-Scl 70 (Scleroderma):  negative  Anti-DS DNA (SLE):  negative  Anti- SSA (Ro) (Sjogrens):  negative  Anti-SSB (La) (Sjogrens):  negative  Anti-Bhumika (polymyositis/dermatomyositis):  negative  Anti-Smith (SLE):  negative  Anti-RNP (MCTD):  negative  CK: (inflammatory myopathy) 145  Sed rate  18    Total IgE Level:  116  Significant allergies:  none    HP panel:  Negative    I reviewed the above labs and images     Assessment/Plan:  1. ILD (interstitial lung disease) (HCC)  Due to chronic HP.  Continue with prednisone 10 mg lifelong.         Follow up in 6 months

## 2025-03-07 ENCOUNTER — OFFICE VISIT (OUTPATIENT)
Dept: INTERNAL MEDICINE CLINIC | Age: 80
End: 2025-03-07

## 2025-03-07 VITALS
SYSTOLIC BLOOD PRESSURE: 102 MMHG | OXYGEN SATURATION: 95 % | WEIGHT: 115 LBS | DIASTOLIC BLOOD PRESSURE: 70 MMHG | HEART RATE: 53 BPM | HEIGHT: 62 IN | BODY MASS INDEX: 21.16 KG/M2

## 2025-03-07 DIAGNOSIS — I95.9 HYPOTENSION, UNSPECIFIED HYPOTENSION TYPE: ICD-10-CM

## 2025-03-07 DIAGNOSIS — E11.40 TYPE 2 DIABETES MELLITUS WITH DIABETIC NEUROPATHY, WITHOUT LONG-TERM CURRENT USE OF INSULIN (HCC): ICD-10-CM

## 2025-03-07 DIAGNOSIS — F02.818 LATE ONSET ALZHEIMER'S DEMENTIA WITH BEHAVIORAL DISTURBANCE (HCC): ICD-10-CM

## 2025-03-07 DIAGNOSIS — G30.1 LATE ONSET ALZHEIMER'S DEMENTIA WITH BEHAVIORAL DISTURBANCE (HCC): ICD-10-CM

## 2025-03-07 DIAGNOSIS — I47.10 SVT (SUPRAVENTRICULAR TACHYCARDIA): ICD-10-CM

## 2025-03-07 DIAGNOSIS — I50.22 CHRONIC SYSTOLIC CHF (CONGESTIVE HEART FAILURE) (HCC): Primary | ICD-10-CM

## 2025-03-07 RX ORDER — AMIODARONE HYDROCHLORIDE 200 MG/1
200 TABLET ORAL DAILY
Qty: 90 TABLET | Refills: 1 | Status: SHIPPED | OUTPATIENT
Start: 2025-03-07

## 2025-03-07 SDOH — ECONOMIC STABILITY: FOOD INSECURITY: WITHIN THE PAST 12 MONTHS, THE FOOD YOU BOUGHT JUST DIDN'T LAST AND YOU DIDN'T HAVE MONEY TO GET MORE.: NEVER TRUE

## 2025-03-07 SDOH — ECONOMIC STABILITY: FOOD INSECURITY: WITHIN THE PAST 12 MONTHS, YOU WORRIED THAT YOUR FOOD WOULD RUN OUT BEFORE YOU GOT MONEY TO BUY MORE.: NEVER TRUE

## 2025-03-07 ASSESSMENT — PATIENT HEALTH QUESTIONNAIRE - PHQ9
3. TROUBLE FALLING OR STAYING ASLEEP: SEVERAL DAYS
9. THOUGHTS THAT YOU WOULD BE BETTER OFF DEAD, OR OF HURTING YOURSELF: NOT AT ALL
6. FEELING BAD ABOUT YOURSELF - OR THAT YOU ARE A FAILURE OR HAVE LET YOURSELF OR YOUR FAMILY DOWN: NOT AT ALL
10. IF YOU CHECKED OFF ANY PROBLEMS, HOW DIFFICULT HAVE THESE PROBLEMS MADE IT FOR YOU TO DO YOUR WORK, TAKE CARE OF THINGS AT HOME, OR GET ALONG WITH OTHER PEOPLE: NOT DIFFICULT AT ALL
4. FEELING TIRED OR HAVING LITTLE ENERGY: SEVERAL DAYS
5. POOR APPETITE OR OVEREATING: NOT AT ALL
8. MOVING OR SPEAKING SO SLOWLY THAT OTHER PEOPLE COULD HAVE NOTICED. OR THE OPPOSITE, BEING SO FIGETY OR RESTLESS THAT YOU HAVE BEEN MOVING AROUND A LOT MORE THAN USUAL: NOT AT ALL
SUM OF ALL RESPONSES TO PHQ QUESTIONS 1-9: 2
1. LITTLE INTEREST OR PLEASURE IN DOING THINGS: NOT AT ALL
SUM OF ALL RESPONSES TO PHQ QUESTIONS 1-9: 2
7. TROUBLE CONCENTRATING ON THINGS, SUCH AS READING THE NEWSPAPER OR WATCHING TELEVISION: NOT AT ALL
SUM OF ALL RESPONSES TO PHQ QUESTIONS 1-9: 2
2. FEELING DOWN, DEPRESSED OR HOPELESS: NOT AT ALL
SUM OF ALL RESPONSES TO PHQ QUESTIONS 1-9: 2

## 2025-03-07 ASSESSMENT — ENCOUNTER SYMPTOMS: SHORTNESS OF BREATH: 0

## 2025-03-07 NOTE — PATIENT INSTRUCTIONS
Cut metoprolol in half - only take half tablet daily   Okay to stop jardiance once you run out due to cost, please let the cardiologist know when you see them

## 2025-03-07 NOTE — PROGRESS NOTES
Date: 3/7/2025                                               Subjective/Objective:     Chief Complaint   Patient presents with    Follow-up       HPI    Traci Aiken is a 78 yo female, visit today for hospital/nursing home follow up. She is accompanied by her  today.     Patient was admitted 2/4/2025-2/10/2025. She was taken to ER by family for AMS and SOB. She was found to have acute on chronic CHF. EF 33% G2DD. She was diuresed with IV lasix. She had hypotension so aldactone was held and metoprolol decreased. She was discharged on jardiance, this is cost prohibitive. She had recurrent SVT, she was started on amiodarone. She was treated for suspected UTI. She was discharged to SNF. She returned home from SNF one week ago. [facility] - has RN and PT/OT. RN visiting - is due to return next week. Patient has been feeling well overall. Saw cardiology 2/21/2025. Has 4 week cardiology as well as pulmonology follow up arranged.     Patient suffers from dementia;  assists in providing HPI.      was not aware of change in metoprolol dose, has been giving 25 mg metoprolol daily not 12.5 mg daily.   Has not complained of any dizziness, light headedness. No falls. No SOB.   Seems like she is getting more back to herself since being home. Some agitation in the evenings, this is improving. Sleeping well at night time.            Patient Active Problem List    Diagnosis Date Noted    Chronic systolic CHF (congestive heart failure) (formerly [facility]) 03/07/2025    SVT (supraventricular tachycardia) 02/05/2025    DMII (diabetes mellitus, type 2) (formerly [facility]) 02/05/2025    Type 2 diabetes mellitus with diabetic neuropathy 03/10/2022    Late onset Alzheimer's dementia with behavioral disturbance (formerly [facility]) 12/08/2021    Cataracts, bilateral 11/05/2021    Acute cystitis with hematuria 09/19/2020    Anemia     RLQ abdominal pain 09/18/2020    Acute delirium 09/18/2020    Rectal bleeding 09/17/2020    Urinary frequency 08/19/2020    Type 2

## 2025-03-10 ENCOUNTER — CARE COORDINATION (OUTPATIENT)
Dept: CARE COORDINATION | Age: 80
End: 2025-03-10

## 2025-03-10 NOTE — CARE COORDINATION
Ambulatory Care Coordination Note     3/10/2025 2:19 PM     Patient Current Location:  Home: [address] [place], [place] [id number]     ACM contacted the patient by telephone. Verified name and  with patient as identifiers.         ACM: Rin Barreto RN     Challenges to be reviewed by the provider   Additional needs identified to be addressed with provider No  none               Method of communication with provider: chart routing.    Utilization: Patient has not had any utilization since our last call.     Care Summary Note:     Offered patient enrollment in the Remote Patient Monitoring (RPM) program for in-home monitoring: Yes, but did not enroll at this time: already monitoring with home equipment.     Assessments Completed:   No changes since last call    Medications Reviewed:   Completed during this call    Advance Care Planning:   Reviewed and current     Care Planning:   Education Documentation  General medication information, taught by Rin Barreto RN at 3/10/2025  2:18 PM.  Learner: Patient  Readiness: Acceptance  Method: Explanation  Response: Verbalizes Understanding, Needs Reinforcement    Home Health Care Services, taught by Rin Barreto RN at 3/10/2025  2:18 PM.  Learner: Patient  Readiness: Acceptance  Method: Explanation  Response: Verbalizes Understanding, Needs Reinforcement    Educate reporting changes in condition, taught by Rin Barreto RN at 3/10/2025  2:18 PM.  Learner: Patient  Readiness: Acceptance  Method: Explanation  Response: Verbalizes Understanding, Needs Reinforcement    Educate Patient on When to Call for Symptoms, taught by Rin Barreto RN at 3/10/2025  2:18 PM.  Learner: Patient  Readiness: Acceptance  Method: Explanation  Response: Verbalizes Understanding, Needs Reinforcement    Educate dietary adherence benefits, taught by Rin Barreto RN at 3/10/2025  2:18 PM.  Learner: Patient  Readiness: Acceptance  Method: Explanation  Response: Verbalizes

## 2025-03-20 ENCOUNTER — RESULTS FOLLOW-UP (OUTPATIENT)
Dept: CARDIOLOGY CLINIC | Age: 80
End: 2025-03-20

## 2025-03-26 ENCOUNTER — TELEPHONE (OUTPATIENT)
Dept: INTERNAL MEDICINE CLINIC | Age: 80
End: 2025-03-26

## 2025-03-26 DIAGNOSIS — F02.818 LATE ONSET ALZHEIMER'S DEMENTIA WITH BEHAVIORAL DISTURBANCE (HCC): Primary | ICD-10-CM

## 2025-03-26 DIAGNOSIS — G30.1 LATE ONSET ALZHEIMER'S DEMENTIA WITH BEHAVIORAL DISTURBANCE (HCC): Primary | ICD-10-CM

## 2025-03-26 NOTE — TELEPHONE ENCOUNTER
[facility] called, was the order received for speech therapy?    Please advise    Thank you    Ext [phone]

## 2025-03-26 NOTE — PROGRESS NOTES
[facility]   Electrophysiology Follow up     Date: 3/26/2025  I had the privilege of visiting Traci Aiken in the office.       CC: Hospital follow-up    HPI: Traci Aiken is a 79 y.o. female with a history of hypertension, hyperlipidemia, dementia, asthma, IBS, had initially presented to the emergency department in February 2025 with altered mentation, found to be in SVT with heart rates in the 180s, treated with Cardizem and amiodarone, 2-week monitor with short runs of atrial tachycardia, longest 13 beats.    Patient presents the office today as follow-up, denies palpitations or racing heart.    Review of System:  [x] Full ROS obtained and negative except as mentioned in HPI      Prior to Admission medications    Medication Sig Start Date End Date Taking? Authorizing Provider   amiodarone (CORDARONE) 200 MG tablet Take 1 tablet by mouth daily 3/7/25   Chapis Ford APRN   metoprolol succinate (TOPROL XL) 25 MG extended release tablet Take 0.5 tablets by mouth daily 3/4/25   Chapis Ford APRN   empagliflozin (JARDIANCE) 10 MG tablet Take 1 tablet by mouth daily 3/4/25   Chapis Ford APRN   alendronate (FOSAMAX) 70 MG tablet TAKE 1 TABLET ONCE WEEKLY ON AN EMPTY STOMACH BEFORE BREAKFAST REMAIN UPRIGHT FOR 30 MINUTES AND TAKE WITH 8 OUNCES OF WATER 1/15/25   Esteban Randle MD   omeprazole (PRILOSEC) 40 MG delayed release capsule TAKE 1 CAPSULE EVERY MORNING BEFORE BREAKFAST  Patient taking differently: 20 mg daily TAKE 1 CAPSULE EVERY MORNING BEFORE BREAKFAST 6/7/24   Ev Collado MD   montelukast (SINGULAIR) 10 MG tablet TAKE 1 TABLET DAILY 6/7/24   Ev Collado MD   Handicap Placard MISC by Does not apply route Duration - 5 years 5/1/24   Ev Collado MD   memantine (NAMENDA) 5 MG tablet TAKE 1 TABLET TWICE A DAY 12/11/23   Ev Collado MD   donepezil (ARICEPT) 5 MG tablet TAKE 1 TABLET NIGHTLY  Patient taking differently: Take

## 2025-03-27 ENCOUNTER — OFFICE VISIT (OUTPATIENT)
Dept: CARDIOLOGY CLINIC | Age: 80
End: 2025-03-27
Payer: MEDICARE

## 2025-03-27 VITALS
HEIGHT: 62 IN | OXYGEN SATURATION: 96 % | DIASTOLIC BLOOD PRESSURE: 84 MMHG | BODY MASS INDEX: 21.16 KG/M2 | HEART RATE: 73 BPM | SYSTOLIC BLOOD PRESSURE: 156 MMHG | WEIGHT: 115 LBS

## 2025-03-27 DIAGNOSIS — I47.10 SVT (SUPRAVENTRICULAR TACHYCARDIA): Primary | ICD-10-CM

## 2025-03-27 DIAGNOSIS — R00.2 PALPITATIONS: ICD-10-CM

## 2025-03-27 PROCEDURE — 3077F SYST BP >= 140 MM HG: CPT | Performed by: INTERNAL MEDICINE

## 2025-03-27 PROCEDURE — 93000 ELECTROCARDIOGRAM COMPLETE: CPT | Performed by: INTERNAL MEDICINE

## 2025-03-27 PROCEDURE — 3079F DIAST BP 80-89 MM HG: CPT | Performed by: INTERNAL MEDICINE

## 2025-03-27 PROCEDURE — 99214 OFFICE O/P EST MOD 30 MIN: CPT | Performed by: INTERNAL MEDICINE

## 2025-03-27 PROCEDURE — 1123F ACP DISCUSS/DSCN MKR DOCD: CPT | Performed by: INTERNAL MEDICINE

## 2025-03-27 NOTE — PATIENT INSTRUCTIONS
- Please come back and have a 14 day monitor completed at the end of May   - Will we call you with the monitor results   - Follow up in 1 year

## 2025-04-01 ENCOUNTER — TELEPHONE (OUTPATIENT)
Dept: INTERNAL MEDICINE CLINIC | Age: 80
End: 2025-04-01

## 2025-04-01 ENCOUNTER — CARE COORDINATION (OUTPATIENT)
Dept: CARE COORDINATION | Age: 80
End: 2025-04-01

## 2025-04-01 NOTE — CARE COORDINATION
Ambulatory Care Coordination Note     4/1/2025 2:30 PM     ACM outreach attempt by this ACM today to perform care management follow up . ACM was unable to reach the spouse/partner  by telephone today;   left voice message requesting a return phone call to this ACM.     ACM: Rin Barreto RN     Care Summary Note: ST? Resources    PCP/Specialist follow up:   Future Appointments         Provider Specialty Dept Phone    5/8/2025 1:00 PM Chapis Ford APRN Internal Medicine [phone]    5/28/2025 1:00 PM [facility] Cardiology [phone]    9/17/2025 1:40 PM Vijay Jensen DO Pulmonology [phone]            Follow Up:   Plan for next ACM outreach in approximately 1 week and 2 weeks to complete:  - disease specific assessments  - medication review  - advance care planning  - education .              [Restricted in physically strenuous activity but ambulatory and able to carry out work of a light or sedentary nature] : Status 1- Restricted in physically strenuous activity but ambulatory and able to carry out work of a light or sedentary nature, e.g., light house work, office work [Normal] : affect appropriate

## 2025-04-01 NOTE — TELEPHONE ENCOUNTER
Radha from [facility] called, have the orders for ST Eval been received?    If so, please fax back to [phone]    Radha: [phone]    Thank you

## 2025-05-02 ENCOUNTER — CARE COORDINATION (OUTPATIENT)
Dept: CARE COORDINATION | Age: 80
End: 2025-05-02

## 2025-05-02 NOTE — CARE COORDINATION
Ambulatory Care Coordination Note     5/2/2025 1:18 PM     ACM outreach attempt by this ACM today to perform care management follow up . ACM was unable to reach the spouse/partner  by telephone today;   left voice message requesting a return phone call to this ACM.     ACM: Rin Barreto, RN     Care Summary Note: holter monitor end of May  C- ST?   Follow up w pcp next week  Sundowning? BP OK on 1/2 tab Metoprolol?    PCP/Specialist follow up:   Future Appointments         Provider Specialty Dept Phone    5/8/2025 1:00 PM Chapis Ford APRN Internal Medicine [phone]    5/28/2025 1:00 PM [facility] Cardiology [phone]    9/17/2025 1:40 PM Vijay Jensen DO Pulmonology [phone]            Follow Up:   Plan for next ACM outreach in approximately 1 week and 2 weeks to complete:  - disease specific assessments  - medication review  - education   - follow-up appointment with PCP .

## 2025-05-08 ENCOUNTER — OFFICE VISIT (OUTPATIENT)
Dept: INTERNAL MEDICINE CLINIC | Age: 80
End: 2025-05-08

## 2025-05-08 VITALS
OXYGEN SATURATION: 95 % | BODY MASS INDEX: 21.53 KG/M2 | HEART RATE: 65 BPM | SYSTOLIC BLOOD PRESSURE: 142 MMHG | WEIGHT: 117 LBS | HEIGHT: 62 IN | DIASTOLIC BLOOD PRESSURE: 80 MMHG

## 2025-05-08 DIAGNOSIS — I47.10 SVT (SUPRAVENTRICULAR TACHYCARDIA): ICD-10-CM

## 2025-05-08 DIAGNOSIS — E11.40 TYPE 2 DIABETES MELLITUS WITH DIABETIC NEUROPATHY, WITHOUT LONG-TERM CURRENT USE OF INSULIN (HCC): Primary | ICD-10-CM

## 2025-05-08 DIAGNOSIS — I10 ESSENTIAL HYPERTENSION: ICD-10-CM

## 2025-05-08 DIAGNOSIS — I50.22 CHRONIC SYSTOLIC CHF (CONGESTIVE HEART FAILURE) (HCC): ICD-10-CM

## 2025-05-08 LAB
ANION GAP SERPL CALCULATED.3IONS-SCNC: 7 MMOL/L (ref 3–16)
BUN SERPL-MCNC: 13 MG/DL (ref 7–20)
CALCIUM SERPL-MCNC: 9.2 MG/DL (ref 8.3–10.6)
CHLORIDE SERPL-SCNC: 103 MMOL/L (ref 99–110)
CO2 SERPL-SCNC: 29 MMOL/L (ref 21–32)
CREAT SERPL-MCNC: 0.8 MG/DL (ref 0.6–1.2)
EST. AVERAGE GLUCOSE BLD GHB EST-MCNC: 111.2 MG/DL
GFR SERPLBLD CREATININE-BSD FMLA CKD-EPI: 74 ML/MIN/{1.73_M2}
GLUCOSE SERPL-MCNC: 139 MG/DL (ref 70–99)
HBA1C MFR BLD: 5.5 %
POTASSIUM SERPL-SCNC: 3.2 MMOL/L (ref 3.5–5.1)
SODIUM SERPL-SCNC: 139 MMOL/L (ref 136–145)

## 2025-05-08 ASSESSMENT — ENCOUNTER SYMPTOMS: SHORTNESS OF BREATH: 0

## 2025-05-08 NOTE — PROGRESS NOTES
Date: 5/8/2025                                               Subjective/Objective:     Chief Complaint   Patient presents with    Follow-up    Shortness of Breath       HPI    Traci Aiken is a 80 yo female, visit today for follow up on chronic medical conditions.   Patient suffers from dementia;  assists in providing HPI.  Patient reports she has been feeling well,  agrees with this. Pt denies SOB, leg swelling.  denies any breathing concerns or swelling concerns.     Patient was admitted 2/4/2025-2/10/2025. She was taken to ER by family for AMS and SOB. She was found to have acute on chronic CHF. EF 33% G2DD. She was diuresed with IV lasix. She had hypotension so aldactone was held and metoprolol decreased. She was discharged on jardiance, this is cost prohibitive. She had recurrent SVT, she was started on amiodarone. She was treated for suspected UTI. She was discharged to SNF. She returned home from SNF one week ago. [facility] - has RN and PT/OT. Seen for hospital follow up 3/7/2025, was advised to decrease metoprolol to 12.5 mg daily due to hypotension. Was not having c/o SOB at that time. Jardiance was cost prohibitive so that was stopped. She saw EP for follow up, who advised stopping amiodarone and repeat 14 day monitor in two months time. Has not followed up with cardiology.     Osteoporosis - on alendronate (started 6/2020). DEXA 6/2020 - needs, has order.     Hypertension - on metoprolol. She denies chest pain, SOB and HA.  does monitor her BP at home, has been around 140 systolic.      Diabetes - diet controlled. She does not monitor glucose at home. Denies polys. Eye exam is not current.      Interstitial lung disease - following with pulmonology, on prednisone daily.      Alzheimer's - on namenda and donepezil. She does follow with neurology (Dr Vincent). Her  manages her medications. She does not drive. Sleep is regular, does sleep a lot. Gets up during the night to

## 2025-05-09 ENCOUNTER — RESULTS FOLLOW-UP (OUTPATIENT)
Dept: INTERNAL MEDICINE CLINIC | Age: 80
End: 2025-05-09

## 2025-05-09 DIAGNOSIS — E87.6 HYPOKALEMIA: Primary | ICD-10-CM

## 2025-05-09 RX ORDER — POTASSIUM CHLORIDE 750 MG/1
10 TABLET, EXTENDED RELEASE ORAL DAILY
Qty: 30 TABLET | Refills: 0 | Status: SHIPPED | OUTPATIENT
Start: 2025-05-09

## 2025-05-16 ENCOUNTER — CARE COORDINATION (OUTPATIENT)
Dept: CARE COORDINATION | Age: 80
End: 2025-05-16

## 2025-05-16 ENCOUNTER — LAB (OUTPATIENT)
Dept: INTERNAL MEDICINE CLINIC | Age: 80
End: 2025-05-16
Payer: MEDICARE

## 2025-05-16 DIAGNOSIS — E87.6 HYPOKALEMIA: ICD-10-CM

## 2025-05-16 LAB — POTASSIUM SERPL-SCNC: 4.4 MMOL/L (ref 3.5–5.1)

## 2025-05-16 PROCEDURE — 36415 COLL VENOUS BLD VENIPUNCTURE: CPT | Performed by: NURSE PRACTITIONER

## 2025-05-16 NOTE — CARE COORDINATION
Ambulatory Care Coordination Note     2025 2:36 PM     Patient Current Location:  Home: [address] [place] [id number]     ACM contacted the patient by telephone. Verified name and  with patient as identifiers.         ACM: Rin Barreto RN     Challenges to be reviewed by the provider   Additional needs identified to be addressed with provider No  none               Method of communication with provider: chart routing.    Utilization: Patient has not had any utilization since our last call.     Care Summary Note:  Call to patient spoe w    Pt doing ok   Not wandering   Likes ball games   Had K recehecked today   Reviewed K rich foods, taking supplement    Needs to schedule Dexa- will be doing so      Offered patient enrollment in the Remote Patient Monitoring (RPM) program for in-home monitoring: n/a.     Assessments Completed:   No changes since last call    Medications Reviewed:   Completed during this call    Advance Care Planning:   Reviewed and current     Care Planning:   Education Documentation  General medication information, taught by Rin Barreto RN at 2025  2:36 PM.  Learner: Significant Other, Family  Readiness: Acceptance  Method: Explanation  Response: Verbalizes Understanding    Educate reporting changes in condition, taught by Rin Barreto RN at 2025  2:36 PM.  Learner: Significant Other, Family  Readiness: Acceptance  Method: Explanation  Response: Verbalizes Understanding    Educate dietary adherence benefits, taught by Rin Barreto RN at 2025  2:36 PM.  Learner: Significant Other, Family  Readiness: Acceptance  Method: Explanation  Response: Verbalizes Understanding    Educate caregiver effective coping behavior, taught by Rin Barreto RN at 2025  2:36 PM.  Learner: Significant Other, Family  Readiness: Acceptance  Method: Explanation  Response: Verbalizes Understanding    General medication information, taught by Rin Barreto, RN

## 2025-05-18 ENCOUNTER — RESULTS FOLLOW-UP (OUTPATIENT)
Dept: INTERNAL MEDICINE CLINIC | Age: 80
End: 2025-05-18

## 2025-05-29 DIAGNOSIS — E11.40 TYPE 2 DIABETES MELLITUS WITH DIABETIC NEUROPATHY, WITHOUT LONG-TERM CURRENT USE OF INSULIN (HCC): ICD-10-CM

## 2025-05-29 DIAGNOSIS — I47.10 SVT (SUPRAVENTRICULAR TACHYCARDIA): ICD-10-CM

## 2025-05-29 DIAGNOSIS — I10 ESSENTIAL HYPERTENSION: ICD-10-CM

## 2025-05-29 RX ORDER — EMPAGLIFLOZIN 10 MG/1
10 TABLET, FILM COATED ORAL DAILY
Qty: 90 TABLET | Refills: 1 | Status: SHIPPED | OUTPATIENT
Start: 2025-05-29

## 2025-05-29 RX ORDER — METOPROLOL SUCCINATE 25 MG/1
TABLET, EXTENDED RELEASE ORAL
Qty: 45 TABLET | Refills: 1 | Status: SHIPPED | OUTPATIENT
Start: 2025-05-29

## 2025-05-29 NOTE — TELEPHONE ENCOUNTER
Future Appointments   Date Time Provider Department Center   5/30/2025  1:15 PM [facility] 1 [facility]    8/14/2025  2:00 PM Chapis Ford APRN [facility] DEP   9/17/2025  1:40 PM Vijay Jensen DO  PULM [facility]

## 2025-05-30 ENCOUNTER — HOSPITAL ENCOUNTER (OUTPATIENT)
Dept: WOMENS IMAGING | Age: 80
Discharge: HOME | End: 2025-05-30
Payer: MEDICARE

## 2025-05-30 ENCOUNTER — CARE COORDINATION (OUTPATIENT)
Dept: CARE COORDINATION | Age: 80
End: 2025-05-30

## 2025-05-30 DIAGNOSIS — M81.0 AGE RELATED OSTEOPOROSIS, UNSPECIFIED PATHOLOGICAL FRACTURE PRESENCE: ICD-10-CM

## 2025-05-30 DIAGNOSIS — Z78.0 POST-MENOPAUSAL: ICD-10-CM

## 2025-05-30 PROCEDURE — 77080 DXA BONE DENSITY AXIAL: CPT

## 2025-05-30 NOTE — CARE COORDINATION
none  Plan for overcoming my barriers:  beings to appts, pt sometimes is reluctant  Confidence: 10/10  Anticipated Goal Completion Date: 4/30/25          COMPLETED: Patient Stated (pt-stated)         Contact [facility] re possible additional services.     Barriers: impairment:  cognitive  Plan for overcoming my barriers:  manages  Confidence: 10/10  Anticipated Goal Completion Date: 4/30/25        COMPLETED: Patient Stated (pt-stated)         Pt  will contact [facility] if increased needs develop, right now OK, had phone call w [facility]    Barriers: none  Plan for overcoming my barriers: N/A  Confidence: 9/10  Anticipated Goal Completion Date: 5/30/25               PCP/Specialist follow up:   Future Appointments         Provider Specialty Dept Phone    5/30/2025 1:15 PM (Arrive by 12:45 PM) [facility]  1 Radiology [phone]    8/14/2025 2:00 PM Chapis Ford APRN Internal Medicine [phone]    9/17/2025 1:40 PM Vijay Jensen DO Pulmonology [phone]            Follow Up:   No further Ambulatory Care Management follow-up scheduled at this time.  Caregiver has Ambulatory Care Manager's contact information for any further questions, concerns or needs.

## 2025-06-02 DIAGNOSIS — J44.89 COPD WITH ASTHMA (HCC): ICD-10-CM

## 2025-06-02 DIAGNOSIS — K21.9 GASTROESOPHAGEAL REFLUX DISEASE, UNSPECIFIED WHETHER ESOPHAGITIS PRESENT: ICD-10-CM

## 2025-06-02 RX ORDER — OMEPRAZOLE 40 MG/1
40 CAPSULE, DELAYED RELEASE ORAL
Qty: 90 CAPSULE | Refills: 3 | OUTPATIENT
Start: 2025-06-02

## 2025-06-02 RX ORDER — MONTELUKAST SODIUM 10 MG/1
10 TABLET ORAL DAILY
Qty: 90 TABLET | Refills: 3 | OUTPATIENT
Start: 2025-06-02

## 2025-06-02 NOTE — TELEPHONE ENCOUNTER
Future Appointments   Date Time Provider Department Center   8/14/2025  2:00 PM Chapis Ford APRN [facility] ECC DEP   9/17/2025  1:40 PM Vijay Jensen DO  PUL [facility]

## 2025-06-09 ENCOUNTER — RESULTS FOLLOW-UP (OUTPATIENT)
Dept: INTERNAL MEDICINE CLINIC | Age: 80
End: 2025-06-09

## 2025-06-12 ENCOUNTER — LAB (OUTPATIENT)
Dept: INTERNAL MEDICINE CLINIC | Age: 80
End: 2025-06-12
Payer: MEDICARE

## 2025-06-12 DIAGNOSIS — M81.0 AGE RELATED OSTEOPOROSIS, UNSPECIFIED PATHOLOGICAL FRACTURE PRESENCE: Primary | ICD-10-CM

## 2025-06-12 DIAGNOSIS — E87.6 HYPOKALEMIA: ICD-10-CM

## 2025-06-12 DIAGNOSIS — M81.0 AGE RELATED OSTEOPOROSIS, UNSPECIFIED PATHOLOGICAL FRACTURE PRESENCE: ICD-10-CM

## 2025-06-12 LAB
25(OH)D3 SERPL-MCNC: 17.6 NG/ML
ANION GAP SERPL CALCULATED.3IONS-SCNC: 10 MMOL/L (ref 3–16)
BUN SERPL-MCNC: 19 MG/DL (ref 7–20)
CALCIUM SERPL-MCNC: 9 MG/DL (ref 8.3–10.6)
CHLORIDE SERPL-SCNC: 103 MMOL/L (ref 99–110)
CO2 SERPL-SCNC: 26 MMOL/L (ref 21–32)
CREAT SERPL-MCNC: 0.8 MG/DL (ref 0.6–1.2)
GFR SERPLBLD CREATININE-BSD FMLA CKD-EPI: 74 ML/MIN/{1.73_M2}
GLUCOSE SERPL-MCNC: 172 MG/DL (ref 70–99)
POTASSIUM SERPL-SCNC: 3.7 MMOL/L (ref 3.5–5.1)
SODIUM SERPL-SCNC: 139 MMOL/L (ref 136–145)

## 2025-06-12 PROCEDURE — 36415 COLL VENOUS BLD VENIPUNCTURE: CPT | Performed by: NURSE PRACTITIONER

## 2025-06-13 ENCOUNTER — RESULTS FOLLOW-UP (OUTPATIENT)
Dept: INTERNAL MEDICINE CLINIC | Age: 80
End: 2025-06-13

## 2025-06-13 DIAGNOSIS — E55.9 VITAMIN D DEFICIENCY: Primary | ICD-10-CM

## 2025-06-13 RX ORDER — ERGOCALCIFEROL 1.25 MG/1
50000 CAPSULE, LIQUID FILLED ORAL WEEKLY
Qty: 12 CAPSULE | Refills: 0 | Status: SHIPPED | OUTPATIENT
Start: 2025-06-13 | End: 2025-08-30

## 2025-06-18 RX ORDER — ACETAMINOPHEN 325 MG/1
650 TABLET ORAL
OUTPATIENT
Start: 2025-06-18

## 2025-06-18 RX ORDER — SODIUM CHLORIDE 9 MG/ML
INJECTION, SOLUTION INTRAVENOUS CONTINUOUS
OUTPATIENT
Start: 2025-06-18

## 2025-06-18 RX ORDER — HYDROCORTISONE SODIUM SUCCINATE 100 MG/2ML
100 INJECTION INTRAMUSCULAR; INTRAVENOUS
OUTPATIENT
Start: 2025-06-18

## 2025-06-18 RX ORDER — EPINEPHRINE 1 MG/ML
0.3 INJECTION, SOLUTION, CONCENTRATE INTRAVENOUS PRN
OUTPATIENT
Start: 2025-06-18

## 2025-06-18 RX ORDER — ALBUTEROL SULFATE 90 UG/1
4 INHALANT RESPIRATORY (INHALATION) PRN
OUTPATIENT
Start: 2025-06-18

## 2025-06-18 RX ORDER — DIPHENHYDRAMINE HYDROCHLORIDE 50 MG/ML
50 INJECTION, SOLUTION INTRAMUSCULAR; INTRAVENOUS
OUTPATIENT
Start: 2025-06-18

## 2025-06-18 RX ORDER — ONDANSETRON 2 MG/ML
8 INJECTION INTRAMUSCULAR; INTRAVENOUS
OUTPATIENT
Start: 2025-06-18

## 2025-06-24 ENCOUNTER — RESULTS FOLLOW-UP (OUTPATIENT)
Dept: CARDIOLOGY CLINIC | Age: 80
End: 2025-06-24

## 2025-08-14 ENCOUNTER — OFFICE VISIT (OUTPATIENT)
Dept: INTERNAL MEDICINE CLINIC | Age: 80
End: 2025-08-14

## 2025-08-14 VITALS
SYSTOLIC BLOOD PRESSURE: 110 MMHG | BODY MASS INDEX: 21.94 KG/M2 | HEART RATE: 84 BPM | WEIGHT: 120 LBS | OXYGEN SATURATION: 97 % | DIASTOLIC BLOOD PRESSURE: 78 MMHG

## 2025-08-14 DIAGNOSIS — E11.40 TYPE 2 DIABETES MELLITUS WITH DIABETIC NEUROPATHY, WITHOUT LONG-TERM CURRENT USE OF INSULIN (HCC): ICD-10-CM

## 2025-08-14 DIAGNOSIS — E78.5 HYPERLIPIDEMIA, UNSPECIFIED HYPERLIPIDEMIA TYPE: ICD-10-CM

## 2025-08-14 DIAGNOSIS — E55.9 VITAMIN D DEFICIENCY: ICD-10-CM

## 2025-08-14 DIAGNOSIS — I10 ESSENTIAL HYPERTENSION: ICD-10-CM

## 2025-08-14 DIAGNOSIS — M81.0 AGE RELATED OSTEOPOROSIS, UNSPECIFIED PATHOLOGICAL FRACTURE PRESENCE: ICD-10-CM

## 2025-08-14 DIAGNOSIS — Z00.00 MEDICARE ANNUAL WELLNESS VISIT, SUBSEQUENT: Primary | ICD-10-CM

## 2025-08-14 LAB
25(OH)D3 SERPL-MCNC: 31.5 NG/ML
EST. AVERAGE GLUCOSE BLD GHB EST-MCNC: 128.4 MG/DL
HBA1C MFR BLD: 6.1 %

## 2025-08-14 ASSESSMENT — PATIENT HEALTH QUESTIONNAIRE - PHQ9
9. THOUGHTS THAT YOU WOULD BE BETTER OFF DEAD, OR OF HURTING YOURSELF: NOT AT ALL
1. LITTLE INTEREST OR PLEASURE IN DOING THINGS: NOT AT ALL
4. FEELING TIRED OR HAVING LITTLE ENERGY: MORE THAN HALF THE DAYS
SUM OF ALL RESPONSES TO PHQ QUESTIONS 1-9: 6
10. IF YOU CHECKED OFF ANY PROBLEMS, HOW DIFFICULT HAVE THESE PROBLEMS MADE IT FOR YOU TO DO YOUR WORK, TAKE CARE OF THINGS AT HOME, OR GET ALONG WITH OTHER PEOPLE: SOMEWHAT DIFFICULT
SUM OF ALL RESPONSES TO PHQ QUESTIONS 1-9: 6
SUM OF ALL RESPONSES TO PHQ QUESTIONS 1-9: 6
6. FEELING BAD ABOUT YOURSELF - OR THAT YOU ARE A FAILURE OR HAVE LET YOURSELF OR YOUR FAMILY DOWN: MORE THAN HALF THE DAYS
8. MOVING OR SPEAKING SO SLOWLY THAT OTHER PEOPLE COULD HAVE NOTICED. OR THE OPPOSITE, BEING SO FIGETY OR RESTLESS THAT YOU HAVE BEEN MOVING AROUND A LOT MORE THAN USUAL: SEVERAL DAYS
3. TROUBLE FALLING OR STAYING ASLEEP: NOT AT ALL
2. FEELING DOWN, DEPRESSED OR HOPELESS: NOT AT ALL
SUM OF ALL RESPONSES TO PHQ QUESTIONS 1-9: 6
5. POOR APPETITE OR OVEREATING: SEVERAL DAYS
7. TROUBLE CONCENTRATING ON THINGS, SUCH AS READING THE NEWSPAPER OR WATCHING TELEVISION: NOT AT ALL

## 2025-08-14 ASSESSMENT — LIFESTYLE VARIABLES
HOW MANY STANDARD DRINKS CONTAINING ALCOHOL DO YOU HAVE ON A TYPICAL DAY: PATIENT DOES NOT DRINK
HOW OFTEN DO YOU HAVE A DRINK CONTAINING ALCOHOL: NEVER

## 2025-08-15 ENCOUNTER — CARE COORDINATION (OUTPATIENT)
Dept: CARE COORDINATION | Age: 80
End: 2025-08-15

## 2025-09-03 ENCOUNTER — CARE COORDINATION (OUTPATIENT)
Dept: CARE COORDINATION | Age: 80
End: 2025-09-03

## (undated) DEVICE — SINGLE USE SUCTION VALVE MAJ-209: Brand: SINGLE USE SUCTION VALVE (STERILE)

## (undated) DEVICE — 60 ML SYRINGE REGULAR TIP: Brand: MONOJECT

## (undated) DEVICE — AIRLIFE™ MISTY MAX 10™ NEBULIZER WITH 7 FOOT (2.1 M) CRUSH RESISTANT OXYGEN TUBING, BAFFLED TEE ADAPTER (22 MM I.D./22 MM O.D.), MOUTHPIECE AND 6 INCH (15 CM) FLEXTUBE: Brand: AIRLIFE™

## (undated) DEVICE — ENDOSCOPY KIT: Brand: MEDLINE INDUSTRIES, INC.

## (undated) DEVICE — SINGLE USE BIOPSY VALVE MAJ-210: Brand: SINGLE USE BIOPSY VALVE (STERILE)

## (undated) DEVICE — MAJ-1414 SINGLE USE ADPATER BIOPSY VALV: Brand: SINGLE USE ADAPTOR BIOPSY VALVE

## (undated) DEVICE — Device: Brand: BALLOON

## (undated) DEVICE — SYRINGE MED 10ML POLYPR LUERSLIP TIP FLAT TOP W/O SFTY DISP

## (undated) DEVICE — TRAP,MUCUS SPECIMEN, 80CC: Brand: MEDLINE

## (undated) DEVICE — MASK, AEROSOL, ELONGATED, ADULT: Brand: MEDLINE

## (undated) DEVICE — Z INACTIVE USE 2711638 MASK SURG WHT STD PREM NONOIL HLTH CARE PARTICULATE RESP

## (undated) DEVICE — FORCEPS BX 240CM 2.4MM L NDL RAD JAW 4 M00513334

## (undated) DEVICE — CONTAINER SPEC 480ML CLR POLYSTYR 10% NEUT BUFF FRMLN ZN